# Patient Record
Sex: FEMALE | Race: WHITE | NOT HISPANIC OR LATINO | Employment: OTHER
[De-identification: names, ages, dates, MRNs, and addresses within clinical notes are randomized per-mention and may not be internally consistent; named-entity substitution may affect disease eponyms.]

---

## 2017-03-17 ENCOUNTER — RECORDS - HEALTHEAST (OUTPATIENT)
Dept: ADMINISTRATIVE | Facility: OTHER | Age: 62
End: 2017-03-17

## 2017-03-18 ENCOUNTER — COMMUNICATION - HEALTHEAST (OUTPATIENT)
Dept: FAMILY MEDICINE | Facility: CLINIC | Age: 62
End: 2017-03-18

## 2017-03-24 ENCOUNTER — COMMUNICATION - HEALTHEAST (OUTPATIENT)
Dept: FAMILY MEDICINE | Facility: CLINIC | Age: 62
End: 2017-03-24

## 2017-03-24 ENCOUNTER — OFFICE VISIT - HEALTHEAST (OUTPATIENT)
Dept: FAMILY MEDICINE | Facility: CLINIC | Age: 62
End: 2017-03-24

## 2017-03-24 DIAGNOSIS — Z02.89 PAIN MANAGEMENT CONTRACT SIGNED: ICD-10-CM

## 2017-03-24 DIAGNOSIS — Z12.31 ENCOUNTER FOR SCREENING MAMMOGRAM FOR MALIGNANT NEOPLASM OF BREAST: ICD-10-CM

## 2017-03-24 DIAGNOSIS — J45.30 MILD PERSISTENT ASTHMA: ICD-10-CM

## 2017-03-24 DIAGNOSIS — E78.5 HYPERLIPIDEMIA: ICD-10-CM

## 2017-03-24 DIAGNOSIS — E66.811 OBESITY (BMI 30.0-34.9): ICD-10-CM

## 2017-03-24 DIAGNOSIS — E11.9 TYPE 2 DIABETES MELLITUS (H): ICD-10-CM

## 2017-03-24 DIAGNOSIS — G47.33 OSA ON CPAP: ICD-10-CM

## 2017-03-24 DIAGNOSIS — I10 ESSENTIAL HYPERTENSION: ICD-10-CM

## 2017-03-24 DIAGNOSIS — G89.4 CHRONIC PAIN SYNDROME: ICD-10-CM

## 2017-03-24 LAB
CHOLEST SERPL-MCNC: 188 MG/DL
FASTING STATUS PATIENT QL REPORTED: YES
HBA1C MFR BLD: 6.3 % (ref 3.5–6)
HDLC SERPL-MCNC: 63 MG/DL
LDLC SERPL CALC-MCNC: 92 MG/DL
TRIGL SERPL-MCNC: 164 MG/DL

## 2017-04-19 ENCOUNTER — HOSPITAL ENCOUNTER (OUTPATIENT)
Dept: MAMMOGRAPHY | Facility: CLINIC | Age: 62
Discharge: HOME | End: 2017-04-19
Attending: FAMILY MEDICINE

## 2017-04-19 DIAGNOSIS — Z12.31 ENCOUNTER FOR SCREENING MAMMOGRAM FOR MALIGNANT NEOPLASM OF BREAST: ICD-10-CM

## 2017-04-29 ENCOUNTER — COMMUNICATION - HEALTHEAST (OUTPATIENT)
Dept: FAMILY MEDICINE | Facility: CLINIC | Age: 62
End: 2017-04-29

## 2017-04-29 DIAGNOSIS — M54.9 BACK PAIN: ICD-10-CM

## 2017-08-23 ENCOUNTER — COMMUNICATION - HEALTHEAST (OUTPATIENT)
Dept: SCHEDULING | Facility: CLINIC | Age: 62
End: 2017-08-23

## 2017-08-23 ENCOUNTER — COMMUNICATION - HEALTHEAST (OUTPATIENT)
Dept: FAMILY MEDICINE | Facility: CLINIC | Age: 62
End: 2017-08-23

## 2017-09-10 ENCOUNTER — COMMUNICATION - HEALTHEAST (OUTPATIENT)
Dept: FAMILY MEDICINE | Facility: CLINIC | Age: 62
End: 2017-09-10

## 2017-09-10 DIAGNOSIS — M79.7 FIBROMYALGIA: ICD-10-CM

## 2017-09-10 DIAGNOSIS — G89.4 CHRONIC PAIN SYNDROME: ICD-10-CM

## 2017-09-10 DIAGNOSIS — M54.9 BACK PAIN: ICD-10-CM

## 2017-09-13 ENCOUNTER — RECORDS - HEALTHEAST (OUTPATIENT)
Dept: ADMINISTRATIVE | Facility: OTHER | Age: 62
End: 2017-09-13

## 2017-09-29 ENCOUNTER — OFFICE VISIT - HEALTHEAST (OUTPATIENT)
Dept: FAMILY MEDICINE | Facility: CLINIC | Age: 62
End: 2017-09-29

## 2017-09-29 DIAGNOSIS — J45.30 MILD PERSISTENT ASTHMA WITHOUT COMPLICATION: ICD-10-CM

## 2017-09-29 DIAGNOSIS — F33.1 MODERATE EPISODE OF RECURRENT MAJOR DEPRESSIVE DISORDER (H): ICD-10-CM

## 2017-09-29 DIAGNOSIS — E78.5 HYPERLIPIDEMIA, UNSPECIFIED HYPERLIPIDEMIA TYPE: ICD-10-CM

## 2017-09-29 DIAGNOSIS — E11.9 TYPE 2 DIABETES MELLITUS (H): ICD-10-CM

## 2017-09-29 DIAGNOSIS — I10 ESSENTIAL HYPERTENSION WITH GOAL BLOOD PRESSURE LESS THAN 140/90: ICD-10-CM

## 2017-09-29 DIAGNOSIS — G89.4 CHRONIC PAIN SYNDROME: ICD-10-CM

## 2017-09-29 DIAGNOSIS — S32.039A: ICD-10-CM

## 2017-09-29 LAB
CHOLEST SERPL-MCNC: 249 MG/DL
FASTING STATUS PATIENT QL REPORTED: NO
HBA1C MFR BLD: 6 % (ref 3.5–6)
HDLC SERPL-MCNC: 69 MG/DL
LDLC SERPL CALC-MCNC: 146 MG/DL
TRIGL SERPL-MCNC: 171 MG/DL

## 2017-10-16 ENCOUNTER — COMMUNICATION - HEALTHEAST (OUTPATIENT)
Dept: FAMILY MEDICINE | Facility: CLINIC | Age: 62
End: 2017-10-16

## 2017-10-21 ENCOUNTER — AMBULATORY - HEALTHEAST (OUTPATIENT)
Dept: FAMILY MEDICINE | Facility: CLINIC | Age: 62
End: 2017-10-21

## 2017-10-26 ENCOUNTER — COMMUNICATION - HEALTHEAST (OUTPATIENT)
Dept: FAMILY MEDICINE | Facility: CLINIC | Age: 62
End: 2017-10-26

## 2017-11-10 ENCOUNTER — COMMUNICATION - HEALTHEAST (OUTPATIENT)
Dept: FAMILY MEDICINE | Facility: CLINIC | Age: 62
End: 2017-11-10

## 2017-11-10 ENCOUNTER — RECORDS - HEALTHEAST (OUTPATIENT)
Dept: ADMINISTRATIVE | Facility: OTHER | Age: 62
End: 2017-11-10

## 2017-11-10 DIAGNOSIS — G89.29 OTHER CHRONIC PAIN: ICD-10-CM

## 2017-11-22 ENCOUNTER — COMMUNICATION - HEALTHEAST (OUTPATIENT)
Dept: FAMILY MEDICINE | Facility: CLINIC | Age: 62
End: 2017-11-22

## 2017-11-22 DIAGNOSIS — M54.9 BACK PAIN: ICD-10-CM

## 2018-01-29 ENCOUNTER — AMBULATORY - HEALTHEAST (OUTPATIENT)
Dept: FAMILY MEDICINE | Facility: CLINIC | Age: 63
End: 2018-01-29

## 2018-01-29 ENCOUNTER — OFFICE VISIT - HEALTHEAST (OUTPATIENT)
Dept: FAMILY MEDICINE | Facility: CLINIC | Age: 63
End: 2018-01-29

## 2018-01-29 DIAGNOSIS — G89.4 CHRONIC PAIN SYNDROME: ICD-10-CM

## 2018-01-29 DIAGNOSIS — I10 ESSENTIAL HYPERTENSION: ICD-10-CM

## 2018-01-29 DIAGNOSIS — E66.9 OBESITY (BMI 30-39.9): ICD-10-CM

## 2018-01-29 DIAGNOSIS — E11.9 TYPE 2 DIABETES MELLITUS (H): ICD-10-CM

## 2018-01-29 DIAGNOSIS — E78.5 HYPERLIPIDEMIA, UNSPECIFIED HYPERLIPIDEMIA TYPE: ICD-10-CM

## 2018-01-29 DIAGNOSIS — J45.30 MILD PERSISTENT ASTHMA WITHOUT COMPLICATION: ICD-10-CM

## 2018-01-29 LAB
ANION GAP SERPL CALCULATED.3IONS-SCNC: 9 MMOL/L (ref 5–18)
BUN SERPL-MCNC: 18 MG/DL (ref 8–22)
CALCIUM SERPL-MCNC: 9.7 MG/DL (ref 8.5–10.5)
CHLORIDE BLD-SCNC: 108 MMOL/L (ref 98–107)
CHOLEST SERPL-MCNC: 234 MG/DL
CO2 SERPL-SCNC: 25 MMOL/L (ref 22–31)
CREAT SERPL-MCNC: 0.96 MG/DL (ref 0.6–1.1)
CREAT UR-MCNC: 70.8 MG/DL
FASTING STATUS PATIENT QL REPORTED: YES
GFR SERPL CREATININE-BSD FRML MDRD: 59 ML/MIN/1.73M2
GLUCOSE BLD-MCNC: 143 MG/DL (ref 70–125)
HBA1C MFR BLD: 6.8 % (ref 3.5–6)
HDLC SERPL-MCNC: 64 MG/DL
LDLC SERPL CALC-MCNC: 137 MG/DL
MICROALBUMIN UR-MCNC: <0.5 MG/DL (ref 0–1.99)
MICROALBUMIN/CREAT UR: NORMAL MG/G
POTASSIUM BLD-SCNC: 4.3 MMOL/L (ref 3.5–5)
SODIUM SERPL-SCNC: 142 MMOL/L (ref 136–145)
TRIGL SERPL-MCNC: 163 MG/DL

## 2018-01-30 ENCOUNTER — COMMUNICATION - HEALTHEAST (OUTPATIENT)
Dept: FAMILY MEDICINE | Facility: CLINIC | Age: 63
End: 2018-01-30

## 2018-01-30 DIAGNOSIS — I10 ESSENTIAL HYPERTENSION: ICD-10-CM

## 2018-02-05 ENCOUNTER — COMMUNICATION - HEALTHEAST (OUTPATIENT)
Dept: FAMILY MEDICINE | Facility: CLINIC | Age: 63
End: 2018-02-05

## 2018-04-06 ENCOUNTER — RECORDS - HEALTHEAST (OUTPATIENT)
Dept: ADMINISTRATIVE | Facility: OTHER | Age: 63
End: 2018-04-06

## 2018-04-12 ENCOUNTER — RECORDS - HEALTHEAST (OUTPATIENT)
Dept: ADMINISTRATIVE | Facility: OTHER | Age: 63
End: 2018-04-12

## 2018-06-04 ENCOUNTER — COMMUNICATION - HEALTHEAST (OUTPATIENT)
Dept: FAMILY MEDICINE | Facility: CLINIC | Age: 63
End: 2018-06-04

## 2018-06-04 DIAGNOSIS — E11.9 TYPE 2 DIABETES MELLITUS (H): ICD-10-CM

## 2018-06-11 ENCOUNTER — OFFICE VISIT - HEALTHEAST (OUTPATIENT)
Dept: FAMILY MEDICINE | Facility: CLINIC | Age: 63
End: 2018-06-11

## 2018-06-11 DIAGNOSIS — G89.4 CHRONIC PAIN SYNDROME: ICD-10-CM

## 2018-06-11 DIAGNOSIS — Z02.89 PAIN MANAGEMENT CONTRACT SIGNED: ICD-10-CM

## 2018-06-11 DIAGNOSIS — I10 ESSENTIAL HYPERTENSION: ICD-10-CM

## 2018-06-11 DIAGNOSIS — M51.369 DEGENERATIVE DISC DISEASE, LUMBAR: ICD-10-CM

## 2018-06-11 DIAGNOSIS — H60.90 OTITIS EXTERNA: ICD-10-CM

## 2018-06-11 DIAGNOSIS — E78.5 HYPERLIPIDEMIA, UNSPECIFIED HYPERLIPIDEMIA TYPE: ICD-10-CM

## 2018-06-11 DIAGNOSIS — E55.9 VITAMIN D DEFICIENCY: ICD-10-CM

## 2018-06-11 DIAGNOSIS — E11.9 TYPE 2 DIABETES MELLITUS (H): ICD-10-CM

## 2018-06-11 DIAGNOSIS — R26.89 POOR BALANCE: ICD-10-CM

## 2018-06-11 DIAGNOSIS — R29.6 FREQUENT FALLS: ICD-10-CM

## 2018-06-11 DIAGNOSIS — M50.30 DEGENERATIVE DISC DISEASE, CERVICAL: ICD-10-CM

## 2018-06-11 LAB
ANION GAP SERPL CALCULATED.3IONS-SCNC: 14 MMOL/L (ref 5–18)
BUN SERPL-MCNC: 23 MG/DL (ref 8–22)
CALCIUM SERPL-MCNC: 10.3 MG/DL (ref 8.5–10.5)
CHLORIDE BLD-SCNC: 107 MMOL/L (ref 98–107)
CHOLEST SERPL-MCNC: 193 MG/DL
CO2 SERPL-SCNC: 22 MMOL/L (ref 22–31)
CREAT SERPL-MCNC: 0.96 MG/DL (ref 0.6–1.1)
FASTING STATUS PATIENT QL REPORTED: YES
GFR SERPL CREATININE-BSD FRML MDRD: 59 ML/MIN/1.73M2
GLUCOSE BLD-MCNC: 119 MG/DL (ref 70–125)
HBA1C MFR BLD: 6.7 % (ref 3.5–6)
HDLC SERPL-MCNC: 57 MG/DL
LDLC SERPL CALC-MCNC: 103 MG/DL
POTASSIUM BLD-SCNC: 4.6 MMOL/L (ref 3.5–5)
SODIUM SERPL-SCNC: 143 MMOL/L (ref 136–145)
TRIGL SERPL-MCNC: 164 MG/DL

## 2018-06-11 RX ORDER — TRIAMCINOLONE ACETONIDE 0.25 MG/G
OINTMENT TOPICAL
Qty: 15 G | Refills: 0 | Status: SHIPPED | OUTPATIENT
Start: 2018-06-11 | End: 2024-06-19

## 2018-06-12 LAB
25(OH)D3 SERPL-MCNC: 18.5 NG/ML (ref 30–80)
25(OH)D3 SERPL-MCNC: 18.5 NG/ML (ref 30–80)

## 2018-06-15 ENCOUNTER — RECORDS - HEALTHEAST (OUTPATIENT)
Dept: ADMINISTRATIVE | Facility: OTHER | Age: 63
End: 2018-06-15

## 2018-06-19 ENCOUNTER — HOSPITAL ENCOUNTER (OUTPATIENT)
Dept: PHYSICAL MEDICINE AND REHAB | Facility: CLINIC | Age: 63
Discharge: HOME | End: 2018-06-19
Attending: FAMILY MEDICINE

## 2018-06-19 DIAGNOSIS — M47.816 LUMBAR FACET ARTHROPATHY: ICD-10-CM

## 2018-06-19 DIAGNOSIS — M54.50 LUMBAR SPINE PAIN: ICD-10-CM

## 2018-06-19 DIAGNOSIS — Z98.1 HISTORY OF LUMBAR FUSION: ICD-10-CM

## 2018-06-19 DIAGNOSIS — M79.7 FIBROMYALGIA: ICD-10-CM

## 2018-06-19 DIAGNOSIS — M12.88 OTHER SPECIFIC ARTHROPATHIES, NOT ELSEWHERE CLASSIFIED, OTHER SPECIFIED SITE: ICD-10-CM

## 2018-06-19 DIAGNOSIS — M47.812 CERVICAL FACET SYNDROME: ICD-10-CM

## 2018-06-19 DIAGNOSIS — M54.2 CERVICAL SPINE PAIN: ICD-10-CM

## 2018-06-19 DIAGNOSIS — R29.6 FALLS FREQUENTLY: ICD-10-CM

## 2018-06-19 ASSESSMENT — MIFFLIN-ST. JEOR: SCORE: 1469.94

## 2018-06-27 ENCOUNTER — COMMUNICATION - HEALTHEAST (OUTPATIENT)
Dept: PHYSICAL MEDICINE AND REHAB | Facility: CLINIC | Age: 63
End: 2018-06-27

## 2018-06-27 DIAGNOSIS — M47.816 LUMBAR FACET ARTHROPATHY: ICD-10-CM

## 2018-06-27 DIAGNOSIS — Z98.1 HISTORY OF LUMBAR FUSION: ICD-10-CM

## 2018-06-27 DIAGNOSIS — M48.061 LUMBAR SPINAL STENOSIS: ICD-10-CM

## 2018-06-27 DIAGNOSIS — M47.812 FACET ARTHROPATHY, CERVICAL: ICD-10-CM

## 2018-06-29 ENCOUNTER — RECORDS - HEALTHEAST (OUTPATIENT)
Dept: ADMINISTRATIVE | Facility: OTHER | Age: 63
End: 2018-06-29

## 2018-07-26 ENCOUNTER — OFFICE VISIT - HEALTHEAST (OUTPATIENT)
Dept: FAMILY MEDICINE | Facility: CLINIC | Age: 63
End: 2018-07-26

## 2018-07-26 DIAGNOSIS — L02.612 ABSCESS, TOE, LEFT: ICD-10-CM

## 2018-07-26 DIAGNOSIS — L03.039 CELLULITIS, TOE: ICD-10-CM
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
## 2018-07-26 DIAGNOSIS — E66.01 MORBID OBESITY (H): ICD-10-CM

## 2018-07-27 ENCOUNTER — COMMUNICATION - HEALTHEAST (OUTPATIENT)
Dept: FAMILY MEDICINE | Facility: CLINIC | Age: 63
End: 2018-07-27

## 2018-07-27 DIAGNOSIS — M54.9 BACK PAIN: ICD-10-CM

## 2018-08-10 ENCOUNTER — COMMUNICATION - HEALTHEAST (OUTPATIENT)
Dept: FAMILY MEDICINE | Facility: CLINIC | Age: 63
End: 2018-08-10

## 2018-08-10 DIAGNOSIS — E11.9 TYPE 2 DIABETES MELLITUS (H): ICD-10-CM

## 2018-08-11 ENCOUNTER — AMBULATORY - HEALTHEAST (OUTPATIENT)
Dept: FAMILY MEDICINE | Facility: CLINIC | Age: 63
End: 2018-08-11

## 2018-08-11 DIAGNOSIS — E11.9 TYPE 2 DIABETES MELLITUS (H): ICD-10-CM

## 2018-08-17 ENCOUNTER — RECORDS - HEALTHEAST (OUTPATIENT)
Dept: ADMINISTRATIVE | Facility: OTHER | Age: 63
End: 2018-08-17

## 2018-10-03 ENCOUNTER — COMMUNICATION - HEALTHEAST (OUTPATIENT)
Dept: FAMILY MEDICINE | Facility: CLINIC | Age: 63
End: 2018-10-03

## 2018-10-17 ENCOUNTER — COMMUNICATION - HEALTHEAST (OUTPATIENT)
Dept: FAMILY MEDICINE | Facility: CLINIC | Age: 63
End: 2018-10-17

## 2018-10-22 ENCOUNTER — OFFICE VISIT - HEALTHEAST (OUTPATIENT)
Dept: FAMILY MEDICINE | Facility: CLINIC | Age: 63
End: 2018-10-22

## 2018-10-22 DIAGNOSIS — E78.5 HYPERLIPIDEMIA, UNSPECIFIED HYPERLIPIDEMIA TYPE: ICD-10-CM

## 2018-10-22 DIAGNOSIS — Z23 IMMUNIZATION DUE: ICD-10-CM

## 2018-10-22 DIAGNOSIS — I10 ESSENTIAL HYPERTENSION: ICD-10-CM

## 2018-10-22 DIAGNOSIS — N32.81 OAB (OVERACTIVE BLADDER): ICD-10-CM

## 2018-10-22 DIAGNOSIS — E11.9 TYPE 2 DIABETES MELLITUS (H): ICD-10-CM

## 2018-10-22 DIAGNOSIS — E55.9 VITAMIN D DEFICIENCY: ICD-10-CM

## 2018-10-22 DIAGNOSIS — G89.4 CHRONIC PAIN SYNDROME: ICD-10-CM

## 2018-10-22 DIAGNOSIS — Z12.11 SCREEN FOR COLON CANCER: ICD-10-CM

## 2018-10-22 DIAGNOSIS — J45.30 MILD PERSISTENT ASTHMA WITHOUT COMPLICATION: ICD-10-CM

## 2018-10-22 LAB
ALBUMIN SERPL-MCNC: 4.1 G/DL (ref 3.5–5)
ALP SERPL-CCNC: 109 U/L (ref 45–120)
ALT SERPL W P-5'-P-CCNC: 24 U/L (ref 0–45)
ANION GAP SERPL CALCULATED.3IONS-SCNC: 11 MMOL/L (ref 5–18)
AST SERPL W P-5'-P-CCNC: 22 U/L (ref 0–40)
BILIRUB SERPL-MCNC: 0.7 MG/DL (ref 0–1)
BUN SERPL-MCNC: 23 MG/DL (ref 8–22)
CALCIUM SERPL-MCNC: 9.9 MG/DL (ref 8.5–10.5)
CHLORIDE BLD-SCNC: 107 MMOL/L (ref 98–107)
CHOLEST SERPL-MCNC: 199 MG/DL
CO2 SERPL-SCNC: 23 MMOL/L (ref 22–31)
CREAT SERPL-MCNC: 0.97 MG/DL (ref 0.6–1.1)
FASTING STATUS PATIENT QL REPORTED: ABNORMAL
GFR SERPL CREATININE-BSD FRML MDRD: 58 ML/MIN/1.73M2
GLUCOSE BLD-MCNC: 146 MG/DL (ref 70–125)
HBA1C MFR BLD: 7 % (ref 3.5–6)
HDLC SERPL-MCNC: 55 MG/DL
LDLC SERPL CALC-MCNC: 107 MG/DL
POTASSIUM BLD-SCNC: 4.5 MMOL/L (ref 3.5–5)
PROT SERPL-MCNC: 6.8 G/DL (ref 6–8)
SODIUM SERPL-SCNC: 141 MMOL/L (ref 136–145)
TRIGL SERPL-MCNC: 183 MG/DL

## 2018-10-23 LAB
25(OH)D3 SERPL-MCNC: 22.2 NG/ML (ref 30–80)
25(OH)D3 SERPL-MCNC: 22.2 NG/ML (ref 30–80)

## 2018-10-25 ENCOUNTER — COMMUNICATION - HEALTHEAST (OUTPATIENT)
Dept: SCHEDULING | Facility: CLINIC | Age: 63
End: 2018-10-25

## 2018-10-25 ENCOUNTER — COMMUNICATION - HEALTHEAST (OUTPATIENT)
Dept: FAMILY MEDICINE | Facility: CLINIC | Age: 63
End: 2018-10-25

## 2018-10-25 ENCOUNTER — RECORDS - HEALTHEAST (OUTPATIENT)
Dept: ADMINISTRATIVE | Facility: OTHER | Age: 63
End: 2018-10-25

## 2018-11-06 ENCOUNTER — RECORDS - HEALTHEAST (OUTPATIENT)
Dept: ADMINISTRATIVE | Facility: OTHER | Age: 63
End: 2018-11-06

## 2018-11-06 LAB — RETINOPATHY: NEGATIVE

## 2018-11-19 ENCOUNTER — COMMUNICATION - HEALTHEAST (OUTPATIENT)
Dept: FAMILY MEDICINE | Facility: CLINIC | Age: 63
End: 2018-11-19

## 2018-11-19 DIAGNOSIS — G89.29 OTHER CHRONIC PAIN: ICD-10-CM

## 2018-11-24 ENCOUNTER — COMMUNICATION - HEALTHEAST (OUTPATIENT)
Dept: FAMILY MEDICINE | Facility: CLINIC | Age: 63
End: 2018-11-24

## 2018-11-24 DIAGNOSIS — G89.4 CHRONIC PAIN SYNDROME: ICD-10-CM

## 2018-12-03 ENCOUNTER — OFFICE VISIT - HEALTHEAST (OUTPATIENT)
Dept: FAMILY MEDICINE | Facility: CLINIC | Age: 63
End: 2018-12-03

## 2018-12-03 DIAGNOSIS — E78.5 HYPERLIPIDEMIA, UNSPECIFIED HYPERLIPIDEMIA TYPE: ICD-10-CM

## 2018-12-03 DIAGNOSIS — G89.4 CHRONIC PAIN SYNDROME: ICD-10-CM

## 2018-12-03 DIAGNOSIS — J45.30 MILD PERSISTENT ASTHMA WITHOUT COMPLICATION: ICD-10-CM

## 2018-12-03 DIAGNOSIS — Z01.818 PREOP GENERAL PHYSICAL EXAM: ICD-10-CM

## 2018-12-03 DIAGNOSIS — I10 ESSENTIAL HYPERTENSION: ICD-10-CM

## 2018-12-03 DIAGNOSIS — E11.9 TYPE 2 DIABETES MELLITUS WITHOUT COMPLICATION, WITHOUT LONG-TERM CURRENT USE OF INSULIN (H): ICD-10-CM

## 2018-12-03 DIAGNOSIS — H25.9 SENILE CATARACT OF RIGHT EYE, UNSPECIFIED AGE-RELATED CATARACT TYPE: ICD-10-CM

## 2018-12-03 ASSESSMENT — MIFFLIN-ST. JEOR: SCORE: 1493.87

## 2018-12-12 ENCOUNTER — COMMUNICATION - HEALTHEAST (OUTPATIENT)
Dept: FAMILY MEDICINE | Facility: CLINIC | Age: 63
End: 2018-12-12

## 2018-12-28 ENCOUNTER — COMMUNICATION - HEALTHEAST (OUTPATIENT)
Dept: FAMILY MEDICINE | Facility: CLINIC | Age: 63
End: 2018-12-28

## 2018-12-28 DIAGNOSIS — E11.9 TYPE 2 DIABETES MELLITUS (H): ICD-10-CM

## 2019-01-21 ENCOUNTER — COMMUNICATION - HEALTHEAST (OUTPATIENT)
Dept: FAMILY MEDICINE | Facility: CLINIC | Age: 64
End: 2019-01-21

## 2019-01-21 RX ORDER — DICLOFENAC EPOLAMINE 0.01 G/1
1 SYSTEM TOPICAL EVERY 12 HOURS PRN
Qty: 60 PATCH | Refills: 5 | Status: SHIPPED | OUTPATIENT
Start: 2019-01-21 | End: 2023-07-06

## 2019-01-25 ENCOUNTER — COMMUNICATION - HEALTHEAST (OUTPATIENT)
Dept: FAMILY MEDICINE | Facility: CLINIC | Age: 64
End: 2019-01-25

## 2019-01-29 ENCOUNTER — COMMUNICATION - HEALTHEAST (OUTPATIENT)
Dept: FAMILY MEDICINE | Facility: CLINIC | Age: 64
End: 2019-01-29

## 2019-01-29 DIAGNOSIS — E11.9 TYPE 2 DIABETES MELLITUS (H): ICD-10-CM

## 2019-01-29 DIAGNOSIS — G89.4 CHRONIC PAIN SYNDROME: ICD-10-CM

## 2019-01-30 ENCOUNTER — COMMUNICATION - HEALTHEAST (OUTPATIENT)
Dept: FAMILY MEDICINE | Facility: CLINIC | Age: 64
End: 2019-01-30

## 2019-01-30 DIAGNOSIS — G89.4 CHRONIC PAIN SYNDROME: ICD-10-CM

## 2019-03-07 ENCOUNTER — RECORDS - HEALTHEAST (OUTPATIENT)
Dept: ADMINISTRATIVE | Facility: OTHER | Age: 64
End: 2019-03-07

## 2019-03-07 ENCOUNTER — COMMUNICATION - HEALTHEAST (OUTPATIENT)
Dept: FAMILY MEDICINE | Facility: CLINIC | Age: 64
End: 2019-03-07

## 2019-03-20 ENCOUNTER — COMMUNICATION - HEALTHEAST (OUTPATIENT)
Dept: FAMILY MEDICINE | Facility: CLINIC | Age: 64
End: 2019-03-20

## 2019-03-20 ENCOUNTER — RECORDS - HEALTHEAST (OUTPATIENT)
Dept: FAMILY MEDICINE | Facility: CLINIC | Age: 64
End: 2019-03-20

## 2019-03-29 ENCOUNTER — OFFICE VISIT - HEALTHEAST (OUTPATIENT)
Dept: FAMILY MEDICINE | Facility: CLINIC | Age: 64
End: 2019-03-29

## 2019-03-29 DIAGNOSIS — E78.5 HYPERLIPIDEMIA, UNSPECIFIED HYPERLIPIDEMIA TYPE: ICD-10-CM

## 2019-03-29 DIAGNOSIS — G89.4 CHRONIC PAIN SYNDROME: ICD-10-CM

## 2019-03-29 DIAGNOSIS — E11.9 TYPE 2 DIABETES MELLITUS WITHOUT COMPLICATION, WITHOUT LONG-TERM CURRENT USE OF INSULIN (H): ICD-10-CM

## 2019-03-29 DIAGNOSIS — E66.01 MORBID OBESITY (H): ICD-10-CM

## 2019-03-29 DIAGNOSIS — Z12.31 VISIT FOR SCREENING MAMMOGRAM: ICD-10-CM

## 2019-03-29 DIAGNOSIS — I10 ESSENTIAL HYPERTENSION: ICD-10-CM

## 2019-03-29 DIAGNOSIS — F33.1 MODERATE EPISODE OF RECURRENT MAJOR DEPRESSIVE DISORDER (H): ICD-10-CM

## 2019-03-29 LAB
ANION GAP SERPL CALCULATED.3IONS-SCNC: 12 MMOL/L (ref 5–18)
BUN SERPL-MCNC: 23 MG/DL (ref 8–22)
CALCIUM SERPL-MCNC: 10.6 MG/DL (ref 8.5–10.5)
CHLORIDE BLD-SCNC: 106 MMOL/L (ref 98–107)
CHOLEST SERPL-MCNC: 215 MG/DL
CO2 SERPL-SCNC: 24 MMOL/L (ref 22–31)
CREAT SERPL-MCNC: 1.11 MG/DL (ref 0.6–1.1)
CREAT UR-MCNC: 93.7 MG/DL
FASTING STATUS PATIENT QL REPORTED: ABNORMAL
GFR SERPL CREATININE-BSD FRML MDRD: 49 ML/MIN/1.73M2
GLUCOSE BLD-MCNC: 157 MG/DL (ref 70–125)
HBA1C MFR BLD: 7.1 % (ref 3.5–6)
HDLC SERPL-MCNC: 60 MG/DL
LDLC SERPL CALC-MCNC: 116 MG/DL
MICROALBUMIN UR-MCNC: <0.5 MG/DL (ref 0–1.99)
MICROALBUMIN/CREAT UR: NORMAL MG/G
POTASSIUM BLD-SCNC: 5.2 MMOL/L (ref 3.5–5)
SODIUM SERPL-SCNC: 142 MMOL/L (ref 136–145)
TRIGL SERPL-MCNC: 197 MG/DL

## 2019-04-05 ENCOUNTER — COMMUNICATION - HEALTHEAST (OUTPATIENT)
Dept: FAMILY MEDICINE | Facility: CLINIC | Age: 64
End: 2019-04-05

## 2019-04-05 DIAGNOSIS — H65.90 FLUID LEVEL BEHIND TYMPANIC MEMBRANE, UNSPECIFIED LATERALITY: ICD-10-CM

## 2019-04-10 ENCOUNTER — COMMUNICATION - HEALTHEAST (OUTPATIENT)
Dept: FAMILY MEDICINE | Facility: CLINIC | Age: 64
End: 2019-04-10

## 2019-04-10 DIAGNOSIS — N32.81 OAB (OVERACTIVE BLADDER): ICD-10-CM

## 2019-04-10 DIAGNOSIS — G89.4 CHRONIC PAIN SYNDROME: ICD-10-CM

## 2019-04-10 DIAGNOSIS — E66.811 OBESITY (BMI 30.0-34.9): ICD-10-CM

## 2019-04-18 ENCOUNTER — COMMUNICATION - HEALTHEAST (OUTPATIENT)
Dept: FAMILY MEDICINE | Facility: CLINIC | Age: 64
End: 2019-04-18

## 2019-04-19 ENCOUNTER — COMMUNICATION - HEALTHEAST (OUTPATIENT)
Dept: FAMILY MEDICINE | Facility: CLINIC | Age: 64
End: 2019-04-19

## 2019-04-24 ENCOUNTER — COMMUNICATION - HEALTHEAST (OUTPATIENT)
Dept: FAMILY MEDICINE | Facility: CLINIC | Age: 64
End: 2019-04-24

## 2019-05-07 ENCOUNTER — COMMUNICATION - HEALTHEAST (OUTPATIENT)
Dept: SCHEDULING | Facility: CLINIC | Age: 64
End: 2019-05-07

## 2019-05-07 ENCOUNTER — HOSPITAL ENCOUNTER (OUTPATIENT)
Dept: MAMMOGRAPHY | Facility: CLINIC | Age: 64
Discharge: HOME | End: 2019-05-07
Attending: FAMILY MEDICINE

## 2019-05-07 ENCOUNTER — COMMUNICATION - HEALTHEAST (OUTPATIENT)
Dept: FAMILY MEDICINE | Facility: CLINIC | Age: 64
End: 2019-05-07

## 2019-05-07 ENCOUNTER — OFFICE VISIT - HEALTHEAST (OUTPATIENT)
Dept: FAMILY MEDICINE | Facility: CLINIC | Age: 64
End: 2019-05-07

## 2019-05-07 DIAGNOSIS — H65.93 OME (OTITIS MEDIA WITH EFFUSION), BILATERAL: ICD-10-CM

## 2019-05-07 DIAGNOSIS — H91.93 DECREASED HEARING OF BOTH EARS: ICD-10-CM

## 2019-05-07 DIAGNOSIS — Z12.31 VISIT FOR SCREENING MAMMOGRAM: ICD-10-CM

## 2019-05-17 ENCOUNTER — RECORDS - HEALTHEAST (OUTPATIENT)
Dept: ADMINISTRATIVE | Facility: OTHER | Age: 64
End: 2019-05-17

## 2019-05-28 ENCOUNTER — RECORDS - HEALTHEAST (OUTPATIENT)
Dept: HEALTH INFORMATION MANAGEMENT | Facility: CLINIC | Age: 64
End: 2019-05-28

## 2019-06-03 ENCOUNTER — COMMUNICATION - HEALTHEAST (OUTPATIENT)
Dept: FAMILY MEDICINE | Facility: CLINIC | Age: 64
End: 2019-06-03

## 2019-06-04 ENCOUNTER — OFFICE VISIT - HEALTHEAST (OUTPATIENT)
Dept: AUDIOLOGY | Facility: CLINIC | Age: 64
End: 2019-06-04

## 2019-06-04 ENCOUNTER — OFFICE VISIT - HEALTHEAST (OUTPATIENT)
Dept: OTOLARYNGOLOGY | Facility: CLINIC | Age: 64
End: 2019-06-04

## 2019-06-04 DIAGNOSIS — H93.8X3 SENSATION OF FULLNESS IN BOTH EARS: ICD-10-CM

## 2019-06-04 DIAGNOSIS — H61.21 IMPACTED CERUMEN OF RIGHT EAR: ICD-10-CM

## 2019-06-04 DIAGNOSIS — H92.01 OTALGIA, RIGHT: ICD-10-CM

## 2019-06-04 DIAGNOSIS — H92.03 OTALGIA OF BOTH EARS: ICD-10-CM

## 2019-06-04 DIAGNOSIS — H90.3 SENSORINEURAL HEARING LOSS, BILATERAL: ICD-10-CM

## 2019-06-04 DIAGNOSIS — H90.3 SENSORINEURAL HEARING LOSS (SNHL) OF BOTH EARS: ICD-10-CM

## 2019-06-25 ENCOUNTER — RECORDS - HEALTHEAST (OUTPATIENT)
Dept: ADMINISTRATIVE | Facility: OTHER | Age: 64
End: 2019-06-25

## 2019-06-25 LAB — COLOGUARD-ABSTRACT: POSITIVE

## 2019-07-08 ENCOUNTER — COMMUNICATION - HEALTHEAST (OUTPATIENT)
Dept: ADMINISTRATIVE | Facility: CLINIC | Age: 64
End: 2019-07-08

## 2019-07-08 DIAGNOSIS — Z12.11 SCREEN FOR COLON CANCER: ICD-10-CM

## 2019-07-12 ENCOUNTER — RECORDS - HEALTHEAST (OUTPATIENT)
Dept: HEALTH INFORMATION MANAGEMENT | Facility: CLINIC | Age: 64
End: 2019-07-12

## 2019-07-17 ENCOUNTER — COMMUNICATION - HEALTHEAST (OUTPATIENT)
Dept: FAMILY MEDICINE | Facility: CLINIC | Age: 64
End: 2019-07-17

## 2019-07-22 ENCOUNTER — RECORDS - HEALTHEAST (OUTPATIENT)
Dept: ADMINISTRATIVE | Facility: OTHER | Age: 64
End: 2019-07-22

## 2019-07-25 ENCOUNTER — COMMUNICATION - HEALTHEAST (OUTPATIENT)
Dept: FAMILY MEDICINE | Facility: CLINIC | Age: 64
End: 2019-07-25

## 2019-08-07 ENCOUNTER — COMMUNICATION - HEALTHEAST (OUTPATIENT)
Dept: FAMILY MEDICINE | Facility: CLINIC | Age: 64
End: 2019-08-07

## 2019-08-07 DIAGNOSIS — E66.811 OBESITY (BMI 30.0-34.9): ICD-10-CM

## 2019-08-07 DIAGNOSIS — M54.9 BACK PAIN: ICD-10-CM

## 2019-08-07 DIAGNOSIS — I10 ESSENTIAL HYPERTENSION: ICD-10-CM

## 2019-09-05 ENCOUNTER — COMMUNICATION - HEALTHEAST (OUTPATIENT)
Dept: FAMILY MEDICINE | Facility: CLINIC | Age: 64
End: 2019-09-05

## 2019-09-05 DIAGNOSIS — M79.7 FIBROMYALGIA: ICD-10-CM

## 2019-09-09 ENCOUNTER — OFFICE VISIT - HEALTHEAST (OUTPATIENT)
Dept: FAMILY MEDICINE | Facility: CLINIC | Age: 64
End: 2019-09-09

## 2019-09-09 DIAGNOSIS — F33.1 MODERATE EPISODE OF RECURRENT MAJOR DEPRESSIVE DISORDER (H): ICD-10-CM

## 2019-09-09 DIAGNOSIS — I10 ESSENTIAL HYPERTENSION: ICD-10-CM

## 2019-09-09 DIAGNOSIS — J45.30 MILD PERSISTENT ASTHMA WITHOUT COMPLICATION: ICD-10-CM

## 2019-09-09 DIAGNOSIS — G89.4 CHRONIC PAIN SYNDROME: ICD-10-CM

## 2019-09-09 DIAGNOSIS — E11.9 TYPE 2 DIABETES MELLITUS WITHOUT COMPLICATION, WITHOUT LONG-TERM CURRENT USE OF INSULIN (H): ICD-10-CM

## 2019-09-09 DIAGNOSIS — E78.5 HYPERLIPIDEMIA, UNSPECIFIED HYPERLIPIDEMIA TYPE: ICD-10-CM

## 2019-09-09 DIAGNOSIS — M79.7 FIBROMYALGIA: ICD-10-CM

## 2019-09-09 DIAGNOSIS — E66.01 MORBID OBESITY (H): ICD-10-CM

## 2019-09-09 DIAGNOSIS — E55.9 VITAMIN D DEFICIENCY: ICD-10-CM

## 2019-09-09 LAB
ALBUMIN SERPL-MCNC: 4 G/DL (ref 3.5–5)
ALP SERPL-CCNC: 124 U/L (ref 45–120)
ALT SERPL W P-5'-P-CCNC: 20 U/L (ref 0–45)
ANION GAP SERPL CALCULATED.3IONS-SCNC: 12 MMOL/L (ref 5–18)
AST SERPL W P-5'-P-CCNC: 21 U/L (ref 0–40)
BILIRUB SERPL-MCNC: 0.3 MG/DL (ref 0–1)
BUN SERPL-MCNC: 17 MG/DL (ref 8–22)
CALCIUM SERPL-MCNC: 9.9 MG/DL (ref 8.5–10.5)
CHLORIDE BLD-SCNC: 107 MMOL/L (ref 98–107)
CHOLEST SERPL-MCNC: 226 MG/DL
CO2 SERPL-SCNC: 23 MMOL/L (ref 22–31)
CREAT SERPL-MCNC: 0.89 MG/DL (ref 0.6–1.1)
FASTING STATUS PATIENT QL REPORTED: NO
GFR SERPL CREATININE-BSD FRML MDRD: >60 ML/MIN/1.73M2
GLUCOSE BLD-MCNC: 129 MG/DL (ref 70–125)
HBA1C MFR BLD: 7.4 % (ref 3.5–6)
HDLC SERPL-MCNC: 53 MG/DL
LDLC SERPL CALC-MCNC: 107 MG/DL
POTASSIUM BLD-SCNC: 4.5 MMOL/L (ref 3.5–5)
PROT SERPL-MCNC: 6.7 G/DL (ref 6–8)
SODIUM SERPL-SCNC: 142 MMOL/L (ref 136–145)
TRIGL SERPL-MCNC: 331 MG/DL

## 2019-09-09 RX ORDER — CYCLOBENZAPRINE HCL 10 MG
TABLET ORAL
Qty: 60 TABLET | Refills: 0 | Status: SHIPPED | OUTPATIENT
Start: 2019-09-09 | End: 2022-06-28

## 2019-09-09 ASSESSMENT — ANXIETY QUESTIONNAIRES
7. FEELING AFRAID AS IF SOMETHING AWFUL MIGHT HAPPEN: NOT AT ALL
6. BECOMING EASILY ANNOYED OR IRRITABLE: NOT AT ALL
2. NOT BEING ABLE TO STOP OR CONTROL WORRYING: NOT AT ALL
1. FEELING NERVOUS, ANXIOUS, OR ON EDGE: NOT AT ALL
4. TROUBLE RELAXING: MORE THAN HALF THE DAYS
IF YOU CHECKED OFF ANY PROBLEMS ON THIS QUESTIONNAIRE, HOW DIFFICULT HAVE THESE PROBLEMS MADE IT FOR YOU TO DO YOUR WORK, TAKE CARE OF THINGS AT HOME, OR GET ALONG WITH OTHER PEOPLE: NOT DIFFICULT AT ALL
5. BEING SO RESTLESS THAT IT IS HARD TO SIT STILL: NOT AT ALL
GAD7 TOTAL SCORE: 2
3. WORRYING TOO MUCH ABOUT DIFFERENT THINGS: NOT AT ALL

## 2019-09-09 ASSESSMENT — PATIENT HEALTH QUESTIONNAIRE - PHQ9: SUM OF ALL RESPONSES TO PHQ QUESTIONS 1-9: 5

## 2019-09-09 ASSESSMENT — MIFFLIN-ST. JEOR: SCORE: 1454.41

## 2019-09-10 LAB
25(OH)D3 SERPL-MCNC: 26.1 NG/ML (ref 30–80)
25(OH)D3 SERPL-MCNC: 26.1 NG/ML (ref 30–80)

## 2019-09-11 ENCOUNTER — COMMUNICATION - HEALTHEAST (OUTPATIENT)
Dept: FAMILY MEDICINE | Facility: CLINIC | Age: 64
End: 2019-09-11

## 2019-09-11 DIAGNOSIS — G89.29 CHRONIC LOW BACK PAIN, UNSPECIFIED BACK PAIN LATERALITY, WITH SCIATICA PRESENCE UNSPECIFIED: ICD-10-CM

## 2019-09-11 DIAGNOSIS — M25.519 ARTHRALGIA OF SHOULDER, UNSPECIFIED LATERALITY: ICD-10-CM

## 2019-09-11 DIAGNOSIS — M54.5 CHRONIC LOW BACK PAIN, UNSPECIFIED BACK PAIN LATERALITY, WITH SCIATICA PRESENCE UNSPECIFIED: ICD-10-CM

## 2019-09-12 ENCOUNTER — COMMUNICATION - HEALTHEAST (OUTPATIENT)
Dept: FAMILY MEDICINE | Facility: CLINIC | Age: 64
End: 2019-09-12

## 2019-09-12 DIAGNOSIS — E11.9 TYPE 2 DIABETES MELLITUS (H): ICD-10-CM

## 2019-09-12 DIAGNOSIS — N32.81 OAB (OVERACTIVE BLADDER): ICD-10-CM

## 2019-09-16 ENCOUNTER — RECORDS - HEALTHEAST (OUTPATIENT)
Dept: FAMILY MEDICINE | Facility: CLINIC | Age: 64
End: 2019-09-16

## 2019-09-18 ENCOUNTER — COMMUNICATION - HEALTHEAST (OUTPATIENT)
Dept: FAMILY MEDICINE | Facility: CLINIC | Age: 64
End: 2019-09-18

## 2019-09-20 ENCOUNTER — COMMUNICATION - HEALTHEAST (OUTPATIENT)
Dept: FAMILY MEDICINE | Facility: CLINIC | Age: 64
End: 2019-09-20

## 2019-09-20 ENCOUNTER — COMMUNICATION - HEALTHEAST (OUTPATIENT)
Dept: GERIATRIC MEDICINE | Facility: CLINIC | Age: 64
End: 2019-09-20

## 2019-09-23 ENCOUNTER — COMMUNICATION - HEALTHEAST (OUTPATIENT)
Dept: FAMILY MEDICINE | Facility: CLINIC | Age: 64
End: 2019-09-23

## 2019-09-23 DIAGNOSIS — M54.5 CHRONIC LOW BACK PAIN, UNSPECIFIED BACK PAIN LATERALITY, WITH SCIATICA PRESENCE UNSPECIFIED: ICD-10-CM

## 2019-09-23 DIAGNOSIS — G89.29 CHRONIC LOW BACK PAIN, UNSPECIFIED BACK PAIN LATERALITY, WITH SCIATICA PRESENCE UNSPECIFIED: ICD-10-CM

## 2019-09-26 RX ORDER — LIDOCAINE 50 MG/G
OINTMENT TOPICAL 3 TIMES DAILY
Qty: 120 G | Refills: 0 | Status: SHIPPED | OUTPATIENT
Start: 2019-09-26 | End: 2023-07-05

## 2019-11-01 ENCOUNTER — COMMUNICATION - HEALTHEAST (OUTPATIENT)
Dept: FAMILY MEDICINE | Facility: CLINIC | Age: 64
End: 2019-11-01

## 2019-11-01 DIAGNOSIS — E55.9 VITAMIN D DEFICIENCY: ICD-10-CM

## 2019-11-27 ENCOUNTER — COMMUNICATION - HEALTHEAST (OUTPATIENT)
Dept: FAMILY MEDICINE | Facility: CLINIC | Age: 64
End: 2019-11-27

## 2019-11-27 DIAGNOSIS — E11.9 TYPE 2 DIABETES MELLITUS (H): ICD-10-CM

## 2019-12-01 ENCOUNTER — COMMUNICATION - HEALTHEAST (OUTPATIENT)
Dept: FAMILY MEDICINE | Facility: CLINIC | Age: 64
End: 2019-12-01

## 2019-12-01 DIAGNOSIS — E11.9 TYPE 2 DIABETES MELLITUS (H): ICD-10-CM

## 2019-12-05 ENCOUNTER — COMMUNICATION - HEALTHEAST (OUTPATIENT)
Dept: FAMILY MEDICINE | Facility: CLINIC | Age: 64
End: 2019-12-05

## 2019-12-06 ENCOUNTER — COMMUNICATION - HEALTHEAST (OUTPATIENT)
Dept: FAMILY MEDICINE | Facility: CLINIC | Age: 64
End: 2019-12-06

## 2020-01-11 ENCOUNTER — COMMUNICATION - HEALTHEAST (OUTPATIENT)
Dept: FAMILY MEDICINE | Facility: CLINIC | Age: 65
End: 2020-01-11

## 2020-01-11 DIAGNOSIS — I10 ESSENTIAL HYPERTENSION: ICD-10-CM

## 2020-01-14 RX ORDER — SPIRONOLACTONE 100 MG/1
TABLET, FILM COATED ORAL
Qty: 90 TABLET | Refills: 2 | Status: SHIPPED | OUTPATIENT
Start: 2020-01-14 | End: 2022-07-12 | Stop reason: ALTCHOICE

## 2020-01-23 ENCOUNTER — COMMUNICATION - HEALTHEAST (OUTPATIENT)
Dept: FAMILY MEDICINE | Facility: CLINIC | Age: 65
End: 2020-01-23

## 2020-02-03 ENCOUNTER — COMMUNICATION - HEALTHEAST (OUTPATIENT)
Dept: FAMILY MEDICINE | Facility: CLINIC | Age: 65
End: 2020-02-03

## 2020-02-03 DIAGNOSIS — I10 ESSENTIAL HYPERTENSION: ICD-10-CM

## 2020-02-09 ENCOUNTER — COMMUNICATION - HEALTHEAST (OUTPATIENT)
Dept: FAMILY MEDICINE | Facility: CLINIC | Age: 65
End: 2020-02-09

## 2020-02-09 DIAGNOSIS — E66.811 OBESITY (BMI 30.0-34.9): ICD-10-CM

## 2020-03-02 ENCOUNTER — COMMUNICATION - HEALTHEAST (OUTPATIENT)
Dept: FAMILY MEDICINE | Facility: CLINIC | Age: 65
End: 2020-03-02

## 2020-03-02 ENCOUNTER — RECORDS - HEALTHEAST (OUTPATIENT)
Dept: FAMILY MEDICINE | Facility: CLINIC | Age: 65
End: 2020-03-02

## 2020-03-02 DIAGNOSIS — G89.4 CHRONIC PAIN SYNDROME: ICD-10-CM

## 2020-03-02 DIAGNOSIS — E55.9 VITAMIN D DEFICIENCY: ICD-10-CM

## 2020-03-02 DIAGNOSIS — I10 ESSENTIAL HYPERTENSION: ICD-10-CM

## 2020-03-02 RX ORDER — ACETAMINOPHEN 500 MG/1
1000 CAPSULE ORAL EVERY 6 HOURS PRN
Qty: 180 CAPSULE | Refills: 1 | Status: SHIPPED | OUTPATIENT
Start: 2020-03-02 | End: 2021-10-13

## 2020-03-13 ENCOUNTER — OFFICE VISIT - HEALTHEAST (OUTPATIENT)
Dept: FAMILY MEDICINE | Facility: CLINIC | Age: 65
End: 2020-03-13

## 2020-03-13 DIAGNOSIS — G89.4 CHRONIC PAIN SYNDROME: ICD-10-CM

## 2020-03-13 DIAGNOSIS — E66.01 MORBID OBESITY (H): ICD-10-CM

## 2020-03-13 DIAGNOSIS — E78.5 HYPERLIPIDEMIA, UNSPECIFIED HYPERLIPIDEMIA TYPE: ICD-10-CM

## 2020-03-13 DIAGNOSIS — Z11.59 ENCOUNTER FOR HEPATITIS C SCREENING TEST FOR LOW RISK PATIENT: ICD-10-CM

## 2020-03-13 DIAGNOSIS — J45.20 MILD INTERMITTENT ASTHMA WITHOUT COMPLICATION: ICD-10-CM

## 2020-03-13 DIAGNOSIS — Z79.899 CONTROLLED SUBSTANCE AGREEMENT SIGNED: ICD-10-CM

## 2020-03-13 DIAGNOSIS — E11.9 TYPE 2 DIABETES MELLITUS WITHOUT COMPLICATION, WITHOUT LONG-TERM CURRENT USE OF INSULIN (H): ICD-10-CM

## 2020-03-13 DIAGNOSIS — I10 ESSENTIAL HYPERTENSION: ICD-10-CM

## 2020-03-13 LAB
ANION GAP SERPL CALCULATED.3IONS-SCNC: 8 MMOL/L (ref 5–18)
BUN SERPL-MCNC: 21 MG/DL (ref 8–22)
CALCIUM SERPL-MCNC: 9.3 MG/DL (ref 8.5–10.5)
CHLORIDE BLD-SCNC: 102 MMOL/L (ref 98–107)
CHOLEST SERPL-MCNC: 161 MG/DL
CO2 SERPL-SCNC: 27 MMOL/L (ref 22–31)
CREAT SERPL-MCNC: 0.95 MG/DL (ref 0.6–1.1)
FASTING STATUS PATIENT QL REPORTED: YES
GFR SERPL CREATININE-BSD FRML MDRD: 59 ML/MIN/1.73M2
GLUCOSE BLD-MCNC: 148 MG/DL (ref 70–125)
HBA1C MFR BLD: 7 %
HDLC SERPL-MCNC: 55 MG/DL
LDLC SERPL CALC-MCNC: 70 MG/DL
POTASSIUM BLD-SCNC: 4.4 MMOL/L (ref 3.5–5)
SODIUM SERPL-SCNC: 137 MMOL/L (ref 136–145)
TRIGL SERPL-MCNC: 182 MG/DL

## 2020-03-13 RX ORDER — ALBUTEROL SULFATE 90 UG/1
2 AEROSOL, METERED RESPIRATORY (INHALATION) EVERY 4 HOURS PRN
Qty: 1 INHALER | Refills: 1 | Status: SHIPPED | OUTPATIENT
Start: 2020-03-13 | End: 2024-06-19

## 2020-03-13 ASSESSMENT — MIFFLIN-ST. JEOR: SCORE: 1405.36

## 2020-03-16 LAB — HCV AB SERPL QL IA: NEGATIVE

## 2020-03-23 ENCOUNTER — COMMUNICATION - HEALTHEAST (OUTPATIENT)
Dept: FAMILY MEDICINE | Facility: CLINIC | Age: 65
End: 2020-03-23

## 2020-03-23 DIAGNOSIS — E66.811 OBESITY (BMI 30.0-34.9): ICD-10-CM

## 2020-03-31 ENCOUNTER — COMMUNICATION - HEALTHEAST (OUTPATIENT)
Dept: FAMILY MEDICINE | Facility: CLINIC | Age: 65
End: 2020-03-31

## 2020-03-31 DIAGNOSIS — E11.9 TYPE 2 DIABETES MELLITUS (H): ICD-10-CM

## 2020-04-01 ENCOUNTER — AMBULATORY - HEALTHEAST (OUTPATIENT)
Dept: FAMILY MEDICINE | Facility: CLINIC | Age: 65
End: 2020-04-01

## 2020-04-01 ENCOUNTER — COMMUNICATION - HEALTHEAST (OUTPATIENT)
Dept: FAMILY MEDICINE | Facility: CLINIC | Age: 65
End: 2020-04-01

## 2020-04-01 DIAGNOSIS — E11.9 TYPE 2 DIABETES MELLITUS (H): ICD-10-CM

## 2020-04-03 ENCOUNTER — COMMUNICATION - HEALTHEAST (OUTPATIENT)
Dept: FAMILY MEDICINE | Facility: CLINIC | Age: 65
End: 2020-04-03

## 2020-04-03 DIAGNOSIS — I10 ESSENTIAL HYPERTENSION: ICD-10-CM

## 2020-04-06 ENCOUNTER — COMMUNICATION - HEALTHEAST (OUTPATIENT)
Dept: FAMILY MEDICINE | Facility: CLINIC | Age: 65
End: 2020-04-06

## 2020-05-28 ENCOUNTER — COMMUNICATION - HEALTHEAST (OUTPATIENT)
Dept: FAMILY MEDICINE | Facility: CLINIC | Age: 65
End: 2020-05-28

## 2020-05-28 DIAGNOSIS — K59.01 SLOW TRANSIT CONSTIPATION: ICD-10-CM

## 2020-06-01 RX ORDER — SENNOSIDES 8.6 MG/1
1 TABLET ORAL 2 TIMES DAILY
Qty: 180 TABLET | Refills: 3 | Status: SHIPPED | OUTPATIENT
Start: 2020-06-01 | End: 2022-10-08

## 2020-06-16 ENCOUNTER — COMMUNICATION - HEALTHEAST (OUTPATIENT)
Dept: FAMILY MEDICINE | Facility: CLINIC | Age: 65
End: 2020-06-16

## 2020-06-16 DIAGNOSIS — G89.4 CHRONIC PAIN SYNDROME: ICD-10-CM

## 2020-06-20 ENCOUNTER — COMMUNICATION - HEALTHEAST (OUTPATIENT)
Dept: FAMILY MEDICINE | Facility: CLINIC | Age: 65
End: 2020-06-20

## 2020-06-20 DIAGNOSIS — M54.50 CHRONIC LOW BACK PAIN, UNSPECIFIED BACK PAIN LATERALITY, UNSPECIFIED WHETHER SCIATICA PRESENT: ICD-10-CM

## 2020-06-20 DIAGNOSIS — G89.29 CHRONIC LOW BACK PAIN, UNSPECIFIED BACK PAIN LATERALITY, UNSPECIFIED WHETHER SCIATICA PRESENT: ICD-10-CM

## 2020-06-20 DIAGNOSIS — G89.4 CHRONIC PAIN SYNDROME: ICD-10-CM

## 2020-06-24 ENCOUNTER — HOSPITAL ENCOUNTER (OUTPATIENT)
Dept: PHYSICAL MEDICINE AND REHAB | Facility: CLINIC | Age: 65
Discharge: HOME | End: 2020-06-24
Attending: PHYSICIAN ASSISTANT

## 2020-06-24 DIAGNOSIS — Z98.1 HISTORY OF LUMBAR FUSION: ICD-10-CM

## 2020-06-24 DIAGNOSIS — R20.0 ARM NUMBNESS: ICD-10-CM

## 2020-06-24 DIAGNOSIS — M48.061 SPINAL STENOSIS OF LUMBAR REGION, UNSPECIFIED WHETHER NEUROGENIC CLAUDICATION PRESENT: ICD-10-CM

## 2020-06-24 DIAGNOSIS — M54.2 CERVICALGIA: ICD-10-CM

## 2020-06-24 DIAGNOSIS — M54.50 LUMBAR SPINE PAIN: ICD-10-CM

## 2020-06-25 ENCOUNTER — COMMUNICATION - HEALTHEAST (OUTPATIENT)
Dept: FAMILY MEDICINE | Facility: CLINIC | Age: 65
End: 2020-06-25

## 2020-06-25 DIAGNOSIS — M54.9 BACK PAIN: ICD-10-CM

## 2020-07-03 ENCOUNTER — HOSPITAL ENCOUNTER (OUTPATIENT)
Dept: MRI IMAGING | Facility: CLINIC | Age: 65
Discharge: HOME | End: 2020-07-03
Attending: PHYSICIAN ASSISTANT

## 2020-07-03 DIAGNOSIS — Z98.1 HISTORY OF LUMBAR FUSION: ICD-10-CM

## 2020-07-03 DIAGNOSIS — M48.061 SPINAL STENOSIS OF LUMBAR REGION, UNSPECIFIED WHETHER NEUROGENIC CLAUDICATION PRESENT: ICD-10-CM

## 2020-07-03 DIAGNOSIS — M54.50 LUMBAR SPINE PAIN: ICD-10-CM

## 2020-07-03 DIAGNOSIS — R20.0 ARM NUMBNESS: ICD-10-CM

## 2020-07-03 DIAGNOSIS — M54.2 CERVICALGIA: ICD-10-CM

## 2020-07-06 ENCOUNTER — COMMUNICATION - HEALTHEAST (OUTPATIENT)
Dept: PHYSICAL MEDICINE AND REHAB | Facility: CLINIC | Age: 65
End: 2020-07-06

## 2020-07-08 ENCOUNTER — HOSPITAL ENCOUNTER (OUTPATIENT)
Dept: PHYSICAL MEDICINE AND REHAB | Facility: CLINIC | Age: 65
Discharge: HOME | End: 2020-07-08
Attending: PHYSICIAN ASSISTANT

## 2020-07-08 DIAGNOSIS — Z98.1 HISTORY OF LUMBAR FUSION: ICD-10-CM

## 2020-07-08 DIAGNOSIS — M48.062 SPINAL STENOSIS OF LUMBAR REGION WITH NEUROGENIC CLAUDICATION: ICD-10-CM

## 2020-07-08 DIAGNOSIS — M47.812 ARTHROPATHY OF CERVICAL FACET JOINT: ICD-10-CM

## 2020-07-08 DIAGNOSIS — M79.18 MYOFASCIAL PAIN: ICD-10-CM

## 2020-07-08 ASSESSMENT — MIFFLIN-ST. JEOR: SCORE: 1404.51

## 2020-07-13 ENCOUNTER — COMMUNICATION - HEALTHEAST (OUTPATIENT)
Dept: FAMILY MEDICINE | Facility: CLINIC | Age: 65
End: 2020-07-13

## 2020-07-13 DIAGNOSIS — I10 ESSENTIAL HYPERTENSION: ICD-10-CM

## 2020-07-13 RX ORDER — LISINOPRIL 2.5 MG/1
TABLET ORAL
Qty: 90 TABLET | Refills: 0 | Status: SHIPPED | OUTPATIENT
Start: 2020-07-13 | End: 2022-01-03

## 2020-07-14 ENCOUNTER — AMBULATORY - HEALTHEAST (OUTPATIENT)
Dept: PHYSICAL MEDICINE AND REHAB | Facility: CLINIC | Age: 65
End: 2020-07-14

## 2020-07-14 ENCOUNTER — HOSPITAL ENCOUNTER (OUTPATIENT)
Dept: PHYSICAL MEDICINE AND REHAB | Facility: CLINIC | Age: 65
Discharge: HOME | End: 2020-07-14
Attending: PHYSICIAN ASSISTANT

## 2020-07-14 DIAGNOSIS — E11.9 DIABETES MELLITUS, TYPE 2 (H): ICD-10-CM

## 2020-07-14 DIAGNOSIS — M48.062 SPINAL STENOSIS OF LUMBAR REGION WITH NEUROGENIC CLAUDICATION: ICD-10-CM

## 2020-07-14 LAB — GLUCOSE BLDC GLUCOMTR-MCNC: 126 MG/DL (ref 70–125)

## 2020-07-27 ENCOUNTER — RECORDS - HEALTHEAST (OUTPATIENT)
Dept: ADMINISTRATIVE | Facility: OTHER | Age: 65
End: 2020-07-27

## 2020-07-27 LAB — RETINOPATHY: NEGATIVE

## 2020-07-28 ENCOUNTER — HOSPITAL ENCOUNTER (OUTPATIENT)
Dept: PHYSICAL MEDICINE AND REHAB | Facility: CLINIC | Age: 65
Discharge: HOME | End: 2020-07-28
Attending: PHYSICIAN ASSISTANT

## 2020-07-28 DIAGNOSIS — Z98.1 HISTORY OF LUMBAR FUSION: ICD-10-CM

## 2020-07-28 DIAGNOSIS — M47.812 ARTHROPATHY OF CERVICAL FACET JOINT: ICD-10-CM

## 2020-07-28 DIAGNOSIS — M48.062 SPINAL STENOSIS OF LUMBAR REGION WITH NEUROGENIC CLAUDICATION: ICD-10-CM

## 2020-07-29 ENCOUNTER — RECORDS - HEALTHEAST (OUTPATIENT)
Dept: HEALTH INFORMATION MANAGEMENT | Facility: CLINIC | Age: 65
End: 2020-07-29

## 2020-08-10 ENCOUNTER — RECORDS - HEALTHEAST (OUTPATIENT)
Dept: ADMINISTRATIVE | Facility: OTHER | Age: 65
End: 2020-08-10

## 2020-08-11 ENCOUNTER — COMMUNICATION - HEALTHEAST (OUTPATIENT)
Dept: FAMILY MEDICINE | Facility: CLINIC | Age: 65
End: 2020-08-11

## 2020-08-11 DIAGNOSIS — I10 ESSENTIAL HYPERTENSION: ICD-10-CM

## 2020-08-12 ENCOUNTER — COMMUNICATION - HEALTHEAST (OUTPATIENT)
Dept: FAMILY MEDICINE | Facility: CLINIC | Age: 65
End: 2020-08-12

## 2020-08-12 DIAGNOSIS — G89.4 CHRONIC PAIN SYNDROME: ICD-10-CM

## 2020-08-12 DIAGNOSIS — G89.29 OTHER CHRONIC PAIN: ICD-10-CM

## 2020-08-12 DIAGNOSIS — E66.811 OBESITY (BMI 30.0-34.9): ICD-10-CM

## 2020-09-08 ENCOUNTER — HOSPITAL ENCOUNTER (OUTPATIENT)
Dept: PHYSICAL MEDICINE AND REHAB | Facility: CLINIC | Age: 65
Discharge: HOME | End: 2020-09-08
Attending: PHYSICIAN ASSISTANT

## 2020-09-08 DIAGNOSIS — M47.812 ARTHROPATHY OF CERVICAL FACET JOINT: ICD-10-CM

## 2020-09-08 DIAGNOSIS — M48.062 SPINAL STENOSIS OF LUMBAR REGION WITH NEUROGENIC CLAUDICATION: ICD-10-CM

## 2020-09-08 DIAGNOSIS — Z98.1 HISTORY OF LUMBAR FUSION: ICD-10-CM

## 2020-09-08 ASSESSMENT — MIFFLIN-ST. JEOR: SCORE: 1404.51

## 2020-09-10 ENCOUNTER — COMMUNICATION - HEALTHEAST (OUTPATIENT)
Dept: FAMILY MEDICINE | Facility: CLINIC | Age: 65
End: 2020-09-10

## 2020-09-22 ENCOUNTER — RECORDS - HEALTHEAST (OUTPATIENT)
Dept: ADMINISTRATIVE | Facility: OTHER | Age: 65
End: 2020-09-22

## 2020-09-23 ENCOUNTER — HOSPITAL ENCOUNTER (OUTPATIENT)
Dept: PHYSICAL MEDICINE AND REHAB | Facility: CLINIC | Age: 65
Discharge: HOME | End: 2020-09-23
Attending: PHYSICIAN ASSISTANT

## 2020-09-23 DIAGNOSIS — M47.812 ARTHROPATHY OF CERVICAL FACET JOINT: ICD-10-CM

## 2020-10-01 ENCOUNTER — AMBULATORY - HEALTHEAST (OUTPATIENT)
Dept: PALLIATIVE MEDICINE | Facility: OTHER | Age: 65
End: 2020-10-01

## 2020-10-01 ENCOUNTER — COMMUNICATION - HEALTHEAST (OUTPATIENT)
Dept: PALLIATIVE MEDICINE | Facility: OTHER | Age: 65
End: 2020-10-01

## 2020-10-01 DIAGNOSIS — M47.812 CERVICAL SPONDYLOSIS WITHOUT MYELOPATHY: ICD-10-CM

## 2020-10-08 ENCOUNTER — OFFICE VISIT - HEALTHEAST (OUTPATIENT)
Dept: FAMILY MEDICINE | Facility: CLINIC | Age: 65
End: 2020-10-08

## 2020-10-08 DIAGNOSIS — I10 ESSENTIAL HYPERTENSION: ICD-10-CM

## 2020-10-08 DIAGNOSIS — J45.20 MILD INTERMITTENT ASTHMA WITHOUT COMPLICATION: ICD-10-CM

## 2020-10-08 DIAGNOSIS — K21.9 GASTROESOPHAGEAL REFLUX DISEASE WITHOUT ESOPHAGITIS: ICD-10-CM

## 2020-10-08 DIAGNOSIS — E78.5 HYPERLIPIDEMIA, UNSPECIFIED HYPERLIPIDEMIA TYPE: ICD-10-CM

## 2020-10-08 DIAGNOSIS — E55.9 VITAMIN D DEFICIENCY: ICD-10-CM

## 2020-10-08 DIAGNOSIS — Z79.899 CONTROLLED SUBSTANCE AGREEMENT SIGNED: ICD-10-CM

## 2020-10-08 DIAGNOSIS — Z78.0 POST-MENOPAUSE: ICD-10-CM

## 2020-10-08 DIAGNOSIS — G89.4 CHRONIC PAIN SYNDROME: ICD-10-CM

## 2020-10-08 DIAGNOSIS — F33.0 MILD EPISODE OF RECURRENT MAJOR DEPRESSIVE DISORDER (H): ICD-10-CM

## 2020-10-08 DIAGNOSIS — E11.9 TYPE 2 DIABETES MELLITUS WITHOUT COMPLICATION, WITHOUT LONG-TERM CURRENT USE OF INSULIN (H): ICD-10-CM

## 2020-10-08 DIAGNOSIS — Z23 NEED FOR VACCINATION: ICD-10-CM

## 2020-10-08 LAB
ALBUMIN SERPL-MCNC: 4 G/DL (ref 3.5–5)
ALP SERPL-CCNC: 105 U/L (ref 45–120)
ALT SERPL W P-5'-P-CCNC: 20 U/L (ref 0–45)
ANION GAP SERPL CALCULATED.3IONS-SCNC: 12 MMOL/L (ref 5–18)
AST SERPL W P-5'-P-CCNC: 20 U/L (ref 0–40)
BILIRUB SERPL-MCNC: 0.6 MG/DL (ref 0–1)
BUN SERPL-MCNC: 20 MG/DL (ref 8–22)
CALCIUM SERPL-MCNC: 9.3 MG/DL (ref 8.5–10.5)
CHLORIDE BLD-SCNC: 105 MMOL/L (ref 98–107)
CO2 SERPL-SCNC: 23 MMOL/L (ref 22–31)
CREAT SERPL-MCNC: 0.93 MG/DL (ref 0.6–1.1)
CREAT UR-MCNC: 64.6 MG/DL
GFR SERPL CREATININE-BSD FRML MDRD: 60 ML/MIN/1.73M2
GLUCOSE BLD-MCNC: 130 MG/DL (ref 70–125)
HBA1C MFR BLD: 7.1 %
MICROALBUMIN UR-MCNC: <0.5 MG/DL (ref 0–1.99)
MICROALBUMIN/CREAT UR: NORMAL MG/G{CREAT}
POTASSIUM BLD-SCNC: 4.2 MMOL/L (ref 3.5–5)
PROT SERPL-MCNC: 6.8 G/DL (ref 6–8)
SODIUM SERPL-SCNC: 140 MMOL/L (ref 136–145)

## 2020-10-08 ASSESSMENT — ANXIETY QUESTIONNAIRES
2. NOT BEING ABLE TO STOP OR CONTROL WORRYING: NOT AT ALL
IF YOU CHECKED OFF ANY PROBLEMS ON THIS QUESTIONNAIRE, HOW DIFFICULT HAVE THESE PROBLEMS MADE IT FOR YOU TO DO YOUR WORK, TAKE CARE OF THINGS AT HOME, OR GET ALONG WITH OTHER PEOPLE: NOT DIFFICULT AT ALL
3. WORRYING TOO MUCH ABOUT DIFFERENT THINGS: NOT AT ALL
GAD7 TOTAL SCORE: 1
4. TROUBLE RELAXING: SEVERAL DAYS
6. BECOMING EASILY ANNOYED OR IRRITABLE: NOT AT ALL
5. BEING SO RESTLESS THAT IT IS HARD TO SIT STILL: NOT AT ALL
7. FEELING AFRAID AS IF SOMETHING AWFUL MIGHT HAPPEN: NOT AT ALL
1. FEELING NERVOUS, ANXIOUS, OR ON EDGE: NOT AT ALL

## 2020-10-08 ASSESSMENT — PATIENT HEALTH QUESTIONNAIRE - PHQ9: SUM OF ALL RESPONSES TO PHQ QUESTIONS 1-9: 7

## 2020-10-09 ENCOUNTER — HOSPITAL ENCOUNTER (OUTPATIENT)
Dept: PHYSICAL MEDICINE AND REHAB | Facility: CLINIC | Age: 65
Discharge: HOME | End: 2020-10-09
Attending: PHYSICIAN ASSISTANT

## 2020-10-09 ENCOUNTER — COMMUNICATION - HEALTHEAST (OUTPATIENT)
Dept: FAMILY MEDICINE | Facility: CLINIC | Age: 65
End: 2020-10-09

## 2020-10-09 DIAGNOSIS — M47.812 CERVICAL SPONDYLOSIS WITHOUT MYELOPATHY: ICD-10-CM

## 2020-10-09 LAB
25(OH)D3 SERPL-MCNC: 25.9 NG/ML (ref 30–80)
25(OH)D3 SERPL-MCNC: 25.9 NG/ML (ref 30–80)

## 2020-10-09 RX ORDER — ERGOCALCIFEROL 1.25 MG/1
1 CAPSULE ORAL WEEKLY
Qty: 12 CAPSULE | Refills: 1 | Status: SHIPPED | OUTPATIENT
Start: 2020-10-09 | End: 2024-02-22

## 2020-10-13 ENCOUNTER — COMMUNICATION - HEALTHEAST (OUTPATIENT)
Dept: PHYSICAL MEDICINE AND REHAB | Facility: CLINIC | Age: 65
End: 2020-10-13

## 2020-10-13 ENCOUNTER — AMBULATORY - HEALTHEAST (OUTPATIENT)
Dept: PHYSICAL MEDICINE AND REHAB | Facility: CLINIC | Age: 65
End: 2020-10-13

## 2020-10-13 DIAGNOSIS — M47.812 CERVICAL SPONDYLOSIS WITHOUT MYELOPATHY: ICD-10-CM

## 2020-10-14 ENCOUNTER — COMMUNICATION - HEALTHEAST (OUTPATIENT)
Dept: PHYSICAL MEDICINE AND REHAB | Facility: CLINIC | Age: 65
End: 2020-10-14

## 2020-10-28 ENCOUNTER — HOSPITAL ENCOUNTER (OUTPATIENT)
Dept: PHYSICAL MEDICINE AND REHAB | Facility: CLINIC | Age: 65
Discharge: HOME | End: 2020-10-28
Attending: PHYSICIAN ASSISTANT

## 2020-10-28 DIAGNOSIS — E11.9 TYPE 2 DIABETES MELLITUS WITHOUT COMPLICATION, WITHOUT LONG-TERM CURRENT USE OF INSULIN (H): ICD-10-CM

## 2020-10-28 DIAGNOSIS — M47.812 CERVICAL SPONDYLOSIS WITHOUT MYELOPATHY: ICD-10-CM

## 2020-10-28 LAB — GLUCOSE BLDC GLUCOMTR-MCNC: 112 MG/DL (ref 70–125)

## 2020-11-30 ENCOUNTER — COMMUNICATION - HEALTHEAST (OUTPATIENT)
Dept: FAMILY MEDICINE | Facility: CLINIC | Age: 65
End: 2020-11-30

## 2020-11-30 DIAGNOSIS — G89.4 CHRONIC PAIN SYNDROME: ICD-10-CM

## 2020-11-30 DIAGNOSIS — E11.9 TYPE 2 DIABETES MELLITUS (H): ICD-10-CM

## 2020-11-30 DIAGNOSIS — N32.81 OAB (OVERACTIVE BLADDER): ICD-10-CM

## 2020-11-30 DIAGNOSIS — M54.9 BACK PAIN: ICD-10-CM

## 2020-12-01 RX ORDER — IBUPROFEN 800 MG/1
TABLET, FILM COATED ORAL
Qty: 100 TABLET | Refills: 3 | Status: SHIPPED | OUTPATIENT
Start: 2020-12-01 | End: 2022-01-03

## 2020-12-01 RX ORDER — SOLIFENACIN SUCCINATE 5 MG/1
TABLET, FILM COATED ORAL
Qty: 90 TABLET | Refills: 3 | Status: SHIPPED | OUTPATIENT
Start: 2020-12-01 | End: 2021-11-10

## 2020-12-01 RX ORDER — TOPIRAMATE 100 MG/1
TABLET, FILM COATED ORAL
Qty: 180 TABLET | Refills: 3 | Status: SHIPPED | OUTPATIENT
Start: 2020-12-01 | End: 2023-03-09

## 2020-12-01 RX ORDER — GLIPIZIDE 5 MG/1
TABLET, FILM COATED, EXTENDED RELEASE ORAL
Qty: 90 TABLET | Refills: 3 | Status: SHIPPED | OUTPATIENT
Start: 2020-12-01 | End: 2021-12-17

## 2020-12-04 ENCOUNTER — COMMUNICATION - HEALTHEAST (OUTPATIENT)
Dept: FAMILY MEDICINE | Facility: CLINIC | Age: 65
End: 2020-12-04

## 2020-12-04 DIAGNOSIS — E11.9 TYPE 2 DIABETES MELLITUS (H): ICD-10-CM

## 2020-12-06 RX ORDER — DAPAGLIFLOZIN 10 MG/1
10 TABLET, FILM COATED ORAL DAILY
Qty: 90 TABLET | Refills: 1 | Status: SHIPPED | OUTPATIENT
Start: 2020-12-06 | End: 2021-08-11

## 2020-12-11 ENCOUNTER — COMMUNICATION - HEALTHEAST (OUTPATIENT)
Dept: FAMILY MEDICINE | Facility: CLINIC | Age: 65
End: 2020-12-11

## 2020-12-11 DIAGNOSIS — E11.9 TYPE 2 DIABETES MELLITUS (H): ICD-10-CM

## 2020-12-13 RX ORDER — METFORMIN HCL 500 MG
1000 TABLET, EXTENDED RELEASE 24 HR ORAL 2 TIMES DAILY WITH MEALS
Qty: 360 TABLET | Refills: 2 | Status: SHIPPED | OUTPATIENT
Start: 2020-12-13 | End: 2022-02-16

## 2020-12-14 ENCOUNTER — COMMUNICATION - HEALTHEAST (OUTPATIENT)
Dept: FAMILY MEDICINE | Facility: CLINIC | Age: 65
End: 2020-12-14

## 2020-12-14 DIAGNOSIS — G89.4 CHRONIC PAIN SYNDROME: ICD-10-CM

## 2020-12-15 RX ORDER — DULOXETIN HYDROCHLORIDE 60 MG/1
CAPSULE, DELAYED RELEASE ORAL
Qty: 90 CAPSULE | Refills: 3 | Status: SHIPPED | OUTPATIENT
Start: 2020-12-15 | End: 2022-02-28

## 2020-12-28 ENCOUNTER — HOSPITAL ENCOUNTER (OUTPATIENT)
Dept: MAMMOGRAPHY | Facility: CLINIC | Age: 65
Discharge: HOME | End: 2020-12-28
Attending: FAMILY MEDICINE

## 2020-12-28 ENCOUNTER — RECORDS - HEALTHEAST (OUTPATIENT)
Dept: BONE DENSITY | Facility: CLINIC | Age: 65
End: 2020-12-28

## 2020-12-28 DIAGNOSIS — Z12.31 SCREENING MAMMOGRAM, ENCOUNTER FOR: ICD-10-CM

## 2020-12-28 DIAGNOSIS — Z78.0 ASYMPTOMATIC MENOPAUSAL STATE: ICD-10-CM

## 2020-12-29 ENCOUNTER — RECORDS - HEALTHEAST (OUTPATIENT)
Dept: ADMINISTRATIVE | Facility: OTHER | Age: 65
End: 2020-12-29

## 2021-01-18 ENCOUNTER — COMMUNICATION - HEALTHEAST (OUTPATIENT)
Dept: SCHEDULING | Facility: CLINIC | Age: 66
End: 2021-01-18

## 2021-01-18 ENCOUNTER — OFFICE VISIT - HEALTHEAST (OUTPATIENT)
Dept: FAMILY MEDICINE | Facility: CLINIC | Age: 66
End: 2021-01-18

## 2021-01-18 DIAGNOSIS — N30.00 ACUTE CYSTITIS WITHOUT HEMATURIA: ICD-10-CM

## 2021-01-18 DIAGNOSIS — R05.9 COUGH: ICD-10-CM

## 2021-01-18 DIAGNOSIS — R42 DIZZINESS: ICD-10-CM

## 2021-01-18 DIAGNOSIS — R53.83 FATIGUE, UNSPECIFIED TYPE: ICD-10-CM

## 2021-01-18 LAB
ALBUMIN SERPL-MCNC: 4 G/DL (ref 3.5–5)
ALBUMIN UR-MCNC: NEGATIVE MG/DL
ALP SERPL-CCNC: 97 U/L (ref 45–120)
ALT SERPL W P-5'-P-CCNC: 18 U/L (ref 0–45)
ANION GAP SERPL CALCULATED.3IONS-SCNC: 13 MMOL/L (ref 5–18)
APPEARANCE UR: ABNORMAL
AST SERPL W P-5'-P-CCNC: 19 U/L (ref 0–40)
BACTERIA #/AREA URNS HPF: ABNORMAL HPF
BILIRUB SERPL-MCNC: 0.4 MG/DL (ref 0–1)
BILIRUB UR QL STRIP: NEGATIVE
BUN SERPL-MCNC: 20 MG/DL (ref 8–22)
CALCIUM SERPL-MCNC: 9.4 MG/DL (ref 8.5–10.5)
CHLORIDE BLD-SCNC: 101 MMOL/L (ref 98–107)
CO2 SERPL-SCNC: 24 MMOL/L (ref 22–31)
COLOR UR AUTO: YELLOW
CREAT SERPL-MCNC: 0.95 MG/DL (ref 0.6–1.1)
ERYTHROCYTE [DISTWIDTH] IN BLOOD BY AUTOMATED COUNT: 11.3 % (ref 11–14.5)
GFR SERPL CREATININE-BSD FRML MDRD: 59 ML/MIN/1.73M2
GLUCOSE BLD-MCNC: 192 MG/DL (ref 70–125)
GLUCOSE UR STRIP-MCNC: ABNORMAL MG/DL
HCT VFR BLD AUTO: 50.1 % (ref 35–47)
HGB BLD-MCNC: 16.4 G/DL (ref 12–16)
HGB UR QL STRIP: NEGATIVE
KETONES UR STRIP-MCNC: NEGATIVE MG/DL
LEUKOCYTE ESTERASE UR QL STRIP: NEGATIVE
MCH RBC QN AUTO: 31.4 PG (ref 27–34)
MCHC RBC AUTO-ENTMCNC: 32.7 G/DL (ref 32–36)
MCV RBC AUTO: 96 FL (ref 80–100)
NITRATE UR QL: POSITIVE
PH UR STRIP: 7.5 [PH] (ref 5–8)
PLATELET # BLD AUTO: 325 THOU/UL (ref 140–440)
PMV BLD AUTO: 7.6 FL (ref 7–10)
POTASSIUM BLD-SCNC: 4.1 MMOL/L (ref 3.5–5)
PROT SERPL-MCNC: 7 G/DL (ref 6–8)
RBC # BLD AUTO: 5.22 MILL/UL (ref 3.8–5.4)
RBC #/AREA URNS AUTO: ABNORMAL HPF
SODIUM SERPL-SCNC: 138 MMOL/L (ref 136–145)
SP GR UR STRIP: 1.02 (ref 1–1.03)
SQUAMOUS #/AREA URNS AUTO: ABNORMAL LPF
TSH SERPL DL<=0.005 MIU/L-ACNC: 1.78 UIU/ML (ref 0.3–5)
UROBILINOGEN UR STRIP-ACNC: ABNORMAL
WBC #/AREA URNS AUTO: ABNORMAL HPF
WBC: 9.1 THOU/UL (ref 4–11)
YEAST #/AREA URNS HPF: ABNORMAL HPF

## 2021-01-19 ENCOUNTER — COMMUNICATION - HEALTHEAST (OUTPATIENT)
Dept: SCHEDULING | Facility: CLINIC | Age: 66
End: 2021-01-19

## 2021-01-19 LAB
ATRIAL RATE - MUSE: 68 BPM
DIASTOLIC BLOOD PRESSURE - MUSE: NORMAL
INTERPRETATION ECG - MUSE: NORMAL
P AXIS - MUSE: 31 DEGREES
PR INTERVAL - MUSE: 166 MS
QRS DURATION - MUSE: 54 MS
QT - MUSE: 390 MS
QTC - MUSE: 414 MS
R AXIS - MUSE: -40 DEGREES
SYSTOLIC BLOOD PRESSURE - MUSE: NORMAL
T AXIS - MUSE: 20 DEGREES
VENTRICULAR RATE- MUSE: 68 BPM

## 2021-01-20 LAB — BACTERIA SPEC CULT: ABNORMAL

## 2021-02-03 ENCOUNTER — COMMUNICATION - HEALTHEAST (OUTPATIENT)
Dept: FAMILY MEDICINE | Facility: CLINIC | Age: 66
End: 2021-02-03

## 2021-02-03 DIAGNOSIS — N30.00 ACUTE CYSTITIS WITHOUT HEMATURIA: ICD-10-CM

## 2021-03-04 ENCOUNTER — COMMUNICATION - HEALTHEAST (OUTPATIENT)
Dept: FAMILY MEDICINE | Facility: CLINIC | Age: 66
End: 2021-03-04

## 2021-03-04 DIAGNOSIS — M54.50 CHRONIC LOW BACK PAIN: ICD-10-CM

## 2021-03-04 DIAGNOSIS — E66.811 OBESITY (BMI 30.0-34.9): ICD-10-CM

## 2021-03-04 DIAGNOSIS — M25.519 ARTHRALGIA OF SHOULDER, UNSPECIFIED LATERALITY: ICD-10-CM

## 2021-03-04 DIAGNOSIS — G89.29 CHRONIC LOW BACK PAIN: ICD-10-CM

## 2021-03-04 RX ORDER — PHENTERMINE HYDROCHLORIDE 37.5 MG/1
TABLET ORAL
Qty: 135 TABLET | Refills: 0 | Status: SHIPPED | OUTPATIENT
Start: 2021-03-04 | End: 2022-06-28

## 2021-03-07 ENCOUNTER — COMMUNICATION - HEALTHEAST (OUTPATIENT)
Dept: FAMILY MEDICINE | Facility: CLINIC | Age: 66
End: 2021-03-07

## 2021-03-07 DIAGNOSIS — G89.29 OTHER CHRONIC PAIN: ICD-10-CM

## 2021-03-07 RX ORDER — GABAPENTIN 300 MG/1
CAPSULE ORAL
Qty: 240 CAPSULE | Refills: 3 | Status: SHIPPED | OUTPATIENT
Start: 2021-03-07 | End: 2022-03-25

## 2021-05-07 ENCOUNTER — TRANSFERRED RECORDS (OUTPATIENT)
Dept: HEALTH INFORMATION MANAGEMENT | Facility: CLINIC | Age: 66
End: 2021-05-07

## 2021-05-26 ENCOUNTER — RECORDS - HEALTHEAST (OUTPATIENT)
Dept: ADMINISTRATIVE | Facility: CLINIC | Age: 66
End: 2021-05-26

## 2021-05-26 ASSESSMENT — PATIENT HEALTH QUESTIONNAIRE - PHQ9: SUM OF ALL RESPONSES TO PHQ QUESTIONS 1-9: 5

## 2021-05-27 ENCOUNTER — RECORDS - HEALTHEAST (OUTPATIENT)
Dept: ADMINISTRATIVE | Facility: CLINIC | Age: 66
End: 2021-05-27

## 2021-05-27 ASSESSMENT — PATIENT HEALTH QUESTIONNAIRE - PHQ9: SUM OF ALL RESPONSES TO PHQ QUESTIONS 1-9: 7

## 2021-05-27 NOTE — TELEPHONE ENCOUNTER
Refill Approved    Rx renewed per Medication Renewal Policy. Medication was last renewed on 6/11/18.    Sandhya Mckinnon, [facility] Triage/Med Refill 4/11/2019     Requested Prescriptions   Pending Prescriptions Disp Refills     DULoxetine (CYMBALTA) 60 MG capsule [Pharmacy Med Name: DULoxetine 60MG     CAP] 90 capsule 1     Sig: TAKE 1 CAPSULE BY MOUTH ONCE DAILY       Tricyclics/Misc Antidepressant/Antianxiety Meds Refill Protocol Passed - 4/10/2019 10:01 AM        Passed - PCP or prescribing provider visit in last year     Last office visit with prescriber/PCP: 3/29/2019 Trish Eagle MD OR same dept: 3/29/2019 Trish Eagle MD OR same specialty: 3/29/2019 Trish Eagle MD  Last physical: 12/3/2018 Last MTM visit: Visit date not found   Next visit within 3 mo: Visit date not found  Next physical within 3 mo: Visit date not found  Prescriber OR PCP: Loraine) SHALOM Eagle MD  Last diagnosis associated with med order: 1. Chronic pain syndrome  - DULoxetine (CYMBALTA) 60 MG capsule [Pharmacy Med Name: DULoxetine 60MG     CAP]; TAKE 1 CAPSULE BY MOUTH ONCE DAILY  Dispense: 90 capsule; Refill: 1    If protocol passes may refill for 12 months if within 3 months of last provider visit (or a total of 15 months).

## 2021-05-27 NOTE — TELEPHONE ENCOUNTER
Question following Office Visit  When did you see your provider: 3/29/19  What is your question: She has been using an over the counter antihistamine since last week with no resolution of plugged ears.  She continues to have mild dizziness as well.   No sinus congestion.    What is next step for patient?  Okay to leave a detailed message: Yes

## 2021-05-27 NOTE — TELEPHONE ENCOUNTER
Controlled Substance Refill Request  Medication Name:   Requested Prescriptions     Pending Prescriptions Disp Refills     phentermine (ADIPEX-P) 37.5 mg tablet 90 tablet 0     Sig: Take 1 tablet (37.5 mg total) by mouth daily before breakfast.     solifenacin (VESICARE) 5 MG tablet 90 tablet 1     Sig: Take 1 tablet (5 mg total) by mouth daily.     Date Last Fill:   3/29/2019  Pharmacy:  [facility]     Submit electronically to pharmacy  Controlled Substance Agreement Date Scanned:   Encounter-Level CSA Scan Date - 03/24/2017:    Scan on 3/28/2017 11:54 AM (below)         Last office visit with prescriber/PCP: 3/29/2019 Trish Eagle MD OR same dept: 3/29/2019 Trish Eagle MD OR same specialty: 3/29/2019 Trish Eagle MD  Last physical: 12/3/2018 Last MTM visit: Visit date not found

## 2021-05-27 NOTE — TELEPHONE ENCOUNTER
[facility]ax [facility] from [facility], they have started the Prior Authorization Process via Cover My Meds    CoverMyMeds Key: [id number]    Medication Name: Gabapentin 300mg capsule    Insurance Plan: Medicare Part D  PBM: SERGIO  Patient ID: not provided on fax    Please complete the PA process

## 2021-05-27 NOTE — PROGRESS NOTES
SUBJECTIVE: Anita Duque is a 63 y.o. White or  female who presents today for a med check/diabetes check.  She also needs her controlled substance agreements updated.  She has chronic pain especially in the back and has had a CSA with me for a long time for 3 Percocet daily.  She actually does not use this much but wants to be able to have this much should she get a flare in her lumbar radiculopathy.  We fill out a CSA for Percocet 5/325 3 times daily for 90 tablets in 30 days.  She also is obese and has had phentermine on a daily basis.  She tells me that Dr. Shai Cardenas gave her 1-1/2 tablets/day on average and she would use at least one and sometimes a second tablet.  I am okay with doing this and so we write a new CSA for phentermine 37.5 mg 1.5 tabs daily for a total of 135 tablets in 90 days.  These are in 90-day increments so she can get them through mail order.  She has diabetes which is well controlled.  Her last A1c was 7.0%.  She needs her foot exam today.  She has hypertension which is well controlled hyperlipidemia with an LDL mildly elevated at 107.  She admits she is not checking blood sugars very often but when she does she says her morning sugars are between 99 and 120.  She has some major depression and scored 10 on a PHQ 9 today.  She scored 0 on her anxiety screening.  She tells me she takes 2 medications for her mood but the only one I see is Cymbalta.    OBJECTIVE: /72 (Patient Site: Right Arm, Patient Position: Sitting, Cuff Size: Adult Large)   Pulse 80   Temp 98.3  F (36.8  C) (Oral)   Wt 207 lb 4.8 oz (94 kg)   SpO2 96%   Breastfeeding? No   BMI 35.58 kg/m    General: Obese older female in no acute distress  Heart: Regular rate and rhythm without murmur  Lungs: Clear bilaterally  Abdomen: Soft, nontender  Extremities: Warm, dry with trace edema  Foot exam shows second toe left foot mild hammertoe.  No skin lesions, very mild toenail fungus limited to the  great toes bilaterally, no neuropathy on filament testing.    ASSESSMENT & PLAN:    1. Chronic pain syndrome     2. Morbid obesity (H)     3. Type 2 diabetes mellitus without complication, without long-term current use of insulin (H)  Microalbumin, Random Urine    Glycosylated Hemoglobin A1c   4. Essential hypertension  Basic Metabolic Panel   5. Hyperlipidemia, unspecified hyperlipidemia type  Lipid Cascade   6. Moderate episode of recurrent major depressive disorder (H)     7. Visit for screening mammogram  Mammo Screening Bilateral     I will check the above-mentioned lab work and get back to her on those results by my chart.  We discussed that she is due for a colonoscopy and has a Estefany guard kit at home she has not even opened.  We discussed doing so.  We also discussed that she is due for a mammogram mid next month.  I will put the new CSA is on her chart for reference.  She should follow up in 4 months time.  40 minutes spent together with the patient today, more than 50% spent in counseling, discussing the above topics.        Patient Active Problem List   Diagnosis     Essential hypertension     Joint Pain, Localized In The Shoulder     Fibromyalgia     Type 2 diabetes mellitus (H)     Hyperlipidemia     Major Depression, Recurrent     Chronic Constipation     Cellulitis     Sciatica     Abdominal Pain     Chronic pain syndrome     Lower Back Pain     Vaginal Wall Prolapse With Midline Cystocele     Female Stress Incontinence     Lump Or Mass In Breast     Osteoarthrosis, unspecified whether generalized or localized, lower leg     JANINE on CPAP     Mild persistent asthma     Obesity (BMI 30.0-34.9)     Degenerative disc disease, cervical     Degenerative disc disease, lumbar     Morbid obesity (H)     Preop general physical exam       Current Outpatient Medications on File Prior to Visit   Medication Sig Dispense Refill     cholecalciferol, vitamin D3, (VITAMIN D3) 5,000 unit Tab Take by mouth daily.        cyanocobalamin, vitamin B-12, (VITAMIN B-12 ORAL) Take 1 tablet by mouth as needed.       dapagliflozin (FARXIGA) 10 mg Tab Take 1 tablet by mouth daily. 90 tablet 1     diclofenac epolamine (FLECTOR) 1.3 % PT12 Place 1 patch on the skin every 12 (twelve) hours as needed. 60 patch 5     DULoxetine (CYMBALTA) 60 MG capsule Take 1 capsule (60 mg total) by mouth daily. 90 capsule 1     erythromycin ophthalmic ointment Administer 1 application to both eyes daily as needed.              gabapentin (NEURONTIN) 300 MG capsule TAKE TWO CAPSULES BY MOUTH 4 TIMES DAILY 240 capsule 9     glipiZIDE (GLUCOTROL XL) 5 MG 24 hr tablet Take 1 tablet (5 mg total) by mouth daily. 90 tablet 2     lidocaine (LIDODERM) 5 % Remove & Discard patch within 12 hours or as directed by MD 90 patch 0     lisinopril (PRINIVIL,ZESTRIL) 2.5 MG tablet TAKE ONE TABLET BY MOUTH ONCE DAILY 90 tablet 0     lovastatin (MEVACOR) 40 MG tablet Take 1 tablet (40 mg total) by mouth at bedtime. 90 tablet 1     metFORMIN (GLUCOPHAGE-XR) 500 MG 24 hr tablet Take 2 tablets (1,000 mg total) by mouth 2 (two) times a day with meals. 360 tablet 1     omeprazole (PRILOSEC) 40 MG capsule Take 40 mg by mouth daily .             oxyCODONE-acetaminophen (PERCOCET/ENDOCET) 5-325 mg per tablet Take 1 tablet by mouth every 6 (six) hours as needed for pain. 90 tablet 0     phentermine (ADIPEX-P) 37.5 mg tablet Take 1 tablet (37.5 mg total) by mouth daily before breakfast. 90 tablet 0     solifenacin (VESICARE) 5 MG tablet Take 1 tablet (5 mg total) by mouth daily. 90 tablet 1     spironolactone (ALDACTONE) 100 MG tablet TAKE 1 TABLET BY MOUTH DAILY 90 tablet 0     topiramate (TOPAMAX) 100 MG tablet Take 1 tablet (100 mg total) by mouth 2 (two) times a day. 180 tablet 3     triamcinolone (KENALOG) 0.025 % ointment Apply sparingly in ear canal 1-2 times daily as needed 15 g 0     [DISCONTINUED] lidocaine (LIDODERM) 5 % Place 1 patch on the skin daily Remove & Discard patch  within 12 hours or as directed by MD .       [DISCONTINUED] dapagliflozin (FARXIGA) 10 mg Tab Take 1 tablet by mouth daily. 90 tablet 1     [DISCONTINUED] ergocalciferol (ERGOCALCIFEROL) 50,000 unit capsule Take 1 capsule (50,000 Units total) by mouth once a week. 12 capsule 3     No current facility-administered medications on file prior to visit.

## 2021-05-27 NOTE — TELEPHONE ENCOUNTER
Central PA team  [phone]  Pool: FUNMILAYO PA MED ([id number])          PA has been initiated.       PA form completed and faxed insurance via Cover My Meds     Key:  [id number]     Medication:  Gabapentin 300MG capsules      Insurance: [employer]         Response will be received via fax and may take up to 5-10 business days depending on plan

## 2021-05-27 NOTE — TELEPHONE ENCOUNTER
Call patient please.  Continue with daily Zyrtec or what ever she is taking and I will send her a Medrol Dosepak to see if she can get any improvement.  Please explained to her that it takes weeks for this to resolve and she is only starting the allergy season.

## 2021-05-28 ENCOUNTER — RECORDS - HEALTHEAST (OUTPATIENT)
Dept: ADMINISTRATIVE | Facility: CLINIC | Age: 66
End: 2021-05-28

## 2021-05-28 ASSESSMENT — ANXIETY QUESTIONNAIRES
GAD7 TOTAL SCORE: 1
GAD7 TOTAL SCORE: 2

## 2021-05-28 ASSESSMENT — ASTHMA QUESTIONNAIRES: ACT_TOTALSCORE: 20

## 2021-05-28 NOTE — TELEPHONE ENCOUNTER
Central PA team  [phone]  Pool: HE PA MED ([id number])          PA has been initiated.       PA form completed and faxed insurance via Cover My Meds     Key:  [id number]     Medication:  DrugDiclofenac Epolamine 1.3% patches    Insurance: Pierre Tim         Response will be received via fax and may take up to 5-10 business days depending on plan

## 2021-05-28 NOTE — PROGRESS NOTES
SUBJECTIVE: Anita Duque is a 63 y.o. White or  female who presents today with a continued complaint of bilateral ear pain.  I saw her late in March and at that time she was complaining about some decreased hearing and full ears.  I thought perhaps it was allergies.  She denies ever having allergies and says she was tested in the past.  At that time I thought she could at least try over-the-counter Zyrtec to see if there was any improvement.  She later called back complaining that that did not help and so I gave her a Medrol dose pack to see if that would help.  She thinks perhaps it helped for a little bit but then she started having more stuffy nose and sore throat and ear pain and decreased hearing.  Her significant other was sick of screaming everything he said to her and sent her to see me again.  She reports that she had trouble as a 1-year-old with her ears.  She had a most complete loss of hearing and apparently had some sort of surgery.  She is uncertain of any details.  She says she actually found out much later in life about this.  Also she notes that when she was giving birth to her child and they went to give her oxygen with the mask she freaked out and had an episode where she had a flashback with a room full of doctors and nurses.  She noted that she saw a male doctor and her family doc was female who was delivering her child.  She goes on to tell me that her dad told her that when she was 2 months old he found her blue in a SIDS episode and he shook her and she started breathing again but this all made her have issues with school.  She was always a poor reader and had problems with memory.  She wonders about how this might be related to her ears.    OBJECTIVE: /76 (Patient Site: Right Arm, Patient Position: Sitting, Cuff Size: Adult Large)   Pulse 82   Temp 98.2  F (36.8  C) (Oral)   Wt 202 lb 8 oz (91.9 kg)   SpO2 99%   BMI 34.76 kg/m    General: Obese older female in no  acute distress  HEENT: Eyes clear, nose with clear rhinorrhea, throat clear, left ear shows fluid behind the TM, right ear has cerumen and some membrane obscuring my vision of the TM  Heart: Regular rate and rhythm  Lungs: Clear bilaterally    ASSESSMENT & PLAN:    1. OME (otitis media with effusion), bilateral  azithromycin (ZITHROMAX Z-ANIRUDH) 250 MG tablet    Ambulatory referral to ENT   2. Decreased hearing of both ears  Ambulatory referral to ENT     The patient is convinced that she has something seriously wrong although I think she likely has allergies.  I am going to give her a Z-Anirudh to see if there is any improvement in her symptoms.  I am also going to give her a referral to ears nose and throat which she is to schedule at least 2 weeks out.  If she is still having the decreased hearing and ear pain even after being treated with a Z-Anirudh, and she has an appointment set up already to be evaluated.  She can see me back at her usual interval which is her diabetes check.    Patient Active Problem List   Diagnosis     Essential hypertension     Joint Pain, Localized In The Shoulder     Fibromyalgia     Type 2 diabetes mellitus (H)     Hyperlipidemia     Major Depression, Recurrent     Chronic Constipation     Cellulitis     Sciatica     Abdominal Pain     Chronic pain syndrome     Lower Back Pain     Vaginal Wall Prolapse With Midline Cystocele     Female Stress Incontinence     Lump Or Mass In Breast     Osteoarthrosis, unspecified whether generalized or localized, lower leg     JANINE on CPAP     Mild persistent asthma     Obesity (BMI 30.0-34.9)     Degenerative disc disease, cervical     Degenerative disc disease, lumbar     Morbid obesity (H)     Preop general physical exam       Current Outpatient Medications on File Prior to Visit   Medication Sig Dispense Refill     cholecalciferol, vitamin D3, (VITAMIN D3) 5,000 unit Tab Take 5,000 Units by mouth daily.              cyanocobalamin, vitamin B-12, (VITAMIN B-12  ORAL) Take 1 tablet by mouth as needed.       dapagliflozin (FARXIGA) 10 mg Tab Take 1 tablet by mouth daily. 90 tablet 1     diclofenac epolamine (FLECTOR) 1.3 % PT12 Place 1 patch on the skin every 12 (twelve) hours as needed. 60 patch 5     DULoxetine (CYMBALTA) 60 MG capsule TAKE 1 CAPSULE BY MOUTH ONCE DAILY 90 capsule 3     erythromycin ophthalmic ointment Administer 1 application to both eyes daily as needed.              gabapentin (NEURONTIN) 300 MG capsule TAKE TWO CAPSULES BY MOUTH 4 TIMES DAILY 240 capsule 9     glipiZIDE (GLUCOTROL XL) 5 MG 24 hr tablet Take 1 tablet (5 mg total) by mouth daily. 90 tablet 2     lidocaine (LIDODERM) 5 % Remove & Discard patch within 12 hours or as directed by MD 90 patch 0     lisinopril (PRINIVIL,ZESTRIL) 2.5 MG tablet TAKE ONE TABLET BY MOUTH ONCE DAILY 90 tablet 0     lovastatin (MEVACOR) 40 MG tablet Take 1 tablet (40 mg total) by mouth at bedtime. 90 tablet 1     MAPAP, ACETAMINOPHEN, 500 mg coapsule Take 1,000 mg by mouth every 6 (six) hours as needed.         11     metFORMIN (GLUCOPHAGE-XR) 500 MG 24 hr tablet Take 2 tablets (1,000 mg total) by mouth 2 (two) times a day with meals. 360 tablet 1     omeprazole (PRILOSEC) 40 MG capsule Take 40 mg by mouth daily .             oxyCODONE-acetaminophen (PERCOCET/ENDOCET) 5-325 mg per tablet Take 1 tablet by mouth every 6 (six) hours as needed for pain. 90 tablet 0     phentermine (ADIPEX-P) 37.5 mg tablet Take 1-1/2 tabs per day in a.m. 135 tablet 0     SENNA LAXATIVE 8.6 mg tablet Take 1 tablet by mouth 2 (two) times a day.         3     solifenacin (VESICARE) 5 MG tablet Take 1 tablet (5 mg total) by mouth daily. 90 tablet 1     spironolactone (ALDACTONE) 100 MG tablet TAKE 1 TABLET BY MOUTH DAILY 90 tablet 0     topiramate (TOPAMAX) 100 MG tablet Take 1 tablet (100 mg total) by mouth 2 (two) times a day. 180 tablet 3     triamcinolone (KENALOG) 0.025 % ointment Apply sparingly in ear canal 1-2 times daily as needed  15 g 0     No current facility-administered medications on file prior to visit.

## 2021-05-28 NOTE — TELEPHONE ENCOUNTER
PRIOR AUTHORIZATION DENIED    Denial Rational: WEIGHT LOSS MEDICATIONS ARE EXCLUDED FROM ALL MEDICARE PART D PLANS.        Appeal Information: PHONE: [phone], FAX: [phone]    UNABLE TO COPY/PASTE INTO Epic.

## 2021-05-28 NOTE — TELEPHONE ENCOUNTER
Fax received from [facility], they have started the Prior Authorization Process via Cover My Meds    CoverMyMeds Key: [id number]    Medication Name: Phentermine 37.5mg tablets    Insurance Plan: A[employer] Part D  PBM:   Patient ID: [id number]    Please complete the PA process

## 2021-05-28 NOTE — TELEPHONE ENCOUNTER
Please call and talk to the patient about this.  Have her call her insurance to see what they will cover for alternatives.  If she is willing to take 1 of those medications I will certainly send a new prescription.  Otherwise she needs to tell me why it has to be this medication so I can write a letter.

## 2021-05-28 NOTE — TELEPHONE ENCOUNTER
Central PA team  [phone]  Pool: HE PA MED ([id number])          PA has been initiated.       PA form completed and faxed insurance via Cover My Meds     Key:  [id number]     Medication:  PHENTERMINE 37.5MG    Insurance:  AETNA        Response will be received via fax and may take up to 5-10 business days depending on plan

## 2021-05-28 NOTE — TELEPHONE ENCOUNTER
Seen 3 weeks ago for ears and still both hurting her.    Had head congestion since then.    No fever, dizziness or shortness of breath     Would like to be seen.    Sera Botello, RN, [facility] Nurse Triage/Med Refills RN         Reason for Disposition    Earache lasts > 1 hour    Protocols used: EAR - CONGESTION-A-OH

## 2021-05-29 ENCOUNTER — RECORDS - HEALTHEAST (OUTPATIENT)
Dept: ADMINISTRATIVE | Facility: CLINIC | Age: 66
End: 2021-05-29

## 2021-05-29 NOTE — TELEPHONE ENCOUNTER
Received fax from [facility] home oxygen on patient      Placed in Dr. Pineda's inbox       06/03/19

## 2021-05-29 NOTE — PROGRESS NOTES
Anita Duque is a 63 y.o. female seen in consultation at the request of Dr. Eagle for hearing loss in both ears for the past month and right sided ear pain.  Patient notes she had surgery in an ear at age 1. She has had non-pulsatile tinnitus in both ears for 3-4 years.  She reports no vertigo.      ALLERGY:    Allergies   Allergen Reactions     Bupropion Hcl        MEDICATIONS:     Current Outpatient Medications on File Prior to Visit   Medication Sig Dispense Refill     azithromycin (ZITHROMAX Z-ANIRUDH) 250 MG tablet Take 2 tabs day 1, then 1 tab for next 4 days 6 tablet 0     cholecalciferol, vitamin D3, (VITAMIN D3) 5,000 unit Tab Take 5,000 Units by mouth daily.              cyanocobalamin, vitamin B-12, (VITAMIN B-12 ORAL) Take 1 tablet by mouth as needed.       dapagliflozin (FARXIGA) 10 mg Tab Take 1 tablet by mouth daily. 90 tablet 1     diclofenac epolamine (FLECTOR) 1.3 % PT12 Place 1 patch on the skin every 12 (twelve) hours as needed. 60 patch 5     DULoxetine (CYMBALTA) 60 MG capsule TAKE 1 CAPSULE BY MOUTH ONCE DAILY 90 capsule 3     erythromycin ophthalmic ointment Administer 1 application to both eyes daily as needed.              gabapentin (NEURONTIN) 300 MG capsule TAKE TWO CAPSULES BY MOUTH 4 TIMES DAILY 240 capsule 9     glipiZIDE (GLUCOTROL XL) 5 MG 24 hr tablet Take 1 tablet (5 mg total) by mouth daily. 90 tablet 2     lidocaine (LIDODERM) 5 % Remove & Discard patch within 12 hours or as directed by MD 90 patch 0     lisinopril (PRINIVIL,ZESTRIL) 2.5 MG tablet TAKE ONE TABLET BY MOUTH ONCE DAILY 90 tablet 0     lovastatin (MEVACOR) 40 MG tablet Take 1 tablet (40 mg total) by mouth at bedtime. 90 tablet 1     MAPAP, ACETAMINOPHEN, 500 mg coapsule Take 1,000 mg by mouth every 6 (six) hours as needed.         11     metFORMIN (GLUCOPHAGE-XR) 500 MG 24 hr tablet Take 2 tablets (1,000 mg total) by mouth 2 (two) times a day with meals. 360 tablet 1     omeprazole (PRILOSEC) 40 MG capsule Take  40 mg by mouth daily .             oxyCODONE-acetaminophen (PERCOCET/ENDOCET) 5-325 mg per tablet Take 1 tablet by mouth every 6 (six) hours as needed for pain. 90 tablet 0     phentermine (ADIPEX-P) 37.5 mg tablet Take 1-1/2 tabs per day in a.m. 135 tablet 0     SENNA LAXATIVE 8.6 mg tablet Take 1 tablet by mouth 2 (two) times a day.         3     solifenacin (VESICARE) 5 MG tablet Take 1 tablet (5 mg total) by mouth daily. 90 tablet 1     spironolactone (ALDACTONE) 100 MG tablet TAKE 1 TABLET BY MOUTH DAILY 90 tablet 0     topiramate (TOPAMAX) 100 MG tablet Take 1 tablet (100 mg total) by mouth 2 (two) times a day. 180 tablet 3     triamcinolone (KENALOG) 0.025 % ointment Apply sparingly in ear canal 1-2 times daily as needed 15 g 0     No current facility-administered medications on file prior to visit.        Past Medical/Surgical History, Family History and Social History reviewed in detail and documented separately in the medical record.    Complete Review of Systems:  A 10-point review was performed.  Pertinent positives are noted in the HPI and on a separate scanned document in the chart.    EXAM:  There were no vitals filed for this visit.    Nurse documentation reviewed  and documented separately.    General Appearance: Pleasant, alert, appropriate appearance for age. No acute distress    Head Exam: Normal. Normocephalic, atraumatic.    Eye Exam: Normal external eye, conjunctiva, lids, cornea. Extra-ocular movements are intact.    Left external ear: normal  Left otoscopic exam: Normal EAC. Normal TM     Right external ear: normal  Right otoscopic exam: cerumen impaction    PROCEDURE  Cerumen removal  The right ear is examined under the microscope and a cerumen impaction is removed with microdissection technique.  EAC and TM are normal.     Nose Exam: Normal external nose. Septum midline. Nasal mucosa normal.  Inferior turbinates normal.    OroPharynx Exam: Dental hygiene adequate. Normal tongue. Normal  buccal mucosa. Normal palate.  Normal pharynx. Normal tonsils.    Neck Exam: Supple, no masses or nodes. Trachea and larynx midline.    Thyroid Exam: No tenderness, nodules or enlargement.    Salivary Glands: nontender without masses    Neuro: Alert and oriented times 3, CN 2-12 grossly intact, no nystagmus, PERRL, EOMI, normal speech and gait    Chest/Respiratory Exam: Normal chest wall motion and respiratory effort. No audible stridor or wheezing.    Cardiovascular Exam: Regular rate and rhythm.  No cyanosis, clubbing or edema.    Pulses: carotid pulses normal    Mood:  Calm, appropriate    Skin:  No conspicuous rash or lesion in the head and neck    ASSESSMENT:  1. Otalgia, right    2. Sensorineural hearing loss (SNHL) of both ears    3. Impacted cerumen of right ear        PLAN: Findings, assessment, and management options were discussed. Ears look healthy and if she is having continued pain, consider oral surgery evaluation. She notes that she has not seen a dentist for 5 years.  Recommend annual audiogram.

## 2021-05-30 ENCOUNTER — RECORDS - HEALTHEAST (OUTPATIENT)
Dept: ADMINISTRATIVE | Facility: CLINIC | Age: 66
End: 2021-05-30

## 2021-05-30 VITALS — WEIGHT: 181.3 LBS | BODY MASS INDEX: 31.36 KG/M2

## 2021-05-30 NOTE — TELEPHONE ENCOUNTER
Who is calling:  PatientAnita  Reason for Call:  Patient received a note from Gastrology that results of colonoscopy were sent to her primary. Patient is asking if results are available , would like the results please. Patient is anxious for the results.  Date of last appointment with primary care: 6/4/19  Okay to leave a detailed message: Yes

## 2021-05-30 NOTE — TELEPHONE ENCOUNTER
Fax received from [facility], they have started the Prior Authorization Process via Cover My Meds    CoverMyMeds Key: [id number]    Medication Name:   solifenacin (VESICARE) 5 MG tablet 90 tablet 1 4/12/2019     Sig - Route: Take 1 tablet (5 mg total) by mouth daily. - Oral    Sent to pharmacy as: solifenacin (VESICARE) 5 MG tablet    E-Prescribing Status: Receipt confirmed by pharmacy (4/12/2019  6:08 PM CDT)          Insurance Plan: Medicare Part D  BRIONNAM: SERGIO  Patient ID: [id number]    Please complete the PA process

## 2021-05-30 NOTE — TELEPHONE ENCOUNTER
Please call the patient and let her know her Cologuard testing was positive.  This means she needs to have a follow-up colonoscopy.  Is patient willing?  I will write the referral.

## 2021-05-30 NOTE — TELEPHONE ENCOUNTER
Pt is willing to get a colonoscopy. I told her you will write a referral and that they should be calling.

## 2021-05-30 NOTE — TELEPHONE ENCOUNTER
Central PA team  [phone]  Pool: [facility] PA MED ([id number])          PA has been initiated.       PA form completed and faxed insurance via Cover My Meds     Key:  [id number]     Medication: Solifenacin Succinate 5MG tablets    Insurance:  [employer] Medicare Part D        Response will be received via fax and may take up to 5-10 business days depending on plan

## 2021-05-30 NOTE — TELEPHONE ENCOUNTER
I have not received this information yet.  When it comes through I will call her and let her know the result.  If she does not hear from me by August 1, she should call me again.

## 2021-05-31 VITALS — BODY MASS INDEX: 36.87 KG/M2 | WEIGHT: 213.1 LBS

## 2021-05-31 VITALS — WEIGHT: 197.7 LBS | BODY MASS INDEX: 34.2 KG/M2

## 2021-05-31 NOTE — TELEPHONE ENCOUNTER
RN cannot approve Refill Request    RN can NOT refill this medication med is not covered by policy/route to provider. Last office visit: 5/7/2019 Trish Eagle MD Last Physical: 12/3/2018 Last MTM visit: Visit date not found Last visit same specialty: 5/7/2019 Trish Eagle MD.  Next visit within 3 mo: Visit date not found  Next physical within 3 mo: Visit date not found      Sera Botello, [facility] Triage/Med Refill 8/7/2019    Requested Prescriptions   Pending Prescriptions Disp Refills     phentermine (ADIPEX-P) 37.5 mg tablet [Pharmacy Med Name: PHENTERMINE 37.5MG  TAB] 135 tablet 0     Sig: TAKE 1 & 1/2 (ONE & ONE-HALF) TABLETS BY MOUTH ONCE DAILY IN THE MORNING       There is no refill protocol information for this order        ibuprofen (ADVIL,MOTRIN) 800 MG tablet [Pharmacy Med Name: IBUPROFEN 800MG     TAB] 100 tablet 2     Sig: TAKE 1 TABLET BY MOUTH THREE TIMES DAILY AS NEEDED       There is no refill protocol information for this order      Signed Prescriptions Disp Refills    lovastatin (MEVACOR) 40 MG tablet 30 tablet 0     Sig: TAKE ONE TABLET BY MOUTH AT BEDTIME       Statins Refill Protocol (Hmg CoA Reductase Inhibitors) Passed - 8/7/2019 10:27 AM        Passed - PCP or prescribing provider visit in past 12 months      Last office visit with prescriber/PCP: 5/7/2019 Trish Eagle MD OR same dept: 5/7/2019 Trish Eagle MD OR same specialty: 5/7/2019 Trish Eagle MD  Last physical: 12/3/2018 Last MTM visit: Visit date not found   Next visit within 3 mo: Visit date not found  Next physical within 3 mo: Visit date not found  Prescriber OR PCP: Trish Eagle MD ([place])  Last diagnosis associated with med order: 1. Essential hypertension  - lovastatin (MEVACOR) 40 MG tablet; TAKE ONE TABLET BY MOUTH AT BEDTIME  Dispense: 30 tablet; Refill: 0  - lisinopril (PRINIVIL,ZESTRIL) 2.5 MG tablet; TAKE ONE TABLET BY MOUTH ONCE DAILY  Dispense: 30  tablet; Refill: 0  - spironolactone (ALDACTONE) 100 MG tablet; Take 1 tablet (100 mg total) by mouth daily.  Dispense: 30 tablet; Refill: 0    2. Obesity (BMI 30.0-34.9)  - phentermine (ADIPEX-P) 37.5 mg tablet [Pharmacy Med Name: PHENTERMINE 37.5MG  TAB]; TAKE 1 & 1/2 (ONE & ONE-HALF) TABLETS BY MOUTH ONCE DAILY IN THE MORNING  Dispense: 135 tablet; Refill: 0    3. Back pain  - ibuprofen (ADVIL,MOTRIN) 800 MG tablet [Pharmacy Med Name: IBUPROFEN 800MG     TAB]; TAKE 1 TABLET BY MOUTH THREE TIMES DAILY AS NEEDED  Dispense: 100 tablet; Refill: 2    If protocol passes may refill for 12 months if within 3 months of last provider visit (or a total of 15 months).            lisinopril (PRINIVIL,ZESTRIL) 2.5 MG tablet 30 tablet 0     Sig: TAKE ONE TABLET BY MOUTH ONCE DAILY       Ace Inhibitors Refill Protocol Passed - 8/7/2019 10:27 AM        Passed - PCP or prescribing provider visit in past 12 months       Last office visit with prescriber/PCP: 5/7/2019 Trish Eagle MD OR same dept: 5/7/2019 Trish Eagle MD OR same specialty: 5/7/2019 Trish Eagle MD  Last physical: 12/3/2018 Last MTM visit: Visit date not found   Next visit within 3 mo: Visit date not found  Next physical within 3 mo: Visit date not found  Prescriber OR PCP: Trish Eagle MD ([place])  Last diagnosis associated with med order: 1. Essential hypertension  - lovastatin (MEVACOR) 40 MG tablet; TAKE ONE TABLET BY MOUTH AT BEDTIME  Dispense: 30 tablet; Refill: 0  - lisinopril (PRINIVIL,ZESTRIL) 2.5 MG tablet; TAKE ONE TABLET BY MOUTH ONCE DAILY  Dispense: 30 tablet; Refill: 0  - spironolactone (ALDACTONE) 100 MG tablet; Take 1 tablet (100 mg total) by mouth daily.  Dispense: 30 tablet; Refill: 0    2. Obesity (BMI 30.0-34.9)  - phentermine (ADIPEX-P) 37.5 mg tablet [Pharmacy Med Name: PHENTERMINE 37.5MG  TAB]; TAKE 1 & 1/2 (ONE & ONE-HALF) TABLETS BY MOUTH ONCE DAILY IN THE MORNING  Dispense: 135 tablet; Refill: 0    3.  Back pain  - ibuprofen (ADVIL,MOTRIN) 800 MG tablet [Pharmacy Med Name: IBUPROFEN 800MG     TAB]; TAKE 1 TABLET BY MOUTH THREE TIMES DAILY AS NEEDED  Dispense: 100 tablet; Refill: 2    If protocol passes may refill for 12 months if within 3 months of last provider visit (or a total of 15 months).             Passed - Serum Potassium in past 12 months     Lab Results   Component Value Date    Potassium 5.2 (H) 03/29/2019             Passed - Blood pressure filed in past 12 months     BP Readings from Last 1 Encounters:   05/07/19 110/76             Passed - Serum Creatinine in past 12 months     Creatinine   Date Value Ref Range Status   03/29/2019 1.11 (H) 0.60 - 1.10 mg/dL Final            spironolactone (ALDACTONE) 100 MG tablet 30 tablet 0     Sig: Take 1 tablet (100 mg total) by mouth daily.       Diuretics/Combination Diuretics Refill Protocol  Passed - 8/7/2019 10:27 AM        Passed - Visit with PCP or prescribing provider visit in past 12 months     Last office visit with prescriber/PCP: 5/7/2019 Trish Eagle MD OR same dept: 5/7/2019 Trish Eagle MD OR same specialty: 5/7/2019 Trish Eagle MD  Last physical: 12/3/2018 Last MTM visit: Visit date not found   Next visit within 3 mo: Visit date not found  Next physical within 3 mo: Visit date not found  Prescriber OR PCP: Trish Eagle MD ([place])  Last diagnosis associated with med order: 1. Essential hypertension  - lovastatin (MEVACOR) 40 MG tablet; TAKE ONE TABLET BY MOUTH AT BEDTIME  Dispense: 30 tablet; Refill: 0  - lisinopril (PRINIVIL,ZESTRIL) 2.5 MG tablet; TAKE ONE TABLET BY MOUTH ONCE DAILY  Dispense: 30 tablet; Refill: 0  - spironolactone (ALDACTONE) 100 MG tablet; Take 1 tablet (100 mg total) by mouth daily.  Dispense: 30 tablet; Refill: 0    2. Obesity (BMI 30.0-34.9)  - phentermine (ADIPEX-P) 37.5 mg tablet [Pharmacy Med Name: PHENTERMINE 37.5MG  TAB]; TAKE 1 & 1/2 (ONE & ONE-HALF) TABLETS BY MOUTH ONCE DAILY  IN THE MORNING  Dispense: 135 tablet; Refill: 0    3. Back pain  - ibuprofen (ADVIL,MOTRIN) 800 MG tablet [Pharmacy Med Name: IBUPROFEN 800MG     TAB]; TAKE 1 TABLET BY MOUTH THREE TIMES DAILY AS NEEDED  Dispense: 100 tablet; Refill: 2    If protocol passes may refill for 12 months if within 3 months of last provider visit (or a total of 15 months).             Passed - Serum Potassium in past 12 months      Lab Results   Component Value Date    Potassium 5.2 (H) 03/29/2019             Passed - Serum Sodium in past 12 months      Lab Results   Component Value Date    Sodium 142 03/29/2019             Passed - Blood pressure on file in past 12 months     BP Readings from Last 1 Encounters:   05/07/19 110/76             Passed - Serum Creatinine in past 12 months      Creatinine   Date Value Ref Range Status   03/29/2019 1.11 (H) 0.60 - 1.10 mg/dL Final

## 2021-05-31 NOTE — TELEPHONE ENCOUNTER
Controlled Substance Refill Request  Medication Name:   Requested Prescriptions     Pending Prescriptions Disp Refills     phentermine (ADIPEX-P) 37.5 mg tablet [Pharmacy Med Name: PHENTERMINE 37.5MG  TAB] 135 tablet 0     Sig: TAKE 1 & 1/2 (ONE & ONE-HALF) TABLETS BY MOUTH ONCE DAILY IN THE MORNING     ibuprofen (ADVIL,MOTRIN) 800 MG tablet [Pharmacy Med Name: IBUPROFEN 800MG     TAB] 100 tablet 2     Sig: TAKE 1 TABLET BY MOUTH THREE TIMES DAILY AS NEEDED     Signed Prescriptions Disp Refills     lovastatin (MEVACOR) 40 MG tablet 30 tablet 0     Sig: TAKE ONE TABLET BY MOUTH AT BEDTIME     Authorizing Provider: TRISH EAGLE     Ordering User: RADHA CARROLL     lisinopril (PRINIVIL,ZESTRIL) 2.5 MG tablet 30 tablet 0     Sig: TAKE ONE TABLET BY MOUTH ONCE DAILY     Authorizing Provider: TRISH EAGLE     Ordering User: RADHA CARROLL     spironolactone (ALDACTONE) 100 MG tablet 30 tablet 0     Sig: Take 1 tablet (100 mg total) by mouth daily.     Authorizing Provider: TRISH EAGLE     Ordering User: RADHA CARROLL     Date Last Fill: 4/12/2019  Pharmacy: [facility]      Submit electronically to pharmacy  Controlled Substance Agreement Date Scanned:   Encounter-Level CSA Scan Date - 03/24/2017:    Scan on 3/28/2017 11:54 AM (below)         Last office visit with prescriber/PCP: 5/7/2019 Trish Eagle MD OR same dept: 5/7/2019 Trish Eagle MD OR same specialty: 5/7/2019 Trish Eagle MD  Last physical: 12/3/2018 Last MTM visit: Visit date not found

## 2021-05-31 NOTE — TELEPHONE ENCOUNTER
" Last OV 3/29/19 \"  Return in about 4 months (around 7/29/2019) for diabetes check. \"    Refill Given    Refill given per Policy, patient informed they are overdue for Office Visit    Sera Botello, [facility] Triage/Med Refill 8/7/2019    Requested Prescriptions   Pending Prescriptions Disp Refills     lovastatin (MEVACOR) 40 MG tablet [Pharmacy Med Name: LOVASTATIN 40MG     TAB] 90 tablet 1     Sig: TAKE ONE TABLET BY MOUTH AT BEDTIME       Statins Refill Protocol (Hmg CoA Reductase Inhibitors) Passed - 8/7/2019 10:27 AM        Passed - PCP or prescribing provider visit in past 12 months      Last office visit with prescriber/PCP: 5/7/2019 Trish Eagle MD OR same dept: 5/7/2019 Trish Eagle MD OR same specialty: 5/7/2019 Trish Eagle MD  Last physical: 12/3/2018 Last MTM visit: Visit date not found   Next visit within 3 mo: Visit date not found  Next physical within 3 mo: Visit date not found  Prescriber OR PCP: Trish Eagle MD ([place])  Last diagnosis associated with med order: 1. Essential hypertension  - lisinopril (PRINIVIL,ZESTRIL) 2.5 MG tablet [Pharmacy Med Name: LISINOPRIL 2.5MG    TAB]; TAKE ONE TABLET BY MOUTH ONCE DAILY  Dispense: 90 tablet; Refill: 0    2. Obesity (BMI 30.0-34.9)  - phentermine (ADIPEX-P) 37.5 mg tablet [Pharmacy Med Name: PHENTERMINE 37.5MG  TAB]; TAKE 1 & 1/2 (ONE & ONE-HALF) TABLETS BY MOUTH ONCE DAILY IN THE MORNING  Dispense: 135 tablet; Refill: 0    3. Back pain  - ibuprofen (ADVIL,MOTRIN) 800 MG tablet [Pharmacy Med Name: IBUPROFEN 800MG     TAB]; TAKE 1 TABLET BY MOUTH THREE TIMES DAILY AS NEEDED  Dispense: 100 tablet; Refill: 2    If protocol passes may refill for 12 months if within 3 months of last provider visit (or a total of 15 months).             lisinopril (PRINIVIL,ZESTRIL) 2.5 MG tablet [Pharmacy Med Name: LISINOPRIL 2.5MG    TAB] 90 tablet 0     Sig: TAKE ONE TABLET BY MOUTH ONCE DAILY       Ace Inhibitors Refill " Protocol Passed - 8/7/2019 10:27 AM        Passed - PCP or prescribing provider visit in past 12 months       Last office visit with prescriber/PCP: 5/7/2019 Trish Eagle MD OR same dept: 5/7/2019 Trish Eagle MD OR same specialty: 5/7/2019 Trish Eagle MD  Last physical: 12/3/2018 Last MTM visit: Visit date not found   Next visit within 3 mo: Visit date not found  Next physical within 3 mo: Visit date not found  Prescriber OR PCP: Trish Eagle MD ([place])  Last diagnosis associated with med order: 1. Essential hypertension  - lisinopril (PRINIVIL,ZESTRIL) 2.5 MG tablet [Pharmacy Med Name: LISINOPRIL 2.5MG    TAB]; TAKE ONE TABLET BY MOUTH ONCE DAILY  Dispense: 90 tablet; Refill: 0    2. Obesity (BMI 30.0-34.9)  - phentermine (ADIPEX-P) 37.5 mg tablet [Pharmacy Med Name: PHENTERMINE 37.5MG  TAB]; TAKE 1 & 1/2 (ONE & ONE-HALF) TABLETS BY MOUTH ONCE DAILY IN THE MORNING  Dispense: 135 tablet; Refill: 0    3. Back pain  - ibuprofen (ADVIL,MOTRIN) 800 MG tablet [Pharmacy Med Name: IBUPROFEN 800MG     TAB]; TAKE 1 TABLET BY MOUTH THREE TIMES DAILY AS NEEDED  Dispense: 100 tablet; Refill: 2    If protocol passes may refill for 12 months if within 3 months of last provider visit (or a total of 15 months).             Passed - Serum Potassium in past 12 months     Lab Results   Component Value Date    Potassium 5.2 (H) 03/29/2019             Passed - Blood pressure filed in past 12 months     BP Readings from Last 1 Encounters:   05/07/19 110/76             Passed - Serum Creatinine in past 12 months     Creatinine   Date Value Ref Range Status   03/29/2019 1.11 (H) 0.60 - 1.10 mg/dL Final             phentermine (ADIPEX-P) 37.5 mg tablet [Pharmacy Med Name: PHENTERMINE 37.5MG  TAB] 135 tablet 0     Sig: TAKE 1 & 1/2 (ONE & ONE-HALF) TABLETS BY MOUTH ONCE DAILY IN THE MORNING       There is no refill protocol information for this order        ibuprofen (ADVIL,MOTRIN) 800 MG tablet  [Pharmacy Med Name: IBUPROFEN 800MG     TAB] 100 tablet 2     Sig: TAKE 1 TABLET BY MOUTH THREE TIMES DAILY AS NEEDED       There is no refill protocol information for this order        spironolactone (ALDACTONE) 100 MG tablet 90 tablet 0     Sig: Take 1 tablet (100 mg total) by mouth daily.       Diuretics/Combination Diuretics Refill Protocol  Passed - 8/7/2019 10:27 AM        Passed - Visit with PCP or prescribing provider visit in past 12 months     Last office visit with prescriber/PCP: 5/7/2019 Trish Eagle MD OR same dept: 5/7/2019 Trish Eagle MD OR same specialty: 5/7/2019 Trish Eagle MD  Last physical: 12/3/2018 Last MTM visit: Visit date not found   Next visit within 3 mo: Visit date not found  Next physical within 3 mo: Visit date not found  Prescriber OR PCP: Trish Eagle MD ([place])  Last diagnosis associated with med order: 1. Essential hypertension  - lisinopril (PRINIVIL,ZESTRIL) 2.5 MG tablet [Pharmacy Med Name: LISINOPRIL 2.5MG    TAB]; TAKE ONE TABLET BY MOUTH ONCE DAILY  Dispense: 90 tablet; Refill: 0    2. Obesity (BMI 30.0-34.9)  - phentermine (ADIPEX-P) 37.5 mg tablet [Pharmacy Med Name: PHENTERMINE 37.5MG  TAB]; TAKE 1 & 1/2 (ONE & ONE-HALF) TABLETS BY MOUTH ONCE DAILY IN THE MORNING  Dispense: 135 tablet; Refill: 0    3. Back pain  - ibuprofen (ADVIL,MOTRIN) 800 MG tablet [Pharmacy Med Name: IBUPROFEN 800MG     TAB]; TAKE 1 TABLET BY MOUTH THREE TIMES DAILY AS NEEDED  Dispense: 100 tablet; Refill: 2    If protocol passes may refill for 12 months if within 3 months of last provider visit (or a total of 15 months).             Passed - Serum Potassium in past 12 months      Lab Results   Component Value Date    Potassium 5.2 (H) 03/29/2019             Passed - Serum Sodium in past 12 months      Lab Results   Component Value Date    Sodium 142 03/29/2019             Passed - Blood pressure on file in past 12 months     BP Readings from Last 1 Encounters:    05/07/19 110/76             Passed - Serum Creatinine in past 12 months      Creatinine   Date Value Ref Range Status   03/29/2019 1.11 (H) 0.60 - 1.10 mg/dL Final

## 2021-05-31 NOTE — TELEPHONE ENCOUNTER
Refill Request  Did you contact pharmacy: Yes  Medication name:   Requested Prescriptions     Pending Prescriptions Disp Refills     lovastatin (MEVACOR) 40 MG tablet [Pharmacy Med Name: LOVASTATIN 40MG     TAB] 90 tablet 1     Sig: TAKE ONE TABLET BY MOUTH AT BEDTIME     lisinopril (PRINIVIL,ZESTRIL) 2.5 MG tablet [Pharmacy Med Name: LISINOPRIL 2.5MG    TAB] 90 tablet 0     Sig: TAKE ONE TABLET BY MOUTH ONCE DAILY     phentermine (ADIPEX-P) 37.5 mg tablet [Pharmacy Med Name: PHENTERMINE 37.5MG  TAB] 135 tablet 0     Sig: TAKE 1 & 1/2 (ONE & ONE-HALF) TABLETS BY MOUTH ONCE DAILY IN THE MORNING     ibuprofen (ADVIL,MOTRIN) 800 MG tablet [Pharmacy Med Name: IBUPROFEN 800MG     TAB] 100 tablet 2     Sig: TAKE 1 TABLET BY MOUTH THREE TIMES DAILY AS NEEDED     spironolactone (ALDACTONE) 100 MG tablet 90 tablet 0     Sig: Take 1 tablet (100 mg total) by mouth daily.     Who prescribed the medication: PCP  [facility]  Is patient out of medication: No.  5 days left  Patient notified refills processed in 72 hours:  no  Okay to leave a detailed message: no

## 2021-06-01 VITALS — BODY MASS INDEX: 35.9 KG/M2 | WEIGHT: 210.3 LBS | HEIGHT: 64 IN

## 2021-06-01 VITALS — BODY MASS INDEX: 36.16 KG/M2 | WEIGHT: 209 LBS

## 2021-06-01 NOTE — TELEPHONE ENCOUNTER
Pt contacted. She would like to try the gel. She reports her pain is in her back, neck, knees and hands.

## 2021-06-01 NOTE — TELEPHONE ENCOUNTER
Central PA team  [phone]  Pool: HE PA MED ([id number])          PA has been initiated.       PA form completed and faxed insurance via Cover My Meds     Key:  [id number]     Medication:  Lidocaine 5% patches      Insurance:  [employer] Medicare Part D ePA Form          Response will be received via fax and may take up to 5-10 business days depending on plan

## 2021-06-01 NOTE — TELEPHONE ENCOUNTER
Prior Authorization Request  Who s requesting:  Pharmacy  Pharmacy Name and Location: [facility]  Medication Name:   lidocaine (LIDODERM) 5 % 90 patch 0 1/30/2019  --   Sig: Remove & Discard patch within 12 hours or as directed by MD       Insurance Plan: [employer]  Insurance Member ID Number:  [id number]  Informed patient that prior authorizations can take up to 10 business days for response:   No  Okay to leave a detailed message: Yes

## 2021-06-01 NOTE — TELEPHONE ENCOUNTER
Refill Approved    Rx renewed per Medication Renewal Policy. Medication was last renewed on 4/12/19 (Vesicare) 10/22/18 (metformin).    Karlene Rodriguez, [facility] Triage/Med Refill 9/12/2019     Requested Prescriptions   Pending Prescriptions Disp Refills     metFORMIN (GLUCOPHAGE-XR) 500 MG 24 hr tablet [Pharmacy Med Name: MetFORMIN ER 500MG  TAB] 360 tablet 1     Sig: TAKE 2 TABLETS BY MOUTH TWICE DAILY WITH MEALS       Metformin Refill Protocol Passed - 9/12/2019  4:57 PM        Passed - Blood pressure in last 12 months     BP Readings from Last 1 Encounters:   09/09/19 110/70             Passed - LFT or AST or ALT in last 12 months     Albumin   Date Value Ref Range Status   09/09/2019 4.0 3.5 - 5.0 g/dL Final     Bilirubin, Total   Date Value Ref Range Status   09/09/2019 0.3 0.0 - 1.0 mg/dL Final     Bilirubin, Direct   Date Value Ref Range Status   08/26/2013 0.1 <0.6 mg/dL Final     Alkaline Phosphatase   Date Value Ref Range Status   09/09/2019 124 (H) 45 - 120 U/L Final     AST   Date Value Ref Range Status   09/09/2019 21 0 - 40 U/L Final     ALT   Date Value Ref Range Status   09/09/2019 20 0 - 45 U/L Final     Protein, Total   Date Value Ref Range Status   09/09/2019 6.7 6.0 - 8.0 g/dL Final                Passed - GFR or Serum Creatinine in last 6 months     GFR MDRD Non Af Amer   Date Value Ref Range Status   09/09/2019 >60 >60 mL/min/1.73m2 Final     GFR MDRD Af Amer   Date Value Ref Range Status   09/09/2019 >60 >60 mL/min/1.73m2 Final             Passed - Visit with PCP or prescribing provider visit in last 6 months or next 3 months     Last office visit with prescriber/PCP: 9/9/2019 OR same dept: 9/9/2019 Trish Eagle MD OR same specialty: 9/9/2019 Trish Eagle MD Last physical: Visit date not found Last MTM visit: Visit date not found         Next appt within 3 mo: Visit date not found  Next physical within 3 mo: Visit date not found  Prescriber OR PCP: Chambers ([place])  SHALOM Eagle MD  Last diagnosis associated with med order: 1. Type 2 diabetes mellitus (H)  - metFORMIN (GLUCOPHAGE-XR) 500 MG 24 hr tablet [Pharmacy Med Name: MetFORMIN ER 500MG  TAB]; TAKE 2 TABLETS BY MOUTH TWICE DAILY WITH MEALS  Dispense: 360 tablet; Refill: 1    2. OAB (overactive bladder)  - VESICARE 5 mg tablet [Pharmacy Med Name: VESICARE 5MG TAB]; TAKE 1 TABLET BY MOUTH ONCE DAILY  Dispense: 90 tablet; Refill: 1     If protocol passes may refill for 12 months if within 3 months of last provider visit (or a total of 15 months).           Passed - A1C in last 6 months     Hemoglobin A1c   Date Value Ref Range Status   09/09/2019 7.4 (H) 3.5 - 6.0 % Final               Passed - Microalbumin in last year      Microalbumin, Random Urine   Date Value Ref Range Status   03/29/2019 <0.50 0.00 - 1.99 mg/dL Final                  VESICARE 5 mg tablet [Pharmacy Med Name: VESICARE 5MG TAB] 90 tablet 1     Sig: TAKE 1 TABLET BY MOUTH ONCE DAILY       Urinary Incontinence Medications Refill Protocol Passed - 9/12/2019  4:57 PM        Passed - PCP or prescribing provider visit in past 12 months       Last office visit with prescriber/PCP: 9/9/2019 Trish Eagle MD OR same dept: 9/9/2019 Trish Eagle MD OR same specialty: 9/9/2019 Trish Eagle MD  Last physical: 12/3/2018 Last MTM visit: Visit date not found   Next visit within 3 mo: Visit date not found  Next physical within 3 mo: Visit date not found  Prescriber OR PCP: Trish Eagle MD ([place])  Last diagnosis associated with med order: 1. Type 2 diabetes mellitus (H)  - metFORMIN (GLUCOPHAGE-XR) 500 MG 24 hr tablet [Pharmacy Med Name: MetFORMIN ER 500MG  TAB]; TAKE 2 TABLETS BY MOUTH TWICE DAILY WITH MEALS  Dispense: 360 tablet; Refill: 1    2. OAB (overactive bladder)  - VESICARE 5 mg tablet [Pharmacy Med Name: VESICARE 5MG TAB]; TAKE 1 TABLET BY MOUTH ONCE DAILY  Dispense: 90 tablet; Refill: 1    If protocol passes may refill for 12  months if within 3 months of last provider visit (or a total of 15 months).

## 2021-06-01 NOTE — TELEPHONE ENCOUNTER
Prior Authorization Request  Who s requesting:  Patient  Pharm[facility] and Location[facility]  Medication Name: diclofenac sodium  Insurance Plan: Medicare Health Insurance  Insurance Member ID Number:  [id number].  Informed patient that prior authorizations can take up to 10 business days for response:   No  Okay to leave a detailed message: No

## 2021-06-01 NOTE — TELEPHONE ENCOUNTER
50,000 IU tab of Vit D weekly, not 5000, is (I hope) what the pharmacist meant.  I want her to take 5000 IU daily. Notify her if that needs to be clarified with her.

## 2021-06-01 NOTE — TELEPHONE ENCOUNTER
RN cannot approve Refill Request    RN can NOT refill this medication med is not covered by policy/route to provider. Last office visit: 9/9/2019 Trish Eagle MD Last Physical: 12/3/2018 Last MTM visit: Visit date not found Last visit same specialty: 9/9/2019 Trish Eagle MD.  Next visit within 3 mo: Visit date not found  Next physical within 3 mo: Visit date not found      Robyn Lopez, [facility] Triage/Med Refill 9/11/2019    Requested Prescriptions   Pending Prescriptions Disp Refills     diclofenac sodium (VOLTAREN) 1 % Gel [Pharmacy Med Name: DICLOFENAC 1%       GEL]  3     Sig: APPLY TOPICALLY TWICE DAILY AS NEEDED FOR ARTHRITIS       There is no refill protocol information for this order

## 2021-06-01 NOTE — TELEPHONE ENCOUNTER
Prior Authorization Request  Who s requesting:  Patient  Pharmacy Name and Location: [facility]  Medication Name: Diclofenac Gel  Insurance Plan: Medicare  Insurance Member ID Number:  [id number]  Informed patient that prior authorizations can take up to 10 business days for response:   No  Okay to leave a detailed message: No

## 2021-06-01 NOTE — TELEPHONE ENCOUNTER
Called pt and relayed message below. Pt states that she will stick with her same medication of lovastatin and give it a try with 2 pills a day. And regarding vitamin d, pt is currently taking 5,000 IU's once a week per the recommendation of the pharmacist. She states that she used to take it daily but not anymore.     Trinh Gomes, CMA

## 2021-06-01 NOTE — TELEPHONE ENCOUNTER
RN cannot approve Refill Request    RN can NOT refill this medication med is not covered by policy/route to provider. Last office visit: 5/7/2019 Trish Eagle MD Last Physical: 12/3/2018 Last MTM visit: Visit date not found Last visit same specialty: 5/7/2019 Trish Eagle MD.  Next visit within 3 mo: Visit date not found  Next physical within 3 mo: Visit date not found      Robyn Lopez, [facility] Triage/Med Refill 9/5/2019    Requested Prescriptions   Pending Prescriptions Disp Refills     cyclobenzaprine (FLEXERIL) 5 MG tablet [Pharmacy Med Name: CYCLOBENZAPR 5MG    TAB] 30 tablet 0     Sig: TAKE ONE TABLET BY MOUTH THREE TIMES DAILY AS NEEDED       There is no refill protocol information for this order

## 2021-06-01 NOTE — TELEPHONE ENCOUNTER
Please make sure that the patient needs this medication before we have somebody start the prior authorization paperwork.  If she still wants the medication, faxed to the PA pool.

## 2021-06-01 NOTE — PROGRESS NOTES
"    SUBJECTIVE: Anita Duque is a 64 y.o. White or  female who presents today for a six-month med check and for a refill of her Flexeril.  She was last seen 3/29.  Since that time she has been seen for ear pain and decreased hearing and was sent to ears nose and throat.  She is a complicated patient having chronic pain, type 2 diabetes, obesity, hypertension, hyperlipidemia, asthma and depression.  We have a controlled substance agreement for phentermine and now she needs one for Percocet and Flexeril.  We agree on 90 tablets for 90 days.  She only uses the Percocet as needed.  I have agreed to 60 tablets for 30 days for Flexeril.  She was given a PHQ 9 and scored 5.  She scored 2 on her KINDRA 7.  She is very concerned about her weight.  Her significant other has a problem with it.  It affects their relationship.  She has spent money on different diets and treatments.  She is tearful when talking about this.  She feels she is not able to exercise because of her chronic pain.  She finds it very hard to diet.  We discussed getting a referral to the bariatric clinic to discuss both surgical and nonsurgical options.  She has relatively well-controlled type 2 diabetes.  She has hypertension which is well controlled.  LDL is higher than I would like to see at 116.  She also had a bump in her creatinine which is something new and it was 1.11.  We discussed rechecking that.  She has a history of vitamin D deficiency.    OBJECTIVE: /70 (Patient Site: Right Arm, Patient Position: Sitting, Cuff Size: Adult Large)   Pulse 86   Ht 5' 4\" (1.626 m)   Wt 206 lb (93.4 kg)   SpO2 98%   Breastfeeding? No   BMI 35.36 kg/m    General: Obese older female in no acute physical distress  Heart: Regular rate and rhythm without murmur  Lungs: Clear bilaterally  Abdomen: Soft, nontender  Extremities: Warm, dry and without edema  Psych: Tearful, frustrated, anxious especially when discussing her weight concerning her " relationship    ASSESSMENT & PLAN:    1. Type 2 diabetes mellitus without complication, without long-term current use of insulin (H)  Glycosylated Hemoglobin A1c   2. Chronic pain syndrome  oxyCODONE-acetaminophen (PERCOCET/ENDOCET) 5-325 mg per tablet   3. Fibromyalgia  cyclobenzaprine (FLEXERIL) 10 MG tablet   4. Essential hypertension  Comprehensive Metabolic Panel    lovastatin (MEVACOR) 40 MG tablet   5. Hyperlipidemia, unspecified hyperlipidemia type  Lipid Cascade   6. Mild persistent asthma without complication     7. Moderate episode of recurrent major depressive disorder (H)     8. Morbid obesity (H)  Ambulatory referral to Bariatric Care: Surgical and Non-Surgical   9. Vitamin D deficiency  Vitamin D, Total (25-Hydroxy)     We discussed the fact that something like a sleeve might be a good option for her.  I will refer her to the bariatric clinic to discuss options.  We filled out a controlled substance agreement for her Percocet and Flexeril.  I will put that on her chart.  I have refilled these 2 meds.  Will check the above-mentioned lab work and get back to her on those results by my chart and only call with grossly abnormal values.  I am refilling her lovastatin.  She declined to hepatitis C screening as well as immunizations.  She should see me back in 6 months time for a med check or annual wellness visit.40 minutes spent together with the patient today, more than 50% spent in counseling, discussing the above topics.        Patient Active Problem List   Diagnosis     Essential hypertension     Joint Pain, Localized In The Shoulder     Fibromyalgia     Type 2 diabetes mellitus (H)     Hyperlipidemia     Major Depression, Recurrent     Chronic Constipation     Cellulitis     Sciatica     Abdominal Pain     Chronic pain syndrome     Lower Back Pain     Vaginal Wall Prolapse With Midline Cystocele     Female Stress Incontinence     Lump Or Mass In Breast     Osteoarthrosis, unspecified whether  generalized or localized, lower leg     JANINE on CPAP     Mild persistent asthma     Obesity (BMI 30.0-34.9)     Degenerative disc disease, cervical     Degenerative disc disease, lumbar     Morbid obesity (H)     Preop general physical exam       Current Outpatient Medications on File Prior to Visit   Medication Sig Dispense Refill     cholecalciferol, vitamin D3, (VITAMIN D3) 5,000 unit Tab Take 5,000 Units by mouth daily.              cyanocobalamin, vitamin B-12, (VITAMIN B-12 ORAL) Take 1 tablet by mouth as needed.       dapagliflozin (FARXIGA) 10 mg Tab Take 1 tablet by mouth daily. 90 tablet 1     diclofenac epolamine (FLECTOR) 1.3 % PT12 Place 1 patch on the skin every 12 (twelve) hours as needed. 60 patch 5     DULoxetine (CYMBALTA) 60 MG capsule TAKE 1 CAPSULE BY MOUTH ONCE DAILY 90 capsule 3     erythromycin ophthalmic ointment Administer 1 application to both eyes daily as needed.              gabapentin (NEURONTIN) 300 MG capsule TAKE TWO CAPSULES BY MOUTH 4 TIMES DAILY 240 capsule 9     glipiZIDE (GLUCOTROL XL) 5 MG 24 hr tablet Take 1 tablet (5 mg total) by mouth daily. 90 tablet 2     ibuprofen (ADVIL,MOTRIN) 800 MG tablet TAKE 1 TABLET BY MOUTH THREE TIMES DAILY AS NEEDED 100 tablet 2     lidocaine (LIDODERM) 5 % Remove & Discard patch within 12 hours or as directed by MD 90 patch 0     lisinopril (PRINIVIL,ZESTRIL) 2.5 MG tablet TAKE ONE TABLET BY MOUTH ONCE DAILY 30 tablet 0     MAPAP, ACETAMINOPHEN, 500 mg coapsule Take 1,000 mg by mouth every 6 (six) hours as needed.         11     omeprazole (PRILOSEC) 40 MG capsule Take 40 mg by mouth daily .             phentermine (ADIPEX-P) 37.5 mg tablet TAKE 1 & 1/2 (ONE & ONE-HALF) TABLETS BY MOUTH ONCE DAILY IN THE MORNING 135 tablet 0     SENNA LAXATIVE 8.6 mg tablet Take 1 tablet by mouth 2 (two) times a day.         3     spironolactone (ALDACTONE) 100 MG tablet Take 1 tablet (100 mg total) by mouth daily. 30 tablet 0     topiramate (TOPAMAX) 100 MG  tablet Take 1 tablet (100 mg total) by mouth 2 (two) times a day. 180 tablet 3     triamcinolone (KENALOG) 0.025 % ointment Apply sparingly in ear canal 1-2 times daily as needed 15 g 0     azithromycin (ZITHROMAX Z-ANIRUDH) 250 MG tablet Take 2 tabs day 1, then 1 tab for next 4 days 6 tablet 0     No current facility-administered medications on file prior to visit.

## 2021-06-01 NOTE — TELEPHONE ENCOUNTER
You can call the patient and see if she is willing to use some other type of cream or patch that is possibly covered by Medicare.  I believe there is a gel that is covered.

## 2021-06-01 NOTE — PROGRESS NOTES
[facility] Care Coordination Contact   CHW, spoke with member  to remind to schedule Annual exam. Member noted she had never had this type of exam. CHW provided overview of exam and member noted she would follow up with her PCP.      CATHIE Moreno  [facility]  [phone]

## 2021-06-01 NOTE — TELEPHONE ENCOUNTER
Is there is some kind of lidocaine topical product that is covered by Medicare for arthritic pain?  This patient does have diabetes, not sure that the pain she has is from neuropathy.  Can you help?

## 2021-06-01 NOTE — TELEPHONE ENCOUNTER
----- Message from Trish Eagle MD sent at 9/12/2019  9:14 AM CDT -----  I have sent a refill of her cholesterol med --with a change--I am doubling the dose.  If she would prefer to switch to atorvastatin 40 mg instead of 80 mg of lovastatin, let me know. Also discuss the Vit D with pt.  Get back to me.

## 2021-06-01 NOTE — TELEPHONE ENCOUNTER
Central PA team  [phone]  Pool: FUNMILAYO PA MED ([id number])          PA has been initiated.       PA form completed and faxed insurance via Cover My Meds     Key:  [id number]      Medication:  Diclofenac Sodium 1% gel      Insurance:  [employer] Medicare Part D        Response will be received via fax and may take up to 5-10 business days depending on plan

## 2021-06-02 ENCOUNTER — RECORDS - HEALTHEAST (OUTPATIENT)
Dept: ADMINISTRATIVE | Facility: CLINIC | Age: 66
End: 2021-06-02

## 2021-06-02 VITALS — BODY MASS INDEX: 35.58 KG/M2 | WEIGHT: 207.3 LBS

## 2021-06-02 VITALS — WEIGHT: 214 LBS | BODY MASS INDEX: 37.02 KG/M2

## 2021-06-02 VITALS — WEIGHT: 214.7 LBS | HEIGHT: 64 IN | BODY MASS INDEX: 36.66 KG/M2

## 2021-06-02 NOTE — TELEPHONE ENCOUNTER
RN cannot approve Refill Request    RN can NOT refill this medication med is not covered by policy/route to provider. Last office visit: 9/9/2019 Trish Eagle MD Last Physical: 12/3/2018 Last MTM visit: Visit date not found Last visit same specialty: 9/9/2019 Trish Eagle MD.  Next visit within 3 mo: Visit date not found  Next physical within 3 mo: Visit date not found      Shanthi Fishman, [facility] Triage/Med Refill 11/1/2019    Requested Prescriptions   Pending Prescriptions Disp Refills     VITAMIN D2 50,000 unit capsule [Pharmacy Med Name: VITAMIN D2 (ERGO)50,000 IU CAP] 4 capsule 11     Sig: TAKE 1 CAPSULE BY MOUTH ONCE A WEEK       There is no refill protocol information for this order

## 2021-06-03 VITALS
DIASTOLIC BLOOD PRESSURE: 70 MMHG | OXYGEN SATURATION: 98 % | WEIGHT: 206 LBS | BODY MASS INDEX: 35.17 KG/M2 | HEIGHT: 64 IN | SYSTOLIC BLOOD PRESSURE: 110 MMHG | HEART RATE: 86 BPM

## 2021-06-03 VITALS — WEIGHT: 202.5 LBS | BODY MASS INDEX: 34.76 KG/M2

## 2021-06-03 NOTE — TELEPHONE ENCOUNTER
RN cannot approve Refill Request    RN can NOT refill this medication med is not covered by policy/route to provider. Last office visit: 9/9/2019 Trish Eagle MD Last Physical: 12/3/2018 Last MTM visit: Visit date not found Last visit same specialty: 9/9/2019 Trsih Eagle MD.  Next visit within 3 mo: Visit date not found  Next physical within 3 mo: Visit date not found      Mckayla Malhotra, [facility] Connection Triage/Med Refill 11/28/2019    Requested Prescriptions   Pending Prescriptions Disp Refills     glipiZIDE (GLUCOTROL XL) 5 MG 24 hr tablet [Pharmacy Med Name: GLIPIZIDE ER 5MG    TAB] 90 tablet 2     Sig: TAKE 1 TABLET BY MOUTH ONCE DAILY       Oral Hypoglycemics Refill Protocol Passed - 11/27/2019  8:51 AM        Passed - Visit with PCP or prescribing provider visit in last 6 months       Last office visit with prescriber/PCP: 9/9/2019 OR same dept: 9/9/2019 Trish Eagle MD OR same specialty: 9/9/2019 Trish Eagle MD Last physical: Visit date not found Last MTM visit: Visit date not found         Next appt within 3 mo: Visit date not found  Next physical within 3 mo: Visit date not found  Prescriber OR PCP: Trish Eagle MD (Betsy)  Last diagnosis associated with med order: 1. Type 2 diabetes mellitus (H)  - glipiZIDE (GLUCOTROL XL) 5 MG 24 hr tablet [Pharmacy Med Name: GLIPIZIDE ER 5MG    TAB]; TAKE 1 TABLET BY MOUTH ONCE DAILY  Dispense: 90 tablet; Refill: 2  - FARXIGA 10 mg Tab [Pharmacy Med Name: FARXIGA 10MG TAB]; TAKE 1 TABLET BY MOUTH ONCE DAILY  Dispense: 90 tablet; Refill: 1     If protocol passes may refill for 12 months if within 3 months of last provider visit (or a total of 15 months).           Passed - A1C in last 6 months     Hemoglobin A1c   Date Value Ref Range Status   09/09/2019 7.4 (H) 3.5 - 6.0 % Final               Passed - Microalbumin in last year      Microalbumin, Random Urine   Date Value Ref Range Status   03/29/2019 <0.50 0.00 - 1.99  mg/dL Final                  Passed - Blood pressure in last year     BP Readings from Last 1 Encounters:   09/09/19 110/70             Passed - Serum creatinine in last year     Creatinine   Date Value Ref Range Status   09/09/2019 0.89 0.60 - 1.10 mg/dL Final             FARXIGA 10 mg Tab [Pharmacy Med Name: FARXIGA 10MG TAB] 90 tablet 1     Sig: TAKE 1 TABLET BY MOUTH ONCE DAILY       There is no refill protocol information for this order

## 2021-06-03 NOTE — TELEPHONE ENCOUNTER
Refill Approved    Rx renewed per Medication Renewal Policy. Medication was last renewed on .  glipiZIDE (GLUCOTROL XL) 5 MG 24 hr tablet 90 tablet 2 1/30/2019     Mckayla Malhotra, [facility] Triage/Med Refill 11/28/2019     Requested Prescriptions   Pending Prescriptions Disp Refills     glipiZIDE (GLUCOTROL XL) 5 MG 24 hr tablet [Pharmacy Med Name: GLIPIZIDE ER 5MG    TAB] 90 tablet 2     Sig: TAKE 1 TABLET BY MOUTH ONCE DAILY       Oral Hypoglycemics Refill Protocol Passed - 11/27/2019  8:51 AM        Passed - Visit with PCP or prescribing provider visit in last 6 months       Last office visit with prescriber/PCP: 9/9/2019 OR same dept: 9/9/2019 Trish Eagle MD OR same specialty: 9/9/2019 Trihs Eagle MD Last physical: Visit date not found Last MTM visit: Visit date not found         Next appt within 3 mo: Visit date not found  Next physical within 3 mo: Visit date not found  Prescriber OR PCP: Loraine) SHALOM Eagle MD  Last diagnosis associated with med order: 1. Type 2 diabetes mellitus (H)  - glipiZIDE (GLUCOTROL XL) 5 MG 24 hr tablet [Pharmacy Med Name: GLIPIZIDE ER 5MG    TAB]; TAKE 1 TABLET BY MOUTH ONCE DAILY  Dispense: 90 tablet; Refill: 2  - FARXIGA 10 mg Tab [Pharmacy Med Name: FARXIGA 10MG TAB]; TAKE 1 TABLET BY MOUTH ONCE DAILY  Dispense: 90 tablet; Refill: 1     If protocol passes may refill for 12 months if within 3 months of last provider visit (or a total of 15 months).           Passed - A1C in last 6 months     Hemoglobin A1c   Date Value Ref Range Status   09/09/2019 7.4 (H) 3.5 - 6.0 % Final               Passed - Microalbumin in last year      Microalbumin, Random Urine   Date Value Ref Range Status   03/29/2019 <0.50 0.00 - 1.99 mg/dL Final                  Passed - Blood pressure in last year     BP Readings from Last 1 Encounters:   09/09/19 110/70             Passed - Serum creatinine in last year     Creatinine   Date Value Ref Range Status   09/09/2019  0.89 0.60 - 1.10 mg/dL Final             FARXIGA 10 mg Tab [Pharmacy Med Name: FARXIGA 10MG TAB] 90 tablet 1     Sig: TAKE 1 TABLET BY MOUTH ONCE DAILY       There is no refill protocol information for this order

## 2021-06-03 NOTE — TELEPHONE ENCOUNTER
RN cannot approve Refill Request    RN can NOT refill this medication med is not covered by policy/route to provider     . Last office visit: 9/9/2019 Trish Eagle MD Last Physical: 12/3/2018 Last MTM visit: Visit date not found Last visit same specialty: 9/9/2019 Trish Eagle MD.  Next visit within 3 mo: Visit date not found  Next physical within 3 mo: Visit date not found      Sandhya Mckinnon, [facility] Triage/Med Refill 12/1/2019    Requested Prescriptions   Pending Prescriptions Disp Refills     FARXIGA 10 mg Tab [Pharmacy Med Name: FARXIGA 10MG TAB] 90 tablet 1     Sig: TAKE 1 TABLET BY MOUTH ONCE DAILY       There is no refill protocol information for this order

## 2021-06-04 VITALS
RESPIRATION RATE: 18 BRPM | HEART RATE: 82 BPM | HEIGHT: 64 IN | TEMPERATURE: 97.9 F | DIASTOLIC BLOOD PRESSURE: 72 MMHG | BODY MASS INDEX: 33.32 KG/M2 | OXYGEN SATURATION: 98 % | SYSTOLIC BLOOD PRESSURE: 112 MMHG | WEIGHT: 195.19 LBS

## 2021-06-04 VITALS — WEIGHT: 195 LBS | HEIGHT: 64 IN | BODY MASS INDEX: 33.29 KG/M2

## 2021-06-04 NOTE — TELEPHONE ENCOUNTER
Prior Authorization Request  Who s requesting:  Pharmacy  Pharmacy Name and Location: [id number]  Medication Name: Farxiga 10 mg tablets  Insurance Plan: [facility]  Insurance Member ID Number:  [id number]  Informed patient that prior authorizations can take up to 10 business days for response:   No  Okay to leave a detailed message: No    Key [id number]

## 2021-06-04 NOTE — TELEPHONE ENCOUNTER
Central PA team  [phone]  Pool: FUNMILAYO PA MED ([id number])          PA has been initiated.       PA form completed and faxed insurance via Cover My Meds     Key:   [id number] - PA Case ID: [id number]     Medication:  Farxiga 10MG tablets    Insurance:  [employer] Medicare Part D        Response will be received via fax and may take up to 5-10 business days depending on plan

## 2021-06-05 VITALS
HEART RATE: 76 BPM | DIASTOLIC BLOOD PRESSURE: 60 MMHG | BODY MASS INDEX: 33.3 KG/M2 | WEIGHT: 194 LBS | SYSTOLIC BLOOD PRESSURE: 98 MMHG | OXYGEN SATURATION: 100 %

## 2021-06-05 VITALS
HEART RATE: 83 BPM | BODY MASS INDEX: 33.47 KG/M2 | SYSTOLIC BLOOD PRESSURE: 96 MMHG | WEIGHT: 195 LBS | TEMPERATURE: 98.4 F | DIASTOLIC BLOOD PRESSURE: 67 MMHG | OXYGEN SATURATION: 98 % | RESPIRATION RATE: 14 BRPM

## 2021-06-05 NOTE — TELEPHONE ENCOUNTER
Central PA team  [phone]  Pool: HE PA MED ([id number])          PA has been initiated.       PA form completed and faxed insurance via Cover My Meds     Key:  MARCOS COLLADO (Key: [id number])        Medication:  VESIcare 5MG tablets      Insurance:  [employer] PART D        Response will be received via fax and may take up to 5-10 business days depending on plan

## 2021-06-05 NOTE — TELEPHONE ENCOUNTER
Prior Authorization Request  Who s requesting:  Pharmacy  Pharmacy Name and Location: [facility]  Medication Name: Vesicare 5 mg  Insurance Plan: [employer]  Insurance Member ID Number:  [id number]  CoverMyMeds Key: N/A  Informed patient that prior authorizations can take up to 10 business days for response:   NA  Okay to leave a detailed message: Yes

## 2021-06-05 NOTE — TELEPHONE ENCOUNTER
Refill Approved    Rx renewed per Medication Renewal Policy. Medication was last renewed on 8/7/2019.  Last OV 9/9/2019.    Ninfa Blackmon, [facility] Connection Triage/Med Refill 1/14/2020     Requested Prescriptions   Pending Prescriptions Disp Refills     spironolactone (ALDACTONE) 100 MG tablet [Pharmacy Med Name: Spironolactone 100 MG Oral Tablet] 30 tablet 0     Sig: TAKE 1 TABLET BY MOUTH ONCE DAILY. APPOINTMENT REQUIRED FOR FUTURE REFILLS       Diuretics/Combination Diuretics Refill Protocol  Passed - 1/11/2020  9:27 AM        Passed - Visit with PCP or prescribing provider visit in past 12 months     Last office visit with prescriber/PCP: 9/9/2019 Trish Eagle MD OR same dept: 9/9/2019 Trish Eagle MD OR same specialty: 9/9/2019 Trish Eagle MD  Last physical: 12/3/2018 Last MTM visit: Visit date not found   Next visit within 3 mo: Visit date not found  Next physical within 3 mo: Visit date not found  Prescriber OR PCP: Trish Eagle MD (Betsy)  Last diagnosis associated with med order: 1. Essential hypertension  - spironolactone (ALDACTONE) 100 MG tablet [Pharmacy Med Name: Spironolactone 100 MG Oral Tablet]; TAKE 1 TABLET BY MOUTH ONCE DAILY. APPOINTMENT REQUIRED FOR FUTURE REFILLS  Dispense: 30 tablet; Refill: 0    If protocol passes may refill for 12 months if within 3 months of last provider visit (or a total of 15 months).             Passed - Serum Potassium in past 12 months      Lab Results   Component Value Date    Potassium 4.5 09/09/2019             Passed - Serum Sodium in past 12 months      Lab Results   Component Value Date    Sodium 142 09/09/2019             Passed - Blood pressure on file in past 12 months     BP Readings from Last 1 Encounters:   09/09/19 110/70             Passed - Serum Creatinine in past 12 months      Creatinine   Date Value Ref Range Status   09/09/2019 0.89 0.60 - 1.10 mg/dL Final

## 2021-06-05 NOTE — TELEPHONE ENCOUNTER
Refill Approved    Rx renewed per Medication Renewal Policy. Medication was last renewed on 9/12/19.    Sandhya Mckinnon, [facility] Triage/Med Refill 2/3/2020     Requested Prescriptions   Pending Prescriptions Disp Refills     lovastatin (MEVACOR) 40 MG tablet [Pharmacy Med Name: Lovastatin 40 MG Oral Tablet] 180 tablet 0     Sig: TAKE 2 TABLETS BY MOUTH AT BEDTIME       Statins Refill Protocol (Hmg CoA Reductase Inhibitors) Passed - 2/3/2020 12:36 PM        Passed - PCP or prescribing provider visit in past 12 months      Last office visit with prescriber/PCP: 9/9/2019 Trish Eagle MD OR same dept: 9/9/2019 Trish Eagle MD OR same specialty: 9/9/2019 Trish Eagle MD  Last physical: 12/3/2018 Last MTM visit: Visit date not found   Next visit within 3 mo: Visit date not found  Next physical within 3 mo: Visit date not found  Prescriber OR PCP: Loraine) SHALOM Eagle MD  Last diagnosis associated with med order: 1. Essential hypertension  - lovastatin (MEVACOR) 40 MG tablet [Pharmacy Med Name: Lovastatin 40 MG Oral Tablet]; TAKE 2 TABLETS BY MOUTH AT BEDTIME  Dispense: 180 tablet; Refill: 0    If protocol passes may refill for 12 months if within 3 months of last provider visit (or a total of 15 months).

## 2021-06-06 NOTE — TELEPHONE ENCOUNTER
Medication Question or Clarification  Who is calling: Patient   What medication are you calling about (include dose and sig)?:  MAPAP, ACETAMINOPHEN, 500 mg coapsule  1,000 mg, Oral, Every 6 hours PRN         Summary: Take 1,000 mg by mouth every 6 (six) hours as needed.         Who prescribed the medication?: Trish Eagle MD  What is your question/concern?: The patient is making a office visit as she was transferred to [facility].  The issue is she is out of medication.  Will Dr Gonzales bridge medication until seen?  Please advise.   Requested Pharmacy: [facility]  Okay to leave a detailed message?: No [phone]

## 2021-06-06 NOTE — TELEPHONE ENCOUNTER
Controlled Substance Refill Request  Medication Name:   Requested Prescriptions     Pending Prescriptions Disp Refills     phentermine (ADIPEX-P) 37.5 mg tablet [Pharmacy Med Name: Phentermine HCl 37.5 MG Oral Tablet] 135 tablet 0     Sig: TAKE 1 & 1/2 (ONE & ONE-HALF) TABLETS BY MOUTH ONCE DAILY IN THE MORNING     Date Last Fill: 8/8/19  Requested Pharmacy: [facility]  Submit electronically to pharmacy  Controlled Substance Agreement on file:   Encounter-Level CSA Scan Date - 06/11/2018:    Scan on 6/14/2018 11:32 AM: PERCOSET/PHENTERMINE           Encounter-Level CSA Scan Date - 03/24/2017:    Scan on 3/28/2017 11:54 AM        Last office visit:  9/9/19        Serina Bauer RN BSBA [facility] Triage/Med Refill 2/13/2020 7:16 AM

## 2021-06-06 NOTE — TELEPHONE ENCOUNTER
Last Med Check: 9/9/19     Next med check due on: 3/9/20    CSA on File: last csa for this med on 4/9/19    Future Appointment Scheduled ? No, but left msg for pt to sched med in in March    Edie Lynne, [facility]

## 2021-06-06 NOTE — TELEPHONE ENCOUNTER
Pt is scheduled 3/13/2020.    She is requesting Vitamin D 50,000 units  Tylenol 500mg capsule  Percocet.    CSA 9/23/2019  Future appt 3/13/20  Last med check visit 9/9/2019      Controlled Substance Refill Request  Medication Name:   Requested Prescriptions     Pending Prescriptions Disp Refills     oxyCODONE-acetaminophen (PERCOCET/ENDOCET) 5-325 mg per tablet 90 tablet 0     Sig: Take 1 tablet by mouth every 6 (six) hours as needed for pain.     Signed Prescriptions Disp Refills     lisinopriL (PRINIVIL,ZESTRIL) 2.5 MG tablet 30 tablet 0     Sig: Take 1 tablet (2.5 mg total) by mouth daily.     Authorizing Provider: VENKAT CALDWELL     Date Last Fill: 9/9/2019  Requested Pharmacy: [facility]  Submit electronically to pharmacy  Controlled Substance Agreement on file:

## 2021-06-06 NOTE — PROGRESS NOTES
"    SUBJECTIVE: Anita Duque is a 64 y.o. White or  female who presents today patient is here for her 6-month med check.  She was last here 9/9/2019.  She has type 2 diabetes and her A1c at that time was 7.4% which was higher than usual.  She also has mild intermittent asthma and would like a refill of her albuterol.  She rarely uses it but needs 1 for her car.  Together we filled out a asthma action plan.  She has chronic pain syndrome and uses Percocet for which we have an opioid controlled substance contract for which was done at our last visit.  We also have a CSA for phentermine 37.5 mg.  She takes 1-1/2 tablets a day.  That is 135 tablets first 90 days.  Together we signed that updated controlled substance agreement today.  She has hypertension which is well controlled.  She takes lovastatin for hyperlipidemia and needs that checked.  She is willing to have a hepatitis C screening check.  She has had low vitamin D but this is chronic.  She is getting high-dose vitamin D weekly.    OBJECTIVE: /72   Pulse 82   Temp 97.9  F (36.6  C)   Resp 18   Ht 5' 4\" (1.626 m)   Wt 195 lb 3 oz (88.5 kg)   SpO2 98%   Breastfeeding No   BMI 33.50 kg/m    General: Obese older female in no acute distress  Heart: Regular rate and rhythm without murmur  Lungs: Clear bilaterally  Abdomen: Soft  Extremities: Warm, dry and without edema    ASSESSMENT & PLAN:     1. Type 2 diabetes mellitus without complication, without long-term current use of insulin (H)  Generally doing pretty well controlled.  Will monitor.   - Glycosylated Hemoglobin A1c    2. Mild intermittent asthma without complication   Asthma action plan has been filled out today.  Patient rarely uses albuterol but needs refill as she has not had any refill recently.  - albuterol (PROAIR HFA;PROVENTIL HFA;VENTOLIN HFA) 90 mcg/actuation inhaler; Inhale 2 puffs every 4 (four) hours as needed for wheezing.  Dispense: 1 Inhaler; Refill: 1    3. " Essential hypertension  Well-controlled, will monitor lab.  - Basic Metabolic Panel    4. Hyperlipidemia, unspecified hyperlipidemia type  Will monitor lab.  - Lipid [place] FASTING    5. Chronic pain syndrome  Has opioid controlled substance agreement with me.  Last done 9/2019.    6. Morbid obesity (H)  Uses phentermine for which we have a controlled substance agreement.    7. Controlled substance agreement signed  Signed contract for phentermine 37.5 mg, 135 tablets in 90 days.    8. Encounter for hepatitis C screening test for low risk patient  - Hepatitis C Antibody (Anti-HCV)    I will get back to her on her lab results by my chart and only call with grossly abnormal values.  New non-opioid controlled substance agreement will be scanned in.  Asthma action plan updated.  If her labs look good, she can see me back in 6 months time otherwise she will need to come back in sooner.    Patient Active Problem List   Diagnosis     Essential hypertension     Joint Pain, Localized In The Shoulder     Fibromyalgia     Type 2 diabetes mellitus (H)     Hyperlipidemia     Major Depression, Recurrent     Chronic Constipation     Sciatica     Chronic pain syndrome     Lower Back Pain     Vaginal Wall Prolapse With Midline Cystocele     Female Stress Incontinence     Lump Or Mass In Breast     Osteoarthrosis, unspecified whether generalized or localized, lower leg     JANINE on CPAP     Obesity (BMI 30.0-34.9)     Degenerative disc disease, cervical     Degenerative disc disease, lumbar     Morbid obesity (H)     Mild intermittent asthma     Controlled substance agreement signed       Current Outpatient Medications on File Prior to Visit   Medication Sig Dispense Refill     cyanocobalamin, vitamin B-12, (VITAMIN B-12 ORAL) Take 1 tablet by mouth as needed.       cyclobenzaprine (FLEXERIL) 10 MG tablet one tablet twice daily 60 tablet 0     dapagliflozin (FARXIGA) 10 mg Tab Take 10 mg by mouth daily. 90 tablet 0     diclofenac  epolamine (FLECTOR) 1.3 % PT12 Place 1 patch on the skin every 12 (twelve) hours as needed. 60 patch 5     diclofenac sodium (VOLTAREN) 1 % Gel APPLY TOPICALLY TWICE DAILY AS NEEDED FOR ARTHRITIS 300 g 1     DULoxetine (CYMBALTA) 60 MG capsule TAKE 1 CAPSULE BY MOUTH ONCE DAILY 90 capsule 3     ergocalciferol (VITAMIN D2) 1,250 mcg (50,000 unit) capsule Take 1 capsule (50,000 Units total) by mouth once a week. 12 capsule 1     gabapentin (NEURONTIN) 300 MG capsule TAKE TWO CAPSULES BY MOUTH 4 TIMES DAILY 240 capsule 9     glipiZIDE (GLUCOTROL XL) 5 MG 24 hr tablet Take 1 tablet (5 mg total) by mouth daily. 90 tablet 3     ibuprofen (ADVIL,MOTRIN) 800 MG tablet TAKE 1 TABLET BY MOUTH THREE TIMES DAILY AS NEEDED 100 tablet 2     lidocaine (XYLOCAINE) 5 % ointment Apply topically 3 (three) times a day. 120 g 0     lisinopriL (PRINIVIL,ZESTRIL) 2.5 MG tablet Take 1 tablet (2.5 mg total) by mouth daily. 30 tablet 0     lovastatin (MEVACOR) 40 MG tablet TAKE 2 TABLETS BY MOUTH AT BEDTIME 180 tablet 1     MAPAP, ACETAMINOPHEN, 500 mg coapsule Take 1,000 mg by mouth every 6 (six) hours as needed. 180 capsule 1     metFORMIN (GLUCOPHAGE-XR) 500 MG 24 hr tablet Take 2 tablets (1,000 mg total) by mouth 2 (two) times a day with meals. 360 tablet 3     omeprazole (PRILOSEC) 40 MG capsule Take 40 mg by mouth daily .             oxyCODONE-acetaminophen (PERCOCET/ENDOCET) 5-325 mg per tablet Take 1 tablet by mouth every 6 (six) hours as needed for pain. 90 tablet 0     phentermine (ADIPEX-P) 37.5 mg tablet TAKE 1 & 1/2 (ONE & ONE-HALF) TABLETS BY MOUTH ONCE DAILY IN THE MORNING 45 tablet 0     SENNA LAXATIVE 8.6 mg tablet Take 1 tablet by mouth 2 (two) times a day.         3     spironolactone (ALDACTONE) 100 MG tablet TAKE 1 TABLET BY MOUTH ONCE DAILY. APPOINTMENT REQUIRED FOR FUTURE REFILLS 90 tablet 2     topiramate (TOPAMAX) 100 MG tablet Take 1 tablet (100 mg total) by mouth 2 (two) times a day. 180 tablet 3     triamcinolone  (KENALOG) 0.025 % ointment Apply sparingly in ear canal 1-2 times daily as needed 15 g 0     VESICARE 5 mg tablet Take 1 tablet (5 mg total) by mouth daily. 90 tablet 3     erythromycin ophthalmic ointment Administer 1 application to both eyes daily as needed.              [DISCONTINUED] albuterol (PROVENTIL HFA;VENTOLIN HFA) 90 mcg/actuation inhaler Inhale 2 puffs every 4 (four) hours as needed for wheezing. 3 Inhaler 3     No current facility-administered medications on file prior to visit.

## 2021-06-06 NOTE — TELEPHONE ENCOUNTER
Patient is due for her 6-month med check.  Please get her scheduled.  What is with the Tylenol?  Does she need that filled as well?

## 2021-06-06 NOTE — TELEPHONE ENCOUNTER
Medication Request  Medication name: Vitamin D 5,000 and Mpap Extra Strength 500 mg  Requested Pharmacy: [facility]  Reason for request: The patient is making a office visit as she was transferred to [facility].  The issue is she is out of medication.  Will Dr Gonzales bridge medication until seen?  Please advise.   When did you use medication last?:  Out today  Patient offered appointment:  yes  Okay to leave a detailed message: no  [phone]

## 2021-06-07 NOTE — TELEPHONE ENCOUNTER
RX for dapagliflozin called into [facility] pharmacy per dr. Vargas request. rx is not configured to send in by e script.

## 2021-06-07 NOTE — TELEPHONE ENCOUNTER
Last Med Check: 3/13/20    Next med check due on:9/13/2020    CSA on File:3/20/20    Future Appointment Scheduled ?no    Last Med Refill? 2/13/20    [facility]

## 2021-06-08 NOTE — TELEPHONE ENCOUNTER
Patient insistent that Dr Eagle has filled this for her in the past.   Pharmacist contacted. Dr Eagle has never filled this but rather her previous provider, Dr Cardenas. - pt notified.    Ok for Monday to wait for DR Swain return to clinic per patient. Declined sooner virtual appt with partner. She has been out x 2wks. She would like to wait for her PCP.     Spoke with and inform patient Ketoconazole cream refill but Oxycodone refill request was denied due to negative urine drug screen. Srinivasan mentioned that he had stopped taking the Oxycodone tabs for about a week and a half because it was making him too drowsy when driving. Patient asked to be seen in office for an appointment but I was unable to find an open available slot. Suggested patient calling clinic for same day slot since her would rather be seen sooner than later. Provided patient with clinic phone number.

## 2021-06-08 NOTE — TELEPHONE ENCOUNTER
Controlled Substance Refill Request  Medication Name:   Requested Prescriptions     Pending Prescriptions Disp Refills     oxyCODONE-acetaminophen (PERCOCET/ENDOCET) 5-325 mg per tablet 90 tablet 0     Sig: Take 1 tablet by mouth every 6 (six) hours as needed for pain.     Date Last Fill: 3/2/2020  Requested Pharmacy: [facility]  Submit electronically to pharmacy  Controlled Substance Agreement on file:   Encounter-Level CSA Scan Date - 06/11/2018:    Scan on 6/14/2018 11:32 AM: PERCOSET/PHENTERMINE           Encounter-Level CSA Scan Date - 03/24/2017:    Scan on 3/28/2017 11:54 AM        Last office visit:  3/13/2020

## 2021-06-08 NOTE — TELEPHONE ENCOUNTER
I do not see records of Dr. Eagle ever sending this medicine as a prescription.  I do note that it has been used over-the-counter in the past.  Is there a reason she wants a prescription versus just yet picking it up over-the-counter?  Have to make sure since Dr. Eagle may or may not want to continue prescribing this when she gets back from her PTO.    Maninder Lira, CNP

## 2021-06-08 NOTE — TELEPHONE ENCOUNTER
Orders being requested: New TENS Unit  Reason service is needed/diagnosis: back pain  When are orders needed by: soon as ordered.  Where to send Orders: [facility] patient states she does not know were to get this as she was given one when she was in PT.  Okay to leave detailed message?  Yes

## 2021-06-08 NOTE — TELEPHONE ENCOUNTER
Left a detailed voicemail on the patient's voicemail requesting her to call back to schedule a face to face visit with Dr. Eagle or a covering provider if unable to wait until Dr. Eagle is back in clinic doing virtual visits.

## 2021-06-08 NOTE — TELEPHONE ENCOUNTER
Medication Request  Medication name: Hyper Patch  Requested Pharmacy: [facility]  Reason for request: States she saw this on Face Book and it is a patch you put on your stomach. It is suppose to change your metabolism and gives you energy which helps you loose weight.    Okay to leave a detailed message: yes

## 2021-06-08 NOTE — TELEPHONE ENCOUNTER
Advised pt that this is an OTC medication and purchased at a retailer. Insurance would not cover, this would be an out of pocket expense.

## 2021-06-08 NOTE — TELEPHONE ENCOUNTER
Medication Request  Medication name:    Disp  Refills  Start  End     SENNA LAXATIVE 8.6 mg tablet   3  4/12/2019      Sig - Route: Take 1 tablet by mouth 2 (two) times a day.        - Oral     Class: Historical Med         Requested Pharmacy: [facility]  Reason for request: caller is out and stated that she had put refills in last week already. Caller stated that she would really like for Trish Eagle MD to send it over as soon as possible   When did you use medication last?:    Patient offered appointment:  patient declined  Okay to leave a detailed message: yes

## 2021-06-08 NOTE — TELEPHONE ENCOUNTER
Tried to order this but it is requiring a face-to-face visit.  It needs special documentation.  She will need to make an appointment to see me.

## 2021-06-09 ENCOUNTER — COMMUNICATION - HEALTHEAST (OUTPATIENT)
Dept: FAMILY MEDICINE | Facility: CLINIC | Age: 66
End: 2021-06-09

## 2021-06-09 DIAGNOSIS — I10 ESSENTIAL HYPERTENSION: ICD-10-CM

## 2021-06-09 NOTE — TELEPHONE ENCOUNTER
Call placed to pt with provider's results and recommendations. Pt stated understanding. Transferred pt to scheduling to make appt.

## 2021-06-09 NOTE — PATIENT INSTRUCTIONS - HE
.   DISCHARGE INSTRUCTIONS    During office hours (8:00 a.m.- 4:00 p.m.) questions or concerns may be answered  by calling [facility] Navigation Nurses at  [phone].  Messages received after hours will be returned the following business day.      In the case of an emergency, please dial 911 or seek assistance at the nearest Emergency Room/Urgent Care facility.     All Patients:    ? You may experience an increase in your symptoms for the first 2 days (It may take anywhere between 2 days- 2 weeks for the steroid to have maximum effect).    ? You may use ice on the injection site, as frequently as 20 minutes each hour if needed.    ? You may take your pain medicine.    ? You may continue taking your regular medication after your injection. If you have had a Medial Branch Block you may resume pain medication once your pain diary is completed.    ? You may shower. No swimming, tub bath or hot tub for 48 hours.  You may remove your bandaid/bandage as soon as you are home.    ? You may resume light activities, as tolerated.    ? Resume your usual diet as tolerated.    ? It is strongly advised that you do not drive for 1-3 hours post injection.    ? If you have had oral sedation:  Do not drive for 8 hours post injection.      ? If you have had IV sedation:  Do not drive for 24 hours post injection.  Do not operate hazardous machinery or make important personal/business decisions for 24 hours.      POSSIBLE STEROID SIDE EFFECTS (If steroid/cortisone was used for your procedure)    -If you experience these symptoms, it should only last for a short period      Swelling of the legs                Skin redness (flushing)       Mouth (oral) irritation     Blood sugar (glucose) levels              Sweats                      Mood changes    Headache    Sleeplessness         POSSIBLE PROCEDURE SIDE EFFECTS  -Call the Spine Center if you are concerned    Increased Pain             Increased numbness/tingling         Nausea/Vomiting            Bruising/bleeding at site        Redness or swelling                                                Difficulty walking        Weakness             Fever greater than 100.5    *In the event of a severe headache after an epidural steroid injection that is relieved by lying down, please call the [facility] to speak with a clinical staff member*

## 2021-06-09 NOTE — PROGRESS NOTES
SUBJECTIVE: Anita Duque is a 61 y.o. caucasion female who presents today following up on her diabetes and chronic pain.  Her last diabetic office visit was 16 and her A1c at that time was 6.5.  She continues to take her medication faithfully.  Anita sees providers at [facility] for her diabetes and her most recent visit was about 4 months ago, but I do not have those records to review.  She was getting her percocet and phentermine from the providers at [facility], but since she transitioned to Medicare, she can no longer be seen there.  She requests refills of these medications to now come from me.    Anita reports getting percocet from [facility] totaling about 90 tablets which lasts her anywhere from 2-3 months.  She also uses volataren gel and a TENS unit.  Her lidocaine patches which used to be beneficial are no longer covered by insurance.  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  Her phentermine use is inconsistent over the past 5 years and used prn.    Her mood is better since her mother  6 months ago.  She initially had significant depressive symptoms, but now is better after visiting her son and his family in [facility] and with the support of her significant other, tremayne. She lost 20 pounds during this episode due to poor appetite.      Anita is trying to use her CPAP machine more faithfully.  She reports issues with nasal congestion, in particular when the humidifier is used.  She initially tried and failed therapy but is more motivated this time to make it work. She recently had an appointment at [facility] which showed excellent compliance. They recommended adjusting humidification and a trial of Flonase for her congestion symptoms.    PHQ9 score was 5  GAD7 score was 0  Asthma control test score was 24      OBJECTIVE: BP 90/60  Temp (!) 72  F (22.2  C)  Wt 181 lb 4.8 oz (82.2 kg)  BMI 31.36 kg/m2  General: Healthy  appearing obese female  Heart: S1, S2  Lungs: Clear to auscultation bilaterally  Abdomen: Soft. Non-tender  Extremities: No lower extremity edema.  Diabetic foot exam: Normal sensation with monofilament probe.  Pedal and post tibial pulses palpable.    ASSESSMENT & PLAN:    1. Type 2 diabetes mellitus  Glycosylated Hemoglobin A1c    Microalbumin, Random Urine   2. Chronic pain syndrome  oxyCODONE-acetaminophen (PERCOCET) 5-325 mg per tablet   3. Mild persistent asthma     4. Essential hypertension  Basic Metabolic Panel   5. Hyperlipidemia  Lipid [place] FASTING   6. JANINE on CPAP     7. Obesity (BMI 30.0-34.9)     8. Pain management contract signed     9. Encounter for screening mammogram for malignant neoplasm of breast   Mammo Screening Bilateral     Continue diabetic medications as previously prescribed.  Patient was reminded to do eye exam and mammogram.  Recheck above-mentioned labs.  I will get back to her on these results by mail and only call with grossly abnormal values.   run which shows percocet filled 120 count approximately every 3 months.  Pain contract signed and prescription filled.  Follow up in 3-4 months.    Patient Active Problem List   Diagnosis     Essential Hypertension     Joint Pain, Localized In The Shoulder     Fibromyalgia     Type 2 diabetes mellitus     Hyperlipidemia     Major Depression, Recurrent     Chronic Constipation     Cellulitis     Sciatica     Abdominal Pain     Chronic Pain Syndrome     Lower Back Pain     Vaginal Wall Prolapse With Midline Cystocele     Female Stress Incontinence     Lump Or Mass In Breast     Osteoarthrosis, unspecified whether generalized or localized, lower leg     JANINE on CPAP     Mild persistent asthma     Obesity (BMI 30.0-34.9)     Pain management contract signed       Current Outpatient Prescriptions on File Prior to Visit   Medication Sig Dispense Refill     cyclobenzaprine (FLEXERIL) 5 MG tablet TAKE ONE TABLET BY MOUTH THREE TIMES DAILY AS  NEEDED 30 tablet 0     diclofenac sodium (VOLTAREN) 1 % Gel Apply topically twice daily as needed for arthritis 3 Tube 3     DULoxetine (CYMBALTA) 60 MG capsule        fluticasone (FLONASE) 50 mcg/actuation nasal spray 1 spray each nostril twice daily 1 g 1     gabapentin (NEURONTIN) 300 MG capsule 600 mg 4 (four) times a day.        glipiZIDE (GLUCOTROL) 5 MG 24 hr tablet 10 mg daily.        ibuprofen (ADVIL,MOTRIN) 800 MG tablet TAKE ONE TABLET BY MOUTH THREE TIMES DAILY AS NEEDED 90 tablet 0     INVOKANA 300 mg Tab        ketoconazole (NIZORAL) 2 % shampoo        lisinopril (PRINIVIL,ZESTRIL) 2.5 MG tablet TAKE ONE TABLET BY MOUTH EVERY DAY 90 tablet 3     lovastatin (MEVACOR) 40 MG tablet        metFORMIN (GLUCOPHAGE-XR) 500 MG 24 hr tablet 500 mg 4 (four) times a day.        mometasone-formoterol (DULERA) 200-5 mcg/actuation HFAA inhaler Inhale 2 puffs 2 (two) times a day. 30 Inhaler 1     nystatin-triamcinolone (MYCOLOG II) cream Apply small amount to affected 2-3 times daily as needed for the itching. 30 g 0     omeprazole (PRILOSEC) 40 MG capsule 40 mg.        polyethylene glycol (GLYCOLAX) 17 gram/dose powder        pramipexole (MIRAPEX) 0.75 MG tablet 0.75 mg.        spironolactone (ALDACTONE) 100 MG tablet        topiramate (TOPAMAX) 50 MG tablet        traZODone (DESYREL) 100 MG tablet        VESICARE 5 mg tablet        No current facility-administered medications on file prior to visit.                Maninder Lira, Nurse Practitioner

## 2021-06-09 NOTE — TELEPHONE ENCOUNTER
----- Message from Pema De PA-C sent at 7/5/2020  1:24 PM CDT -----  Please call the patient and let her know that the MRI scans of the cervical and lumbar spine show slight progression of the degenerative changes.  I would like the patient to  Follow up in the clinic to determine next steps.  Thanks!

## 2021-06-09 NOTE — TELEPHONE ENCOUNTER
RN cannot approve Refill Request    RN can NOT refill this medication med is not covered by policy/route to provider     . Last office visit: 3/13/2020 Trish Eagle MD Last Physical: 12/3/2018 Last MTM visit: Visit date not found Last visit same specialty: 3/13/2020 Trish Eagle MD.  Next visit within 3 mo: Visit date not found  Next physical within 3 mo: Visit date not found      Sandhya Mckinnon, [facility] Triage/Med Refill 6/25/2020    Requested Prescriptions   Pending Prescriptions Disp Refills     ibuprofen (ADVIL,MOTRIN) 800 MG tablet [Pharmacy Med Name: Ibuprofen 800 MG Oral Tablet] 100 tablet 0     Sig: Take 1 tablet by mouth three times daily as needed       There is no refill protocol information for this order

## 2021-06-09 NOTE — TELEPHONE ENCOUNTER
Who is calling:  patient  Reason for Call:  Calling to check on below request. Relayed message that a referral was placed to Spine. Transferred patient to Spine and also provided their phone number.  Date of last appointment with primary care: 3/13/2020  Okay to leave a detailed message: Yes

## 2021-06-09 NOTE — TELEPHONE ENCOUNTER
Who is calling:  Pt   Reason for Call:  Pt would like PCP to contact her about getting an appointment or an order for an appointment for an injection in her back for herniated disc pain at [facility] sometime next week if possible.     Date of last appointment with primary care: 03/13/20    Okay to leave a detailed message: Yes

## 2021-06-09 NOTE — PROGRESS NOTES
Assessment:   Anita Duque is a 65 y.o. y.o. female with past medical history significant for hypertension, type 2 diabetes mellitus, hyperlipidemia, depression, chronic pain syndrome, obstructive sleep apnea, asthma, controlled substance agreement signed, fibromyalgia, obesity a who presents today for follow-up regarding 2 areas of pain.  1.  Acute on chronic midline low back pain with radiation into the buttocks and hips and subjective bilateral lower extremity weakness.  Patient has a history of an L4-5 fusion.  MRI lumbar spine shows moderate spinal stenosis L3-4.  There is also moderate to severe foraminal stenosis at this level.  2.  Chronic neck pain radiating into the bilateral shoulders.  Patient feels as though the pain radiates from the neck all the way down to the low back.  MRI cervical spine shows mild bilateral foraminal stenosis C4-5 and minimal bilateral foraminal stenosis C5-6.  I suspect she is symptomatic from cervical facet arthropathy with secondary myofascial pain.  Patient had a radiofrequency ablation in 2016 with [facility] which provided 100% relief of her neck pain for about 1 year.        Plan:      A shared decision making plan was used.  The patient's values and choices were respected.  The following represents what was discussed and decided upon by the physician assistant and the patient.       1.  DIAGNOSTIC TESTS:  I reviewed the MRI lumbar spine and MRI cervical spine.  No further diagnostic tests were ordered.     2.  PHYSICAL THERAPY: Patient attended physical therapy at [facility] in 2018.    She has been doing some of her home exercises but states that the exercises that require her to get down on the floor are too painful.  I entered a new referral for the patient to return to pool therapy at [facility].     3.  MEDICATIONS: No changes are made to the patient's medications.  -Uses Percocet 1 tab four times daily.  -Using gabapentin 600 mg 4 times  daily  -Using cyclobenzaprine 10 mg 1-2 daily.  -Using ibuprofen and Tylenol as needed.  -She is also using topical pain relieving patches.  -I did provide a prescription for a new TENS unit.  Patient has been using a TENS unit for years for her chronic neck and low back pain and is very helpful.  Her unit is not functioning properly.    4.  INTERVENTIONS: **  -In regards to the low back pain, I recommended a bilateral L3-4 transforaminal epidural steroid injection.  I chose this injection initially because she describes claudicating back pain with a positive shopping cart sign.  She reports that she has had excellent relief with epidural steroid injections in the past. The injection will use cortisone.  Cortisone weakens/suppresses the immune system so it does not function as well as it normally does.  We do not know if this could make it more likely that you could get the COVID-19 virus or if you do have the virus, if you are going to be sicker than you would have been if you hadn't had the cortisone injection.  Patient was willing to accept this risk and the order for the injection was placed.  -If this does not help, I would recommend medial branch blocks to determine if there is facet mediated pain.  -In regards to the neck pain, patient is interested in repeating the radiofrequency ablation.  Patient will trial physical therapy first.  If it fails to improve, we could consider medial branch blocks as a work-up toward a repeat radiofrequency ablation.      5.  PATIENT EDUCATION: Patient is in agreement the above plan.  All questions were answered.     6.  FOLLOW-UP: I will see the patient back in clinic for a 2-week post procedure follow-up visit after her bilateral L3-4 transforaminal epidural steroid injection.  If she has questions or concerns in the meantime, she should not hesitate to call.     Subjective:      Anita Duque is a 65 y.o. female who presents today for follow-up regarding acute on  chronic low back pain as well as an exacerbation of chronic neck pain.  I last had a video visit with the patient June 24, 2020.  At that time I ordered MRI scans of the cervical and lumbar spine which will be described below.  She returns today to review those results and discuss treatment options.     Patient complains of  bilateral low back pain.  Pain is worse on the left than the right.  Pain radiates into the buttocks and lateral hips.  She denies any pain radiating further down the legs.  She states that her pain is aggravated with walking.  She states that she can only walk from her house to the car and back.  She states that if she goes to the grocery store she has to lean on a cart to make it around the store.  A sending stairs also aggravates the pain.  Pain improves with sitting, but she states that it can take 20 to 30 minutes for the pain to ease up.  Also improves with applying heat and pain relieving patches.    Patient also complains of bilateral neck pain.  Pain is equally severe on both sides.  Pain extends down the neck and into the upper trapezius muscles.  She denies any pain radiating further down the arms.  She does have some pain and numbness in her hands.  She has known carpal tunnel syndrome bilaterally.  She had carpal tunnel release years ago on the left but she continued to have symptoms so she did not pursue carpal tunnel release on the right.    Overall, patient rates her pain today as a 6 out of 10.  At its worst it is a 10-10.  At its best it is a 2 out of 10.        Patient attended physical therapy at [facility] in 2018.   She has been doing some of her home exercises but states that the exercises that require her to get down on the floor are too painful. She is using Percocet twice daily, gabapentin 600 mg 4 times daily, Flexeril 10 mg daily, ibuprofen 800 mg twice daily, Voltaren gel, and pain relieving patches.     Review of Systems:  Positive for weakness, loss of bladder  control (chronic, stable), headache, difficulty swallowing, trip/stumble/falls.  Negative for numbness/tingling, loss of bowel control, inability to urinate, pain much worse at night, difficulty with hand skills, fevers, unintentional weight loss.     Objective:   CONSTITUTIONAL:  Vital signs as above.  Patient is in no acute distress.  The patient is well nourished and well groomed.  Patient is obese.  PSYCHIATRIC:  The patient is awake, alert, oriented to person, place and time.  The patient is answering questions appropriately with clear speech.  Normal affect.  HEENT: Normocephalic, atraumatic.  Sclera clear.    SKIN: Exposed skin is clean, dry, intact without rashes.  VASCULAR: No lower extremity edema.  MUSCULOSKELETAL:  Gait is mildly antalgic.  The patient is able to rise onto toes and heels bilaterally with support..  Lumbar flexion moderately restricted.  Lumbar extension severely restricted with reproduction of low back pain.  Patient has tenderness to palpation bilateral lumbar paraspinous muscles from approximately L3 through the lumbosacral junction.  No significant tenderness palpation bilateral sacroiliac joints.  Tender to palpation bilateral cervical paraspinous muscles and upper trapezius muscles with some hypertonicity.  Patient demonstrates breakaway weakness bilateral shoulder abductors (patient reports reproduction of shoulder pain due to rotator cuff tear), 5/5 bilateral elbow flexors/extensors, finger flexors/abductors, wrist extensors.  5/5 strength bilateral hip flexors, knee flexors/extensors, ankle dorsi/plantar flexors, EHL.  NEUROLOGICAL: Reflexes are 2+ bilateral biceps, triceps, brachioradialis, patellar, and Achilles.  Sensation to light touch is intact over the bilateral upper and lower extremities throughout.     RESULTS: I reviewed the MRI lumbar spine from [facility] dated Jaqui 3, 2020.  This shows slight progression of the degenerative changes in the lumbar spine at L3-4 and  L4-5.  At L3-4 there is a generalized disc bulge with superimposed left paracentral disc extrusion leading to moderate to severe left greater than right foraminal stenosis as well as moderate spinal canal stenosis.  At L4-5 there is what appears to be artifact and/are a disc extrusion resulting in severe right foraminal stenosis.  There is also mild left foraminal stenosis.  At L5-S1 there is a generalized disc bulge asymmetric to the right with mild right foraminal stenosis.  Please see report for further details.     I reviewed the MRI cervical spine from [facility] dated July 3, 2020.  At C4-5 there is a generalized disc bulge/osteophyte complex resulting in mild left wrist and right foraminal stenosis.  At C5-6 there is a generalized disc bulge and mild facet arthropathy with minimal bilateral foraminal stenosis.  There is no spinal canal stenosis at any level.  Please see report for further details.

## 2021-06-09 NOTE — PROGRESS NOTES
"Anita Duque is a 65 y.o. female who is being evaluated via a billable video visit.      The patient has been notified of following:     \"This video visit will be conducted via a call between you and your physician/provider. We have found that certain health care needs can be provided without the need for an in-person physical exam.  This service lets us provide the care you need with a video conversation.  If a prescription is necessary we can send it directly to your pharmacy.  If lab work is needed we can place an order for that and you can then stop by our lab to have the test done at a later time.    Video visits are billed at different rates depending on your insurance coverage. Please reach out to your insurance provider with any questions.    If during the course of the call the physician/provider feels a video visit is not appropriate, you will not be charged for this service.\"    Patient has given verbal consent to a Video visit? Yes    Will anyone else be joining your video visit? No        Video Start Time: 11:21 AM    Additional provider notes:   Assessment:   Anita Duque is a 65 y.o. y.o. female with past medical history significant for hypertension, type 2 diabetes mellitus, hyperlipidemia, depression, chronic pain syndrome, obstructive sleep apnea, asthma, controlled substance agreement signed, fibromyalgia, obesity a who presents today for follow-up regarding acute on chronic midline low back pain with radiation into the buttocks and bilateral lower extremity weakness.  Patient has a history of an L4-5 fusion.  MRI lumbar spine from 2018 shows a diffuse annular bulge at L3-4 which results in moderate to severe central canal stenosis.  I am concerned that the severity of the stenosis may have progressed or she may have a new disc protrusion, particularly at L5-S1 based on the location of her pain.  On exam, she was unable to walk on heels or toes (difficult to tell if this was pain limited " or true weakness).  -Patient also complains of an exacerbation of chronic neck pain radiating into the bilateral shoulders.  Patient feels as though the pain radiates from the neck all the way down to the low back.  MRI cervical spine in 2018 showed mild bilateral foraminal stenosis C4-5 and C5-6 but no central canal stenosis.       Plan:     A shared decision making plan was used.  The patient's values and choices were respected.  The following represents what was discussed and decided upon by the physician assistant and the patient.      1.  DIAGNOSTIC TESTS:  I reviewed the MRI lumbar spine and MRI cervical spine from June 2018.  I did order updated MRI cervical and lumbar spines for further evaluation.    2.  PHYSICAL THERAPY: Patient attended physical therapy at [facility] in 2018.  No further physical therapy was ordered.  I do not think the patient would be able to tolerate physical therapy based on the severity of her pain currently.  Hopefully once her pain is under better control she would be able to begin a physical therapy program again.    3.  MEDICATIONS: No changes are made to the patient's medications.  -Uses Percocet twice daily.  -Using gabapentin 600 mg 4 times daily  -Using cyclobenzaprine 10 mg daily  -Using ibuprofen 800 mg twice daily  -She is also using topical pain relieving patches and Voltaren gel.    4.  INTERVENTIONS: Patient is interested in having an injection.  Patient states that she has had significant improvement in her pain with lumbar epidural steroid injections in the past, although I do not see any record of this.  We will await the results of her MRI scans.    5.  PATIENT EDUCATION: Patient is in agreement the above plan.  All questions were answered.    6.  FOLLOW-UP: Patient will follow-up with me in person in the clinic to review the MRI scans of the cervical and lumbar spine and determine next steps in treatment.  If she has questions or concerns in the meantime, she  should not hesitate to call.    Subjective:     Anita Duque is a 65 y.o. female who presents today for follow-up regarding acute on chronic low back pain as well as an exacerbation of chronic neck pain..  I have not seen this patient since June 19, 2018.  Patient states that she is continued to deal with neck and back pain since I saw her and it became more severe about 3 weeks ago.  She denies any injury or event to cause the pain to increase 3 weeks ago.  She has not had any falls.  Patient states that for the past 3 weeks she has been in almost constant 10 out of 10 pain.    Patient complains of a stabbing pain in the midline of the lower back.  Patient points to the lumbosacral junction when asked to point to her pain.  She states that occasionally she feels the pain radiate into the buttocks and hips bilaterally.  This tends to be worse on the left than the right.  She denies any pain further down the legs.  However, she feels that both legs are weak.  She has great difficulty walking because of the weakness and pain in her legs.  She denies loss of bowel control.  She does have chronic urinary incontinence.  She has had bladder surgery for this and has to wear a pad.  She does not think it is any more severe since her pain worsened.    Patient also complains of worsening neck pain.  Patient complains of bilateral neck pain that radiates up to the shoulders.  She denies any pain radiating further down the arms.  Patient states that it feels like the neck pain radiates down the spine to her low back where the pain is most severe.  She denies any weakness down the arms.  She does have numbness and tingling in both arms when she sleeps at night.  She does have a history of carpal tunnel release on 1 side.  She wears wrist splints.  She has headache associated with the neck pain.    Overall, patient rates her pain today as a 10 out of 10.  At its worst it is a 10 out of 10.  At its best it is a 8 out of 10.   Pain is aggravated with walking.  She states that she has been using a TENS unit, heat, ice, and pain relieving patches with minimal improvement (reaching pain level 8 at its best).      Patient attended physical therapy at [facility] in 2018.  She is using Percocet twice daily, gabapentin 600 mg 4 times daily, Flexeril 10 mg daily, ibuprofen 800 mg twice daily, Voltaren gel, and pain relieving patches.    Review of Systems:  Positive for numbness/tingling (both arms at night), weakness (both legs with walking), loss of bladder control (chronic, stable), headache, difficulty swallowing.  Negative for loss of bowel control, inability to urinate, pain much worse at night, trip/stumble/falls, difficulty with hand skills, fevers, unintentional weight loss.     Objective:   CONSTITUTIONAL:  Vital signs as above.  Patient appears to be in a great deal of pain during the physical exam portion of the visit.  The patient is well nourished and well groomed.  Patient is obese.  PSYCHIATRIC:  The patient is awake, alert, oriented to person, place and time.  The patient is answering questions appropriately with clear speech.  Normal affect.  HEENT: Normocephalic, atraumatic.  Sclera clear.    SKIN: Exposed skin is clean, dry, intact without rashes.  MUSCULOSKELETAL:  Gait is severely antalgic.  The patient is unable to heel and toe walk bilaterally.  Lumbar flexion severely restricted.  Lumbar extension severely restricted.     RESULTS: I reviewed the MRI lumbar spine from [facility] dated June 26, 2018.  This shows postoperative changes of the laminectomy and posterior spinal fusion at L4-5.  At L2-3 there is minimal central canal stenosis with mild bilateral foraminal stenosis and mild bilateral lateral recess stenosis.  At L3-4 there is a diffuse disc bulge with moderate to severe central canal stenosis with left greater than right lateral recess stenosis and moderate left greater than right foraminal stenosis.  At  L4-5 there is no evidence of canal compromise but there is bilateral lateral recess stenosis and moderate bilateral record left foraminal stenosis.  At L5-S1 there is mild right lateral recess stenosis and mild right foraminal stenosis.  Please see report for further details.    I reviewed the MRI cervical spine from [facility] dated June 26, 2018.  This shows stable mild bilateral foraminal stenosis C4-5 and C5-6.  There is no evidence of central canal stenosis throughout the cervical region.  Please see report for further details.          Video-Visit Details    Type of service:  Video Visit    Video End Time (time video stopped): 11:42 AM  Originating Location (pt. Location): Home    Distant Location (provider location):  [facility]     Platform used for Video Visit: Kaitlynn De PA-C

## 2021-06-09 NOTE — TELEPHONE ENCOUNTER
Patient Returning Call  Reason for call:  Patient returning call to check on the status of this request.  Information relayed to patient:  Pending providers review and approval.  Patient has additional questions:  yes  If YES, what are your questions/concerns:  I need a call back Today I need to be seen ASAP for my back pain and injection.  Okay to leave a detailed message?: Yes

## 2021-06-10 RX ORDER — LOVASTATIN 40 MG
80 TABLET ORAL AT BEDTIME
Qty: 180 TABLET | Refills: 1 | Status: SHIPPED | OUTPATIENT
Start: 2021-06-10 | End: 2022-03-29 | Stop reason: ALTCHOICE

## 2021-06-10 NOTE — TELEPHONE ENCOUNTER
Refill Approved    Rx renewed per Medication Renewal Policy. Medication was last renewed on 2/3/20, last OV 3/13/20.    Kenia Mccarthy, [facility] Triage/Med Refill 8/12/2020     Requested Prescriptions   Pending Prescriptions Disp Refills     lovastatin (MEVACOR) 40 MG tablet [Pharmacy Med Name: Lovastatin 40 MG Oral Tablet] 180 tablet 0     Sig: TAKE 2 TABLETS BY MOUTH AT BEDTIME       Statins Refill Protocol (Hmg CoA Reductase Inhibitors) Passed - 8/11/2020  9:20 AM        Passed - PCP or prescribing provider visit in past 12 months      Last office visit with prescriber/PCP: 3/13/2020 Trish Eagle MD OR same dept: 3/13/2020 Trish Eagle MD OR same specialty: 3/13/2020 Trish Eagle MD  Last physical: 12/3/2018 Last MTM visit: Visit date not found   Next visit within 3 mo: Visit date not found  Next physical within 3 mo: Visit date not found  Prescriber OR PCP: [place]) SHALOM Eagle MD  Last diagnosis associated with med order: 1. Essential hypertension  - lovastatin (MEVACOR) 40 MG tablet [Pharmacy Med Name: Lovastatin 40 MG Oral Tablet]; TAKE 2 TABLETS BY MOUTH AT BEDTIME  Dispense: 180 tablet; Refill: 0    If protocol passes may refill for 12 months if within 3 months of last provider visit (or a total of 15 months).

## 2021-06-10 NOTE — PROGRESS NOTES
Assessment:   Anita Duque is a 65 y.o. y.o. female with past medical history significant for hypertension, type 2 diabetes mellitus, hyperlipidemia, depression, chronic pain syndrome, obstructive sleep apnea, asthma, controlled substance agreement signed, fibromyalgia, obesity a who presents today for follow-up regarding 2 areas of pain.  1.  Acute on chronic midline low back pain with radiation into the buttocks and hips and subjective bilateral lower extremity weakness.  Patient has a history of an L4-5 fusion.  MRI lumbar spine shows moderate spinal stenosis L3-4.  There is also moderate to severe foraminal stenosis at this level. Patient is status post a bilateral L3-4 transforaminal epidural steroid injection July 14, 2020 which provided 95% relief of her pain.  2.  Chronic neck pain radiating into the bilateral shoulders.  Patient feels as though the pain radiates from the neck all the way down to the low back.  MRI cervical spine shows mild bilateral foraminal stenosis C4-5 and minimal bilateral foraminal stenosis C5-6.  I suspect she is symptomatic from cervical facet arthropathy with secondary myofascial pain.  Patient had a right C4-5 radiofrequency ablation in October 6, 2016 with [facility] which provided 100% relief of her neck pain for about 1 year.  Neck pain is currently under adequate control with medication regimen.  However, if it were to worsen, we could consider repeating the radiofrequency ablation.        Plan:      A shared decision making plan was used.  The patient's values and choices were respected.  The following represents what was discussed and decided upon by the physician assistant and the patient.       1.  DIAGNOSTIC TESTS:  I reviewed the MRI lumbar spine and MRI cervical spine.  No further diagnostic tests were ordered.     2.  PHYSICAL THERAPY: I had entered a referral for pool therapy at [facility] on July 8, 2020.  Patient reports that she is scheduled to  begin pool therapy August 3, when the pool opens.     3.  MEDICATIONS: No changes are made to the patient's medications.  -Uses Percocet 1 tab twice daily.  -Using gabapentin 600 mg 4 times daily  -Using cyclobenzaprine 10 mg as needed.  -Using ibuprofen and Tylenol as needed.  -She is also using topical pain relieving patches and ointments which are helpful.  Specifically, salon pas patches are helpful for her neck pain..    4.  INTERVENTIONS:   -If low back pain were to return, recommend repeating the bilateral L3-4 transforaminal epidural steroid injection.  I explained to the patient that she can have up to 4 injections/year so ideally we would wait 3 months before we repeat the injection.  -If the neck pain becomes more severe, we could repeat the radiofrequency ablation.  Patient will trial physical therapy first.  If it fails to improve, we could consider medial branch blocks as a work-up toward a repeat radiofrequency ablation.      5.  PATIENT EDUCATION: Patient is in agreement the above plan.  All questions were answered.     6.  FOLLOW-UP: I will see the patient back in the clinic in about 6 weeks to monitor her progress with physical therapy.  If she has questions or concerns in the meantime, she should not hesitate to call.     Subjective:      Anita Duque is a 65 y.o. female who presents today for follow-up regarding acute on chronic low back pain as well as an exacerbation of chronic neck pain.  Patient status post a bilateral L3-4 transforaminal epidural steroid injection July 14, 2020.  Patient reports 95% improvement in her back pain.     Patient complains of  only mild residual low back pain.  She feels the pain primarily in the midline of the lumbar spine, but as the pain worsens the end of the day, it spreads across the low back.  She denies any pain radiating to the buttocks or down the legs.  She does continue to feel a sensation of weakness in her legs, like they are tired with walking.   She denies any numbness or tingling down the legs.    Patient also complains of ongoing neck pain, although it is not as severe as it was previously.  She feels the pain in the midline but sometimes it radiates out to the sides.  Both sides are equally affected.  She denies any pain radiating down the arms.  Denies any numbness, tingling, weakness down the arms.  Neck pain is aggravated with turning her neck, such as when she is driving.  Neck pain is also aggravated with prolonged cervical flexion, such as reading.  Pain is alleviated with applying salon pas patches.    Overall, patient rates her pain as a 2 out of 10.  At its worst it is a 10 out of 10.  At its best it is a 1-10.    Patient had been referred to physical therapy at [facility] for pool therapy.  Patient reports that the pool opens August 3 and she is scheduled to begin pool therapy then.  She has had pool therapy in the past and it was therapeutic. She is using Percocet twice daily, gabapentin 600 mg 4 times daily, Flexeril 10 mg as needed, ibuprofen 800 mg as needed, Tylenol as needed, Voltaren gel, lidocaine ointment, and pain relieving patches.  All of the medications are somewhat helpful.  Patient also applies a back brace occasionally for pain.     Review of Systems:  Positive for weakness, loss of bladder control (chronic, stable), headache, difficulty swallowing, pain much worse at night.  Negative for numbness/tingling, loss of bowel control, inability to urinate, trip/stumble/falls, difficulty with hand skills, fevers, unintentional weight loss.     Objective:   CONSTITUTIONAL:  Vital signs as above.  Patient is in no acute distress.  The patient is well nourished and well groomed.  Patient is obese.  PSYCHIATRIC:  The patient is awake, alert, oriented to person, place and time.  The patient is answering questions appropriately with clear speech.  Normal affect.  HEENT: Normocephalic, atraumatic.  Sclera clear.    SKIN: Exposed skin is  clean, dry, intact without rashes.  VASCULAR: No lower extremity edema.  MUSCULOSKELETAL:  Gait is nonantalgic.   Patient has tenderness to palpation bilateral lumbar paraspinous muscles from approximately L3 through the lumbosacral junction.   Tender to palpation bilateral cervical paraspinous muscles and upper trapezius muscles with some hypertonicity.  Cervical range of motion is intact with flexion, mildly restricted with extension, mildly restricted with lateral rotation bilaterally.  Positive Kemps maneuver bilaterally.  Patient demonstrates  5/5 strength bilateral shoulder abductors, bilateral elbow flexors/extensors, finger flexors/abductors, wrist extensors.  5/5 strength bilateral hip flexors, knee flexors/extensors, ankle dorsi/plantar flexors, EHL.  NEUROLOGICAL: Reflexes are 2+ bilateral biceps, triceps, brachioradialis, patellar, and Achilles.  Sensation to light touch is intact over the bilateral upper and lower extremities throughout.     RESULTS: I reviewed the MRI lumbar spine from [facility] dated Jaqui 3, 2020.  This shows slight progression of the degenerative changes in the lumbar spine at L3-4 and L4-5.  At L3-4 there is a generalized disc bulge with superimposed left paracentral disc extrusion leading to moderate to severe left greater than right foraminal stenosis as well as moderate spinal canal stenosis.  At L4-5 there is what appears to be artifact and/are a disc extrusion resulting in severe right foraminal stenosis.  There is also mild left foraminal stenosis.  At L5-S1 there is a generalized disc bulge asymmetric to the right with mild right foraminal stenosis.  Please see report for further details.     I reviewed the MRI cervical spine from [facility] dated July 3, 2020.  At C4-5 there is a generalized disc bulge/osteophyte complex resulting in mild left wrist and right foraminal stenosis.  At C5-6 there is a generalized disc bulge and mild facet arthropathy with minimal bilateral  foraminal stenosis.  There is no spinal canal stenosis at any level.  Please see report for further details.

## 2021-06-10 NOTE — TELEPHONE ENCOUNTER
Controlled Substance Refill Request  Medication Name:   Requested Prescriptions     Pending Prescriptions Disp Refills     gabapentin (NEURONTIN) 300 MG capsule [Pharmacy Med Name: Gabapentin 300 MG Oral Capsule] 240 capsule 0     Sig: TAKE 2 CAPSULES BY MOUTH 4 TIMES DAILY     phentermine (ADIPEX-P) 37.5 mg tablet [Pharmacy Med Name: Phentermine HCl 37.5 MG Oral Tablet] 135 tablet 0     Sig: TAKE 1 & 1/2 (ONE & ONE-HALF) TABLETS BY MOUTH ONCE DAILY IN THE MORNING     DULoxetine (CYMBALTA) 60 MG capsule [Pharmacy Med Name: DULoxetine HCl 60 MG Oral Capsule Delayed Release Particles] 90 capsule 0     Sig: Take 1 capsule by mouth once daily     Date Last Fill: 3/23/20  Requested Pharmacy: [facility]  Submit electronically to pharmacy  Controlled Substance Agreement on file:   Encounter-Level CSA Scan Date - 06/11/2018:    Scan on 6/14/2018 11:32 AM: PERCOSET/PHENTERMINE           Encounter-Level CSA Scan Date - 03/24/2017:    Scan on 3/28/2017 11:54 AM        Last office visit:  3/13/20    .    Rx renewed per Medication Renewal policy  Duloxetine    Provider Refill Request  Medication:  gabapentin  RN can NOT refill this medication per RN refill protocol because historical medication requested

## 2021-06-11 NOTE — TELEPHONE ENCOUNTER
Attempted to contact patient, but she was unreachable at this time.  A message was left informing her that based on her positive response to the cervical medial branch blocks that were done by Dr. Ponce on 9/23/20, Pema De was recommending that the patient proceed with confirmatory right C4, C5 MBBs.    The patient was encouraged to contact the [facility] Center if she had any questions regarding this recommendation or if she was interested in scheduling the procedure.    Procedure requirements were included as part of the detailed message that was left by this author.

## 2021-06-11 NOTE — PROGRESS NOTES
Assessment:   Anita Duque is a 65 y.o. y.o. female with past medical history significant for hypertension, type 2 diabetes mellitus, hyperlipidemia, depression, chronic pain syndrome, obstructive sleep apnea, asthma, controlled substance agreement signed, fibromyalgia, obesity a who presents today for follow-up regarding 2 areas of pain.  1.  Acute on chronic midline low back pain with radiation into the buttocks and hips and subjective bilateral lower extremity weakness.  Patient has a history of an L4-5 fusion.  MRI lumbar spine shows moderate spinal stenosis L3-4.  There is also moderate to severe foraminal stenosis at this level. Patient is status post a bilateral L3-4 transforaminal epidural steroid injection July 14, 2020 which provided 95% relief of her pain, but she feels that that relief is waning.  2.  Chronic neck pain radiating into the bilateral shoulders.   MRI cervical spine shows mild bilateral foraminal stenosis C4-5 and minimal bilateral foraminal stenosis C5-6.  I suspect she is symptomatic from cervical facet arthropathy with secondary myofascial pain.  Patient had a right C4-5 radiofrequency ablation in October 6, 2016 with [facility] which provided 100% relief of her neck pain for about 1 year.         Plan:      A shared decision making plan was used.  The patient's values and choices were respected.  The following represents what was discussed and decided upon by the physician assistant and the patient.       1.  DIAGNOSTIC TESTS:  I reviewed the MRI lumbar spine and MRI cervical spine.  No further diagnostic tests were ordered.     2.  PHYSICAL THERAPY: Patient is currently in physical therapy at [facility].  She is doing pool therapy she has had 4 sessions so far.  Encouraged her to continue with physical therapy and home exercises.     3.  MEDICATIONS: No changes are made to the patient's medications.  -Uses Percocet 1 tab twice daily.  -Using gabapentin 600 mg 4 times  daily  -Using cyclobenzaprine 10 mg as needed.  -Using ibuprofen and Tylenol as needed.  -She is also applying diclofenac gel and patches which help.    4.  INTERVENTIONS:   -I offer the patient right C4, C5 medial branch blocks as a work-up toward radiofrequency ablation.  Patient had 100% relief of her neck pain for about 1 year when this procedure was performed several years ago.  Patient indicated she would like to proceed and an order was placed.      5.  REFERRALS: Patient is interested in finding out if there may be a surgical solution for her back pain.  She is disappointed that the relief she had from her injection is already waning.  Patient request to see Dr. Frieda Beyer.  We will refer her to his clinic.  He did have prior surgery.     6.  FOLLOW-UP: Patient will return to the clinic for her right C4, C5 medial branch blocks.  If she has questions or concerns in the meantime, she should not hesitate to call.     Subjective:      Anita Duque is a 65 y.o. female who presents today for follow-up regarding acute on chronic low back pain as well as an exacerbation of chronic neck pain.  I last saw the patient July 28, 2020.  At that time she reported 95% improvement in her low back pain after her bilateral L3-4 transforaminal epidural steroid injection performed on July 14, 2020.  She complained of chronic neck pain.  I recommended physical therapy.  Patient has had 4 sessions of physical therapy at [facility].  She reports ongoing pain.     Patient complains of  bilateral low back pain.  Patient feels that the relief that she had from her epidural steroid injection is waning.  Pain extends into the buttocks and lateral hips.  She denies any pain further down the leg.    Patient also complains of neck pain.  Patient states that the neck pain is worse than it was 6 weeks ago.  She states the pain is in the midline and extends out into the upper trapezius muscles toward the shoulders.  Pain also  radiates up into the head causing a headache.    Overall, patient rates her pain today as a 3 or 4 out of 10.  She states that on bad days her pain reaches a 7 out of 10.  Pain is aggravated with walking.  She denies alleviating factors to the pain.  She denies any new symptoms since she was last seen.  She denies any numbness, tingling, or weakness.    As mentioned above, patient is attending physical therapy at [facility].  She is doing pool therapy.  She has had 4 sessions of far.  She does her home exercises.  She is using Percocet twice daily, gabapentin 600 mg 4 times daily, Flexeril 10 mg as needed, ibuprofen 800 mg as needed, Tylenol as needed, Voltaren gel, lidocaine ointment, and pain relieving patches.  All of the medications are somewhat helpful.     Review of Systems:  Positive for loss of bladder control (chronic, stable), headache, difficulty swallowing, pain much worse at night.  Negative for numbness/tingling, weakness, loss of bowel control, inability to urinate, trip/stumble/falls, difficulty with hand skills, fevers, unintentional weight loss.     Objective:   CONSTITUTIONAL:  Vital signs as above.  Patient is in no acute distress.  The patient is well nourished and well groomed.  Patient is obese.  PSYCHIATRIC:  The patient is awake, alert, oriented to person, place and time.  The patient is answering questions appropriately with clear speech.  Normal affect.  HEENT: Normocephalic, atraumatic.  Sclera clear.    SKIN: Exposed skin is clean, dry, intact without rashes.  VASCULAR: No lower extremity edema.  MUSCULOSKELETAL:  Gait is nonantalgic.   Patient has tenderness to palpation bilateral lumbar paraspinous muscles from approximately L3 through the lumbosacral junction.   Tender to palpation bilateral cervical paraspinous muscles and upper trapezius muscles with some hypertonicity.  Cervical range of motion is intact with flexion, mildly restricted with extension, mildly restricted with  lateral rotation bilaterally.  Positive Kemps maneuver bilaterally.  Patient demonstrates  5/5 strength bilateral shoulder abductors, bilateral elbow flexors/extensors, finger flexors/abductors, wrist extensors.  5/5 strength bilateral hip flexors, knee flexors/extensors, ankle dorsi/plantar flexors, EHL.  NEUROLOGICAL: Reflexes are 2+ bilateral biceps, triceps, brachioradialis, patellar, and Achilles.  Sensation to light touch is intact over the bilateral upper and lower extremities throughout.     RESULTS: I reviewed the MRI lumbar spine from [facility] dated Jaqui 3, 2020.  This shows slight progression of the degenerative changes in the lumbar spine at L3-4 and L4-5.  At L3-4 there is a generalized disc bulge with superimposed left paracentral disc extrusion leading to moderate to severe left greater than right foraminal stenosis as well as moderate spinal canal stenosis.  At L4-5 there is what appears to be artifact and/are a disc extrusion resulting in severe right foraminal stenosis.  There is also mild left foraminal stenosis.  At L5-S1 there is a generalized disc bulge asymmetric to the right with mild right foraminal stenosis.  Please see report for further details.     I reviewed the MRI cervical spine from [facility] dated July 3, 2020.  At C4-5 there is a generalized disc bulge/osteophyte complex resulting in mild left wrist and right foraminal stenosis.  At C5-6 there is a generalized disc bulge and mild facet arthropathy with minimal bilateral foraminal stenosis.  There is no spinal canal stenosis at any level.  Please see report for further details.

## 2021-06-11 NOTE — PATIENT INSTRUCTIONS - HE
Please complete your pain diary and return the diary to the Spine Center at your earliest convenience.  The Spine Center will contact you to schedule your next appointment after your pain diary is reviewed by your doctor.  Thank you.    DISCHARGE INSTRUCTIONS    During office hours (8:00 a.m.- 4:00 p.m.) questions or concerns may be answered  by calling Spine Center Navigation Nurses at  [phone].  Messages received after hours will be returned the following business day.      In the case of an emergency, please dial 911 or seek assistance at the nearest Emergency Room/Urgent Care facility.     All Patients:  ? You may experience an increase in your symptoms for the first 2 days, once the numbing medication wears off.    ? You may resume your regular medication, no pain medication until you have completed your diary    ? You may shower. No swimming, tub bath or hot tub for 24 hours; remove bandage after 4 hours    ? Continue your activities that can cause you pain to test the blocks.                         ? You should not drive for the next 3 to 5 hours (have someone drive you)           POSSIBLE PROCEDURE SIDE EFFECTS  -Call Spine Center if concerned-    Increased Pain  Increased numbness/tingling     Nausea/Vomiting  Bruising/bleeding at site (hematoma)             Swelling at site (edema) Headache  Difficulty walking  Infection        Fever greater than 100.5

## 2021-06-12 NOTE — PATIENT INSTRUCTIONS - HE
Please complete your pain diary and return the diary to the Spine Center at your earliest convenience.  The Sp[facility] Center will contact you to schedule your next appointment after your pain diary is reviewed by your doctor.  Thank you.    You can follow up with Pema on her next available appointment to discuss treatment options for your back.    DISCHARGE INSTRUCTIONS    During office hours (8:00 a.m.- 4:00 p.m.) questions or concerns may be answered  by calling Sp[facility] Center Navigation Nurses at  [phone].  Messages received after hours will be returned the following business day.      In the case of an emergency, please dial 911 or seek assistance at the nearest Emergency Room/Urgent Care facility.     All Patients:  ? You may experience an increase in your symptoms for the first 2 days, once the numbing medication wears off.    ? You may resume your regular medication, no pain medication until you have completed your diary    ? You may shower. No swimming, tub bath or hot tub for 24 hours; remove bandage after 4 hours    ? Continue your activities that can cause you pain to test the blocks.                         ? You should not drive for the next 3 to 5 hours (have someone drive you)           POSSIBLE PROCEDURE SIDE EFFECTS  -Call Spine Center if concerned-    Increased Pain  Increased numbness/tingling     Nausea/Vomiting  Bruising/bleeding at site (hematoma)             Swelling at site (edema) Headache  Difficulty walking  Infection        Fever greater than 100.5

## 2021-06-12 NOTE — TELEPHONE ENCOUNTER
Patient contacted to inform her that KADEN Price was recommending that she move forward with the right C4, C5 medial branch radiofrequency ablation procedure with Dr. Ponce based on the positive response to the confirmatory medial branch blocks she recently had completed.    The patient was in agreement with the recommendation and was interested in proceeding with the RF.    A prior authorization request was sent to the PA Team and the patient was informed that she would be contacted to schedule the procedure once the PA had been received.    The patient was encouraged to contact the [facility] Center if she had any questions or concerns prior to her next appointment.

## 2021-06-12 NOTE — TELEPHONE ENCOUNTER
Patient contacted to inform her that the prior authorization had been received from the PA Team and she could move forward with scheduling her procedure with Dr. Ponce.    Procedure requirements were reviewed with the patient to her verbalized understanding before being transferred to a member of the scheduling team to secure the appointment.    The patient was encouraged to contact the Sp[facility] Center if she had any questions or concerns prior to her next appointment.

## 2021-06-12 NOTE — PROGRESS NOTES
SUBJECTIVE: Anita Duque is a 65 y.o. White or  female who presents today for her 6-month med check.  Last time I saw her was mid March.  She is a very complicated patient.  She is diabetic and used to be seen at [facility] [place].  She was trying to lose weight and had been on phentermine there but now is getting it from me.  She uses it intermittently actually.  She is on Farxiga, glipizide XL and metformin.  She is currently not watching her blood sugars.  Her A1c in March was 7.0%.  Her blood pressure is well controlled and her LDL was 70.  She needs a foot exam today.  She also has asthma.  We did our action plan in March and today her ACT score is 20.  She has chronic pain and depression.  She used to be seen at a pain clinic but now I am giving her Percocet 5/3 2 5 mg 90 tablets for 90 days.  We have a controlled substance agreement.  She takes Cymbalta and Neurontin as well as Advil.  Her chronic pain affects her mental health.  PHQ 9 score today is 7.  KINDRA 7 score is 1.  She is due for a DEXA scan and has a history of being vitamin D deficient on a regular basis.  She had her mammogram in May 2019.  She complains of having a fall not long ago and landing on her knees and hands and having that affect her pain which was doing so well prior to it.  She also complains of sore throat and we discussed her omeprazole which she is only taking every other day at best.  We discussed the need to take it more regularly.    OBJECTIVE: BP 98/60   Pulse 76   Wt 194 lb (88 kg)   SpO2 100%   Breastfeeding No   BMI 33.30 kg/m    General: Obese older female in no acute distress  Heart: Regular rate and rhythm without murmur  Lungs: Clear bilaterally  Abdomen: Soft, nontender  Extremities: Warm, and dry with trace nonpitting edema  Foot exam shows no deformities, no skin lesions but mild fungal infection of her first digit toenails.  No neuropathy on filament testing.  Psych: Mood is pretty good today.    ASSESSMENT &  PLAN:     1. Type 2 diabetes mellitus without complication, without long-term current use of insulin (H)  Historically well controlled.  Foot exam done today.  Will monitor labs.  Will refill meds as needed.  - Glycosylated Hemoglobin A1c  - Microalbumin, Random Urine    2. Chronic pain syndrome  Exacerbation of her knee and back pain after a fall.  Needs refill of her controlled substance.  - oxyCODONE-acetaminophen (PERCOCET/ENDOCET) 5-325 mg per tablet; Take 1 tablet by mouth every 6 (six) hours as needed for pain.  Dispense: 90 tablet; Refill: 0    3. Controlled substance agreement signed  CSA was signed on 3/20/2020    4. Mild intermittent asthma without complication  Decently controlled.  ACT score is 20 today.    5. Essential hypertension  Well-controlled.  Will monitor lab work.  - Comprehensive Metabolic Panel    6. Hyperlipidemia, unspecified hyperlipidemia type  Well-controlled as of 6 months ago.    7. Mild episode of recurrent major depressive disorder (H)  PHQ 9 score of 7 which is not bad for her.  Affected by her chronic pain.    8. Gastroesophageal reflux disease without esophagitis  Patient is having sore throat which I think is secondary to not using her omeprazole on a regular basis.  She is to reinitiate its use on a daily basis.    9. Vitamin D deficiency  Chronically vitamin D deficient.  Will monitor lab and treat accordingly.  - Vitamin D, Total (25-Hydroxy)  - ergocalciferol (VITAMIN D2) 1,250 mcg (50,000 unit) capsule; Take 1 capsule (50,000 Units total) by mouth once a week.  Dispense: 12 capsule; Refill: 1    10. Need for vaccination  - Influenza,Quad,High Dose,PF 65 YR+    11. Post-menopause  Due for bone density.  - DXA Bone Density Scan; Future    I will get back to her on her lab results by my chart and only call with grossly abnormal values.  I will refill meds as needed.  I reminded her that she needs a tetanus update and that that should be done at the pharmacy.  If her labs are  all good, she can return to clinic in 6 months time.    40 minutes spent together with the patient today, more than 50% spent in counseling, discussing the above topics.        Patient Active Problem List   Diagnosis     Essential hypertension     Joint Pain, Localized In The Shoulder     Fibromyalgia     Type 2 diabetes mellitus (H)     Hyperlipidemia     Major Depression, Recurrent     Chronic Constipation     Sciatica     Chronic pain syndrome     Lower Back Pain     Vaginal Wall Prolapse With Midline Cystocele     Female Stress Incontinence     Lump Or Mass In Breast     Osteoarthrosis, unspecified whether generalized or localized, lower leg     JANINE on CPAP     Obesity (BMI 30.0-34.9)     Degenerative disc disease, cervical     Degenerative disc disease, lumbar     Morbid obesity (H)     Mild intermittent asthma     Controlled substance agreement signed     Gastroesophageal reflux disease without esophagitis       Current Outpatient Medications on File Prior to Visit   Medication Sig Dispense Refill     albuterol (PROAIR HFA;PROVENTIL HFA;VENTOLIN HFA) 90 mcg/actuation inhaler Inhale 2 puffs every 4 (four) hours as needed for wheezing. 1 Inhaler 1     cyanocobalamin, vitamin B-12, (VITAMIN B-12 ORAL) Take 1 tablet by mouth as needed.       cyclobenzaprine (FLEXERIL) 10 MG tablet one tablet twice daily 60 tablet 0     dapagliflozin (FARXIGA) 10 mg Tab Take 10 mg by mouth daily. 90 tablet 1     diclofenac epolamine (FLECTOR) 1.3 % PT12 Place 1 patch on the skin every 12 (twelve) hours as needed. 60 patch 5     diclofenac sodium (VOLTAREN) 1 % Gel APPLY TOPICALLY TWICE DAILY AS NEEDED FOR ARTHRITIS 300 g 1     DULoxetine (CYMBALTA) 60 MG capsule Take 1 capsule by mouth once daily 90 capsule 0     erythromycin ophthalmic ointment Administer 1 application to both eyes daily as needed.              gabapentin (NEURONTIN) 300 MG capsule TAKE 2 CAPSULES BY MOUTH 4 TIMES DAILY 240 capsule 0     glipiZIDE (GLUCOTROL XL) 5  MG 24 hr tablet Take 1 tablet (5 mg total) by mouth daily. 90 tablet 3     ibuprofen (ADVIL,MOTRIN) 800 MG tablet Take 1 tablet by mouth three times daily as needed 100 tablet 0     lidocaine (XYLOCAINE) 5 % ointment Apply topically 3 (three) times a day. 120 g 0     lisinopriL (PRINIVIL,ZESTRIL) 2.5 MG tablet Take 1 tablet by mouth once daily 90 tablet 0     lovastatin (MEVACOR) 40 MG tablet Take 2 tablets (80 mg total) by mouth at bedtime. 180 tablet 1     MAPAP, ACETAMINOPHEN, 500 mg coapsule Take 1,000 mg by mouth every 6 (six) hours as needed. 180 capsule 1     metFORMIN (GLUCOPHAGE-XR) 500 MG 24 hr tablet Take 2 tablets (1,000 mg total) by mouth 2 (two) times a day with meals. 360 tablet 3     omeprazole (PRILOSEC) 40 MG capsule Take 40 mg by mouth daily .             phentermine (ADIPEX-P) 37.5 mg tablet TAKE 1 & 1/2 (ONE & ONE-HALF) TABLETS BY MOUTH ONCE DAILY IN THE MORNING 135 tablet 0     SENNA LAXATIVE 8.6 mg tablet Take 1 tablet by mouth 2 (two) times a day. 180 tablet 3     spironolactone (ALDACTONE) 100 MG tablet TAKE 1 TABLET BY MOUTH ONCE DAILY. APPOINTMENT REQUIRED FOR FUTURE REFILLS 90 tablet 2     topiramate (TOPAMAX) 100 MG tablet Take 1 tablet (100 mg total) by mouth 2 (two) times a day. 180 tablet 3     triamcinolone (KENALOG) 0.025 % ointment Apply sparingly in ear canal 1-2 times daily as needed 15 g 0     VESICARE 5 mg tablet Take 1 tablet (5 mg total) by mouth daily. 90 tablet 3     [DISCONTINUED] ergocalciferol (VITAMIN D2) 1,250 mcg (50,000 unit) capsule Take 1 capsule (50,000 Units total) by mouth once a week. 12 capsule 1     No current facility-administered medications on file prior to visit.

## 2021-06-12 NOTE — PATIENT INSTRUCTIONS - HE
Follow-up visit with Pema De in 4 weeks to discuss outcome and determine care plan going forward.  Please be advised that your blood glucose levels may be increased for the next 5-7 days as a result of the steroid you received with your injection today.  It is recommended that you monitor your blood glucose levels closely over the next week and direct any additional questions regarding your blood glucose management to your primary doctor.      DISCHARGE INSTRUCTIONS    During office hours (8:00 a.m.- 4:00 p.m.) questions or concerns may be answered  by calling [facility] Nurses at  [phone].  Messages received after hours will be returned the following business day.      In the case of an emergency, please dial 911 or seek assistance at the nearest Emergency Room/Urgent Care facility.     All Patients:  ? You may experience an increase in your symptoms for the first 2 days (It may take anywhere between 4-6 weeks for the procedure to get to its maximum effect).    ? You may use ice on the injection site, as frequently as 20 minutes each hour if needed.    ? You may take your pain medicine.    ? You may continue taking your regular medication.    ? You may shower. No swimming, tub bath or hot tub for 48 hours.  You may remove your bandaid/bandage as soon as you are home.    ? You may resume light activities, as tolerated.    ? Resume your usual diet as tolerated.    ? It is strongly advised that you do not drive for 1-3 hours post injection.    ? If you have had oral sedation:  Do not drive for 8 hours post injection.      ? If you have had IV sedation:  Do not drive for 24 hours post injection.  Do not operate hazardous machinery or make important personal/business decisions for 24 hours.    POSSIBLE STEROID SIDE EFFECTS (If steroid/cortisone was used for your procedure)    -If you experience these symptoms, it should only last for a short period      Swelling of the legs                Skin  redness (flushing)       Mouth (oral) irritation     Blood sugar (glucose) levels              Sweats                     Mood changes    Headache         POSSIBLE PROCEDURE SIDE EFFECTS    -Call the [facility] if you are concerned      Increased Pain             Increased numbness/tingling        Nausea/Vomiting            Bruising/bleeding at site        Redness or swelling                                                Difficulty walking        Weakness            Fever greater than 100.5    *In the event of a severe headache after an epidural steroid injection that is relieved by lying down, please call the [facility] to speak with a clinical staff member*

## 2021-06-13 NOTE — TELEPHONE ENCOUNTER
Refill Approved    Rx renewed per Medication Renewal Policy. Medication was last renewed on 9/12/19, last Ov 10/8/20.    Kenia Mccarthy, [facility] Triage/Med Refill 12/13/2020     Requested Prescriptions   Pending Prescriptions Disp Refills     metFORMIN (GLUCOPHAGE-XR) 500 MG 24 hr tablet [Pharmacy Med Name: metFORMIN HCl  MG Oral Tablet Extended Release 24 Hour] 360 tablet 0     Sig: TAKE 2 TABLETS BY MOUTH TWICE DAILY WITH MEALS       Metformin Refill Protocol Passed - 12/11/2020 12:32 PM        Passed - Blood pressure in last 12 months     BP Readings from Last 1 Encounters:   10/28/20 124/74             Passed - LFT or AST or ALT in last 12 months     Albumin   Date Value Ref Range Status   10/08/2020 4.0 3.5 - 5.0 g/dL Final     Bilirubin, Total   Date Value Ref Range Status   10/08/2020 0.6 0.0 - 1.0 mg/dL Final     Bilirubin, Direct   Date Value Ref Range Status   08/26/2013 0.1 <0.6 mg/dL Final     Alkaline Phosphatase   Date Value Ref Range Status   10/08/2020 105 45 - 120 U/L Final     AST   Date Value Ref Range Status   10/08/2020 20 0 - 40 U/L Final     ALT   Date Value Ref Range Status   10/08/2020 20 0 - 45 U/L Final     Protein, Total   Date Value Ref Range Status   10/08/2020 6.8 6.0 - 8.0 g/dL Final                Passed - GFR or Serum Creatinine in last 6 months     GFR MDRD Non Af Amer   Date Value Ref Range Status   10/08/2020 60 (L) >60 mL/min/1.73m2 Final     GFR MDRD Af Amer   Date Value Ref Range Status   10/08/2020 >60 >60 mL/min/1.73m2 Final             Passed - Visit with PCP or prescribing provider visit in last 6 months or next 3 months     Last office visit with prescriber/PCP: 10/8/2020 OR same dept: 10/8/2020 Trish Eagle MD OR same specialty: 10/8/2020 Trish Eagle MD Last physical: Visit date not found Last MTM visit: Visit date not found         Next appt within 3 mo: Visit date not found  Next physical within 3 mo: Visit date not  found  Prescriber OR PCP: Trish Eagle MD ([facility])  Last diagnosis associated with med order: 1. Type 2 diabetes mellitus (H)  - metFORMIN (GLUCOPHAGE-XR) 500 MG 24 hr tablet [Pharmacy Med Name: metFORMIN HCl  MG Oral Tablet Extended Release 24 Hour]; TAKE 2 TABLETS BY MOUTH TWICE DAILY WITH MEALS  Dispense: 360 tablet; Refill: 0     If protocol passes may refill for 12 months if within 3 months of last provider visit (or a total of 15 months).           Passed - A1C in last 6 months     Hemoglobin A1c   Date Value Ref Range Status   10/08/2020 7.1 (H) <=5.6 % Final     Comment:     Normal <5.7% Prediabete 5.7-6.4% Diabletes 6.5% or higher - adopted from ADA consensus guidelines               Passed - Microalbumin in last year      Microalbumin, Random Urine   Date Value Ref Range Status   10/08/2020 <0.50 0.00 - 1.99 mg/dL Final

## 2021-06-13 NOTE — TELEPHONE ENCOUNTER
RN cannot approve Refill Request    RN can NOT refill this medication med is not covered by policy/route to provider. Last office visit: 10/8/2020 Trish Eagle MD Last Physical: 12/3/2018 Last MTM visit: Visit date not found Last visit same specialty: 10/8/2020 Trish Eagle MD.  Next visit within 3 mo: Visit date not found  Next physical within 3 mo: Visit date not found      Sandhya Mckinnon, [facility] Triage/Med Refill 12/1/2020    Requested Prescriptions   Pending Prescriptions Disp Refills     ibuprofen (ADVIL,MOTRIN) 800 MG tablet [Pharmacy Med Name: Ibuprofen 800 MG Oral Tablet] 100 tablet 0     Sig: Take 1 tablet by mouth three times daily as needed       There is no refill protocol information for this order      Signed Prescriptions Disp Refills    topiramate (TOPAMAX) 100 MG tablet 180 tablet 3     Sig: Take 1 tablet by mouth twice daily       Topiramate Refill Protocol  Passed - 11/30/2020 11:18 AM        Passed - Bicarbonate/Electrolyte panel in last 12 months     Sodium   Date Value Ref Range Status   10/08/2020 140 136 - 145 mmol/L Final     Potassium   Date Value Ref Range Status   10/08/2020 4.2 3.5 - 5.0 mmol/L Final     Chloride   Date Value Ref Range Status   10/08/2020 105 98 - 107 mmol/L Final     CO2   Date Value Ref Range Status   10/08/2020 23 22 - 31 mmol/L Final                Passed - PCP or prescribing provider visit in last 12 months or next 3 months     Last office visit with prescriber/PCP: 10/8/2020 Trish Eagle MD OR same dept: 10/8/2020 Trish Eagle MD OR same specialty: 10/8/2020 Trish Eagle MD  Last physical: 12/3/2018 Last MTM visit: Visit date not found   Next visit within 3 mo: Visit date not found  Next physical within 3 mo: Visit date not found  Prescriber OR PCP: Trish Eagle MD ([place])  Last diagnosis associated with med order: 1. Chronic pain syndrome  - topiramate (TOPAMAX) 100 MG tablet; Take 1 tablet by mouth  twice daily  Dispense: 180 tablet; Refill: 3    2. OAB (overactive bladder)  - VESICARE 5 mg tablet; Take 1 tablet by mouth once daily  Dispense: 90 tablet; Refill: 3    3. Type 2 diabetes mellitus (H)  - glipiZIDE (GLUCOTROL XL) 5 MG 24 hr tablet; Take 1 tablet by mouth once daily  Dispense: 90 tablet; Refill: 3    4. Back pain  - ibuprofen (ADVIL,MOTRIN) 800 MG tablet [Pharmacy Med Name: Ibuprofen 800 MG Oral Tablet]; Take 1 tablet by mouth three times daily as needed  Dispense: 100 tablet; Refill: 0    If protocol passes may refill for 12 months if within 3 months of last provider visit (or a total of 15 months).             Passed - Serum creatinine in last 12 months     Creatinine   Date Value Ref Range Status   10/08/2020 0.93 0.60 - 1.10 mg/dL Final               VESICARE 5 mg tablet 90 tablet 3     Sig: Take 1 tablet by mouth once daily       Urinary Incontinence Medications Refill Protocol Passed - 11/30/2020 11:18 AM        Passed - PCP or prescribing provider visit in past 12 months       Last office visit with prescriber/PCP: 10/8/2020 Trish Eagle MD OR same dept: 10/8/2020 Trish Eagle MD OR same specialty: 10/8/2020 Trish Eagle MD  Last physical: 12/3/2018 Last MTM visit: Visit date not found   Next visit within 3 mo: Visit date not found  Next physical within 3 mo: Visit date not found  Prescriber OR PCP: Trish Eagle MD ([place])  Last diagnosis associated with med order: 1. Chronic pain syndrome  - topiramate (TOPAMAX) 100 MG tablet; Take 1 tablet by mouth twice daily  Dispense: 180 tablet; Refill: 3    2. OAB (overactive bladder)  - VESICARE 5 mg tablet; Take 1 tablet by mouth once daily  Dispense: 90 tablet; Refill: 3    3. Type 2 diabetes mellitus (H)  - glipiZIDE (GLUCOTROL XL) 5 MG 24 hr tablet; Take 1 tablet by mouth once daily  Dispense: 90 tablet; Refill: 3    4. Back pain  - ibuprofen (ADVIL,MOTRIN) 800 MG tablet [Pharmacy Med Name: Ibuprofen 800 MG  Oral Tablet]; Take 1 tablet by mouth three times daily as needed  Dispense: 100 tablet; Refill: 0    If protocol passes may refill for 12 months if within 3 months of last provider visit (or a total of 15 months).               glipiZIDE (GLUCOTROL XL) 5 MG 24 hr tablet 90 tablet 3     Sig: Take 1 tablet by mouth once daily       Oral Hypoglycemics Refill Protocol Passed - 11/30/2020 11:18 AM        Passed - Visit with PCP or prescribing provider visit in last 6 months       Last office visit with prescriber/PCP: 10/8/2020 OR same dept: 10/8/2020 Trish Eagle MD OR same specialty: 10/8/2020 Trish Eagle MD Last physical: Visit date not found Last MTM visit: Visit date not found         Next appt within 3 mo: Visit date not found  Next physical within 3 mo: Visit date not found  Prescriber OR PCP: Trish Eagle MD ([place])  Last diagnosis associated with med order: 1. Chronic pain syndrome  - topiramate (TOPAMAX) 100 MG tablet; Take 1 tablet by mouth twice daily  Dispense: 180 tablet; Refill: 3    2. OAB (overactive bladder)  - VESICARE 5 mg tablet; Take 1 tablet by mouth once daily  Dispense: 90 tablet; Refill: 3    3. Type 2 diabetes mellitus (H)  - glipiZIDE (GLUCOTROL XL) 5 MG 24 hr tablet; Take 1 tablet by mouth once daily  Dispense: 90 tablet; Refill: 3    4. Back pain  - ibuprofen (ADVIL,MOTRIN) 800 MG tablet [Pharmacy Med Name: Ibuprofen 800 MG Oral Tablet]; Take 1 tablet by mouth three times daily as needed  Dispense: 100 tablet; Refill: 0     If protocol passes may refill for 12 months if within 3 months of last provider visit (or a total of 15 months).           Passed - A1C in last 6 months     Hemoglobin A1c   Date Value Ref Range Status   10/08/2020 7.1 (H) <=5.6 % Final     Comment:     Normal <5.7% Prediabete 5.7-6.4% Diabletes 6.5% or higher - adopted from ADA consensus guidelines               Passed - Microalbumin in last year      Microalbumin, Random Urine   Date Value  Ref Range Status   10/08/2020 <0.50 0.00 - 1.99 mg/dL Final                  Passed - Blood pressure in last year     BP Readings from Last 1 Encounters:   10/28/20 124/74             Passed - Serum creatinine in last year     Creatinine   Date Value Ref Range Status   10/08/2020 0.93 0.60 - 1.10 mg/dL Final

## 2021-06-13 NOTE — TELEPHONE ENCOUNTER
Refill Approved    Rx renewed per Medication Renewal Policy. Medication was last renewed on 11/2819.1/30/19.9/77793    Sandhya Mckinnon, [facility] Triage/Med Refill 12/1/2020     Requested Prescriptions   Pending Prescriptions Disp Refills     topiramate (TOPAMAX) 100 MG tablet [Pharmacy Med Name: Topiramate 100 MG Oral Tablet] 180 tablet 0     Sig: Take 1 tablet by mouth twice daily       Topiramate Refill Protocol  Passed - 11/30/2020 11:18 AM        Passed - Bicarbonate/Electrolyte panel in last 12 months     Sodium   Date Value Ref Range Status   10/08/2020 140 136 - 145 mmol/L Final     Potassium   Date Value Ref Range Status   10/08/2020 4.2 3.5 - 5.0 mmol/L Final     Chloride   Date Value Ref Range Status   10/08/2020 105 98 - 107 mmol/L Final     CO2   Date Value Ref Range Status   10/08/2020 23 22 - 31 mmol/L Final                Passed - PCP or prescribing provider visit in last 12 months or next 3 months     Last office visit with prescriber/PCP: 10/8/2020 Trish Eagle MD OR same dept: 10/8/2020 Trish Eagle MD OR same specialty: 10/8/2020 Trish Eagle MD  Last physical: 12/3/2018 Last MTM visit: Visit date not found   Next visit within 3 mo: Visit date not found  Next physical within 3 mo: Visit date not found  Prescriber OR PCP: Trish Eagle MD ([place])  Last diagnosis associated with med order: 1. Chronic pain syndrome  - topiramate (TOPAMAX) 100 MG tablet [Pharmacy Med Name: Topiramate 100 MG Oral Tablet]; Take 1 tablet by mouth twice daily  Dispense: 180 tablet; Refill: 0    2. OAB (overactive bladder)  - VESICARE 5 mg tablet [Pharmacy Med Name: VESIcare 5 MG Oral Tablet]; Take 1 tablet by mouth once daily  Dispense: 90 tablet; Refill: 0    3. Type 2 diabetes mellitus (H)  - glipiZIDE (GLUCOTROL XL) 5 MG 24 hr tablet [Pharmacy Med Name: glipiZIDE ER 5 MG Oral Tablet Extended Release 24 Hour]; Take 1 tablet by mouth once daily  Dispense: 90 tablet; Refill:  0    4. Back pain  - ibuprofen (ADVIL,MOTRIN) 800 MG tablet [Pharmacy Med Name: Ibuprofen 800 MG Oral Tablet]; Take 1 tablet by mouth three times daily as needed  Dispense: 100 tablet; Refill: 0    If protocol passes may refill for 12 months if within 3 months of last provider visit (or a total of 15 months).             Passed - Serum creatinine in last 12 months     Creatinine   Date Value Ref Range Status   10/08/2020 0.93 0.60 - 1.10 mg/dL Final                VESICARE 5 mg tablet [Pharmacy Med Name: VESIcare 5 MG Oral Tablet] 90 tablet 0     Sig: Take 1 tablet by mouth once daily       Urinary Incontinence Medications Refill Protocol Passed - 11/30/2020 11:18 AM        Passed - PCP or prescribing provider visit in past 12 months       Last office visit with prescriber/PCP: 10/8/2020 Trish Eagle MD OR same dept: 10/8/2020 Trish Eagle MD OR same specialty: 10/8/2020 Trish Eagle MD  Last physical: 12/3/2018 Last MTM visit: Visit date not found   Next visit within 3 mo: Visit date not found  Next physical within 3 mo: Visit date not found  Prescriber OR PCP: Trish Eagle MD ([place])  Last diagnosis associated with med order: 1. Chronic pain syndrome  - topiramate (TOPAMAX) 100 MG tablet [Pharmacy Med Name: Topiramate 100 MG Oral Tablet]; Take 1 tablet by mouth twice daily  Dispense: 180 tablet; Refill: 0    2. OAB (overactive bladder)  - VESICARE 5 mg tablet [Pharmacy Med Name: VESIcare 5 MG Oral Tablet]; Take 1 tablet by mouth once daily  Dispense: 90 tablet; Refill: 0    3. Type 2 diabetes mellitus (H)  - glipiZIDE (GLUCOTROL XL) 5 MG 24 hr tablet [Pharmacy Med Name: glipiZIDE ER 5 MG Oral Tablet Extended Release 24 Hour]; Take 1 tablet by mouth once daily  Dispense: 90 tablet; Refill: 0    4. Back pain  - ibuprofen (ADVIL,MOTRIN) 800 MG tablet [Pharmacy Med Name: Ibuprofen 800 MG Oral Tablet]; Take 1 tablet by mouth three times daily as needed  Dispense: 100 tablet;  Refill: 0    If protocol passes may refill for 12 months if within 3 months of last provider visit (or a total of 15 months).                glipiZIDE (GLUCOTROL XL) 5 MG 24 hr tablet [Pharmacy Med Name: glipiZIDE ER 5 MG Oral Tablet Extended Release 24 Hour] 90 tablet 0     Sig: Take 1 tablet by mouth once daily       Oral Hypoglycemics Refill Protocol Passed - 11/30/2020 11:18 AM        Passed - Visit with PCP or prescribing provider visit in last 6 months       Last office visit with prescriber/PCP: 10/8/2020 OR same dept: 10/8/2020 Trish Eagle MD OR same specialty: 10/8/2020 Trish Eagle MD Last physical: Visit date not found Last MTM visit: Visit date not found         Next appt within 3 mo: Visit date not found  Next physical within 3 mo: Visit date not found  Prescriber OR PCP: Loraine) SHALOM Eagle MD  Last diagnosis associated with med order: 1. Chronic pain syndrome  - topiramate (TOPAMAX) 100 MG tablet [Pharmacy Med Name: Topiramate 100 MG Oral Tablet]; Take 1 tablet by mouth twice daily  Dispense: 180 tablet; Refill: 0    2. OAB (overactive bladder)  - VESICARE 5 mg tablet [Pharmacy Med Name: VESIcare 5 MG Oral Tablet]; Take 1 tablet by mouth once daily  Dispense: 90 tablet; Refill: 0    3. Type 2 diabetes mellitus (H)  - glipiZIDE (GLUCOTROL XL) 5 MG 24 hr tablet [Pharmacy Med Name: glipiZIDE ER 5 MG Oral Tablet Extended Release 24 Hour]; Take 1 tablet by mouth once daily  Dispense: 90 tablet; Refill: 0    4. Back pain  - ibuprofen (ADVIL,MOTRIN) 800 MG tablet [Pharmacy Med Name: Ibuprofen 800 MG Oral Tablet]; Take 1 tablet by mouth three times daily as needed  Dispense: 100 tablet; Refill: 0     If protocol passes may refill for 12 months if within 3 months of last provider visit (or a total of 15 months).           Passed - A1C in last 6 months     Hemoglobin A1c   Date Value Ref Range Status   10/08/2020 7.1 (H) <=5.6 % Final     Comment:     Normal <5.7% Prediabete  5.7-6.4% Diabletes 6.5% or higher - adopted from ADA consensus guidelines               Passed - Microalbumin in last year      Microalbumin, Random Urine   Date Value Ref Range Status   10/08/2020 <0.50 0.00 - 1.99 mg/dL Final                  Passed - Blood pressure in last year     BP Readings from Last 1 Encounters:   10/28/20 124/74             Passed - Serum creatinine in last year     Creatinine   Date Value Ref Range Status   10/08/2020 0.93 0.60 - 1.10 mg/dL Final                ibuprofen (ADVIL,MOTRIN) 800 MG tablet [Pharmacy Med Name: Ibuprofen 800 MG Oral Tablet] 100 tablet 0     Sig: Take 1 tablet by mouth three times daily as needed       There is no refill protocol information for this order

## 2021-06-13 NOTE — PROGRESS NOTES
SUBJECTIVE: Anita Duque is a 62 y.o.  female who presents today for follow-up of an ER visit on 8/23 for a fall off of her ladder in the garage while trying to get something off the top of a refrigerator.  She fell backward onto her left wrist and then onto her back.  She has been getting slowly better but has needed to increase her use of narcotics.  She had a left wrist injury that was painful at first but has improved a lot.  However, after reviewing a number of different x-rays she had that evening, it was noted she had a acute minimally displaced anterior fracture of the L3 vertebrae above the area of pedicle screws.  This has been bothering her and continues to bother her.  We have a pain contract for 40 tablets of Percocet per 30 days.  She has been eating 4 Percocet per day.  She tells me she will only use it as long as absolutely needed.  Right now she is finding it hard to sit, sleep, and do things around the house.  She also needs a diabetes check today as she was last seen in March when she had an A1c of 6.3%.  Her last LDL was 92.  Her blood pressure is controlled today.  She has depression and anxiety and sees a therapist.  She is pretty well controlled.  Her PHQ 9 score was 4 today.  Her KINDRA 7 score was 4.  She is also using ibuprofen and Tylenol and asks for a refill of Tylenol at a high dose because she believes it will be covered by her insurance then.  She expresses frustration with her new insurance not covering over-the-counter medication.  She has mild persistent asthma and she was given an ACT and scored 24.  She is in need of a tetanus and flu shot today.    OBJECTIVE: /60  Pulse 78  Wt 197 lb 11.2 oz (89.7 kg)  BMI 34.2 kg/m2  General: Mildly obese older female with a bit of increased pain with movement around compared to usual  Heart: Regular rate and rhythm without murmur  Lungs: Clear bilaterally  Abdomen: Soft, nontender  Extremities: Warm, dry and without  edema    ASSESSMENT & PLAN:    1. Chronic pain syndrome  acetaminophen (TYLENOL) 650 MG CR tablet   2. Fracture of L3 vertebra     3. Type 2 diabetes mellitus  Glycosylated Hemoglobin A1c   4. Essential hypertension with goal blood pressure less than 140/90  Comprehensive Metabolic Panel   5. Hyperlipidemia, unspecified hyperlipidemia type  Lipid Cascade RANDOM   6. Mild persistent asthma without complication     7. Moderate episode of recurrent major depressive disorder       I have agreed to increase her Percocet quantity on a short-term basis.  I have given her a new prescription for higher dose Tylenol to see if she can indeed get them to pay for it.  She is to follow-up with her orthopedic surgeon Dr. Cloud on October 4.  I will get back to her on the above-mentioned lab work by my chart and only call with grossly abnormal values.  I would like to see her back in 4 months time. 40 minutes spent together with the patient today, more than 50% spent in counseling, discussing the above topics.        Patient Active Problem List   Diagnosis     Essential hypertension     Joint Pain, Localized In The Shoulder     Fibromyalgia     Type 2 diabetes mellitus     Hyperlipidemia     Major Depression, Recurrent     Chronic Constipation     Cellulitis     Sciatica     Abdominal Pain     Chronic pain syndrome     Lower Back Pain     Vaginal Wall Prolapse With Midline Cystocele     Female Stress Incontinence     Lump Or Mass In Breast     Osteoarthrosis, unspecified whether generalized or localized, lower leg     JANINE on CPAP     Mild persistent asthma     Obesity (BMI 30.0-34.9)     Pain management contract signed       Current Outpatient Prescriptions on File Prior to Visit   Medication Sig Dispense Refill     cyclobenzaprine (FLEXERIL) 10 MG tablet Take 1 tablet (10 mg total) by mouth 2 (two) times a day as needed for muscle spasms. 20 tablet 0     diclofenac sodium (VOLTAREN) 1 % Gel Apply topically twice daily as needed  for arthritis 3 Tube 3     DULoxetine (CYMBALTA) 60 MG capsule        fluticasone (FLONASE) 50 mcg/actuation nasal spray 1 spray each nostril twice daily 1 g 1     gabapentin (NEURONTIN) 300 MG capsule 600 mg 4 (four) times a day.        glipiZIDE (GLUCOTROL) 5 MG 24 hr tablet 10 mg daily.        ibuprofen (ADVIL,MOTRIN) 800 MG tablet TAKE ONE TABLET BY MOUTH THREE TIMES DAILY AS NEEDED 100 tablet 0     INVOKANA 300 mg Tab        ketoconazole (NIZORAL) 2 % shampoo        lisinopril (PRINIVIL,ZESTRIL) 2.5 MG tablet TAKE ONE TABLET BY MOUTH EVERY DAY 90 tablet 3     lovastatin (MEVACOR) 40 MG tablet        metFORMIN (GLUCOPHAGE-XR) 500 MG 24 hr tablet 500 mg 4 (four) times a day.        mometasone-formoterol (DULERA) 200-5 mcg/actuation HFAA inhaler Inhale 2 puffs 2 (two) times a day. 30 Inhaler 1     nystatin-triamcinolone (MYCOLOG II) cream Apply small amount to affected 2-3 times daily as needed for the itching. 30 g 0     omeprazole (PRILOSEC) 40 MG capsule 40 mg.        oxyCODONE-acetaminophen (PERCOCET) 5-325 mg per tablet Take 1 tablet by mouth every 6 (six) hours as needed for pain. 90 tablet 0     polyethylene glycol (GLYCOLAX) 17 gram/dose powder        pramipexole (MIRAPEX) 0.75 MG tablet 0.75 mg.        spironolactone (ALDACTONE) 100 MG tablet        topiramate (TOPAMAX) 50 MG tablet        traZODone (DESYREL) 100 MG tablet        VESICARE 5 mg tablet        No current facility-administered medications on file prior to visit.

## 2021-06-13 NOTE — TELEPHONE ENCOUNTER
Refill Approved    Rx renewed per Medication Renewal Policy. Medication was last renewed on 8/13/20.    Sandhya Mckinnon, [facility] Triage/Med Refill 12/15/2020     Requested Prescriptions   Pending Prescriptions Disp Refills     DULoxetine (CYMBALTA) 60 MG capsule [Pharmacy Med Name: DULoxetine HCl 60 MG Oral Capsule Delayed Release Particles] 90 capsule 0     Sig: Take 1 capsule by mouth once daily       Tricyclics/Misc Antidepressant/Antianxiety Meds Refill Protocol Passed - 12/14/2020 10:14 AM        Passed - PCP or prescribing provider visit in last year     Last office visit with prescriber/PCP: 10/8/2020 Trish Eagle MD OR same dept: 10/8/2020 Trish Eagle MD OR same specialty: 10/8/2020 Trish Eagle MD  Last physical: 12/3/2018 Last MTM visit: Visit date not found   Next visit within 3 mo: Visit date not found  Next physical within 3 mo: Visit date not found  Prescriber OR PCP: Trish Eagle MD ([place])  Last diagnosis associated with med order: 1. Chronic pain syndrome  - DULoxetine (CYMBALTA) 60 MG capsule [Pharmacy Med Name: DULoxetine HCl 60 MG Oral Capsule Delayed Release Particles]; Take 1 capsule by mouth once daily  Dispense: 90 capsule; Refill: 0    If protocol passes may refill for 12 months if within 3 months of last provider visit (or a total of 15 months).

## 2021-06-14 ENCOUNTER — COMMUNICATION - HEALTHEAST (OUTPATIENT)
Dept: FAMILY MEDICINE | Facility: CLINIC | Age: 66
End: 2021-06-14

## 2021-06-14 NOTE — PROGRESS NOTES
URGENT CARE VISIT:    SUBJECTIVE:   Anita Duque is a 65 y.o. female with a history of diabetes who presents for lightheadedness for 4 days. Lightheadedness is constant. It is worse with standing up. Nothing makes it better. No recent changes in medications. No risk factors for PE. Denies shortness of breath, urinary frequency, palpitations, vision changes, weakness, or n/t in arms/legs. Treatments tried include none with no relief of symptoms. This is first episode.     Has a cough which has been present for months. It occurs a few times a day. Her cough has not changed in character. She denies shortness of breath, nasal congestion, sore throat, or chest pain. Had a virtual visit and they suggested a COVID test.     PMH:   Past Medical History:   Diagnosis Date     Asthma      Chronic pain syndrome      Depression      Diabetes mellitus, type II (H)      Fibromyalgia      Hyperlipidemia      Hypertension      Insomnia      Allergies: Bupropion hcl  Medications:   Current Outpatient Medications   Medication Sig Dispense Refill     cyanocobalamin, vitamin B-12, (VITAMIN B-12 ORAL) Take 1 tablet by mouth as needed.       cyclobenzaprine (FLEXERIL) 10 MG tablet one tablet twice daily 60 tablet 0     diclofenac epolamine (FLECTOR) 1.3 % PT12 Place 1 patch on the skin every 12 (twelve) hours as needed. 60 patch 5     diclofenac sodium (VOLTAREN) 1 % Gel APPLY TOPICALLY TWICE DAILY AS NEEDED FOR ARTHRITIS 300 g 1     DULoxetine (CYMBALTA) 60 MG capsule Take 1 capsule by mouth once daily 90 capsule 3     ergocalciferol (VITAMIN D2) 1,250 mcg (50,000 unit) capsule Take 1 capsule (50,000 Units total) by mouth once a week. 12 capsule 1     gabapentin (NEURONTIN) 300 MG capsule TAKE 2 CAPSULES BY MOUTH 4 TIMES DAILY 240 capsule 0     glipiZIDE (GLUCOTROL XL) 5 MG 24 hr tablet Take 1 tablet by mouth once daily 90 tablet 3     ibuprofen (ADVIL,MOTRIN) 800 MG tablet Take 1 tablet by mouth three times daily as needed 100  tablet 3     lidocaine (XYLOCAINE) 5 % ointment Apply topically 3 (three) times a day. 120 g 0     lisinopriL (PRINIVIL,ZESTRIL) 2.5 MG tablet Take 1 tablet by mouth once daily 90 tablet 0     lovastatin (MEVACOR) 40 MG tablet Take 2 tablets (80 mg total) by mouth at bedtime. 180 tablet 1     MAPAP, ACETAMINOPHEN, 500 mg coapsule Take 1,000 mg by mouth every 6 (six) hours as needed. 180 capsule 1     metFORMIN (GLUCOPHAGE-XR) 500 MG 24 hr tablet Take 2 tablets (1,000 mg total) by mouth 2 (two) times a day with meals. 360 tablet 2     omeprazole (PRILOSEC) 40 MG capsule Take 40 mg by mouth daily .             oxyCODONE-acetaminophen (PERCOCET/ENDOCET) 5-325 mg per tablet Take 1 tablet by mouth every 6 (six) hours as needed for pain. 90 tablet 0     phentermine (ADIPEX-P) 37.5 mg tablet TAKE 1 & 1/2 (ONE & ONE-HALF) TABLETS BY MOUTH ONCE DAILY IN THE MORNING 135 tablet 0     SENNA LAXATIVE 8.6 mg tablet Take 1 tablet by mouth 2 (two) times a day. 180 tablet 3     spironolactone (ALDACTONE) 100 MG tablet TAKE 1 TABLET BY MOUTH ONCE DAILY. APPOINTMENT REQUIRED FOR FUTURE REFILLS 90 tablet 2     topiramate (TOPAMAX) 100 MG tablet Take 1 tablet by mouth twice daily 180 tablet 3     triamcinolone (KENALOG) 0.025 % ointment Apply sparingly in ear canal 1-2 times daily as needed 15 g 0     VESICARE 5 mg tablet Take 1 tablet by mouth once daily 90 tablet 3     albuterol (PROAIR HFA;PROVENTIL HFA;VENTOLIN HFA) 90 mcg/actuation inhaler Inhale 2 puffs every 4 (four) hours as needed for wheezing. 1 Inhaler 1     dapagliflozin (FARXIGA) 10 mg Tab Take 10 mg by mouth daily. 90 tablet 1     sulfamethoxazole-trimethoprim (BACTRIM DS) 800-160 mg per tablet Take 1 tablet by mouth 2 (two) times a day for 7 days. 14 tablet 0     No current facility-administered medications for this visit.      Social History:   Social History     Tobacco Use     Smoking status: Never Smoker     Smokeless tobacco: Never Used   Substance Use Topics      Alcohol use: Not on file       ROS: ROS otherwise found to be negative except as noted above.     OBJECTIVE:  BP 96/67 (Patient Site: Left Arm, Patient Position: Standing, Cuff Size: Adult Regular)   Pulse 83   Temp 98.4  F (36.9  C) (Oral)   Resp 14   Wt 195 lb (88.5 kg)   SpO2 98%   BMI 33.47 kg/m    General: WDWN in NAD  Eyes: EOMI,  PERRL, conjunctiva clear  HENT: Ear canals and TM's normal.  Nose and mouth without ulcers, erythema or lesions  Neck: Supple, non-tender  Cardiac: RRR without murmurs, rubs, or gallops.  Respiratory: LCTAB without adventitious sounds. Non-labored breathing.  Abdominal: Active bowel sounds in all four quadrants. Soft, non-tender without guarding or rebound.   Extremities: No peripheral edema or tenderness, peripheral pulses normal  Musculoskeletal: No gross deformities noted, FROM noted in all extremities  Neuro: Normal strength and tone, sensory exam grossly normal,  normal speech and mentation. CN 2 to 12 intact. Normal finger to nose and shin to heel test. Negative romberg. Ambulates well. A & O x 3.   Integumentary: No suspicious rashes or lesions.   Psych: normal mood and affect    Labs:  Results for orders placed or performed in visit on 01/18/21   HM2(CBC w/o Differential)   Result Value Ref Range    WBC 9.1 4.0 - 11.0 thou/uL    RBC 5.22 3.80 - 5.40 mill/uL    Hemoglobin 16.4 (H) 12.0 - 16.0 g/dL    Hematocrit 50.1 (H) 35.0 - 47.0 %    MCV 96 80 - 100 fL    MCH 31.4 27.0 - 34.0 pg    MCHC 32.7 32.0 - 36.0 g/dL    RDW 11.3 11.0 - 14.5 %    Platelets 325 140 - 440 thou/uL    MPV 7.6 7.0 - 10.0 fL   Comprehensive Metabolic Panel   Result Value Ref Range    Sodium 138 136 - 145 mmol/L    Potassium 4.1 3.5 - 5.0 mmol/L    Chloride 101 98 - 107 mmol/L    CO2 24 22 - 31 mmol/L    Anion Gap, Calculation 13 5 - 18 mmol/L    Glucose 192 (H) 70 - 125 mg/dL    BUN 20 8 - 22 mg/dL    Creatinine 0.95 0.60 - 1.10 mg/dL    GFR MDRD Af Amer >60 >60 mL/min/1.73m2    GFR MDRD Non Af Amer  59 (L) >60 mL/min/1.73m2    Bilirubin, Total 0.4 0.0 - 1.0 mg/dL    Calcium 9.4 8.5 - 10.5 mg/dL    Protein, Total 7.0 6.0 - 8.0 g/dL    Albumin 4.0 3.5 - 5.0 g/dL    Alkaline Phosphatase 97 45 - 120 U/L    AST 19 0 - 40 U/L    ALT 18 0 - 45 U/L   Urinalysis-UC if Indicated   Result Value Ref Range    Color, UA Yellow Colorless, Yellow, Straw, Light Yellow    Clarity, UA Cloudy (!) Clear    Glucose, UA >=1000 mg/dL (!) Negative    Bilirubin, UA Negative Negative    Ketones, UA Negative Negative    Specific Gravity, UA 1.020 1.005 - 1.030    Blood, UA Negative Negative    pH, UA 7.5 5.0 - 8.0    Protein, UA Negative Negative mg/dL    Urobilinogen, UA 0.2 E.U./dL 0.2 E.U./dL, 1.0 E.U./dL    Nitrite, UA Positive (!) Negative    Leukocytes, UA Negative Negative    Bacteria, UA Many (!) None Seen hpf    RBC, UA None Seen None Seen, 0-2 hpf    WBC, UA None Seen None Seen, 0-5 hpf    Squam Epithel, UA 5-10 (!) None Seen, 0-5 lpf    Yeast, UA Few (!) None Seen hpf   Electrocardiogram Perform and Read   Result Value Ref Range    SYSTOLIC BLOOD PRESSURE      DIASTOLIC BLOOD PRESSURE      VENTRICULAR RATE 68 BPM    ATRIAL RATE 68 BPM    P-R INTERVAL 166 ms    QRS DURATION 54 ms    Q-T INTERVAL 390 ms    QTC CALCULATION (BEZET) 414 ms    P Axis 31 degrees    R AXIS -40 degrees    T AXIS 20 degrees    MUSE DIAGNOSIS       Normal sinus rhythm  Left axis deviation  Low voltage QRS  Inferior infarct , age undetermined  Cannot rule out Anteroseptal infarct , age undetermined  Abnormal ECG  When compared with ECG of 11-MAR-2011 10:22,  Questionable change in QRS duration  Minimal criteria for Anteroseptal infarct are now Present  Inferior infarct is now Present         ASSESSMENT:   1. Acute cystitis without hematuria  sulfamethoxazole-trimethoprim (BACTRIM DS) 800-160 mg per tablet   2. Cough  Symptomatic COVID-19 Virus (CORONAVIRUS) PCR   3. Dizziness  HM2(CBC w/o Differential)    Comprehensive Metabolic Panel    Electrocardiogram  Perform and Read    Orthostatic blood pressure    Urinalysis-UC if Indicated    Culture, Urine   4. Fatigue, unspecified type  Thyroid Stimulating Hormone (TSH)        PLAN:  Patient Instructions   Patient was educated on the natural course of condition.  Broad differential was considered including central causes of dizziness, infection, UTI, cardiac, electrolyte abnormalities, dehydration, anemia, thyroid, among others. CBC, CMP and EKG are unremarkable. UA is indicative of a UTI which might be the culprit of her symptoms although other causes cannot be ruled out at this time until labs are completed. Take medication as directed. Side effects discussed. Thyroid level is in progress. Will notify if any abnormal results. COVID PCR testing may take up to 3 days. Conservative measures discussed including drinking fluids (water), wiping from front to back, and avoiding holding urine when there is urge to urinate. See your primary care provider if symptoms do not improve in 3 days. Seek emergency care if you develop severe abdominal/flank pain, or fever.     Patient verbalized understanding and is agreeable to plan. The patient was discharged ambulatory and in stable condition.    Maki Martinez ....................  1/18/2021   4:47 PM

## 2021-06-14 NOTE — TELEPHONE ENCOUNTER
"Light headed and dizzy for 3 days.  All day dizziness . As soon as she stands up she gets dizzy.    Neck pain on right side.Headaches.  Taking ibuprofen 800mg, Ibuprofen., and using lidocaine patches.    Coughing  And a sore throat . Feeling fatigue, and sleeping a lot.  Has been using inhaler more often in the past 3 days.  Patient advised to make a telephone appointment with PCP or other to possibly R/O Covid..    She was sent to [facility] for appointment     Ritika Chester RN  [facility] Triage/refill nurse  COVID 19 Nurse Triage Plan/Patient Instructions    Please be aware that novel coronavirus (COVID-19) may be circulating in the community. If you develop symptoms such as fever, cough, or SOB or if you have concerns about the presence of another infection including coronavirus (COVID-19), please contact your health care provider or visit [email or web address].     Disposition/Instructions    Home care recommended. Follow home care protocol based instructions., Virtual Visit with provider recommended. Reference Visit Selection Guide. and Additional COVID19 information to add for patients.   How can I protect others?  If you have symptoms (fever, cough, body aches or trouble breathing): Stay home and away from others (self-isolate) until:    At least 10 days have passed since your symptoms started, And     You ve had no fever--and no medicine that reduces fever--for 1 full day (24 hours), And      Your other symptoms have resolved (gotten better).     If you don t have symptoms, but a test showed that you have COVID-19 (you tested positive):    Stay home and away from others (self-isolate). Follow the tips under \"How do I self-isolate?\" below for 10 days (20 days if you have a weak immune system).    You don't need to be retested for COVID-19 before going back to school or work. As long as you're fever-free and feeling better, you can go back to school, work and other activities after waiting the 10 or 20 days.     How " do I self-isolate?    Stay in your own room, even for meals. Use your own bathroom if you can.     Stay away from others in your home. No hugging, kissing or shaking hands. No visitors.    Don t go to work, school or anywhere else.     Clean  high touch  surfaces often (doorknobs, counters, handles, etc.). Use a household cleaning spray or wipes. You ll find a full list on the EPA website:  www.epa.gov/pesticide-registration/list-n-disinfectants-use-against-sars-cov-2.    Cover your mouth and nose with a mask, tissue or washcloth to avoid spreading germs.    Wash your hands and face often. Use soap and water.    Caregivers in these groups are at risk for severe illness due to COVID-19:  o People 65 years and older  o People who live in a nursing home or long-term care facility  o People with chronic disease (lung, heart, cancer, diabetes, kidney, liver, immunologic)  o People who have a weakened immune system, including those who:  - Are in cancer treatment  - Take medicine that weakens the immune system, such as corticosteroids  - Had a bone marrow or organ transplant  - Have an immune deficiency  - Have poorly controlled HIV or AIDS  - Are obese (body mass index of 40 or higher)  - Smoke regularly    Caregivers should wear gloves while washing dishes, handling laundry and cleaning bedrooms and bathrooms.    Use caution when washing and drying laundry: Don t shake dirty laundry, and use the warmest water setting that you can.    For more tips, go to www.cdc.gov/coronavirus/2019-ncov/downloads/10Things.pdf.    How can I take care of myself?  1. Get lots of rest. Drink extra fluids (unless a doctor has told you not to).     2. Take Tylenol (acetaminophen) for fever or pain. If you have liver or kidney problems, ask your family doctor if it s okay to take Tylenol.     Adults can take either:     650 mg (two 325 mg pills) every 4 to 6 hours, or     1,000 mg (two 500 mg pills) every 8 hours as needed.     Note: Don t  take more than 3,000 mg in one day.   Acetaminophen is found in many medicines (both prescribed and over-the-counter medicines). Read all labels to be sure you don t take too much.     For children, check the Tylenol bottle for the right dose. The dose is based on the child s age or weight.    3. If you have other health problems (like cancer, heart failure, an organ transplant or severe kidney disease): Call your specialty clinic if you don t feel better in the next 2 days.    4. Know when to call 911: Emergency warning signs include:    Trouble breathing or shortness of breath    Pain or pressure in the chest that doesn t go away    Feeling confused like you haven t felt before, or not being able to wake up    Bluish-colored lips or face    What are the symptoms of COVID-19?     The most common symptoms are cough, fever and trouble breathing.     Less common symptoms include body aches, chills, diarrhea (loose, watery poops), fatigue (feeling very tired), headache, runny nose, sore throat and loss of smell.    COVID-19 can cause severe coughing (bronchitis) and lung infection (pneumonia).    How does it spread?     The virus may spread when a person coughs or sneezes into the air. The virus can travel about 6 feet this way, and it can live on surfaces.      Common  (household disinfectants) will kill the virus.    Who is at risk?  Anyone can catch COVID-19 if they re around someone who has the virus.    How can others protect themselves?     Stay away from people who have COVID-19 (or symptoms of COVID-19).    Wash hands often with soap and water. Or, use hand  with at least 60% alcohol.    Avoid touching the eyes, nose or mouth.     Wear a face mask when you go out in public, when sick or when caring for a sick person.    Where can I get more information?     [facility]: About COVID-19: [email or web address]    CDC: What to Do If You re Sick:  www.cdc.gov/coronavirus/2019-ncov/about/steps-when-sick.html    CDC: Ending Home Isolation: www.cdc.gov/coronavirus/2019-ncov/hcp/disposition-in-home-patients.html     CDC: Caring for Someone: www.cdc.gov/coronavirus/2019-ncov/if-you-are-sick/care-for-someone.html     [facility]: Interim Guidance for Hospital Discharge to Home: www.[facility]/diseases/coronavirus/hcp/hospdischarge.pdf    [facility] clinical trials (COVID-19 research studies): clinicalaffairs.[facility]-clinical-trials     Below are the COVID-19 hotlines at the [place] Department of Health ([facility]). Interpreters are available.   o For health questions: Call 915-824-9812 or 1-535.432.3394 (7 a.m. to 7 p.m.)  o For questions about schools and childcare: Call 692-134-5375 or 1-972.224.2375 (7 a.m. to 7 p.m.)              Thank you for taking steps to prevent the spread of this virus.  o Limit your contact with others.  o Wear a simple mask to cover your cough.  o Wash your hands well and often.    [facility]: About COVID-19: [email or web address]    CDC: What to Do If You're Sick: www.cdc.gov/coronavirus/2019-ncov/about/steps-when-sick.html    CDC: Ending Home Isolation: www.cdc.gov/coronavirus/2019-ncov/hcp/disposition-in-home-patients.html     CDC: Caring for Someone: www.cdc.gov/coronavirus/2019-ncov/if-you-are-sick/care-for-someone.html     [facility]: Interim Guidance for Hospital Discharge to Home: www.[facility]/diseases/coronavirus/hcp/hospdischarge.pdf    [facility] clinical trials (COVID-19 research studies): clinicalaffairs.[facility]-clinical-trials     Below are the COVID-19 hotlines at the [place] Department [facility] ([facility]). Interpreters are available.   o For health questions: Call 366-335-4976 or 1-847.917.5771 (7 a.m. to 7 p.m.)  o For questions about schools and childcare: Call 404-892-2007 or 1-532.685.9420 (7 a.m. to 7 p.m.)       Reason for Disposition    SEVERE sore  throat pain    Protocols used: SORE THROAT-A-[place]

## 2021-06-14 NOTE — PATIENT INSTRUCTIONS - HE
Patient was educated on the natural course of condition.  Broad differential was considered including central causes of dizziness, infection, UTI, cardiac, electrolyte abnormalities, dehydration, anemia, thyroid, among others. CBC, CMP and EKG are unremarkable. UA is indicative of a UTI which might be the culprit of her symptoms although other causes cannot be ruled out at this time until labs are completed. Take medication as directed. Side effects discussed. Thyroid level is in progress. Will notify if any abnormal results. COVID PCR testing may take up to 3 days. Conservative measures discussed including drinking fluids (water), wiping from front to back, and avoiding holding urine when there is urge to urinate. See your primary care provider if symptoms do not improve in 3 days. Seek emergency care if you develop severe abdominal/flank pain, or fever.

## 2021-06-15 ENCOUNTER — OFFICE VISIT - HEALTHEAST (OUTPATIENT)
Dept: FAMILY MEDICINE | Facility: CLINIC | Age: 66
End: 2021-06-15

## 2021-06-15 DIAGNOSIS — Z79.899 CONTROLLED SUBSTANCE AGREEMENT SIGNED: ICD-10-CM

## 2021-06-15 DIAGNOSIS — E66.01 MORBID OBESITY (H): ICD-10-CM

## 2021-06-15 DIAGNOSIS — E11.9 TYPE 2 DIABETES MELLITUS WITHOUT COMPLICATION, WITHOUT LONG-TERM CURRENT USE OF INSULIN (H): ICD-10-CM

## 2021-06-15 DIAGNOSIS — G89.4 CHRONIC PAIN SYNDROME: ICD-10-CM

## 2021-06-15 DIAGNOSIS — G47.33 OSA ON CPAP: ICD-10-CM

## 2021-06-15 DIAGNOSIS — I10 ESSENTIAL HYPERTENSION: ICD-10-CM

## 2021-06-15 DIAGNOSIS — R94.31 ABNORMAL ELECTROCARDIOGRAM: ICD-10-CM

## 2021-06-15 DIAGNOSIS — M26.29 OVERBITE: ICD-10-CM

## 2021-06-15 DIAGNOSIS — J45.20 MILD INTERMITTENT ASTHMA WITHOUT COMPLICATION: ICD-10-CM

## 2021-06-15 DIAGNOSIS — K08.89 TOOTH PAIN: ICD-10-CM

## 2021-06-15 DIAGNOSIS — F33.0 MILD EPISODE OF RECURRENT MAJOR DEPRESSIVE DISORDER (H): ICD-10-CM

## 2021-06-15 DIAGNOSIS — E78.5 HYPERLIPIDEMIA, UNSPECIFIED HYPERLIPIDEMIA TYPE: ICD-10-CM

## 2021-06-15 PROBLEM — E66.811 OBESITY (BMI 30.0-34.9): Status: ACTIVE | Noted: 2017-03-24

## 2021-06-15 LAB
ANION GAP SERPL CALCULATED.3IONS-SCNC: 16 MMOL/L (ref 5–18)
BUN SERPL-MCNC: 21 MG/DL (ref 8–22)
CALCIUM SERPL-MCNC: 9.5 MG/DL (ref 8.5–10.5)
CHLORIDE BLD-SCNC: 102 MMOL/L (ref 98–107)
CHOLEST SERPL-MCNC: 197 MG/DL
CO2 SERPL-SCNC: 20 MMOL/L (ref 22–31)
CREAT SERPL-MCNC: 0.86 MG/DL (ref 0.6–1.1)
FASTING STATUS PATIENT QL REPORTED: YES
GFR SERPL CREATININE-BSD FRML MDRD: >60 ML/MIN/1.73M2
GLUCOSE BLD-MCNC: 143 MG/DL (ref 70–125)
HBA1C MFR BLD: 7.1 %
HDLC SERPL-MCNC: 52 MG/DL
LDLC SERPL CALC-MCNC: 103 MG/DL
POTASSIUM BLD-SCNC: 4.9 MMOL/L (ref 3.5–5)
SODIUM SERPL-SCNC: 138 MMOL/L (ref 136–145)
TRIGL SERPL-MCNC: 208 MG/DL

## 2021-06-15 RX ORDER — OXYCODONE AND ACETAMINOPHEN 5; 325 MG/1; MG/1
1 TABLET ORAL EVERY 6 HOURS PRN
Qty: 90 TABLET | Refills: 0 | Status: SHIPPED | OUTPATIENT
Start: 2021-06-15 | End: 2022-02-28

## 2021-06-15 RX ORDER — CLINDAMYCIN HCL 300 MG
300 CAPSULE ORAL 3 TIMES DAILY
Qty: 30 CAPSULE | Refills: 0 | Status: SHIPPED | OUTPATIENT
Start: 2021-06-15 | End: 2021-06-25

## 2021-06-15 ASSESSMENT — PATIENT HEALTH QUESTIONNAIRE - PHQ9: SUM OF ALL RESPONSES TO PHQ QUESTIONS 1-9: 6

## 2021-06-15 ASSESSMENT — ANXIETY QUESTIONNAIRES
2. NOT BEING ABLE TO STOP OR CONTROL WORRYING: NOT AT ALL
4. TROUBLE RELAXING: NOT AT ALL
3. WORRYING TOO MUCH ABOUT DIFFERENT THINGS: NOT AT ALL
6. BECOMING EASILY ANNOYED OR IRRITABLE: NOT AT ALL
5. BEING SO RESTLESS THAT IT IS HARD TO SIT STILL: NOT AT ALL
GAD7 TOTAL SCORE: 0
7. FEELING AFRAID AS IF SOMETHING AWFUL MIGHT HAPPEN: NOT AT ALL
1. FEELING NERVOUS, ANXIOUS, OR ON EDGE: NOT AT ALL

## 2021-06-15 NOTE — TELEPHONE ENCOUNTER
Refill Approved    Rx renewed per Medication Renewal Policy. Medication was last renewed on 8/13/20, last OV 10/8/20    Kenia Mccarthy, [facility] Triage/Med Refill 3/7/2021     Requested Prescriptions   Pending Prescriptions Disp Refills     gabapentin (NEURONTIN) 300 MG capsule [Pharmacy Med Name: Gabapentin 300 MG Oral Capsule] 240 capsule 0     Sig: TAKE 2 CAPSULES BY MOUTH 4 TIMES DAILY       Gabapentin/Levetiracetam/Tiagabine Refill Protocol  Passed - 3/7/2021 10:43 AM        Passed - PCP or prescribing provider visit in past 12 months or next 3 months     Last office visit with prescriber/PCP: 10/8/2020 Trish Eagle MD OR same dept: 10/8/2020 Trish Eagle MD OR same specialty: 10/8/2020 Trish Eagle MD  Last physical: 12/3/2018 Last MTM visit: Visit date not found   Next visit within 3 mo: Visit date not found  Next physical within 3 mo: Visit date not found  Prescriber OR PCP: [place]) SHALOM Eagle MD  Last diagnosis associated with med order: 1. Other chronic pain  - gabapentin (NEURONTIN) 300 MG capsule [Pharmacy Med Name: Gabapentin 300 MG Oral Capsule]; TAKE 2 CAPSULES BY MOUTH 4 TIMES DAILY  Dispense: 240 capsule; Refill: 0    If protocol passes may refill for 12 months if within 3 months of last provider visit (or a total of 15 months).

## 2021-06-15 NOTE — TELEPHONE ENCOUNTER
Pt called back, relayed message-    Pt is refusing to come in, states she tried to see Dr. Eagle in the first place and had to go to urgent care because she couldn't see her.    She states she always has to do 2 rounds of ABX no matter what the infection is-she has been like this her whole life and her records can prove that.    She is also on a spend down with her insurance and cannot afford to come in for something she has already been treat for an still has same symptoms because she didn't get 2 rounds of ABX.    She wants ABX and cannot and will not come in.

## 2021-06-15 NOTE — TELEPHONE ENCOUNTER
Controlled Substance Refill Request  Medication Name:   Requested Prescriptions     Pending Prescriptions Disp Refills     phentermine (ADIPEX-P) 37.5 mg tablet [Pharmacy Med Name: Phentermine HCl 37.5 MG Oral Tablet] 135 tablet 0     Sig: TAKE 1 & 1/2 (ONE & ONE-HALF) TABLETS BY MOUTH ONCE DAILY IN THE MORNING     diclofenac sodium (VOLTAREN) 1 % Gel [Pharmacy Med Name: Diclofenac Sodium 1 % Transdermal Gel] 300 g 0     Sig: APPLY TOPICALLY TWICE DAILY AS NEEDED FOR ARTHRITIS     Date Last Fill: 8/13/20  Requested Pharmacy: [facility]  Submit electronically to pharmacy  Controlled Substance Agreement on file:   Encounter-Level CSA Scan Date - 06/11/2018:    Scan on 6/14/2018 11:32 AM: PERCOSET/PHENTERMINE           Encounter-Level CSA Scan Date - 03/24/2017:    Scan on 3/28/2017 11:54 AM        Last office visit:  10/8/20

## 2021-06-15 NOTE — TELEPHONE ENCOUNTER
Reason for Call:  Medication or medication refill:    Do you use a [facility]?  Name of the pharmacy and phone number for the current request: [facility]    Name of the medication requested: BACTRIM    Other request:  PT WANTS ANOTHER RX OF BACTRIM TO FOLLOW THE ONE SHE RECEIVED FROM THE ER    Can we leave a detailed message on this number? Yes    Phone number patient can be reached at: Cell number on file:    Telephone Information:   Mobile [phone]       Best Time: ANYTIME    Call taken on 2/3/2021 at 4:33 PM by Catalino Kumari

## 2021-06-15 NOTE — TELEPHONE ENCOUNTER
Medication: Phentermine & diclofenac gel  Last Date Filled 8/13/2020; 9/12/19  Last appointment addressing medication use: 10/8/20      Taken as prescribed from physician notes? YES    CSA in last year: YES    Random Utox in last year: YES  (if any of the above answer NO - schedule with PCP)     Opioids + benzodiazepines? NO  (if the above answer YES - schedule with PCP every 6 months)       All responses suggest: Scheduling with PCP for further intervention

## 2021-06-15 NOTE — TELEPHONE ENCOUNTER
Not appropriate to refill an antibiotic. Needs an appt.  Do I have room for thurs--virtual or live?

## 2021-06-15 NOTE — TELEPHONE ENCOUNTER
Left message to call back for: patient  Information to relay to patient:  See below and help schedule.

## 2021-06-15 NOTE — TELEPHONE ENCOUNTER
RN cannot approve Refill Request    RN can NOT refill this medication med is not covered by policy/route to provider. Last office visit: 10/8/2020 Trish Eagle MD Last Physical: 12/3/2018 Last MTM visit: Visit date not found Last visit same specialty: 10/8/2020 Trish Eagle MD.  Next visit within 3 mo: Visit date not found  Next physical within 3 mo: Visit date not found      Robson Elizalde, [facility] Triage/Med Refill 3/4/2021    Requested Prescriptions   Pending Prescriptions Disp Refills     phentermine (ADIPEX-P) 37.5 mg tablet [Pharmacy Med Name: Phentermine HCl 37.5 MG Oral Tablet] 135 tablet 0     Sig: TAKE 1 & 1/2 (ONE & ONE-HALF) TABLETS BY MOUTH ONCE DAILY IN THE MORNING       There is no refill protocol information for this order        diclofenac sodium (VOLTAREN) 1 % Gel [Pharmacy Med Name: Diclofenac Sodium 1 % Transdermal Gel] 300 g 0     Sig: APPLY TOPICALLY TWICE DAILY AS NEEDED FOR ARTHRITIS       There is no refill protocol information for this order

## 2021-06-15 NOTE — TELEPHONE ENCOUNTER
Unable to pend up medication since not on current med list.     Pt requesting refill of antibiotic.     Medication: Bactrim DS  Last Date Filled: 1/18/21  Last appointment addressing medication: 1/18/21 walk in appointment.  Last B/P:  BP Readings from Last 3 Encounters:   01/19/21 138/72   01/18/21 96/67   10/28/20 124/74     Last labs pertaining to refill:1/18/21.      Pend medication and associate diagnosis before routing to Provider for review.       If patient has not been seen in over 1 year, pend 30 day supply and notify patient they are due for an appointment before any further refills.     Trinh Gomes, CMA

## 2021-06-15 NOTE — PROGRESS NOTES
SUBJECTIVE: Anita Duque is a 62 y.o. White or  female who presents today for her diabetes check.  She was last seen 9/29/17 and had in A1c of 6.0% at that time.  Her blood pressure is well controlled today.  She has hyperlipidemia and her last LDL was very elevated at 146.  It had previously been 92.  We need to recheck that today.  She has mild persistent asthma which she says is well controlled.  She has chronic pain mostly of her low back.  She has a history of vitamin D deficiency.  She is in need of both her tetanus and her flu shot but declines both at this time.  She tells me she has an eye appointment usually in April, I looking in her chart and I do not have anything since a year ago last summer.  She is also flaking for Pap and she tells me she had a total hysterectomy with Dr. Tang even though she did not expect it when she was scheduled for bladder reconstruction.  She needs a foot exam today.  She also would like a refill of her phentermine.  She previously got this from Dr. Cardenas but can no longer go to see him.  After her last surgery she gained about 20 pounds.    OBJECTIVE: /60  Pulse 72  Wt 213 lb 1.6 oz (96.7 kg)  SpO2 99%  BMI 36.87 kg/m2  General: Obese older female in no acute distress  Heart: Regular rate and rhythm without murmur  Lungs: Clear bilaterally  Abdomen: Soft, nontender  Extremities: Warm, dry and without edema  Foot exam shows onychomycosis of the great toe, but no deformity or skin lesions other than dryness.  There is no neuropathy on filament testing.    ASSESSMENT & PLAN:    1. Type 2 diabetes mellitus  Glycosylated Hemoglobin A1c    Microalbumin, Random Urine   2. Essential hypertension  Basic Metabolic Panel   3. Hyperlipidemia, unspecified hyperlipidemia type  Lipid Cascade FASTING   4. Chronic pain syndrome     5. Mild persistent asthma without complication     6. Obesity (BMI 30-39.9)  phentermine 37.5 MG capsule     I will do the  above-mentioned lab work and get back to her by my chart.  I have given her a refill of the phentermine and have sent it to [facility] as requested.  Her usual pharmacy is [facility].  She needs to follow-up in 4 months time.    Patient Active Problem List   Diagnosis     Essential hypertension     Joint Pain, Localized In The Shoulder     Fibromyalgia     Type 2 diabetes mellitus     Hyperlipidemia     Major Depression, Recurrent     Chronic Constipation     Cellulitis     Sciatica     Abdominal Pain     Chronic pain syndrome     Lower Back Pain     Vaginal Wall Prolapse With Midline Cystocele     Female Stress Incontinence     Lump Or Mass In Breast     Osteoarthrosis, unspecified whether generalized or localized, lower leg     JANINE on CPAP     Mild persistent asthma     Obesity (BMI 30.0-34.9)     Pain management contract signed       Current Outpatient Prescriptions on File Prior to Visit   Medication Sig Dispense Refill     acetaminophen (TYLENOL) 650 MG CR tablet Take 2 tablets (1,300 mg total) by mouth every 8 (eight) hours as needed for pain. 90 tablet 1     cyclobenzaprine (FLEXERIL) 10 MG tablet Take 1 tablet (10 mg total) by mouth 2 (two) times a day as needed for muscle spasms. 20 tablet 0     diclofenac sodium (VOLTAREN) 1 % Gel Apply topically twice daily as needed for arthritis 3 Tube 3     DULoxetine (CYMBALTA) 60 MG capsule        fluticasone (FLONASE) 50 mcg/actuation nasal spray 1 spray each nostril twice daily 1 g 1     gabapentin (NEURONTIN) 300 MG capsule Take 2 capsules (600 mg total) by mouth 4 (four) times a day. 240 capsule 5     glipiZIDE (GLUCOTROL) 5 MG 24 hr tablet 10 mg daily.        ibuprofen (ADVIL,MOTRIN) 800 MG tablet TAKE ONE TABLET BY MOUTH THREE TIMES DAILY AS NEEDED 100 tablet 2     INVOKANA 300 mg Tab        ketoconazole (NIZORAL) 2 % shampoo        lisinopril (PRINIVIL,ZESTRIL) 2.5 MG tablet TAKE ONE TABLET BY MOUTH EVERY DAY 90 tablet 3     lovastatin (MEVACOR) 40 MG tablet         metFORMIN (GLUCOPHAGE-XR) 500 MG 24 hr tablet 500 mg 4 (four) times a day.        mometasone-formoterol (DULERA) 200-5 mcg/actuation HFAA inhaler Inhale 2 puffs 2 (two) times a day. 30 Inhaler 1     nystatin-triamcinolone (MYCOLOG II) cream Apply small amount to affected 2-3 times daily as needed for the itching. 30 g 0     omeprazole (PRILOSEC) 40 MG capsule 40 mg.        oxyCODONE-acetaminophen (PERCOCET) 5-325 mg per tablet Take 1 tablet by mouth every 6 (six) hours as needed for pain. 90 tablet 0     polyethylene glycol (GLYCOLAX) 17 gram/dose powder        pramipexole (MIRAPEX) 0.75 MG tablet 0.75 mg.        spironolactone (ALDACTONE) 100 MG tablet        topiramate (TOPAMAX) 50 MG tablet        traZODone (DESYREL) 100 MG tablet        VESICARE 5 mg tablet        No current facility-administered medications on file prior to visit.

## 2021-06-16 PROBLEM — Z79.899 CONTROLLED SUBSTANCE AGREEMENT SIGNED: Chronic | Status: ACTIVE | Noted: 2020-03-13

## 2021-06-16 PROBLEM — K21.9 GASTROESOPHAGEAL REFLUX DISEASE WITHOUT ESOPHAGITIS: Status: ACTIVE | Noted: 2020-10-09

## 2021-06-16 PROBLEM — M50.30 DEGENERATIVE DISC DISEASE, CERVICAL: Status: ACTIVE | Noted: 2018-06-11

## 2021-06-16 PROBLEM — M51.369 DEGENERATIVE DISC DISEASE, LUMBAR: Status: ACTIVE | Noted: 2018-06-11

## 2021-06-16 PROBLEM — J45.20 MILD INTERMITTENT ASTHMA: Chronic | Status: ACTIVE | Noted: 2020-03-13

## 2021-06-16 PROBLEM — E66.01 MORBID OBESITY (H): Status: ACTIVE | Noted: 2018-07-26

## 2021-06-16 NOTE — TELEPHONE ENCOUNTER
Telephone Encounter by Yadi Mac at 4/19/2019  2:27 PM     Author: Yadi Mac Service: -- Author Type: --    Filed: 4/19/2019  2:27 PM Encounter Date: 4/18/2019 Status: Signed    : Yadi Mac APPROVED:    Approval start date: 1/1/2019  Approval end date:12/31/2019    Pharmacy has been notified of approval and will contact patient when medication is ready for pickup.

## 2021-06-16 NOTE — TELEPHONE ENCOUNTER
Telephone Encounter by Yadi Mac at 9/25/2019  4:30 PM     Author: Yadi Mac Service: -- Author Type: --    Filed: 9/25/2019  4:32 PM Encounter Date: 9/23/2019 Status: Addendum    : Yadi Mac    Related Notes: Original Note by Yadi Mac filed at 9/25/2019  4:31 PM       PRIOR AUTHORIZATION DENIED    Denial Rational: Lidoderm patches are only covered with the diagnosis of post-herpetic neuralgia, diabetic neuropathy, or cancer related pain. It will not be covered for the associated diagnosis.         This medication was denied. If physician would like to appeal because patient has contraindication or allergy to covered medication please write letter of medical necessity and route back to PA team to initiate.  If no further action is needed please close encounter thank you.

## 2021-06-16 NOTE — TELEPHONE ENCOUNTER
Telephone Encounter by Isa Arceo at 12/5/2019  1:49 PM     Author: Isa Arceo Service: -- Author Type: --    Filed: 12/5/2019  1:49 PM Encounter Date: 12/5/2019 Status: Signed    : Isa Arceo APPROVED:    Approval start date: 12/05/2019  Approval end date:  12/31/2020    Pharmacy has been notified of approval and will contact patient when medication is ready for pickup.

## 2021-06-16 NOTE — TELEPHONE ENCOUNTER
Telephone Encounter by Suzette Murry at 1/29/2019 11:01 AM     Author: Suzette Murry Service: -- Author Type: --    Filed: 1/29/2019 11:03 AM Encounter Date: 1/25/2019 Status: Signed    : Suzette Murry       PA INITIATION

## 2021-06-16 NOTE — TELEPHONE ENCOUNTER
Telephone Encounter by Yadi Mac at 7/19/2019 11:29 AM     Author: Yadi Mac Service: -- Author Type: --    Filed: 7/19/2019 11:30 AM Encounter Date: 7/17/2019 Status: Signed    : Yadi Mac APPROVED:    Approval start date: 12/30/2018  Approval end date: 12/31/2019    Pharmacy has been notified of approval and will contact patient when medication is ready for pickup.

## 2021-06-16 NOTE — TELEPHONE ENCOUNTER
Telephone Encounter by Cher Arguelles at 1/24/2020  3:06 PM     Author: Cher Arguelles Service: -- Author Type: --    Filed: 1/24/2020  3:07 PM Encounter Date: 1/23/2020 Status: Signed    : Cher Arguelles       Question set received, answered and faxed back. Will await determination.

## 2021-06-16 NOTE — TELEPHONE ENCOUNTER
Telephone Encounter by Yadi Mac at 4/22/2019  9:20 AM     Author: Yadi Mac Service: -- Author Type: --    Filed: 4/22/2019  9:21 AM Encounter Date: 4/19/2019 Status: Signed    : Yadi Mac       PRIOR AUTHORIZATION DENIED    Denial Rational: Not approved for off label use.  Diagnosis of Osteo-arthritis and Fibromyalgia are not approved indications for use.  This is FDA approved for minor sprains, strains, contusions        Appeal Information: This medication was denied. If physician would like to appeal because patient has contraindication or allergy to covered medication please write letter of medical necessity and route back to PA team to initiate.  If no further action is needed please close encounter thank you.

## 2021-06-16 NOTE — TELEPHONE ENCOUNTER
Telephone Encounter by Cher Arguelles at 1/27/2020 10:35 AM     Author: Cher Arguelles Service: -- Author Type: --    Filed: 1/27/2020 10:37 AM Encounter Date: 1/23/2020 Status: Signed    : Cher Arguelles APPROVED: VESIcare 5MG tablets    Approval start date: 01/24/2020  Approval end date:  12/31/2020    Pharmacy has been notified of approval and will contact patient when medication is ready for pickup.

## 2021-06-16 NOTE — TELEPHONE ENCOUNTER
Telephone Encounter by Yadi Mac at 9/19/2019  4:36 PM     Author: Yadi Mac Service: -- Author Type: --    Filed: 9/19/2019  4:36 PM Encounter Date: 9/12/2019 Status: Signed    : Yadi Mac APPROVED:    Approval start date:12/30/18  Approval end date:12/31/19    Pharmacy has been notified of approval and will contact patient when medication is ready for pickup.

## 2021-06-18 ENCOUNTER — COMMUNICATION - HEALTHEAST (OUTPATIENT)
Dept: FAMILY MEDICINE | Facility: CLINIC | Age: 66
End: 2021-06-18

## 2021-06-18 NOTE — PROGRESS NOTES
SUBJECTIVE: Anita Duque is a 62 y.o. White or  female who presents today for a diabetes check and med check.  I last saw her and of January and at that time her A1c was 6.8%.  Her blood pressures well controlled today.  Her last LDL was elevated at 137 and she is not overly compliant with her cholesterol med all the time.  She has some chronic pain issues with back and neck pain that is chronic.  She also has fibromyalgia.  She complains about left ear pain today.  She has had a couple recent falls.  She was in [place] for a son's [employer] as he is in the service.  They were touring the [employer] and she had a fall on the ship.  She also had a fall when she was trying to get off her pontoon.  She has knee osteoarthritis and that is flaring because she has fallen on her knees.  Her meniscus has begun to bother her again.  We discuss her balance and perhaps she needs to have that checked out.  Even her orthopedic doctor suggested it.  [facility]  We also discuss a referral to the spine care clinic.  She is interested in a chiropractor but also would like to be assessed for other treatments.  She has needed to use her Percocet but had not filled it in quite a number of months.  We discussed getting a controlled substance treatment agreement signed for 90 tablets in 30 days at maximum.  She also will take phentermine at times and wants this as well as part of her contract.  This of course has caused a lot of depression and she is on duloxetine.  She was given a PHQ 9 and scored 3 today.  She needs a refill of her medication.  She has had vitamin D deficiency and her last check was quite a while ago and was low at 17.7.    OBJECTIVE: /70  Pulse 81  Resp 14  Wt 209 lb (94.8 kg)  SpO2 98%  BMI 36.16 kg/m2  General: Obese older female in no acute distress  HEENT: Right ear is clear, left ear has irritation of the canal wall with redness and  flaky skin but no discharge  Heart: Regular rate and rhythm without murmur  Lungs: Clear bilaterally  Abdomen: Soft  Extremities: Warm, dry with trace edema    ASSESSMENT & PLAN:    1. Chronic pain syndrome  oxyCODONE-acetaminophen (PERCOCET) 5-325 mg per tablet    DULoxetine (CYMBALTA) 60 MG capsule   2. Pain management contract signed     3. Type 2 diabetes mellitus  Glycosylated Hemoglobin A1c   4. Essential hypertension  Basic Metabolic Panel   5. Hyperlipidemia, unspecified hyperlipidemia type  Lipid Cascade FASTING   6. Poor balance  Ambulatory referral to Neurology   7. Frequent falls  Ambulatory referral to Neurology   8. Degenerative disc disease, cervical  Ambulatory referral to Spine Care   9. Degenerative disc disease, lumbar  Ambulatory referral to Spine Care   10. Otitis externa  triamcinolone (KENALOG) 0.025 % ointment   11. Vitamin D deficiency  Vitamin D, Total (25-Hydroxy)     I am referring her to both the spine care clinic and the [facility].  I am refilling her Cymbalta and Percocet.  I am going to give her an ointment for her ear canal.  She is to use it sparingly twice a day.  I will check the above-mentioned lab work and get back to her by my chart.  I would like to see her back in 4 months time.    Patient Active Problem List   Diagnosis     Essential hypertension     Joint Pain, Localized In The Shoulder     Fibromyalgia     Type 2 diabetes mellitus     Hyperlipidemia     Major Depression, Recurrent     Chronic Constipation     Cellulitis     Sciatica     Abdominal Pain     Chronic pain syndrome     Lower Back Pain     Vaginal Wall Prolapse With Midline Cystocele     Female Stress Incontinence     Lump Or Mass In Breast     Osteoarthrosis, unspecified whether generalized or localized, lower leg     JANINE on CPAP     Mild persistent asthma     Obesity (BMI 30.0-34.9)     Pain management contract signed     Degenerative disc disease, cervical     Degenerative disc  disease, lumbar       Current Outpatient Prescriptions on File Prior to Visit   Medication Sig Dispense Refill     acetaminophen (TYLENOL) 650 MG CR tablet Take 2 tablets (1,300 mg total) by mouth every 8 (eight) hours as needed for pain. 90 tablet 1     cyclobenzaprine (FLEXERIL) 10 MG tablet Take 1 tablet (10 mg total) by mouth 2 (two) times a day as needed for muscle spasms. 20 tablet 0     dapagliflozin (FARXIGA) 10 mg Tab Take 1 tablet by mouth daily. 90 tablet 0     diclofenac sodium (VOLTAREN) 1 % Gel Apply topically twice daily as needed for arthritis 3 Tube 3     fluticasone (FLONASE) 50 mcg/actuation nasal spray 1 spray each nostril twice daily 1 g 1     gabapentin (NEURONTIN) 300 MG capsule Take 2 capsules (600 mg total) by mouth 4 (four) times a day. 240 capsule 5     glipiZIDE (GLUCOTROL) 5 MG 24 hr tablet 10 mg daily.        ibuprofen (ADVIL,MOTRIN) 800 MG tablet TAKE ONE TABLET BY MOUTH THREE TIMES DAILY AS NEEDED 100 tablet 2     INVOKANA 300 mg Tab        ketoconazole (NIZORAL) 2 % shampoo        lisinopril (PRINIVIL,ZESTRIL) 2.5 MG tablet TAKE ONE TABLET BY MOUTH ONCE DAILY 90 tablet 0     lovastatin (MEVACOR) 40 MG tablet Take 1 tablet (40 mg total) by mouth at bedtime. 90 tablet 1     metFORMIN (GLUCOPHAGE-XR) 500 MG 24 hr tablet 500 mg 4 (four) times a day.        mometasone-formoterol (DULERA) 200-5 mcg/actuation HFAA inhaler Inhale 2 puffs 2 (two) times a day. 30 Inhaler 1     nystatin-triamcinolone (MYCOLOG II) cream Apply small amount to affected 2-3 times daily as needed for the itching. 30 g 0     omeprazole (PRILOSEC) 40 MG capsule 40 mg.        phentermine (ADIPEX-P) 37.5 mg tablet Take 1 tablet (37.5 mg total) by mouth daily before breakfast. 90 tablet 0     polyethylene glycol (GLYCOLAX) 17 gram/dose powder        pramipexole (MIRAPEX) 0.75 MG tablet 0.75 mg.        spironolactone (ALDACTONE) 100 MG tablet        topiramate (TOPAMAX) 50 MG tablet        traZODone (DESYREL) 100 MG  tablet        VESICARE 5 mg tablet        [DISCONTINUED] DULoxetine (CYMBALTA) 60 MG capsule        [DISCONTINUED] oxyCODONE-acetaminophen (PERCOCET) 5-325 mg per tablet Take 1 tablet by mouth every 6 (six) hours as needed for pain. 90 tablet 0     No current facility-administered medications on file prior to visit.

## 2021-06-18 NOTE — PROGRESS NOTES
ASSESSMENT: Anita Duque is a 63 y.o. female with past medical history significant for hypertension, type 2 diabetes, hyperlipidemia, chronic pain syndrome, asthma, fibromyalgia, depression, female incontinence, JANINE, obesity who presents today for new patient evaluation of 2 areas of chronic pain.  1.  Chronic bilateral neck pain.  Patient has not had any recent advanced cervical spine.  Patient seem symptomatic from cervical facet syndrome.  Positive Kemps test bilaterally.  Patient's pain radiating to the shoulders.  Question if this is radicular in nature versus related to rotator cuff pathology.  Patient is status post what sounds like a bilateral radiofrequency ablation in 2016 with [facility] which provided 100% relief of her neck pain for about 1 year.  Patient has been having frequent falls and has difficulty with tandem gait.  I would like to rule out cervical spinal stenosis.  2.  Chronic bilateral low back pain.  Patient has history of an L4-5 fusion.  Current pain is most consistent with lumbar facet syndrome/disc degeneration, likely above her fusion.  An x-ray of the lumbar spine from August 2017 showed a mild compression fracture at L3.  Patient does not have any lower extremity radicular symptoms.  -Patient is neurologically intact on exam.  -Patient has fibromyalgia which contributes to her pain.    NDI: 52  RUTHANN: 48  Who 5: 10    PLAN:  A shared decision making model was used.  The patient's values and choices were respected.  The following represents what was discussed and decided upon by the physician assistant and the patient.      1.  DIAGNOSTIC TESTS: I reviewed the x-rays of the thoracic, lumbar spine from August 2017.  I placed orders for MRI scans of the cervical and lumbar spine for further evaluation.    2.  PHYSICAL THERAPY: I believe the patient would benefit from physical therapy.  I would like to see her MRI results first.  I will refer the patient to pool therapy at  [facility] if appropriate.  Patient had physical therapy for her neck and lower back 5 years ago.  She did pool therapy in the 1990s.  She thought it was helpful.    3.  MEDICATIONS: No changes are made to the patient's medications.  Her pain medications are managed by her primary care provider.  She uses Cymbalta 60 mg daily, Tylenol 1000 mg 4 times per day, ibuprofen 800 mg 4 times per day, cyclobenzaprine 10 mg as needed, Percocet up to 3 times per day, gabapentin 600 mg 4 times per day, as well as a topical pain relieving cream.    4.  INTERVENTIONS: No interventions were ordered.  We will await the results of her updated imaging studies.  She will likely benefit from interventional pain management if symptoms fail to improve with conservative treatment.  -In regards to neck pain, I think it would be reasonable to try cervical facet joint injections.  Patient did have 100% relief of her pain following her radiofrequency ablation, but facet joint injections were not trialed first.  I explained to the patient the facet joint injections can also provide relief of her pain which may last months.  -In regards to the lower back pain, I think she may also benefit from lumbar facet joint injections.  Patient has not had any interventional pain management for the lumbar spine.    5.  PATIENT EDUCATION:   -We could consider chiropractic referral in the future if needed.  Patient had chiropractic manipulation along with acupuncture about 5 years ago which was helpful.  -The patient is in agreement with the above plan.  All questions were answered.    6.  FOLLOW-UP:   A nurse will call the patient with results of the MRI scans of the cervical and lumbar spine.  At that time I will likely refer the patient to physical therapy and/or chiropractic manipulation.        SUBJECTIVE:  Aniat Duque  Is a 63 y.o. female who presents today in consultation at the request of Dr. Eagle for new patient evaluation of neck pain  and low back pain.    First concern today is chronic neck pain.  Patient states that she has had pain for 30 years.  She feels it is getting progressively worse.  She had a fall in March 2018 and another fall in May 2018.  Each of these falls seem to aggravate her neck pain.  Both falls occurred where she tripped and landed on her knees and then continued falling forward on her stomach.  Patient states that pain is in the midline of the cervical spine.  She is unable to say if one side of the neck is more painful than the other.  She describes the pain as aching and sharp.  She has pain which extends into the shoulders and upper arms bilaterally, left greater than right.  She is not sure if this is pain coming from the neck or if it is related to known rotator cuff tears.  Patient states that she has occasional numbness and tingling in both hands due to carpal tunnel syndrome.  She feels weakness in the arms.  Patient has daily headaches associated with the neck pain.  She states that these are mild.  Neck pain is aggravated with turning her neck.  It is alleviated with stretching, massage, heat, and applying her TENS unit.  Patient states that her balance is poor.  She has had frequent falls.  She is going to the [facility] for this.    Second concern is chronic low back pain.  This is also been present for at least 30 years.  Patient has a history of lumbar spine surgery ×2 or 3 including an L4-5 fusion.  Patient has had persistent pain despite her surgeries.  Again, the falls in March and May of this year seem to aggravate the pain.  Pain is in the lower back.  It spans across the lower back above her fusion, at about the L3 level.  Patient states that both sides of the lower back are equally painful.  She describes the pain as aching and sharp.  She denies any pain radiating into the buttocks or down the legs.  She denies any numbness or tingling down the legs.  She does feel that her legs  are weak.  Low back pain is aggravated bending, lifting, twisting.  It is alleviated with rest and applying heat.  Patient has history of chronic urinary incontinence related to a cystocele.  Denies bowel incontinence.    About 5 years ago, patient was working with a doctor in [place] for her pain.  Patient states that at that time she was going to a physical therapist, chiropractor, and acupuncturist which were helpful.  She also had Botox for chronic migraine at that time which was helpful.  Patient does not feel like current headaches are to warrant Botox injections.  Patient had a radiofrequency ablation at [facility] in 2016.  It sounds like this was perhaps bilateral C4, C5, C6.  Patient reports that provided 100% relief of her neck pain for about 1 year and then pain returned.  Patient has not had any cervical spine surgery.  As mentioned above, she has had multiple low back surgeries including fusion.  Patient uses Cymbalta 60 mg daily, Tylenol 1000 mg 4 times daily, ibuprofen 800 mg 4 times daily, cycle benzathine 10 mg as needed, Percocet up to 3 per day, gabapentin 60 mg 4 times daily, and she applies a topical pain relieving cream as needed.  She applies a TENS unit.  She also has a back brace occasionally for pain.    Current Outpatient Prescriptions on File Prior to Encounter   Medication Sig Dispense Refill     acetaminophen (TYLENOL) 650 MG CR tablet Take 2 tablets (1,300 mg total) by mouth every 8 (eight) hours as needed for pain. 90 tablet 1     cyclobenzaprine (FLEXERIL) 10 MG tablet Take 1 tablet (10 mg total) by mouth 2 (two) times a day as needed for muscle spasms. 20 tablet 0     DULoxetine (CYMBALTA) 60 MG capsule Take 1 capsule (60 mg total) by mouth daily. 90 capsule 1     ergocalciferol (ERGOCALCIFEROL) 50,000 unit capsule Take 1 capsule (50,000 Units total) by mouth once a week. 12 capsule 3     gabapentin (NEURONTIN) 300 MG capsule Take 2 capsules (600 mg total) by mouth  4 (four) times a day. 240 capsule 5     glipiZIDE (GLUCOTROL) 5 MG 24 hr tablet 10 mg daily.        ibuprofen (ADVIL,MOTRIN) 800 MG tablet TAKE ONE TABLET BY MOUTH THREE TIMES DAILY AS NEEDED 100 tablet 2     ketoconazole (NIZORAL) 2 % shampoo        lisinopril (PRINIVIL,ZESTRIL) 2.5 MG tablet TAKE ONE TABLET BY MOUTH ONCE DAILY 90 tablet 0     lovastatin (MEVACOR) 40 MG tablet Take 1 tablet (40 mg total) by mouth at bedtime. 90 tablet 1     metFORMIN (GLUCOPHAGE-XR) 500 MG 24 hr tablet 500 mg 4 (four) times a day.        omeprazole (PRILOSEC) 40 MG capsule 40 mg.        oxyCODONE-acetaminophen (PERCOCET) 5-325 mg per tablet Take 1 tablet by mouth every 6 (six) hours as needed for pain. 90 tablet 0     phentermine (ADIPEX-P) 37.5 mg tablet Take 1 tablet (37.5 mg total) by mouth daily before breakfast. 90 tablet 0     pramipexole (MIRAPEX) 0.75 MG tablet 0.75 mg.        spironolactone (ALDACTONE) 100 MG tablet        topiramate (TOPAMAX) 50 MG tablet        traZODone (DESYREL) 100 MG tablet        dapagliflozin (FARXIGA) 10 mg Tab Take 1 tablet by mouth daily. 90 tablet 0     diclofenac sodium (VOLTAREN) 1 % Gel Apply topically twice daily as needed for arthritis 3 Tube 3     fluticasone (FLONASE) 50 mcg/actuation nasal spray 1 spray each nostril twice daily 1 g 1     INVOKANA 300 mg Tab        mometasone-formoterol (DULERA) 200-5 mcg/actuation HFAA inhaler Inhale 2 puffs 2 (two) times a day. 30 Inhaler 1     nystatin-triamcinolone (MYCOLOG II) cream Apply small amount to affected 2-3 times daily as needed for the itching. 30 g 0     polyethylene glycol (GLYCOLAX) 17 gram/dose powder        triamcinolone (KENALOG) 0.025 % ointment Apply sparingly in ear canal 1-2 times daily as needed 15 g 0     VESICARE 5 mg tablet            Allergies   Allergen Reactions     Bupropion Hcl        Past Medical History:   Diagnosis Date     Asthma      Chronic pain syndrome      Depression      Diabetes mellitus, type II (H)       Fibromyalgia      Hyperlipidemia      Hypertension      Insomnia         Patient Active Problem List   Diagnosis     Essential hypertension     Joint Pain, Localized In The Shoulder     Fibromyalgia     Type 2 diabetes mellitus (H)     Hyperlipidemia     Major Depression, Recurrent     Chronic Constipation     Cellulitis     Sciatica     Abdominal Pain     Chronic pain syndrome     Lower Back Pain     Vaginal Wall Prolapse With Midline Cystocele     Female Stress Incontinence     Lump Or Mass In Breast     Osteoarthrosis, unspecified whether generalized or localized, lower leg     JANINE on CPAP     Mild persistent asthma     Obesity (BMI 30.0-34.9)     Pain management contract signed     Degenerative disc disease, cervical     Degenerative disc disease, lumbar       Past Surgical History:   Procedure Laterality Date     HYSTERECTOMY  2011     AL [place] W/O FACETEC FORAMOT/[facility]KC 1/2 VRT SEG, CERVICAL      Description: Laminectomy Lumbar;  Recorded: 10/05/2012;     AL REVISE MEDIAN N/CARPAL TUNNEL SURG      Description: Neuroplasty Decompression Median Nerve At Carpal Tunnel;  Recorded: 07/16/2012;     AL SUPRACERV ABD HYSTERECTOMY      Description: Supracervical Hysterectomy;  Recorded: 04/05/2011;     TOTAL ABDOMINAL HYSTERECTOMY         Family History   Problem Relation Age of Onset     No Medical Problems Mother      No Medical Problems Father      No Medical Problems Sister      No Medical Problems Daughter      No Medical Problems Maternal Grandmother      No Medical Problems Maternal Grandfather      No Medical Problems Paternal Grandmother      No Medical Problems Paternal Grandfather      No Medical Problems Maternal Aunt      No Medical Problems Paternal Aunt      BRCA 1/2 Neg Hx      Breast cancer Neg Hx      Cancer Neg Hx      Colon cancer Neg Hx      Endometrial cancer Neg Hx      Ovarian cancer Neg Hx      Social history: The patient is .  She lives with her fiancé.  She works part-time at a [employer]  the[employer] taking to.  She drinks alcohol occasionally.  She denies tobacco use.  She denies illicit drug use.    ROS: Positive for hoarseness, cough, swelling of feet, constipation bruising, poor sleep quality, back pain, joint pain, leg pain, headache, depression/worry, difficulty swallowing.  Specifically negative for dysphagia, imbalance, fine motor skill difficulties, bowel/bladder dysfunction, fevers,chills, appetite changes, unexplained weight loss.   Otherwise 13 systems reviewed are negative.  Please see the patient's intake questionnaire from today for details.      OBJECTIVE:  PHYSICAL EXAMINATION:  CONSTITUTIONAL:  Vital signs as above.  No acute distress.  The patient is well nourished and well groomed.  PSYCHIATRIC:  The patient is awake, alert, oriented to person, place, time and answering questions appropriately with clear speech.    HEENT:  Normocephalic, atraumatic.  Sclera clear.    SKIN:  Skin over the face, bilateral upper extremities, and neck is clean, dry, intact without rashes.  LYMPH NODES:  No palpable or tender anterior/posterior cervical, submandibular, or supraclavicular lymph nodes.    MUSCLE STRENGTH:  5/5 strength for the bilateral shoulder abductors, elbow flexors/extensors, wrist extensors, finger flexors/abductors, wrist, knee flexors/extensors, ankle dorsi/plantar flexors, EHL.  NEURO: Ambulates with a slightly antalgic gait.  Patient has difficulty with tandem gait.  Able to rise onto toes and heels.  CN III-XII are grossly intact.  2/4 symmetric biceps, brachioradialis, triceps, patellar, and Achilles reflexes bilaterally.  Sensation to light touch is intact over bilateral upper and lower extremities throughout.  Negative Babinski's bilaterally.  No ankle clonus.  Negative Alfaro's bilaterally.    VASCULAR:  2/4 radial pulses bilaterally.  Warm upper limbs bilaterally.  Capillary refill in the upper extremities is less than 1 second.  MUSCULOSKELETAL: Tenderness to palpation  bilateral cervical paraspinous muscles.  Cervical range of motion is moderately restricted with flexion, extension, and lateral rotation bilaterally.  Positive Kemps test bilaterally.  Tender palpation of the lumbar paraspinous muscles, most severe at L3 but extending down through the lumbosacral junction.  Lumbar flexion extension mildly restricted.  Lumbar facet loading maneuvers reproduce low back pain bilaterally.    RESULTS:    XR LUMBAR SPINE 2 OR 3 VWS  8/23/2017 12:09 PM     INDICATION: Fall.      COMPARISON: 10/10/2012     FINDINGS: There are bilateral pedicle screws at the lower spine. Mild degenerative osseous changes are present. There is an acute minimally displaced anterior fracture of the superior endplate of a lower lumbar vertebral body at the level just above the   pedicle screw. To maintain consistency with previous report numbering, the pedicle screws are designated at the L4-L5 level. Therefore, the fracture would be at L3. The fracture results in approximately 15% anterior vertebral body height loss. Otherwise   no acute fractures identified.    XR THORACIC SPINE 3 VWS   8/23/2017 12:10 PM     INDICATION: Thoracic spine pain. Trauma.  COMPARISON: None.     FINDINGS: Degenerative thoracic paraspinal osteophytes. No fracture seen.

## 2021-06-19 NOTE — LETTER
Letter by Trish Eagle MD at      Author: Trish Eagle MD Service: -- Author Type: --    Filed:  Encounter Date: 3/29/2019 Status: (Other)         [facility] FAMILY MEDICINE/OB  03/29/19    Patient: Anita Duque  YOB: 1955  Medical Record Number: [id number]  CSN: [id number]                                                                              Non-opioid Controlled Substance Agreement    I understand that my care provider has prescribed a controlled substance to help manage my condition(s). I am taking this medicine to help me function or work. I know this is strong medicine, and that it can cause serious side effects. Controlled substances can be sedating, addicting and may cause a dependency on the drug. They can affect my ability to drive or think, and cause depression. They need to be taken exactly as prescribed. Combining controlled substances with certain medicines or chemicals (such as cocaine, sedatives and tranquilizers, sleeping pills, meth) can be dangerous or even fatal. Also, if I stop controlled substances suddenly, I may have severe withdrawal symptoms.  If not helpful, I may be asked to stop them.    The risks, benefits, and side effects of these medicine(s) were explained to me. I agree that:    1. I will take part in other treatments as advised by my care team. This may be psychiatry or counseling, physical therapy, behavioral therapy, group treatment or a referral to a pain clinic. I will reduce or stop my medicine when my care team tells me to do so.  2. I will take my medicines as prescribed. I will not change the dose or schedule unless my care team tells me to. There will be no refills if I run out early.  I may be contactedwithout warning and asked to complete a urine drug test or pill count at any time.   3. I will keep all my appointments, and understand this is part of the monitoring of controlled substances. My care team may require an  office visit for EVERY controlled substance refill. If I miss appointments or dont follow instructions, my care team may stop my medicine.  4. I will not ask other providers to prescribe controlled substances, and I will not accept controlled substances from other people. If I need another prescribed controlled substance for a new reason, I will tell my care team within 1 business day.  5. I will use one pharmacy to fill all of my controlled substance prescriptions, and it is up to me to make sure that I do not run out of my medicines on weekends or holidays. If my care team is willing to refill my controlled substance prescription without a visit, I must request refills only during office hours, refills may take up to 3 days to process, and it may take up to 5 to 7 days for my medicine to be mailed and ready at my pharmacy. Prescriptions will not be mailed anywhere except my pharmacy.    6. I am responsible for my prescriptions. If the medicine/prescription is lost or stolen, it will not be replaced. I also agree not to share controlled substance medicines with anyone.          [facility] FAMILY MEDICINE/OB  03/29/19  Patient:  Anita Duque  YOB: 1955  Medical Record Number: [id number]  CSN: [id number]    7. I agree to not use ANY illegal or recreational drugs. This includes marijuana, cocaine, bath salts or other drugs. I agree not to use alcohol unless my care team says I may. I agree to give urine samples whenever asked. If I dont give a urine sample, the care team may stop my medicine.    8. If I enroll in the [place] Medical Marijuana program, I will tell my care team. I will also sign an agreement to share my medical records with my care team.    9. I will bring in my list of medicines (or my medicine bottles) each time I come to the clinic.   10. I will tell my care team right away if I become pregnant or have a new medical problem treated outside of my regular clinic.  11. I  understand that this medicine can affect my thinking and judgment. It may be unsafe for me to drive, use machinery and do dangerous tasks. I will not do any of these things until I know how the medicine affects me. If my dose changes, I will wait to see how it affects me. I will contact my care team if I have concerns about medicine side effects.    I understand that if I do not follow any of the conditions above, my prescriptions or treatment may be stopped.      I agree that my provider, clinic care team, and pharmacy may work with any city, state or federal law enforcement agency that investigates the misuse, sale, or other diversion of my controlled medicine. I will allow my provider to discuss my care with or share a copy of this agreement with any other treating provider, pharmacy or emergency room where I receive care. I agree to give up (waive) any right of privacy or confidentiality with respect to these consents.   I have read this agreement and have asked questions about anything I did not understand.    ___________________________________________________________________________  Patient signature - Date/Time  -Anita SINGH Sealy                                      ___________________________________________________________________________  Witness signature                                                                    ___________________________________________________________________________  Provider signature- Trish Eagle MD (Betsy)

## 2021-06-19 NOTE — LETTER
Letter by Trish Eagle MD at      Author: Trish Eagle MD Service: -- Author Type: --    Filed:  Encounter Date: 9/9/2019 Status: (Other)         [facility] FAMILY MEDICINE/OB  09/09/19    Patient: Anita Duque  YOB: 1955  Medical Record Number: [id number]                                                                  Opioid / Opioid Plus Controlled Substance Agreement    I understand that my care provider has prescribed an opioid (narcotic) controlled substance to help manage my condition(s). I am taking this medicine to help me function or work. I know this is strong medicine, and that it can cause serious side effects. Opioid medicine can be sedating, addicting and may cause a dependency on the drug. They can affect my ability to drive or think, and cause depression. They need to be taken exactly as prescribed. Combining opioids with certain medicines or chemicals (such as cocaine, sedatives and tranquilizers, sleeping pills, meth) can be dangerous or even fatal. Also, if I stop opioids suddenly, I may have severe withdrawal symptoms. Last, I understand that opioids do not work for all types of pain nor for all patients. If not helpful, I may be asked to stop them.        The risks, benefits, and side effects of these medicine(s) were explained to me. I agree that:    1. I will take part in other treatments as advised by my care team. This may be psychiatry or counseling, physical therapy, behavioral therapy, group treatment or a referral to a pain clinic. I will reduce or stop my medicine when my care team tells me to do so.  2. I will take my medicines as prescribed. I will not change the dose or schedule unless my care team tells me to. There will be no refills if I run out early.  I may be contactedwithout warning and asked to complete a urine drug test or pill count at any time.   3. I will keep all my appointments, and understand this is part of the monitoring  of opioids. My care team may require an office visit for EVERY opioid/controlled substance refill. If I miss appointments or dont follow instructions, my care team may stop my medicine.  4. I will not ask other providers to prescribe controlled substances, and I will not accept controlled substances from other people. If I need another prescribed controlled substance for a new reason, I will tell my care team within 1 business day.  5. I will use one pharmacy to fill all of my controlled substance prescriptions, and it is up to me to make sure that I do not run out of my medicines on weekends or holidays. If my care team is willing to refill my opioid prescription without a visit, I must request refills only during office hours, refills may take up to 3 days to process, and it may take up to 5 to 7 days for my medicine to be mailed and ready at my pharmacy. Prescriptions will not be mailed anywhere except my pharmacy.        [id number]  Rev 12/18         Registration to scan to EHR                             Page 1 of 2               Controlled Substance Agreement Opioid        [facility] FAMILY MEDICINE/OB  09/09/19  Patient: Anita Duque  YOB: 1955  Medical Record Number: [id number]                                                                  6. I am responsible for my prescriptions. If the medicine/prescription is lost or stolen, it will not be replaced. I also agree not to share controlled substance medicines with anyone.  7. I agree to not use ANY illegal or recreational drugs. This includes marijuana, cocaine, bath salts or other drugs. I agree not to use alcohol unless my care team says I may.          I agree to give urine samples whenever asked. If I dont give a urine sample, the care team may stop my medicine.    8. If I enroll in the [place] Medical Marijuana program, I will tell my care team. I will also sign an agreement to share my medical records with my care team.   9. I  will bring in my list of medicines (or my medicine bottles) each time I come to the clinic.   10. I will tell my care team right away if I become pregnant or have a new medical problem treated outside of my regular clinic.  11. I understand that this medicine can affect my thinking and judgment. It may be unsafe for me to drive, use machinery and do dangerous tasks. I will not do any of these things until I know how the medicine affects me. If my dose changes, I will wait to see how it affects me. I will contact my care team if I have concerns about medicine side effects.    I understand that if I do not follow any of the conditions above, my prescriptions or treatment may be stopped.      I agree that my provider, clinic care team, and pharmacy may work with any city, state or federal law enforcement agency that investigates the misuse, sale, or other diversion of my controlled medicine. I will allow my provider to discuss my care with or share a copy of this agreement with any other treating provider, pharmacy or emergency room where I receive care. I agree to give up (waive) any right of privacy or confidentiality with respect to these consents.     I have read this agreement and have asked questions about anything I did not understand.      ________________________________________________________________________  Patient signature - Date/Time -  Anita SINGH Melissa                                      ________________________________________________________________________  Witness signature                                                            ________________________________________________________________________  Provider signature - Trish Eagle MD (Betsy)      [id number]  Rev 12/18         Registration to scan to EHR                         Page 2 of 2                   Controlled Substance Agreement Opioid           Page 1 of 2  Opioid Pain Medicines (also known as Narcotics)  What You Need to  Know    What are opioids?   Opioids are pain medicines that must be prescribed by a doctor.  They are also known as narcotics.    Examples are:     morphine (MS Contin, Shahnaz)    oxycodone (Oxycontin)    oxycodone and acetaminophen (Percocet)    hydrocodone and acetaminophen (Vicodin, Norco)     fentanyl patch (Duragesic)     hydromorphone (Dilaudid)     methadone     What do opioids do well?   Opioids are best for short-term pain after a surgery or injury. They also work well for cancer pain. Unlike other pain medicines, they do not cause liver or kidney failure or ulcers. They may help some people with long-lasting (chronic) pain.     What do opioids NOT do well?   Opioids never get rid of pain entirely, and they do not work well for most patients with chronic pain. Opioids do not reduce swelling, one of the causes of pain. They also dont work well for nerve pain.                           For informational purposes only.  Not to replace the advice of your care provider.  Copyright 201 [facility]. All right reserved. OMG 655540-Bbi 02/18.      Page 2 of 2    Risks and side effects   Talk to your doctor before you start or decide to keep taking one of these medicines. Side effects include:    Lowering your breathing rate enough to cause death    Overdose, including death, especially if taking higher than prescribed doses    Long-term opioid use    Worse depression symptoms; less pleasure in things you usually enjoy    Feeling tired or sluggish    Slower thoughts or cloudy thinking    Being more sensitive to pain over time; pain is harder to control    Trouble sleeping or restless sleep    Changes in hormone levels (for example, less testosterone)    Changes in sex drive or ability to have sex    Constipation    Unsafe driving    Itching and sweating    Feeling dizzy    Nausea, vomiting and dry mouth    What else should I know about opioids?  When someone takes opioids for too long or too  often, they become dependent. This means that if you stop or reduce the medicine too quickly, you will have withdrawal symptoms.    Dependence is not the same as addiction. Addiction is when people keep using a substance that harms their body, their mind or their relations with others. If you have a history of drug or alcohol abuse, taking opioids can cause a relapse.    Over time, opioids dont work as well. Most people will need higher and higher doses. The higher the dose, the more serious the side effects. We dont know the long-term effects of opioids.      Prescribed opioids aren't the best way to manage chronic pain    Other ways to manage pain include:      Ibuprofen or acetaminophen.  You should always try this first.      Treat health problems that may be causing pain.      acupuncture or massage, deep breathing, meditation, visual imagery, aromatherapy.      Use heat or ice at the pain site      Physical therapy and exercise      Stop smoking      See a counselor or therapist                                                  People who have used opioids for a long time may have a lower quality of life, worse depression, higher levels of pain and more visits to doctors.    Never share your opioids with others. Be sure to store opioids in a secure place, locked if possible.Young children can easily swallow them and overdose.     You can overdose on opioids.  Signs of overdose include decrease or loss of consciousness, slowed breathing, trouble waking and blue lips.  If someone is worried about overdose, they should call 911.    If you are at risk for overdose, you may get naloxone (Narcan, a medicine that reverses the effects of opioids.  If you overdose, a friend or family member can give you Narcan while waiting for the ambulance.  They need to know the signs of overdose and how to give Narcan.    While you're taking opioids:    Don't use alcohol or street drugs. Taking them together can cause  death.    Don't take any of these medicines unless your doctor says its okay.  Taking these with opioids can cause death.    Benzodiazepines (such as lorazepam         or diazepam)    Muscle relaxers (such as cyclobenzaprine)    sleeping pills    other opioids    Safe disposal of opioids  Find your area drug take-back program, your pharmacy mail-back program, buy a special disposal bag (such as Deterra) from your pharmacy or flush them down the toilet.  Use the guidelines at:  www.fda.gov/drugs/resourcesforyou

## 2021-06-19 NOTE — LETTER
Letter by Trish Eagle MD at      Author: Trish Eagle MD Service: -- Author Type: --    Filed:  Encounter Date: 12/6/2019 Status: Signed       Anita Duque  [address] [place] [address]    December 6, 2019    Dear Anita    In reviewing your records, we have determined a gap in your preventive services. Based on your age and health history, we recommend the follow service.       ? Diabetic Eye Exam      Please schedule an appointment with your eye doctor and ask them to fax the report to [facility] at [phone].    Thank you for choosing us as your health care provider.    Sincerely,   [facility] Family Medicine/OB  [address] [facility], [address] [place]  [facility] [address]  Phone Number:  [phone]

## 2021-06-19 NOTE — LETTER
Letter by Trish Eagle MD at      Author: Trish Eagle MD Service: -- Author Type: --    Filed:  Encounter Date: 3/29/2019 Status: (Other)         [facility] FAMILY MEDICINE/OB  03/29/19    Patient: Anita Duque  YOB: 1955  Medical Record Number: [id number]                                                                  Opioid / Opioid Plus Controlled Substance Agreement    I understand that my care provider has prescribed an opioid (narcotic) controlled substance to help manage my condition(s). I am taking this medicine to help me function or work. I know this is strong medicine, and that it can cause serious side effects. Opioid medicine can be sedating, addicting and may cause a dependency on the drug. They can affect my ability to drive or think, and cause depression. They need to be taken exactly as prescribed. Combining opioids with certain medicines or chemicals (such as cocaine, sedatives and tranquilizers, sleeping pills, meth) can be dangerous or even fatal. Also, if I stop opioids suddenly, I may have severe withdrawal symptoms. Last, I understand that opioids do not work for all types of pain nor for all patients. If not helpful, I may be asked to stop them.        The risks, benefits, and side effects of these medicine(s) were explained to me. I agree that:    1. I will take part in other treatments as advised by my care team. This may be psychiatry or counseling, physical therapy, behavioral therapy, group treatment or a referral to a pain clinic. I will reduce or stop my medicine when my care team tells me to do so.  2. I will take my medicines as prescribed. I will not change the dose or schedule unless my care team tells me to. There will be no refills if I run out early.  I may be contactedwithout warning and asked to complete a urine drug test or pill count at any time.   3. I will keep all my appointments, and understand this is part of the  monitoring of opioids. My care team may require an office visit for EVERY opioid/controlled substance refill. If I miss appointments or dont follow instructions, my care team may stop my medicine.  4. I will not ask other providers to prescribe controlled substances, and I will not accept controlled substances from other people. If I need another prescribed controlled substance for a new reason, I will tell my care team within 1 business day.  5. I will use one pharmacy to fill all of my controlled substance prescriptions, and it is up to me to make sure that I do not run out of my medicines on weekends or holidays. If my care team is willing to refill my opioid prescription without a visit, I must request refills only during office hours, refills may take up to 3 days to process, and it may take up to 5 to 7 days for my medicine to be mailed and ready at my pharmacy. Prescriptions will not be mailed anywhere except my pharmacy.        [id number]  Rev 12/18         Registration to scan to EHR                             Page 1 of 2               Controlled Substance Agreement Opioid        [facility] FAMILY MEDICINE/OB  03/29/19  Patient: Anita Duque  YOB: 1955  Medical Record Number: [id number]                                                                  6. I am responsible for my prescriptions. If the medicine/prescription is lost or stolen, it will not be replaced. I also agree not to share controlled substance medicines with anyone.  7. I agree to not use ANY illegal or recreational drugs. This includes marijuana, cocaine, bath salts or other drugs. I agree not to use alcohol unless my care team says I may.          I agree to give urine samples whenever asked. If I dont give a urine sample, the care team may stop my medicine.    8. If I enroll in the [place] Medical Marijuana program, I will tell my care team. I will also sign an agreement to share my medical records with my care  team.   9. I will bring in my list of medicines (or my medicine bottles) each time I come to the clinic.   10. I will tell my care team right away if I become pregnant or have a new medical problem treated outside of my regular clinic.  11. I understand that this medicine can affect my thinking and judgment. It may be unsafe for me to drive, use machinery and do dangerous tasks. I will not do any of these things until I know how the medicine affects me. If my dose changes, I will wait to see how it affects me. I will contact my care team if I have concerns about medicine side effects.    I understand that if I do not follow any of the conditions above, my prescriptions or treatment may be stopped.      I agree that my provider, clinic care team, and pharmacy may work with any city, state or federal law enforcement agency that investigates the misuse, sale, or other diversion of my controlled medicine. I will allow my provider to discuss my care with or share a copy of this agreement with any other treating provider, pharmacy or emergency room where I receive care. I agree to give up (waive) any right of privacy or confidentiality with respect to these consents.     I have read this agreement and have asked questions about anything I did not understand.      ________________________________________________________________________  Patient signature - Date/Time -  Anita Duque                                      ________________________________________________________________________  Witness signature                                                            ________________________________________________________________________  Provider signature - Trish Eagle MD (Betsy)      [id number]  Rev 12/18         Registration to scan to EHR                         Page 2 of 2                   Controlled Substance Agreement Opioid           Page 1 of 2  Opioid Pain Medicines (also known as Narcotics)  What You  Need to Know    What are opioids?   Opioids are pain medicines that must be prescribed by a doctor.  They are also known as narcotics.    Examples are:     morphine (MS Contin, Shahnaz)    oxycodone (Oxycontin)    oxycodone and acetaminophen (Percocet)    hydrocodone and acetaminophen (Vicodin, Norco)     fentanyl patch (Duragesic)     hydromorphone (Dilaudid)     methadone     What do opioids do well?   Opioids are best for short-term pain after a surgery or injury. They also work well for cancer pain. Unlike other pain medicines, they do not cause liver or kidney failure or ulcers. They may help some people with long-lasting (chronic) pain.     What do opioids NOT do well?   Opioids never get rid of pain entirely, and they do not work well for most patients with chronic pain. Opioids do not reduce swelling, one of the causes of pain. They also dont work well for nerve pain.                           For informational purposes only.  Not to replace the advice of your care provider.  Copyright [facility]. All right reserved. rPath [id number] 02/18.      Page 2 of 2    Risks and side effects   Talk to your doctor before you start or decide to keep taking one of these medicines. Side effects include:    Lowering your breathing rate enough to cause death    Overdose, including death, especially if taking higher than prescribed doses    Long-term opioid use    Worse depression symptoms; less pleasure in things you usually enjoy    Feeling tired or sluggish    Slower thoughts or cloudy thinking    Being more sensitive to pain over time; pain is harder to control    Trouble sleeping or restless sleep    Changes in hormone levels (for example, less testosterone)    Changes in sex drive or ability to have sex    Constipation    Unsafe driving    Itching and sweating    Feeling dizzy    Nausea, vomiting and dry mouth    What else should I know about opioids?  When someone takes opioids for too long or  too often, they become dependent. This means that if you stop or reduce the medicine too quickly, you will have withdrawal symptoms.    Dependence is not the same as addiction. Addiction is when people keep using a substance that harms their body, their mind or their relations with others. If you have a history of drug or alcohol abuse, taking opioids can cause a relapse.    Over time, opioids dont work as well. Most people will need higher and higher doses. The higher the dose, the more serious the side effects. We dont know the long-term effects of opioids.      Prescribed opioids aren't the best way to manage chronic pain    Other ways to manage pain include:      Ibuprofen or acetaminophen.  You should always try this first.      Treat health problems that may be causing pain.      acupuncture or massage, deep breathing, meditation, visual imagery, aromatherapy.      Use heat or ice at the pain site      Physical therapy and exercise      Stop smoking      See a counselor or therapist                                                  People who have used opioids for a long time may have a lower quality of life, worse depression, higher levels of pain and more visits to doctors.    Never share your opioids with others. Be sure to store opioids in a secure place, locked if possible.Young children can easily swallow them and overdose.     You can overdose on opioids.  Signs of overdose include decrease or loss of consciousness, slowed breathing, trouble waking and blue lips.  If someone is worried about overdose, they should call 911.    If you are at risk for overdose, you may get naloxone (Narcan, a medicine that reverses the effects of opioids.  If you overdose, a friend or family member can give you Narcan while waiting for the ambulance.  They need to know the signs of overdose and how to give Narcan.    While you're taking opioids:    Don't use alcohol or street drugs. Taking them together can cause  death.    Don't take any of these medicines unless your doctor says its okay.  Taking these with opioids can cause death.    Benzodiazepines (such as lorazepam         or diazepam)    Muscle relaxers (such as cyclobenzaprine)    sleeping pills    other opioids    Safe disposal of opioids  Find your area drug take-back program, your pharmacy mail-back program, buy a special disposal bag (such as Deterra) from your pharmacy or flush them down the toilet.  Use the guidelines at:  www.fda.gov/drugs/resourcesforyou

## 2021-06-19 NOTE — PROGRESS NOTES
ASSESSMENT/PLAN:       1. Cellulitis, toe    - cephalexin (KEFLEX) 500 MG capsule; Take 1 capsule (500 mg total) by mouth 4 (four) times a day for 10 days.  Dispense: 40 capsule; Refill: 0  -Discussed with the patient precautions and what would prompt the need to be seen again urgently.  If it is getting worse she may need to have an IM injection of antibiotics or possibly might even need IV antibiotics.  -I discussed wound care with her and suggested that she soak her foot in warm water and an antibacterial soap nightly.    2. Morbid obesity (H)  Follow-up with primary care provider    3. Tick bite, initial encounter  From the history this certainly did not sound like a deer tick rather a wood tick as it was about the size of a pea      Asher Currie MD      PROGRESS NOTE   7/26/2018    SUBJECTIVE:  Anita Duque is a 63 y.o. female  who presents for   Chief Complaint   Patient presents with     Tick Removal     Pt removed a tick on her toe on Sunday and states she thinks there may still be the head of the tick embedded in her toe.      #1 tick bite left foot  5 days ago the patient noticed what looked like a swelling on the undersurface of her left foot second toe and she tried to remove it and it came off with some difficulty and as she removed it and it was on the floor and started crawling.  It was a fairly large tic and over the last few days she has noticed some redness swelling more pain in the toe and she is concerned that some of the parts of the tick might still be in her skin.  She has not had any fever.  She has diabetes and states that her blood sugars have been fairly well controlled.    Patient Active Problem List   Diagnosis     Essential hypertension     Joint Pain, Localized In The Shoulder     Fibromyalgia     Type 2 diabetes mellitus (H)     Hyperlipidemia     Major Depression, Recurrent     Chronic Constipation     Cellulitis     Sciatica     Abdominal Pain     Chronic pain syndrome      Lower Back Pain     Vaginal Wall Prolapse With Midline Cystocele     Female Stress Incontinence     Lump Or Mass In Breast     Osteoarthrosis, unspecified whether generalized or localized, lower leg     JANINE on CPAP     Mild persistent asthma     Obesity (BMI 30.0-34.9)     Pain management contract signed     Degenerative disc disease, cervical     Degenerative disc disease, lumbar     Morbid obesity (H)       Current Outpatient Prescriptions   Medication Sig Dispense Refill     acetaminophen (TYLENOL) 650 MG CR tablet Take 2 tablets (1,300 mg total) by mouth every 8 (eight) hours as needed for pain. 90 tablet 1     cyclobenzaprine (FLEXERIL) 10 MG tablet Take 1 tablet (10 mg total) by mouth 2 (two) times a day as needed for muscle spasms. 20 tablet 0     dapagliflozin (FARXIGA) 10 mg Tab Take 1 tablet by mouth daily. 90 tablet 0     diclofenac sodium (VOLTAREN) 1 % Gel Apply topically twice daily as needed for arthritis 3 Tube 3     docusate sodium (COLACE) 100 MG capsule Take by mouth.       DULoxetine (CYMBALTA) 60 MG capsule Take 1 capsule (60 mg total) by mouth daily. 90 capsule 1     ergocalciferol (ERGOCALCIFEROL) 50,000 unit capsule Take 1 capsule (50,000 Units total) by mouth once a week. 12 capsule 3     estrogens, conjugated,-methylTESTOSTERone (ESTRATEST) 1.25-2.5 mg per tablet Take 1 tablet by mouth daily.       fluticasone (FLONASE) 50 mcg/actuation nasal spray 1 spray each nostril twice daily 1 g 1     gabapentin (NEURONTIN) 300 MG capsule Take 2 capsules (600 mg total) by mouth 4 (four) times a day. 240 capsule 5     glipiZIDE (GLUCOTROL) 5 MG 24 hr tablet 10 mg daily.        ibuprofen (ADVIL,MOTRIN) 800 MG tablet TAKE ONE TABLET BY MOUTH THREE TIMES DAILY AS NEEDED 100 tablet 2     INVOKANA 300 mg Tab        ketoconazole (NIZORAL) 2 % shampoo        lisinopril (PRINIVIL,ZESTRIL) 2.5 MG tablet TAKE ONE TABLET BY MOUTH ONCE DAILY 90 tablet 0     lovastatin (MEVACOR) 40 MG tablet Take 1 tablet (40 mg  total) by mouth at bedtime. 90 tablet 1     metFORMIN (GLUCOPHAGE-XR) 500 MG 24 hr tablet 500 mg 4 (four) times a day.        mometasone-formoterol (DULERA) 200-5 mcg/actuation HFAA inhaler Inhale 2 puffs 2 (two) times a day. 30 Inhaler 1     nystatin-triamcinolone (MYCOLOG II) cream Apply small amount to affected 2-3 times daily as needed for the itching. 30 g 0     omeprazole (PRILOSEC) 40 MG capsule 40 mg.        orlistat (XENICAL) 120 MG capsule 1 capsule three times daily       oxyCODONE-acetaminophen (PERCOCET) 5-325 mg per tablet Take 1 tablet by mouth every 6 (six) hours as needed for pain. 90 tablet 0     phentermine (ADIPEX-P) 37.5 mg tablet Take 1 tablet (37.5 mg total) by mouth daily before breakfast. 90 tablet 0     polyethylene glycol (GLYCOLAX) 17 gram/dose powder        pramipexole (MIRAPEX) 0.75 MG tablet 0.75 mg.        spironolactone (ALDACTONE) 100 MG tablet        topiramate (TOPAMAX) 50 MG tablet        traZODone (DESYREL) 100 MG tablet        triamcinolone (KENALOG) 0.025 % ointment Apply sparingly in ear canal 1-2 times daily as needed 15 g 0     VESICARE 5 mg tablet        cephalexin (KEFLEX) 500 MG capsule Take 1 capsule (500 mg total) by mouth 4 (four) times a day for 10 days. 40 capsule 0     No current facility-administered medications for this visit.        History   Smoking Status     Never Smoker   Smokeless Tobacco     Never Used           OBJECTIVE:        No results found for this or any previous visit (from the past 240 hour(s)).    Vitals:    07/26/18 1003   BP: 102/62   Patient Site: Right Arm   Patient Position: Sitting   Cuff Size: Adult Regular   Pulse: 72   Temp: 97.8  F (36.6  C)     No Data Recorded        Physical Exam:  GENERAL APPEARANCE: Very pleasant 63-year-old female, NAD, well hydrated, well nourished  SKIN:  Normal skin turgor, inspection of the left foot second toe reveals some redness and swelling both on the plantar surface and the dorsal surface of the foot  with about a 3 cm area of lymphangitis.  EXTREMITY: Procedure note: On the plantar surface of the left foot second toe there is a small dark spot at the proximal crease of the skin and there looks like there may be a small collection of pus under the skin.  This area was cleansed and anesthetized with 1% Xylocaine and was incised with a 11 blade I unroofed the skin and there was a small foreign body present that was removed but very little pus drainage.  The area was irrigated and covered with a bandage.  NEURO: no focal findings

## 2021-06-20 NOTE — LETTER
"Letter by Trish Eagle MD at      Author: Trish Eagle MD Service: -- Author Type: --    Filed:  Encounter Date: 10/9/2020 Status: (Other)         Anita Duque  [address] [facility] [address]             October 9, 2020         Dear Ms. Duque,    Below are the results from your recent visit:    Resulted Orders   Comprehensive Metabolic Panel   Result Value Ref Range    Sodium 140 136 - 145 mmol/L    Potassium 4.2 3.5 - 5.0 mmol/L    Chloride 105 98 - 107 mmol/L    CO2 23 22 - 31 mmol/L    Anion Gap, Calculation 12 5 - 18 mmol/L    Glucose 130 (H) 70 - 125 mg/dL    BUN 20 8 - 22 mg/dL    Creatinine 0.93 0.60 - 1.10 mg/dL    GFR MDRD Af Amer >60 >60 mL/min/1.73m2    GFR MDRD Non Af Amer 60 (L) >60 mL/min/1.73m2    Bilirubin, Total 0.6 0.0 - 1.0 mg/dL    Calcium 9.3 8.5 - 10.5 mg/dL    Protein, Total 6.8 6.0 - 8.0 g/dL    Albumin 4.0 3.5 - 5.0 g/dL    Alkaline Phosphatase 105 45 - 120 U/L    AST 20 0 - 40 U/L    ALT 20 0 - 45 U/L    Narrative    Fasting Glucose reference range is 70-99 mg/dL per  American Diabetes Association (ADA) guidelines.   Glycosylated Hemoglobin A1c   Result Value Ref Range    Hemoglobin A1c 7.1 (H) <=5.6 %      Comment:      Normal <5.7% Prediabete 5.7-6.4% Diabletes 6.5% or higher - adopted from ADA consensus guidelines   Microalbumin, Random Urine   Result Value Ref Range    Microalbumin, Random Urine <0.50 0.00 - 1.99 mg/dL    Creatinine, Urine 64.6 mg/dL    Microalbumin/Creatinine Ratio Random Urine        Comment:      \"Unable to calculate: Creatinine and/or Microalbumin value below detectable level\"    Narrative    Microalbumin, Random Urine  <2.0 mg/dL . . . . . . . . Normal  3.0-30.0 mg/dL . . . . . . Microalbuminuria  >30.0 mg/dL . . . . . .  . Clinical Proteinuria    Microalbumin/Creatinine Ratio, Random Urine  <20 mg/g . . . . .. . . . Normal   mg/g . . . . . . . Microalbuminuria  >300 mg/g . . . . . . . . Clinical Proteinuria     "   Vitamin D, Total (25-Hydroxy)   Result Value Ref Range    Vitamin D, Total (25-Hydroxy) 25.9 (L) 30.0 - 80.0 ng/mL    Narrative    Deficiency <10.0 ng/mL  Insufficiency 10.0-29.9 ng/mL  Sufficiency 30.0-80.0 ng/mL  Toxicity (possible) >100.0 ng/mL           Javier Howard,    I am not sure that you are watching your MakeSpace results.  Thought you might like a copy of these results either way.    Please call with questions or contact us using Make[employer].    Sincerely,        Electronically signed by Trish Eagle MD (Betsy)

## 2021-06-20 NOTE — LETTER
Letter by Trish Eagle MD at      Author: Trish Eagle MD Service: -- Author Type: --    Filed:  Encounter Date: 10/8/2020 Status: (Other)         [facility]  10/08/20    Patient: Anita Duque  YOB: 1955  Medical Record Number: [id number]                                                                  Opioid / Opioid Plus Controlled Substance Agreement    I understand that my care provider has prescribed an opioid (narcotic) controlled substance to help manage my condition(s). I am taking this medicine to help me function or work. I know this is strong medicine, and that it can cause serious side effects. Opioid medicine can be sedating, addicting and may cause a dependency on the drug. They can affect my ability to drive or think, and cause depression. They need to be taken exactly as prescribed. Combining opioids with certain medicines or chemicals (such as cocaine, sedatives and tranquilizers, sleeping pills, meth) can be dangerous or even fatal. Also, if I stop opioids suddenly, I may have severe withdrawal symptoms. Last, I understand that opioids do not work for all types of pain nor for all patients. If not helpful, I may be asked to stop them.        The risks, benefits, and side effects of these medicine(s) were explained to me. I agree that:    1. I will take part in other treatments as advised by my care team. This may be psychiatry or counseling, physical therapy, behavioral therapy, group treatment or a referral to a pain clinic. I will reduce or stop my medicine when my care team tells me to do so.  2. I will take my medicines as prescribed. I will not change the dose or schedule unless my care team tells me to. There will be no refills if I run out early.  I may be contactedwithout warning and asked to complete a urine drug test or pill count at any time.   3. I will keep all my appointments, and understand this is part of the monitoring  of opioids. My care team may require an office visit for EVERY opioid/controlled substance refill. If I miss appointments or dont follow instructions, my care team may stop my medicine.  4. I will not ask other providers to prescribe controlled substances, and I will not accept controlled substances from other people. If I need another prescribed controlled substance for a new reason, I will tell my care team within 1 business day.  5. I will use one pharmacy to fill all of my controlled substance prescriptions, and it is up to me to make sure that I do not run out of my medicines on weekends or holidays. If my care team is willing to refill my opioid prescription without a visit, I must request refills only during office hours, refills may take up to 3 days to process, and it may take up to 5 to 7 days for my medicine to be mailed and ready at my pharmacy. Prescriptions will not be mailed anywhere except my pharmacy.        [id number]  Rev 12/18         Registration to scan to EHR                             Page 1 of 2               Controlled Substance Agreement [facility]  10/08/20  Patient: Anita Duque  YOB: 1955  Medical Record Number: [id number]                                                                  6. I am responsible for my prescriptions. If the medicine/prescription is lost or stolen, it will not be replaced. I also agree not to share controlled substance medicines with anyone.  7. I agree to not use ANY illegal or recreational drugs. This includes marijuana, cocaine, bath salts or other drugs. I agree not to use alcohol unless my care team says I may.          I agree to give urine samples whenever asked. If I dont give a urine sample, the care team may stop my medicine.    8. If I enroll in the [place] Medical Marijuana program, I will tell my care team. I will also sign an agreement to share my medical records with my care team.   9. I  will bring in my list of medicines (or my medicine bottles) each time I come to the clinic.   10. I will tell my care team right away if I become pregnant or have a new medical problem treated outside of my regular clinic.  11. I understand that this medicine can affect my thinking and judgment. It may be unsafe for me to drive, use machinery and do dangerous tasks. I will not do any of these things until I know how the medicine affects me. If my dose changes, I will wait to see how it affects me. I will contact my care team if I have concerns about medicine side effects.    I understand that if I do not follow any of the conditions above, my prescriptions or treatment may be stopped.      I agree that my provider, clinic care team, and pharmacy may work with any city, state or federal law enforcement agency that investigates the misuse, sale, or other diversion of my controlled medicine. I will allow my provider to discuss my care with or share a copy of this agreement with any other treating provider, pharmacy or emergency room where I receive care. I agree to give up (waive) any right of privacy or confidentiality with respect to these consents.     I have read this agreement and have asked questions about anything I did not understand.      ________________________________________________________________________  Patient signature - Date/Time -  Anita SINGH [place]                                      ________________________________________________________________________  Witness signature                                                            ________________________________________________________________________  Provider signature - Trish Eagle MD (Betsy)      [id number]  Rev 12/18         Registration to scan to EHR                         Page 2 of 2                   Controlled Substance Agreement Opioid           Page 1 of 2  Opioid Pain Medicines (also known as Narcotics)  What You Need to  Know    What are opioids?   Opioids are pain medicines that must be prescribed by a doctor.  They are also known as narcotics.    Examples are:     morphine (MS Contin, Shahnaz)    oxycodone (Oxycontin)    oxycodone and acetaminophen (Percocet)    hydrocodone and acetaminophen (Vicodin, Norco)     fentanyl patch (Duragesic)     hydromorphone (Dilaudid)     methadone     What do opioids do well?   Opioids are best for short-term pain after a surgery or injury. They also work well for cancer pain. Unlike other pain medicines, they do not cause liver or kidney failure or ulcers. They may help some people with long-lasting (chronic) pain.     What do opioids NOT do well?   Opioids never get rid of pain entirely, and they do not work well for most patients with chronic pain. Opioids do not reduce swelling, one of the causes of pain. They also dont work well for nerve pain.                           For informational purposes only.  Not to replace the advice of your care provider.  Copyright 201 [facility]. All right reserved. [employer] 416576-Ibt 02/18.      Page 2 of 2    Risks and side effects   Talk to your doctor before you start or decide to keep taking one of these medicines. Side effects include:    Lowering your breathing rate enough to cause death    Overdose, including death, especially if taking higher than prescribed doses    Long-term opioid use    Worse depression symptoms; less pleasure in things you usually enjoy    Feeling tired or sluggish    Slower thoughts or cloudy thinking    Being more sensitive to pain over time; pain is harder to control    Trouble sleeping or restless sleep    Changes in hormone levels (for example, less testosterone)    Changes in sex drive or ability to have sex    Constipation    Unsafe driving    Itching and sweating    Feeling dizzy    Nausea, vomiting and dry mouth    What else should I know about opioids?  When someone takes opioids for too long or too  often, they become dependent. This means that if you stop or reduce the medicine too quickly, you will have withdrawal symptoms.    Dependence is not the same as addiction. Addiction is when people keep using a substance that harms their body, their mind or their relations with others. If you have a history of drug or alcohol abuse, taking opioids can cause a relapse.    Over time, opioids dont work as well. Most people will need higher and higher doses. The higher the dose, the more serious the side effects. We dont know the long-term effects of opioids.      Prescribed opioids aren't the best way to manage chronic pain    Other ways to manage pain include:      Ibuprofen or acetaminophen.  You should always try this first.      Treat health problems that may be causing pain.      acupuncture or massage, deep breathing, meditation, visual imagery, aromatherapy.      Use heat or ice at the pain site      Physical therapy and exercise      Stop smoking      See a counselor or therapist                                                  People who have used opioids for a long time may have a lower quality of life, worse depression, higher levels of pain and more visits to doctors.    Never share your opioids with others. Be sure to store opioids in a secure place, locked if possible.Young children can easily swallow them and overdose.     You can overdose on opioids.  Signs of overdose include decrease or loss of consciousness, slowed breathing, trouble waking and blue lips.  If someone is worried about overdose, they should call 911.    If you are at risk for overdose, you may get naloxone (Narcan, a medicine that reverses the effects of opioids.  If you overdose, a friend or family member can give you Narcan while waiting for the ambulance.  They need to know the signs of overdose and how to give Narcan.    While you're taking opioids:    Don't use alcohol or street drugs. Taking them together can cause  death.    Don't take any of these medicines unless your doctor says its okay.  Taking these with opioids can cause death.    Benzodiazepines (such as lorazepam         or diazepam)    Muscle relaxers (such as cyclobenzaprine)    sleeping pills    other opioids    Safe disposal of opioids  Find your area drug take-back program, your pharmacy mail-back program, buy a special disposal bag (such as Deterra) from your pharmacy or flush them down the toilet.  Use the guidelines at:  www.fda.gov/drugs/resourcesforyou

## 2021-06-20 NOTE — LETTER
Letter by Trish Eagle MD at      Author: Trish Eagle MD Service: -- Author Type: --    Filed:  Encounter Date: 3/13/2020 Status: (Other)       My Asthma Action Plan     Name: Anita Duque   YOB: 1955  Date: 3/13/2020   My doctor: Trish Eagle MD (Betsy)   My clinic: [facility]        My Rescue Medicine:   Albuterol (Proair/Ventolin/Proventil HFA) 2-4 puffs EVERY 4 HOURS as needed. Use a spacer if recommended by your provider.   My Asthma Severity:   Intermittent/Exercise Induced  Know your asthma triggers: smoke, upper respiratory infections and humidity             GREEN ZONE   Good Control    I feel good    No cough or wheeze    Can work, sleep and play without asthma symptoms     Take your asthma control medicine every day.     1. If exercise triggers your asthma, take your rescue medication    15 minutes before exercise or sports, and    During exercise if you have asthma symptoms  2. Spacer to use with inhaler: If you have a spacer, make sure to use it with your inhaler             YELLOW ZONE Getting Worse  I have ANY of these:    I do not feel good    Cough or wheeze    Chest feels tight    Wake up at night 1. Keep taking your Green Zone medications  2. Start taking your rescue medicine:    every 20 minutes for up to 1 hour. Then every 4 hours for 24-48 hours.  3. If you stay in the Yellow Zone for more than 12-24 hours, contact your doctor.  4. If you do not return to the Green Zone in 12-24 hours or you get worse, start taking your oral steroid medicine if prescribed by your provider.           RED ZONE Medical Alert - Get Help  I have ANY of these:    I feel awful    Medicine is not helping    Breathing getting harder    Trouble walking or talking    Nose opens wide to breathe     1. Take your rescue medicine NOW  2. If your provider has prescribed an oral steroid medicine, start taking it NOW  3. Call your doctor NOW  4. If  you are still in the Red Zone after 20 minutes and you have not reached your doctor:    Take your rescue medicine again and    Call 911 or go to the emergency room right away    See your regular doctor within 2 weeks of an Emergency Room or Urgent Care visit for follow-up treatment.          Annual Reminders:  Meet with Asthma Educator,  Flu Shot in the Fall, consider Pneumonia Vaccination for patients with asthma (aged 19 and older).    Pharmacy:   [facility] [address] [place] [address], [place] - [address] [address]  Phone: [phone] Fax: [phone]    [facility] - [place], [place] - [address]  [address]  [address]  [place] [address]  Phone: [phone] Fax: [phone]      Electronically signed by Trish Eagle MD (Betsy)   Date: 03/13/20                      Asthma Triggers  How To Control Things That Make Your Asthma Worse    Triggers are things that make your asthma worse.  Look at the list below to help you find your triggers and what you can do about them.  You can help prevent asthma flare-ups by staying away from your triggers.      Trigger                                                          What you can do   Cigarette Smoke  Tobacco smoke can make asthma worse. Do not allow smoking in your home, car or around you.  Be sure no one smokes at a jeff day care or school.  If you smoke, ask your health care provider for ways to help you quit.  Ask family members to quit too.  Ask your health care provider for a referral to Quit Plan to help you quit smoking, or call 8-095-789-PLAN.     Colds, Flu, Bronchitis  These are common triggers of asthma. Wash your hands often.  Dont touch your eyes, nose or mouth.  Get a flu shot every year.     Dust Mites  These are tiny bugs that live in cloth or carpet. They are too small to see. Wash sheets and blankets in hot water every week.   Encase pillows and mattress in dust  mite proof covers.  Avoid having carpet if you can. If you have carpet, vacuum weekly.   Use a dust mask and HEPA vacuum.   Pollen and Outdoor Mold  Some people are allergic to trees, grass, or weed pollen, or molds. Try to keep your windows closed.  Limit time out doors when pollen count is high.   Ask you health care provider about taking medicine during allergy season.     Animal Dander  Some people are allergic to skin flakes, urine or saliva from pets with fur or feathers. Keep pets with fur or feathers out of your home.    If you cant keep the pet outdoors, then keep the pet out of your bedroom.  Keep the bedroom door closed.  Keep pets off cloth furniture and away from stuffed toys.     Mice, Rats, and Cockroaches  Some people are allergic to the waste from these pests.   Cover food and garbage.  Clean up spills and food crumbs.  Store grease in the refrigerator.   Keep food out of the bedroom.   Indoor Mold  This can be a trigger if your home has high moisture. Fix leaking faucets, pipes, or other sources of water.   Clean moldy surfaces.  Dehumidify basement if it is damp and smelly.   Smoke, Strong Odors, and Sprays  These can reduce air quality. Stay away from strong odors and sprays, such as perfume, powder, hair spray, paints, smoke incense, paint, cleaning products, candles and new carpet.   Exercise or Sports  Some people with asthma have this trigger. Be active!  Ask your doctor about taking medicine before sports or exercise to prevent symptoms.    Warm up for 5-10 minutes before and after sports or exercise.     Other Triggers of Asthma  Cold air:  Cover your nose and mouth with a scarf.  Sometimes laughing or crying can be a trigger.  Some medicines and food can trigger asthma.

## 2021-06-20 NOTE — LETTER
Letter by Trish Eagle MD at      Author: Trish Eagle MD Service: -- Author Type: --    Filed:  Encounter Date: 3/13/2020 Status: (Other)         [facility]  03/13/20    Patient: Anita Duque  YOB: 1955  Medical Record Number: [id number]  CSN: [id number]                                                                              Non-opioid Controlled Substance Agreement    I understand that my care provider has prescribed a controlled substance to help manage my condition(s). I am taking this medicine to help me function or work. I know this is strong medicine, and that it can cause serious side effects. Controlled substances can be sedating, addicting and may cause a dependency on the drug. They can affect my ability to drive or think, and cause depression. They need to be taken exactly as prescribed. Combining controlled substances with certain medicines or chemicals (such as cocaine, sedatives and tranquilizers, sleeping pills, meth) can be dangerous or even fatal. Also, if I stop controlled substances suddenly, I may have severe withdrawal symptoms.  If not helpful, I may be asked to stop them.    The risks, benefits, and side effects of these medicine(s) were explained to me. I agree that:    1. I will take part in other treatments as advised by my care team. This may be psychiatry or counseling, physical therapy, behavioral therapy, group treatment or a referral to a pain clinic. I will reduce or stop my medicine when my care team tells me to do so.  2. I will take my medicines as prescribed. I will not change the dose or schedule unless my care team tells me to. There will be no refills if I run out early.  I may be contactedwithout warning and asked to complete a urine drug test or pill count at any time.   3. I will keep all my appointments, and understand this is part of the monitoring of controlled substances. My care team may require an  office visit for EVERY controlled substance refill. If I miss appointments or dont follow instructions, my care team may stop my medicine.  4. I will not ask other providers to prescribe controlled substances, and I will not accept controlled substances from other people. If I need another prescribed controlled substance for a new reason, I will tell my care team within 1 business day.  5. I will use one pharmacy to fill all of my controlled substance prescriptions, and it is up to me to make sure that I do not run out of my medicines on weekends or holidays. If my care team is willing to refill my controlled substance prescription without a visit, I must request refills only during office hours, refills may take up to 3 days to process, and it may take up to 5 to 7 days for my medicine to be mailed and ready at my pharmacy. Prescriptions will not be mailed anywhere except my pharmacy.    6. I am responsible for my prescriptions. If the medicine/prescription is lost or stolen, it will not be replaced. I also agree not to share controlled substance medicines with anyone.          [facility]  03/13/20  Patient:  Anita Duque  YOB: 1955  Medical Record Number: [id number]  CSN: [id number]    7. I agree to not use ANY illegal or recreational drugs. This includes marijuana, cocaine, bath salts or other drugs. I agree not to use alcohol unless my care team says I may. I agree to give urine samples whenever asked. If I dont give a urine sample, the care team may stop my medicine.    8. If I enroll in the [place] Medical Marijuana program, I will tell my care team. I will also sign an agreement to share my medical records with my care team.    9. I will bring in my list of medicines (or my medicine bottles) each time I come to the clinic.   10. I will tell my care team right away if I become pregnant or have a new medical problem treated outside of my regular clinic.  11. I  understand that this medicine can affect my thinking and judgment. It may be unsafe for me to drive, use machinery and do dangerous tasks. I will not do any of these things until I know how the medicine affects me. If my dose changes, I will wait to see how it affects me. I will contact my care team if I have concerns about medicine side effects.    I understand that if I do not follow any of the conditions above, my prescriptions or treatment may be stopped.      I agree that my provider, clinic care team, and pharmacy may work with any city, state or federal law enforcement agency that investigates the misuse, sale, or other diversion of my controlled medicine. I will allow my provider to discuss my care with or share a copy of this agreement with any other treating provider, pharmacy or emergency room where I receive care. I agree to give up (waive) any right of privacy or confidentiality with respect to these consents.   I have read this agreement and have asked questions about anything I did not understand.    ___________________________________________________________________________  Patient signature - Date/Time  -Anita SINGH [place]                                      ___________________________________________________________________________  Witness signature                                                                    ___________________________________________________________________________  Provider signature- Trish Eagle MD (Betsy)

## 2021-06-21 NOTE — PROGRESS NOTES
SUBJECTIVE: Anita Duque is a 63 y.o. White or  female who presents today for her 4-month diabetes check.  She is a relatively complex patient.  She does have type 2 diabetes is well controlled.  When I last saw her in June her A1c was 6.7%.  She has hyperlipidemia with her last LDL being 103.  She has mild persistent asthma she was given an ACT and scored a 24.  She has chronic depression and anxiety stemming from her chronic pain mostly.  She was given a PHQ 9 and scored 7 with fatigue being 1 of the major points.  She scored 2 on a KINDRA 7.  Her last vitamin D was in the deficient range of 18.5.  She believes she is using high-dose over-the-counter vitamin D.  We discuss that she may not be absorbing it well.  We discussed trying to take the vitamin D under the tongue instead.  She has been to [facility] but we have not received anything from that and so I asked her to fill out a  release of information.  She is near due on her colonoscopy.  It was for 10 years and was last done January 2009.  We discuss Cologuard testing is a good alternative.  She refuses the flu vaccine but is willing to do the pneumococcal 23 today.    OBJECTIVE: Pulse 89  Wt 214 lb (97.1 kg)  SpO2 97%  BMI 37.02 kg/m2  General: Obese older female in no acute distress, looking fatigued  Heart: Regular rate and rhythm without murmur  Lungs: Clear bilaterally  Abdomen: Soft, nontender  Extremities: Warm, dry and without edema    ASSESSMENT & PLAN:    1. Type 2 diabetes mellitus (H)  Glycosylated Hemoglobin A1c    metFORMIN (GLUCOPHAGE-XR) 500 MG 24 hr tablet   2. Essential hypertension  Comprehensive Metabolic Panel   3. Hyperlipidemia, unspecified hyperlipidemia type  Lipid Cascade FASTING   4. Chronic pain syndrome  oxyCODONE-acetaminophen (PERCOCET/ENDOCET) 5-325 mg per tablet   5. Mild persistent asthma without complication     6. OAB (overactive bladder)  solifenacin (VESICARE) 5 MG tablet   7. Vitamin D  deficiency  Vitamin D, Total (25-Hydroxy)   8. Screen for colon cancer  Ambulatory referral for Colonoscopy    CANCELED: Ambulatory referral for Colonoscopy   9. Immunization due  Pneumococcal polysaccharide vaccine 23-valent 3 yo or older, subq/IM     I will check the above-mentioned lab work and get back to her by my chart.  I will refill the above-mentioned medications.  I am giving her a referral for Cologuard testing.  She was given the pneumococcal 23 vaccine today.  She has an active controlled substance agreement.  She can follow-up in 4 months time.    Patient Active Problem List   Diagnosis     Essential hypertension     Joint Pain, Localized In The Shoulder     Fibromyalgia     Type 2 diabetes mellitus (H)     Hyperlipidemia     Major Depression, Recurrent     Chronic Constipation     Cellulitis     Sciatica     Abdominal Pain     Chronic pain syndrome     Lower Back Pain     Vaginal Wall Prolapse With Midline Cystocele     Female Stress Incontinence     Lump Or Mass In Breast     Osteoarthrosis, unspecified whether generalized or localized, lower leg     JANINE on CPAP     Mild persistent asthma     Obesity (BMI 30.0-34.9)     Pain management contract signed     Degenerative disc disease, cervical     Degenerative disc disease, lumbar     Morbid obesity (H)       Current Outpatient Prescriptions on File Prior to Visit   Medication Sig Dispense Refill     acetaminophen (TYLENOL) 650 MG CR tablet Take 2 tablets (1,300 mg total) by mouth every 8 (eight) hours as needed for pain. 90 tablet 1     cyclobenzaprine (FLEXERIL) 10 MG tablet Take 1 tablet (10 mg total) by mouth 2 (two) times a day as needed for muscle spasms. 20 tablet 0     dapagliflozin (FARXIGA) 10 mg Tab Take 1 tablet by mouth daily. 90 tablet 1     diclofenac sodium (VOLTAREN) 1 % Gel Apply topically twice daily as needed for arthritis 3 Tube 3     docusate sodium (COLACE) 100 MG capsule Take by mouth.       DULoxetine (CYMBALTA) 60 MG capsule  Take 1 capsule (60 mg total) by mouth daily. 90 capsule 1     ergocalciferol (ERGOCALCIFEROL) 50,000 unit capsule Take 1 capsule (50,000 Units total) by mouth once a week. 12 capsule 3     fluticasone (FLONASE) 50 mcg/actuation nasal spray 1 spray each nostril twice daily 1 g 1     gabapentin (NEURONTIN) 300 MG capsule Take 2 capsules (600 mg total) by mouth 4 (four) times a day. 240 capsule 5     glipiZIDE (GLUCOTROL XL) 5 MG 24 hr tablet TAKE 1 TABLET BY MOUTH ONCE DAILY 90 tablet 1     ibuprofen (ADVIL,MOTRIN) 800 MG tablet TAKE ONE TABLET BY MOUTH THREE TIMES DAILY AS NEEDED 100 tablet 2     INVOKANA 300 mg Tab        ketoconazole (NIZORAL) 2 % shampoo        lisinopril (PRINIVIL,ZESTRIL) 2.5 MG tablet TAKE ONE TABLET BY MOUTH ONCE DAILY 90 tablet 0     lovastatin (MEVACOR) 40 MG tablet Take 1 tablet (40 mg total) by mouth at bedtime. 90 tablet 1     mometasone-formoterol (DULERA) 200-5 mcg/actuation HFAA inhaler Inhale 2 puffs 2 (two) times a day. 30 Inhaler 1     nystatin-triamcinolone (MYCOLOG II) cream Apply small amount to affected 2-3 times daily as needed for the itching. 30 g 0     omeprazole (PRILOSEC) 40 MG capsule 40 mg.        orlistat (XENICAL) 120 MG capsule 1 capsule three times daily       phentermine (ADIPEX-P) 37.5 mg tablet Take 1 tablet (37.5 mg total) by mouth daily before breakfast. 90 tablet 0     polyethylene glycol (GLYCOLAX) 17 gram/dose powder        pramipexole (MIRAPEX) 0.75 MG tablet 0.75 mg.        spironolactone (ALDACTONE) 100 MG tablet        topiramate (TOPAMAX) 50 MG tablet        traZODone (DESYREL) 100 MG tablet        [DISCONTINUED] metFORMIN (GLUCOPHAGE-XR) 500 MG 24 hr tablet 500 mg 4 (four) times a day.        [DISCONTINUED] oxyCODONE-acetaminophen (PERCOCET) 5-325 mg per tablet Take 1 tablet by mouth every 6 (six) hours as needed for pain. 90 tablet 0     [DISCONTINUED] VESICARE 5 mg tablet        estrogens, conjugated,-methylTESTOSTERone (ESTRATEST) 1.25-2.5 mg per  tablet Take 1 tablet by mouth daily.       triamcinolone (KENALOG) 0.025 % ointment Apply sparingly in ear canal 1-2 times daily as needed 15 g 0     No current facility-administered medications on file prior to visit.

## 2021-06-22 NOTE — PROGRESS NOTES
"    SUBJECTIVE: Anita Duque is a 63 y.o. White or  female who presents today for a preop physical.  She was found to have a cataract in her right eye less than a year ago when she got new glasses in April.  This progressed quickly and she started having glaring and blurry vision in the right eye.  She went back to see her eye doctor and he set her up with surgery with Dr. Gilbert Chan.  She will have surgery on 12/14.  She brings in a preop form for me to fill out.  She was recently seen for a med check and lab work.  She has a history of well-controlled type 2 diabetes, hypertension, hyperlipidemia and mild persistent asthma.  She also has chronic vitamin D deficiency and chronic pain.    OBJECTIVE: /64 (Patient Site: Right Arm, Patient Position: Sitting, Cuff Size: Adult Large)   Pulse 80   Temp 98.5  F (36.9  C) (Oral)   Ht 5' 4\" (1.626 m)   Wt 214 lb 11.2 oz (97.4 kg)   SpO2 96%   Breastfeeding? No   BMI 36.85 kg/m    General: Obese older female in no acute distress    Please refer to the scanned in preop physical form for details of this visit and for the physical exam.    ASSESSMENT & PLAN:    1. Preop general physical exam     2. Senile cataract of right eye, unspecified age-related cataract type     3. Type 2 diabetes mellitus without complication, without long-term current use of insulin (H)     4. Essential hypertension     5. Hyperlipidemia, unspecified hyperlipidemia type     6. Chronic pain syndrome     7. Mild persistent asthma without complication       I have cleared her for cataract surgery as she is a good candidate.  Her preop physical form was faxed along with her med list to the surgery center.  She should follow-up in 6 months time for her next med check.    Patient Active Problem List   Diagnosis     Essential hypertension     Joint Pain, Localized In The Shoulder     Fibromyalgia     Type 2 diabetes mellitus (H)     Hyperlipidemia     Major Depression, " Recurrent     Chronic Constipation     Cellulitis     Sciatica     Abdominal Pain     Chronic pain syndrome     Lower Back Pain     Vaginal Wall Prolapse With Midline Cystocele     Female Stress Incontinence     Lump Or Mass In Breast     Osteoarthrosis, unspecified whether generalized or localized, lower leg     JANINE on CPAP     Mild persistent asthma     Obesity (BMI 30.0-34.9)     Degenerative disc disease, cervical     Degenerative disc disease, lumbar     Morbid obesity (H)     Preop general physical exam       Current Outpatient Medications on File Prior to Visit   Medication Sig Dispense Refill     dapagliflozin (FARXIGA) 10 mg Tab Take 1 tablet by mouth daily. 90 tablet 1     diclofenac sodium (VOLTAREN) 1 % Gel Apply topically twice daily as needed for arthritis 3 Tube 3     DULoxetine (CYMBALTA) 60 MG capsule Take 1 capsule (60 mg total) by mouth daily. 90 capsule 1     ergocalciferol (ERGOCALCIFEROL) 50,000 unit capsule Take 1 capsule (50,000 Units total) by mouth once a week. 12 capsule 3     gabapentin (NEURONTIN) 300 MG capsule TAKE TWO CAPSULES BY MOUTH 4 TIMES DAILY 240 capsule 9     glipiZIDE (GLUCOTROL XL) 5 MG 24 hr tablet TAKE 1 TABLET BY MOUTH ONCE DAILY 90 tablet 1     lidocaine (LIDODERM) 5 % Place 1 patch on the skin daily Remove & Discard patch within 12 hours or as directed by MD .       lisinopril (PRINIVIL,ZESTRIL) 2.5 MG tablet TAKE ONE TABLET BY MOUTH ONCE DAILY 90 tablet 0     lovastatin (MEVACOR) 40 MG tablet Take 1 tablet (40 mg total) by mouth at bedtime. 90 tablet 1     metFORMIN (GLUCOPHAGE-XR) 500 MG 24 hr tablet Take 2 tablets (1,000 mg total) by mouth 2 (two) times a day with meals. 360 tablet 1     omeprazole (PRILOSEC) 40 MG capsule Take 40 mg by mouth daily .             oxyCODONE-acetaminophen (PERCOCET/ENDOCET) 5-325 mg per tablet Take 1 tablet by mouth every 6 (six) hours as needed for pain. 90 tablet 0     phentermine (ADIPEX-P) 37.5 mg tablet Take 1 tablet (37.5 mg  total) by mouth daily before breakfast. 90 tablet 0     solifenacin (VESICARE) 5 MG tablet Take 1 tablet (5 mg total) by mouth daily. 90 tablet 1     triamcinolone (KENALOG) 0.025 % ointment Apply sparingly in ear canal 1-2 times daily as needed 15 g 0     [DISCONTINUED] estrogens, conjugated,-methylTESTOSTERone (ESTRATEST) 1.25-2.5 mg per tablet Take 1 tablet by mouth daily.       No current facility-administered medications on file prior to visit.

## 2021-06-23 NOTE — TELEPHONE ENCOUNTER
RN cannot approve Refill Request    RN can NOT refill this medication med is not covered by policy/route to provider.       Sandhya Mckinnon, [facility] Triage/Med Refill 1/30/2019    Requested Prescriptions   Pending Prescriptions Disp Refills     dapagliflozin (FARXIGA) 10 mg Tab 90 tablet 0     Sig: Take 1 tablet by mouth daily.    There is no refill protocol information for this order      Signed Prescriptions Disp Refills     glipiZIDE (GLUCOTROL XL) 5 MG 24 hr tablet 90 tablet 2     Sig: Take 1 tablet (5 mg total) by mouth daily.    Oral Hypoglycemics Refill Protocol Passed - 1/29/2019 10:42 AM       Passed - Visit with PCP or prescribing provider visit in last 6 months      Last office visit with prescriber/PCP: 10/22/2018 OR same dept: 10/22/2018 Trish Eagle MD OR same specialty: 10/22/2018 Trish Eagle MD Last physical: 12/3/2018 Last MTM visit: Visit date not found         Next appt within 3 mo: Visit date not found  Next physical within 3 mo: Visit date not found  Prescriber OR PCP: Trish Eagle MD ([facility])  Last diagnosis associated with med order: 1. Type 2 diabetes mellitus (H)  - glipiZIDE (GLUCOTROL XL) 5 MG 24 hr tablet; Take 1 tablet (5 mg total) by mouth daily.  Dispense: 90 tablet; Refill: 2  - dapagliflozin (FARXIGA) 10 mg Tab; Take 1 tablet by mouth daily.  Dispense: 90 tablet; Refill: 0    2. Chronic pain syndrome  - topiramate (TOPAMAX) 100 MG tablet; Take 1 tablet (100 mg total) by mouth 2 (two) times a day.  Dispense: 180 tablet; Refill: 3     If protocol passes may refill for 12 months if within 3 months of last provider visit (or a total of 15 months).          Passed - A1C in last 6 months    Hemoglobin A1c   Date Value Ref Range Status   10/22/2018 7.0 (H) 3.5 - 6.0 % Final              Passed - Microalbumin in last year     Microalbumin, Random Urine   Date Value Ref Range Status   01/29/2018 <0.50 0.00 - 1.99 mg/dL Final                 Passed - Blood  pressure in last year    BP Readings from Last 1 Encounters:   12/03/18 104/64            Passed - Serum creatinine in last year    Creatinine   Date Value Ref Range Status   10/22/2018 0.97 0.60 - 1.10 mg/dL Final             topiramate (TOPAMAX) 100 MG tablet 180 tablet 3     Sig: Take 1 tablet (100 mg total) by mouth 2 (two) times a day.    Topiramate Refill Protocol  Passed - 1/29/2019 10:42 AM       Passed - Bicarbonate/Electrolyte panel in last 12 months    Sodium   Date Value Ref Range Status   10/22/2018 141 136 - 145 mmol/L Final     Potassium   Date Value Ref Range Status   10/22/2018 4.5 3.5 - 5.0 mmol/L Final     Chloride   Date Value Ref Range Status   10/22/2018 107 98 - 107 mmol/L Final     CO2   Date Value Ref Range Status   10/22/2018 23 22 - 31 mmol/L Final               Passed - PCP or prescribing provider visit in last 12 months or next 3 months    Last office visit with prescriber/PCP: 10/22/2018 Trish Eagle MD OR same dept: 10/22/2018 Trish Eagle MD OR same specialty: 10/22/2018 Trish Eagle MD  Last physical: 12/3/2018 Last MTM visit: Visit date not found   Next visit within 3 mo: Visit date not found  Next physical within 3 mo: Visit date not found  Prescriber OR PCP: Trish Eagle MD ([place])  Last diagnosis associated with med order: 1. Type 2 diabetes mellitus (H)  - glipiZIDE (GLUCOTROL XL) 5 MG 24 hr tablet; Take 1 tablet (5 mg total) by mouth daily.  Dispense: 90 tablet; Refill: 2  - dapagliflozin (FARXIGA) 10 mg Tab; Take 1 tablet by mouth daily.  Dispense: 90 tablet; Refill: 0    2. Chronic pain syndrome  - topiramate (TOPAMAX) 100 MG tablet; Take 1 tablet (100 mg total) by mouth 2 (two) times a day.  Dispense: 180 tablet; Refill: 3    If protocol passes may refill for 12 months if within 3 months of last provider visit (or a total of 15 months).            Passed - Serum creatinine in last 12 months    Creatinine   Date Value Ref Range Status    10/22/2018 0.97 0.60 - 1.10 mg/dL Final

## 2021-06-23 NOTE — TELEPHONE ENCOUNTER
Pt contacted.   Dr López, endocrinologist from [facility], has prescribed this in the past for her back,neck and shoulder pain.  Patient no longer sees this provider in addition to Dr Eagle.      Voltaren cream was alternative for her based on Medicare insurance. She is not back on an insurance(medicare D) that will cover it. She would like to stop the Voltaren and start back on the Flexor patch.

## 2021-06-23 NOTE — TELEPHONE ENCOUNTER
Who is calling:  [place] pharmacist- [employer] department   Reason for Call:  Writer answered questions with [employer] pharmacist from patients chart.   Date of last appointment with primary care: 12/03/18  Has the patient been recently seen:  No  Okay to leave a detailed message: Yes

## 2021-06-23 NOTE — TELEPHONE ENCOUNTER
Refill Approved    Rx renewed per Medication Renewal Policy. Medication was last renewed on 10/4/18. 12/31/18    Sandhya Mckinnon, [facility] Triage/Med Refill 1/30/2019     Requested Prescriptions   Pending Prescriptions Disp Refills     glipiZIDE (GLUCOTROL XL) 5 MG 24 hr tablet 90 tablet 0     Sig: Take 1 tablet (5 mg total) by mouth daily.    Oral Hypoglycemics Refill Protocol Passed - 1/29/2019 10:42 AM       Passed - Visit with PCP or prescribing provider visit in last 6 months      Last office visit with prescriber/PCP: 10/22/2018 OR same dept: 10/22/2018 Trish Eagle MD OR same specialty: 10/22/2018 Trish Eagle MD Last physical: 12/3/2018 Last MTM visit: Visit date not found         Next appt within 3 mo: Visit date not found  Next physical within 3 mo: Visit date not found  Prescriber OR PCP: Trish Eagle MD ([place])  Last diagnosis associated with med order: 1. Type 2 diabetes mellitus (H)  - dapagliflozin (FARXIGA) 10 mg Tab; Take 1 tablet by mouth daily.  Dispense: 90 tablet; Refill: 0     If protocol passes may refill for 12 months if within 3 months of last provider visit (or a total of 15 months).          Passed - A1C in last 6 months    Hemoglobin A1c   Date Value Ref Range Status   10/22/2018 7.0 (H) 3.5 - 6.0 % Final              Passed - Microalbumin in last year     Microalbumin, Random Urine   Date Value Ref Range Status   01/29/2018 <0.50 0.00 - 1.99 mg/dL Final                 Passed - Blood pressure in last year    BP Readings from Last 1 Encounters:   12/03/18 104/64            Passed - Serum creatinine in last year    Creatinine   Date Value Ref Range Status   10/22/2018 0.97 0.60 - 1.10 mg/dL Final             dapagliflozin (FARXIGA) 10 mg Tab 90 tablet 0     Sig: Take 1 tablet by mouth daily.    There is no refill protocol information for this order        topiramate (TOPAMAX) 100 MG tablet 180 tablet 0     Sig: Take 1 tablet (100 mg total) by mouth 2 (two)  times a day.    Topiramate Refill Protocol  Passed - 1/29/2019 10:42 AM       Passed - Bicarbonate/Electrolyte panel in last 12 months    Sodium   Date Value Ref Range Status   10/22/2018 141 136 - 145 mmol/L Final     Potassium   Date Value Ref Range Status   10/22/2018 4.5 3.5 - 5.0 mmol/L Final     Chloride   Date Value Ref Range Status   10/22/2018 107 98 - 107 mmol/L Final     CO2   Date Value Ref Range Status   10/22/2018 23 22 - 31 mmol/L Final               Passed - PCP or prescribing provider visit in last 12 months or next 3 months    Last office visit with prescriber/PCP: 10/22/2018 Trish Eagle MD OR same dept: 10/22/2018 Trish Eagle MD OR same specialty: 10/22/2018 Trish Eagle MD  Last physical: 12/3/2018 Last MTM visit: Visit date not found   Next visit within 3 mo: Visit date not found  Next physical within 3 mo: Visit date not found  Prescriber OR PCP: [place]) SHALOM Eagle MD  Last diagnosis associated with med order: 1. Type 2 diabetes mellitus (H)  - dapagliflozin (FARXIGA) 10 mg Tab; Take 1 tablet by mouth daily.  Dispense: 90 tablet; Refill: 0    If protocol passes may refill for 12 months if within 3 months of last provider visit (or a total of 15 months).            Passed - Serum creatinine in last 12 months    Creatinine   Date Value Ref Range Status   10/22/2018 0.97 0.60 - 1.10 mg/dL Final

## 2021-06-23 NOTE — TELEPHONE ENCOUNTER
[facility], they have started the Prior Authorization Process via Cover My Meds    CoverMyMeds Key: [id number]    Medication Name: Flector 1.3% patch    Insurance Plan: not provided on fax  PBM:   Patient ID: not provided on fax    Please complete the PA process

## 2021-06-23 NOTE — TELEPHONE ENCOUNTER
Medication Request  Medication name: Flexor 3 % patches - 1 patch daily for 12 hours and then off for 12 hours.  Pharmacy Name and Location: [facility] #[id number]  Reason for request: Not on active medication list, writer unable to find medication on patients list - patient reports new insurance will cover medication.   When did you use medication last?:  Last month  Okay to leave a detailed message: yes

## 2021-06-23 NOTE — TELEPHONE ENCOUNTER
PRIOR AUTHORIZATION DENIED    Denial Rational: DX IS NOT AN FDA MEDICARE APPROVED DX. MEDICATION IS ONLY APPROVED TO TREAT ACUTE PAIN.      Appeal Information: PHONE: [phone] FAX: [phone]    Unable to copy/paste in to Epic.

## 2021-06-23 NOTE — TELEPHONE ENCOUNTER
[employer] prescription program  ID # [id number]    Senior linkage line reported that this medication is covered under her [employer] Rx plan. This is the reason she switched plans.     Please resubmit and see what they say.

## 2021-06-23 NOTE — TELEPHONE ENCOUNTER
APPEAL APPROVED:    Approval start date: 2/1/19  Approval end date: 3/1/19    Pharmacy has been notified of approval and will contact patient when medication is ready for pickup.     Unable to copy/paste in to Epic.

## 2021-06-23 NOTE — TELEPHONE ENCOUNTER
Orders being requested: Lidocaine 5%patch  Reason service is needed/diagnosis: chronic ain   When are orders needed by:   Where to send Orders: [facility] pharmacy  Okay to leave detailed message?  No      Pt has new insurance for prescription coverage and would like to go back on the lidocaine patches. She was getting these from her previous endocrinologist Dr López.

## 2021-06-25 NOTE — TELEPHONE ENCOUNTER
Medication Question or Clarification  Who is calling: Patient  What medication are you calling about? (include dose and sig) Phentermine 37.5mg, one daily  Who prescribed the medication?: Trish Eagle MD   What is your question/concern?: Patient now needs medications ordered to [facility] because of insurance change.    Pharmacy: [facility] #[id number]  Okay to leave a detailed message?: Yes  Site CMT - Please call the pharmacy to obtain any additional needed information.      Patient states she is taking 1 1/2 tablets of Phentermine 37.5mg tablets. Provider last wrote the order on 2/5/19 for 1 tablet daily.  Please send correct medication to [facility] pharmacy.

## 2021-06-25 NOTE — TELEPHONE ENCOUNTER
Patient Returning Call  Reason for call:  Patient  returning call to check on the status of this request.  Information relayed to patient:  Pending providers review and approval.  Patient has additional questions:  yes  If YES, what are your questions/concerns:  Patient stated I never go this medication in February due to insurance. I am requesting 1.5 tablets as this how Dr. Shai Sebastian wrote this prior.   Okay to leave a detailed message?: Yes

## 2021-06-25 NOTE — TELEPHONE ENCOUNTER
We will need a new CSA for this and she will need to have a diabetes check anyway.  Get her scheduled. Thanks.

## 2021-06-25 NOTE — TELEPHONE ENCOUNTER
Patient Quality Outreach Summary      Summary:    Patient is due/failing the following:   Diabetic Follow-Up Visit    Type of outreach:    Phone, spoke to patient/parent. appt sched for 6/15    Questions for provider review:    None                                               Gen DEA CMA     Chart routed to n/a.

## 2021-06-25 NOTE — TELEPHONE ENCOUNTER
Refill Approved    Rx renewed per Medication Renewal Policy. Medication was last renewed on 8/12/20.    Robson Elizalde, [facility] Triage/Med Refill 6/10/2021     Requested Prescriptions   Pending Prescriptions Disp Refills     lovastatin (MEVACOR) 40 MG tablet 180 tablet 1     Sig: Take 2 tablets (80 mg total) by mouth at bedtime.       Statins Refill Protocol (Hmg CoA Reductase Inhibitors) Passed - 6/9/2021  1:46 PM        Passed - PCP or prescribing provider visit in past 12 months      Last office visit with prescriber/PCP: 10/8/2020 Trish Eagle MD OR same dept: Visit date not found OR same specialty: 10/8/2020 Trish Eagle MD  Last physical: 12/3/2018 Last MTM visit: Visit date not found   Next visit within 3 mo: Visit date not found  Next physical within 3 mo: Visit date not found  Prescriber OR PCP: Trish Eagle MD ([place])  Last diagnosis associated with med order: 1. Essential hypertension  - lovastatin (MEVACOR) 40 MG tablet; Take 2 tablets (80 mg total) by mouth at bedtime.  Dispense: 180 tablet; Refill: 1    If protocol passes may refill for 12 months if within 3 months of last provider visit (or a total of 15 months).

## 2021-06-25 NOTE — TELEPHONE ENCOUNTER
doesn't show this medication. She may be paying out of pocket.     Detailed message left on voicemail regarding need for appointment. H.DeGree, LIZ

## 2021-06-26 ENCOUNTER — HEALTH MAINTENANCE LETTER (OUTPATIENT)
Age: 66
End: 2021-06-26

## 2021-06-27 NOTE — PROGRESS NOTES
"Progress Notes by Jacqueline Mitchell AuD at 6/4/2019  2:00 PM     Author: Jacqueline Mitchell AuD Service: -- Author Type: Audiologist    Filed: 6/4/2019  6:26 PM Encounter Date: 6/4/2019 Status: Signed    : Jacqueline Mitchell AuD (Audiologist)       Hearing evaluation in conjunction with ENT exam (Dr. Fofana)    Summary:  Audiology visit completed. Please see audiogram below or under \"media\" tab for history and results.    Transducer:  Both insert phones and circumaural headphones were used.    Reliability:    Good    Recommendations:  Follow-up with ENT; retest hearing annually (to monitor) or per medical management.  Wear hearing protection consistently in noise to preserve residual hearing sensitivity.  Anita FRANCISCO Duque is a potential binaural amplification candidate, if patient motivation exists and medical clearance is granted.    Piero Loyola, [facility]  [place] Licensed Audiologist [id number]           "

## 2021-06-29 ENCOUNTER — COMMUNICATION - HEALTHEAST (OUTPATIENT)
Dept: FAMILY MEDICINE | Facility: CLINIC | Age: 66
End: 2021-06-29

## 2021-06-29 DIAGNOSIS — I10 ESSENTIAL HYPERTENSION: ICD-10-CM

## 2021-06-29 RX ORDER — SPIRONOLACTONE 100 MG/1
100 TABLET, FILM COATED ORAL DAILY
Qty: 90 TABLET | Refills: 3 | Status: SHIPPED | OUTPATIENT
Start: 2021-06-29 | End: 2022-11-30

## 2021-06-30 ENCOUNTER — COMMUNICATION - HEALTHEAST (OUTPATIENT)
Dept: FAMILY MEDICINE | Facility: CLINIC | Age: 66
End: 2021-06-30

## 2021-06-30 DIAGNOSIS — K59.01 SLOW TRANSIT CONSTIPATION: ICD-10-CM

## 2021-07-03 NOTE — ADDENDUM NOTE
Addendum Note by Trish Eagle MD at 10/22/2018 11:40 AM     Author: Trish Eagle MD Service: -- Author Type: Physician    Filed: 10/22/2018 11:40 AM Encounter Date: 10/22/2018 Status: Signed    : Trish Eagle MD (Physician)    Addended by: TRISH EAGLE on: 10/22/2018 11:40 AM        Modules accepted: Orders

## 2021-07-03 NOTE — ADDENDUM NOTE
Addendum Note by Trish Eagle MD at 6/12/2018  6:27 PM     Author: Trish Eagle MD Service: -- Author Type: Physician    Filed: 6/12/2018  6:27 PM Encounter Date: 6/11/2018 Status: Signed    : Trish Eagle MD (Physician)    Addended by: TRISH EAGLE on: 6/12/2018 06:27 PM        Modules accepted: Orders

## 2021-07-03 NOTE — ADDENDUM NOTE
Addendum Note by Trish Eagle MD at 9/26/2019  3:31 PM     Author: Trish Eagle MD Service: -- Author Type: Physician    Filed: 9/26/2019  3:31 PM Encounter Date: 9/23/2019 Status: Signed    : Trish Eagle MD (Physician)    Addended by: TRISH EAGLE on: 9/26/2019 03:31 PM        Modules accepted: Orders

## 2021-07-03 NOTE — ADDENDUM NOTE
Addendum Note by Candace Mujica CMA at 12/4/2020  2:27 PM     Author: Candace Mujica CMA Service: -- Author Type: Certified Medical Assistant    Filed: 12/4/2020  2:27 PM Encounter Date: 12/4/2020 Status: Signed    : Candace Mujica CMA (Certified Medical Assistant)    Addended by: CANDACE MUJICA on: 12/4/2020 02:27 PM        Modules accepted: Orders

## 2021-07-03 NOTE — ADDENDUM NOTE
Addendum Note by Trish Eagle MD at 3/2/2020  2:23 PM     Author: Trish Eagle MD Service: -- Author Type: Physician    Filed: 3/2/2020  2:23 PM Encounter Date: 3/2/2020 Status: Signed    : Trish Eagle MD (Physician)    Addended by: TRISH EAGLE on: 3/2/2020 02:23 PM        Modules accepted: Orders

## 2021-07-03 NOTE — ADDENDUM NOTE
Addendum Note by Candace Mujica CMA at 3/2/2020  2:02 PM     Author: Candace Mujica CMA Service: -- Author Type: Certified Medical Assistant    Filed: 3/2/2020  2:02 PM Encounter Date: 3/2/2020 Status: Signed    : Candace Mujica CMA (Certified Medical Assistant)    Addended by: CANDACE MUJICA on: 3/2/2020 02:02 PM        Modules accepted: Orders

## 2021-07-03 NOTE — ADDENDUM NOTE
Addendum Note by Trish Eagle MD at 3/5/2020 10:56 AM     Author: Trish Eagle MD Service: -- Author Type: Physician    Filed: 3/5/2020 10:56 AM Encounter Date: 3/2/2020 Status: Signed    : Trish Eagle MD (Physician)    Addended by: TRISH EAGLE on: 3/5/2020 10:56 AM        Modules accepted: Orders

## 2021-07-04 NOTE — LETTER
Letter by Trish Eagle MD at      Author: Trish Eagle MD Service: -- Author Type: --    Filed:  Encounter Date: 6/15/2021 Status: (Other)       My Asthma Action Plan     Name: Anita Duque   YOB: 1955  Date: 6/15/2021   My doctor: Trish Eagle MD ([place])   My clinic: [facility]        My Rescue Medicine:   Albuterol (Proair/Ventolin/Proventil HFA) 2-4 puffs EVERY 4 HOURS as needed. Use a spacer if recommended by your provider.   My Asthma Severity:   Intermittent/Exercise Induced  Know your asthma triggers: upper respiratory infections and humidity             GREEN ZONE   Good Control    I feel good    No cough or wheeze    Can work, sleep and play without asthma symptoms     Take your asthma control medicine every day.     1. If exercise triggers your asthma, take your rescue medication    15 minutes before exercise or sports, and    During exercise if you have asthma symptoms  2. Spacer to use with inhaler: If you have a spacer, make sure to use it with your inhaler             YELLOW ZONE Getting Worse  I have ANY of these:    I do not feel good    Cough or wheeze    Chest feels tight    Wake up at night 1. Keep taking your Green Zone medications  2. Start taking your rescue medicine:    every 20 minutes for up to 1 hour. Then every 4 hours for 24-48 hours.  3. If you stay in the Yellow Zone for more than 12-24 hours, contact your doctor.  4. If you do not return to the Green Zone in 12-24 hours or you get worse, start taking your oral steroid medicine if prescribed by your provider.           RED ZONE Medical Alert - Get Help  I have ANY of these:    I feel awful    Medicine is not helping    Breathing getting harder    Trouble walking or talking    Nose opens wide to breathe     1. Take your rescue medicine NOW  2. If your provider has prescribed an oral steroid medicine, start taking it NOW  3. Call your doctor NOW  4. If you are  still in the Red Zone after 20 minutes and you have not reached your doctor:    Take your rescue medicine again and    Call 911 or go to the emergency room right away    See your regular doctor within 2 weeks of an Emergency Room or Urgent Care visit for follow-up treatment.          Annual Reminders:  Meet with Asthma Educator,  Flu Shot in the Fall, consider Pneumonia Vaccination for patients with asthma (aged 19 and older).    Pharmacy:   [facility] [address] [place], [place] [address] [address] [address]  Phone: [phone] Fax: [phone]    [facility] - [place], [place] - [address]  [address]  [address]  [place] [address]  Phone: [phone] Fax: [phone]      Electronically signed by Trish Eagle MD ([place])   Date: 06/15/21                      Asthma Triggers  How To Control Things That Make Your Asthma Worse    Triggers are things that make your asthma worse.  Look at the list below to help you find your triggers and what you can do about them.  You can help prevent asthma flare-ups by staying away from your triggers.      Trigger                                                          What you can do   Cigarette Smoke  Tobacco smoke can make asthma worse. Do not allow smoking in your home, car or around you.  Be sure no one smokes at a jeff day care or school.  If you smoke, ask your health care provider for ways to help you quit.  Ask family members to quit too.  Ask your health care provider for a referral to Quit Plan to help you quit smoking, or call 2-272-966-PLAN.     Colds, Flu, Bronchitis  These are common triggers of asthma. Wash your hands often.  Dont touch your eyes, nose or mouth.  Get a flu shot every year.     Dust Mites  These are tiny bugs that live in cloth or carpet. They are too small to see. Wash sheets and blankets in hot water every week.   Encase pillows and mattress in dust mite  proof covers.  Avoid having carpet if you can. If you have carpet, vacuum weekly.   Use a dust mask and HEPA vacuum.   Pollen and Outdoor Mold  Some people are allergic to trees, grass, or weed pollen, or molds. Try to keep your windows closed.  Limit time out doors when pollen count is high.   Ask you health care provider about taking medicine during allergy season.     Animal Dander  Some people are allergic to skin flakes, urine or saliva from pets with fur or feathers. Keep pets with fur or feathers out of your home.    If you cant keep the pet outdoors, then keep the pet out of your bedroom.  Keep the bedroom door closed.  Keep pets off cloth furniture and away from stuffed toys.     Mice, Rats, and Cockroaches  Some people are allergic to the waste from these pests.   Cover food and garbage.  Clean up spills and food crumbs.  Store grease in the refrigerator.   Keep food out of the bedroom.   Indoor Mold  This can be a trigger if your home has high moisture. Fix leaking faucets, pipes, or other sources of water.   Clean moldy surfaces.  Dehumidify basement if it is damp and smelly.   Smoke, Strong Odors, and Sprays  These can reduce air quality. Stay away from strong odors and sprays, such as perfume, powder, hair spray, paints, smoke incense, paint, cleaning products, candles and new carpet.   Exercise or Sports  Some people with asthma have this trigger. Be active!  Ask your doctor about taking medicine before sports or exercise to prevent symptoms.    Warm up for 5-10 minutes before and after sports or exercise.     Other Triggers of Asthma  Cold air:  Cover your nose and mouth with a scarf.  Sometimes laughing or crying can be a trigger.  Some medicines and food can trigger asthma.

## 2021-07-04 NOTE — TELEPHONE ENCOUNTER
Telephone Encounter by Robson Elizalde, RN at 6/30/2021 10:54 AM     Author: Robson Elizalde, RN Service: -- Author Type: Registered Nurse    Filed: 6/30/2021 10:55 AM Encounter Date: 6/30/2021 Status: Signed    : Robson Elizalde, RN (Registered Nurse)       Refill Approved    Rx renewed per Medication Renewal Policy. Medication was last renewed on 6/1/20.    Robson Elizalde, [facility] Connection Triage/Med Refill 6/30/2021     Requested Prescriptions   Pending Prescriptions Disp Refills   ? SENNA 8.6 mg tablet [Pharmacy Med Name: Senna 8.6 MG Oral Tablet] 180 tablet 0     Sig: Take 1 tablet by mouth twice daily       GI Medications Refill Protocol Passed - 6/30/2021  9:49 AM        Passed - PCP or prescribing provider visit in last 12 or next 3 months.     Last office visit with prescriber/PCP: 6/15/2021 Trish Eagle MD OR same dept: 6/15/2021 Trish Eagle MD OR same specialty: 6/15/2021 Trish Eagle MD  Last physical: 12/3/2018 Last MTM visit: Visit date not found   Next visit within 3 mo: Visit date not found  Next physical within 3 mo: Visit date not found  Prescriber OR PCP: Trish Eagle MD ([place])  Last diagnosis associated with med order: 1. Slow transit constipation  - SENNA 8.6 mg tablet [Pharmacy Med Name: Senna 8.6 MG Oral Tablet]; Take 1 tablet by mouth twice daily  Dispense: 180 tablet; Refill: 0    If protocol passes may refill for 12 months if within 3 months of last provider visit (or a total of 15 months).

## 2021-07-04 NOTE — LETTER
Letter by Trish Eagle MD at      Author: Trish Eagle MD Service: -- Author Type: --    Filed:  Encounter Date: 6/18/2021 Status: (Other)         Anita Duque  [address] [facility] [address]             June 18, 2021         Dear Ms. Duque,    Below are the results from your recent visit:    Resulted Orders   Glycosylated Hemoglobin A1c   Result Value Ref Range    Hemoglobin A1c 7.1 (H) <=5.6 %   Lipid Cascade FASTING   Result Value Ref Range    Cholesterol 197 <=199 mg/dL    Triglycerides 208 (H) <=149 mg/dL    HDL Cholesterol 52 >=50 mg/dL    LDL Calculated 103 <=129 mg/dL    Patient Fasting > 8hrs? Yes    Basic Metabolic Panel   Result Value Ref Range    Sodium 138 136 - 145 mmol/L    Potassium 4.9 3.5 - 5.0 mmol/L    Chloride 102 98 - 107 mmol/L    CO2 20 (L) 22 - 31 mmol/L    Anion Gap, Calculation 16 5 - 18 mmol/L    Glucose 143 (H) 70 - 125 mg/dL    Calcium 9.5 8.5 - 10.5 mg/dL    BUN 21 8 - 22 mg/dL    Creatinine 0.86 0.60 - 1.10 mg/dL    GFR MDRD Af Amer >60 >60 mL/min/1.73m2    GFR MDRD Non Af Amer >60 >60 mL/min/1.73m2    Narrative    Fasting Glucose reference range is 70-99 mg/dL per  American Diabetes Association (ADA) guidelines.       Yehuda Anita,    It appears you might not be watching your [employer] account so I am mailing you the results of our labs.  I am noticing that your cholesterol is not as good as it was a year ago.  Make sure you are taking all doses of your cholesterol medication.  Your other labs look good.  Your A1c is stable and in a good range.  See you back in 6 months time.    Please call with questions or contact us using [employer].    Sincerely,        Electronically signed by Trish Eagle MD ([place]y)

## 2021-07-06 VITALS
HEART RATE: 95 BPM | SYSTOLIC BLOOD PRESSURE: 104 MMHG | DIASTOLIC BLOOD PRESSURE: 78 MMHG | RESPIRATION RATE: 20 BRPM | BODY MASS INDEX: 33.3 KG/M2 | WEIGHT: 194 LBS | OXYGEN SATURATION: 93 %

## 2021-07-06 ASSESSMENT — PATIENT HEALTH QUESTIONNAIRE - PHQ9: SUM OF ALL RESPONSES TO PHQ QUESTIONS 1-9: 6

## 2021-07-07 ENCOUNTER — COMMUNICATION - HEALTHEAST (OUTPATIENT)
Dept: FAMILY MEDICINE | Facility: CLINIC | Age: 66
End: 2021-07-07

## 2021-07-07 DIAGNOSIS — K21.9 GASTROESOPHAGEAL REFLUX DISEASE WITHOUT ESOPHAGITIS: ICD-10-CM

## 2021-07-07 RX ORDER — OMEPRAZOLE 40 MG/1
40 CAPSULE, DELAYED RELEASE ORAL DAILY
Qty: 90 CAPSULE | Refills: 1 | Status: SHIPPED | OUTPATIENT
Start: 2021-07-07

## 2021-07-07 NOTE — TELEPHONE ENCOUNTER
Telephone Encounter by Robson Elizalde RN at 7/7/2021 10:13 AM     Author: Robson Elizalde RN Service: -- Author Type: Registered Nurse    Filed: 7/7/2021 10:17 AM Encounter Date: 7/7/2021 Status: Signed    : Robson Elizalde RN (Registered Nurse)       RN cannot approve Refill Request    RN can NOT refill this medication historical medication requested. Last office visit: 6/15/2021 Trish Eagle MD Last Physical: 12/3/2018 Last MTM visit: Visit date not found Last visit same specialty: 6/15/2021 rTish Eagle MD.  Next visit within 3 mo: Visit date not found  Next physical within 3 mo: Visit date not found      [facility] Triage/Med Refill 7/7/2021    Requested Prescriptions   Pending Prescriptions Disp Refills   ? omeprazole (PRILOSEC) 40 MG capsule 90 capsule 0     Sig: Take 1 capsule (40 mg total) by mouth daily.       GI Medications Refill Protocol Passed - 7/7/2021  9:10 AM        Passed - PCP or prescribing provider visit in last 12 or next 3 months.     Last office visit with prescriber/PCP: 6/15/2021 Trish Eagle MD OR same dept: 6/15/2021 Trish Eagle MD OR same specialty: 6/15/2021 Trish Eagle MD  Last physical: 12/3/2018 Last MTM visit: Visit date not found   Next visit within 3 mo: Visit date not found  Next physical within 3 mo: Visit date not found  Prescriber OR PCP: Trish Eagle MD ([place]etsy)  Last diagnosis associated with med order: There are no diagnoses linked to this encounter.  If protocol passes may refill for 12 months if within 3 months of last provider visit (or a total of 15 months).

## 2021-07-07 NOTE — TELEPHONE ENCOUNTER
Refill Approved    Rx renewed per Medication Renewal Policy. Medication was last renewed on 1/14/20.    Robson Elizalde, [facility] Triage/Med Refill 6/29/2021     Requested Prescriptions   Pending Prescriptions Disp Refills     spironolactone (ALDACTONE) 100 MG tablet [Pharmacy Med Name: Spironolactone 100 MG Oral Tablet] 90 tablet 0     Sig: TAKE 1 TABLET BY MOUTH ONCE DAILY . APPOINTMENT REQUIRED FOR FUTURE REFILLS       Diuretics/Combination Diuretics Refill Protocol  Passed - 6/29/2021 11:24 AM        Passed - Visit with PCP or prescribing provider visit in past 12 months     Last office visit with prescriber/PCP: 6/15/2021 Trish Eagle MD OR same dept: 6/15/2021 Trish Eagle MD OR same specialty: 6/15/2021 Trish Eagle MD  Last physical: 12/3/2018 Last MTM visit: Visit date not found   Next visit within 3 mo: Visit date not found  Next physical within 3 mo: Visit date not found  Prescriber OR PCP: Trish Eagle MD ([place])  Last diagnosis associated with med order: 1. Essential hypertension  - spironolactone (ALDACTONE) 100 MG tablet [Pharmacy Med Name: Spironolactone 100 MG Oral Tablet]; TAKE 1 TABLET BY MOUTH ONCE DAILY. APPOINTMENT REQUIRED FOR FUTURE REFILLS  Dispense: 90 tablet; Refill: 0    If protocol passes may refill for 12 months if within 3 months of last provider visit (or a total of 15 months).             Passed - Serum Potassium in past 12 months      Lab Results   Component Value Date    Potassium 4.9 06/15/2021             Passed - Serum Sodium in past 12 months      Lab Results   Component Value Date    Sodium 138 06/15/2021             Passed - Blood pressure on file in past 12 months     BP Readings from Last 1 Encounters:   06/15/21 104/78             Passed - Serum Creatinine in past 12 months      Creatinine   Date Value Ref Range Status   06/15/2021 0.86 0.60 - 1.10 mg/dL Final

## 2021-07-08 ASSESSMENT — ANXIETY QUESTIONNAIRES: GAD7 TOTAL SCORE: 0

## 2021-07-08 ASSESSMENT — ASTHMA QUESTIONNAIRES: ACT_TOTALSCORE: 17

## 2021-07-08 NOTE — PROGRESS NOTES
Progress Notes by Trish Eagle MD at 6/15/2021 12:20 PM     Author: Trish Eagle MD Service: -- Author Type: Physician    Filed: 7/8/2021  3:25 PM Encounter Date: 6/15/2021 Status: Signed    : Trish Eagle MD (Physician)       SUBJECTIVE: Anita Duque is a 66 y.o. White or  female who presents today for her 6-month med check.  I last saw her 10/8/2020.  She is a diabetic but is seen by an endocrinologist as well and so I only see her every 6 months.  Her last A1c was 7.1%.  She has her eye exam due in July and is aware of this.  She has hypertension which is well controlled as is her hyperlipidemia with her last LDL being 70.  Her biggest complaint today is her chronic pain.  She is particularly having pain in her right side of her face due to tooth pain.  She has headache and neck ache and ear pain secondary to it she believes.  She complains that she cannot get into see a dentist as she is on Medicaid and it is difficult to find a dentist who will take her on.  She wonders if I could treat her tooth pain in order to buy her some time.  We do have a controlled substance agreement which was done 10/8/2024 Percocet but she has been seen at the spine care clinic last October.  She has been to a clinic pain clinic in the past but has wanted to follow with me for her Percocet which she does not get picked up every 30 days for sure.  Her last refill was 10/8/2020.  She wants a refill now because of her current facial pain.  Her chronic pain affects her mood as well.  Her PHQ-9 score today is 6 and her KINDRA-7 score is 0 and these are pretty stable since I saw her back in October.  She does not want any change in medication dosing as she feels she is doing well.    She has asthma which is mild persistent at times.  Her ACT back in October was 20 but today is 17 due to the heat and humidity we are experiencing right now.  We do an asthma action plan today.  She has  obstructive sleep apnea and uses a CPAP regularly.  She is obese and has gained weight during the pandemic which she is not happy about.  It is difficult for her to exercise due to her chronic pain.  She had an ER visit in January for a dizzy spells and they noted an abnormal EKG.  She is wondering if we could follow-up on that.  She is high risk for heart issues because of her diabetes, hypertension, hyperlipidemia and obesity.    OBJECTIVE: /78   Pulse 95   Resp 20   Wt 194 lb (88 kg)   SpO2 93%   BMI 33.30 kg/m    General: Mildly obese older female in no acute distress.  HEENT: No noticeable excessive swelling or asymmetry of the face due to her pain  Heart: Regular rate and rhythm without murmur  Lungs: Clear bilaterally  Abdomen: Soft  Extremities: Lower extremities are warm, dry and without edema.  Patient does move more slowly secondary to pain.  Psych: Anxious, low mood, frustrated with pain    ASSESSMENT & PLAN:     1. Chronic pain syndrome  We do have a controlled substance agreement as of 10/2020 and so I will refill her Percocet which she does not fill on a regular basis.  - oxyCODONE-acetaminophen (PERCOCET/ENDOCET) 5-325 mg per tablet; Take 1 tablet by mouth every 6 (six) hours as needed for pain.  Dispense: 90 tablet; Refill: 0    2. Tooth pain  We discussed perhaps a Medrol Dosepak would be helpful with the pain which she would like to try.  I also will give her a course of antibiotic for her tooth issue while she tries to find a dentist.  - clindamycin (CLEOCIN) 300 MG capsule; Take 1 capsule (300 mg total) by mouth 3 (three) times a day for 10 days.  Dispense: 30 capsule; Refill: 0  - methylPREDNISolone (MEDROL DOSEPACK) 4 mg tablet; Take 1 tablet (4 mg total) by mouth daily for 6 days. Follow package directions  Dispense: 21 tablet; Refill: 0    3. Overbite  Possibly part of the reason she is having problems with facial pain.    4. Controlled substance agreement signed    5. Type 2  diabetes mellitus without complication, without long-term current use of insulin (H)  Typically well controlled, will monitor labs.  - Glycosylated Hemoglobin A1c    6. Essential hypertension  Well-controlled, will monitor labs.  - Basic Metabolic Panel    7. Hyperlipidemia, unspecified hyperlipidemia type  Historically well controlled with last LDL of 70 a year ago.  Will monitor labs.  - Lipid [place] FASTING    8. Mild intermittent asthma without complication  Not wholly controlled today as she has an ACT of 17 due to the heat and humidity.  We again discussed the fact that she should have a controller medication which she can use when she has flares of her asthma secondary to allergies or humidity such as these long stretches of the last couple of weeks.  She says she has 1 at home.  We again discussed she should only be using the albuterol a couple times a week for rescue purposes.  We did fill out an asthma action plan today.    9. JANINE on CPAP  Claims to be compliant.    10. Mild episode of recurrent major depressive disorder (H)  Relatively well controlled with a PHQ-9 score today of 6, KINDRA-7 score of 0.  Will continue current medication.    11. Abnormal electrocardiogram  Seen at a recent ER visit in January of this year.  Wants to have a stress test to look at her heart as she is high risk for coronary artery disease.  - NM MPI with Lexiscan; Future    12. Morbid obesity (H)  Recent weight gain with pandemic.  BMI of 33.  Has struggled with her weight for a long time.  Feels unable to exercise due to her chronic pain.    I will get back to her on her lab results by my chart and only call with grossly abnormal values.  I will refill her meds as needed.  She is to have her eye exam in July and have the doctor send me the note.  She is to follow-up in no longer than 6 months time.    40 minutes spent together with the patient today on day of visit, more than 50% spent in counseling, discussing the above  topics.        Patient Active Problem List   Diagnosis   ? Essential hypertension   ? Joint Pain, Localized In The Shoulder   ? Fibromyalgia   ? Type 2 diabetes mellitus (H)   ? Hyperlipidemia   ? Major Depression, Recurrent   ? Chronic Constipation   ? Sciatica   ? Chronic pain syndrome   ? Lower Back Pain   ? Vaginal Wall Prolapse With Midline Cystocele   ? Female Stress Incontinence   ? Lump Or Mass In Breast   ? Osteoarthrosis, unspecified whether generalized or localized, lower leg   ? JANINE on CPAP   ? Obesity (BMI 30.0-34.9)   ? Degenerative disc disease, cervical   ? Degenerative disc disease, lumbar   ? Morbid obesity (H)   ? Mild intermittent asthma   ? Controlled substance agreement signed   ? Gastroesophageal reflux disease without esophagitis       Current Outpatient Medications on File Prior to Visit   Medication Sig Dispense Refill   ? cyanocobalamin, vitamin B-12, (VITAMIN B-12 ORAL) Take 1 tablet by mouth as needed.     ? cyclobenzaprine (FLEXERIL) 10 MG tablet one tablet twice daily 60 tablet 0   ? dapagliflozin (FARXIGA) 10 mg Tab Take 10 mg by mouth daily. 90 tablet 1   ? diclofenac epolamine (FLECTOR) 1.3 % PT12 Place 1 patch on the skin every 12 (twelve) hours as needed. 60 patch 5   ? diclofenac sodium (VOLTAREN) 1 % Gel APPLY TOPICALLY TWICE DAILY AS NEEDED FOR ARTHRITIS 300 g 1   ? DULoxetine (CYMBALTA) 60 MG capsule Take 1 capsule by mouth once daily 90 capsule 3   ? ergocalciferol (VITAMIN D2) 1,250 mcg (50,000 unit) capsule Take 1 capsule (50,000 Units total) by mouth once a week. 12 capsule 1   ? gabapentin (NEURONTIN) 300 MG capsule TAKE 2 CAPSULES BY MOUTH 4 TIMES DAILY 240 capsule 3   ? glipiZIDE (GLUCOTROL XL) 5 MG 24 hr tablet Take 1 tablet by mouth once daily 90 tablet 3   ? ibuprofen (ADVIL,MOTRIN) 800 MG tablet Take 1 tablet by mouth three times daily as needed 100 tablet 3   ? lidocaine (XYLOCAINE) 5 % ointment Apply topically 3 (three) times a day. 120 g 0   ? lisinopriL  (PRINIVIL,ZESTRIL) 2.5 MG tablet Take 1 tablet by mouth once daily 90 tablet 0   ? lovastatin (MEVACOR) 40 MG tablet Take 2 tablets (80 mg total) by mouth at bedtime. 180 tablet 1   ? MAPAP, ACETAMINOPHEN, 500 mg coapsule Take 1,000 mg by mouth every 6 (six) hours as needed. 180 capsule 1   ? metFORMIN (GLUCOPHAGE-XR) 500 MG 24 hr tablet Take 2 tablets (1,000 mg total) by mouth 2 (two) times a day with meals. 360 tablet 2   ? phentermine (ADIPEX-P) 37.5 mg tablet TAKE 1 & 1/2 (ONE & ONE-HALF) TABLETS BY MOUTH ONCE DAILY IN THE MORNING 135 tablet 0   ? topiramate (TOPAMAX) 100 MG tablet Take 1 tablet by mouth twice daily 180 tablet 3   ? triamcinolone (KENALOG) 0.025 % ointment Apply sparingly in ear canal 1-2 times daily as needed 15 g 0   ? VESICARE 5 mg tablet Take 1 tablet by mouth once daily 90 tablet 3   ? albuterol (PROAIR HFA;PROVENTIL HFA;VENTOLIN HFA) 90 mcg/actuation inhaler Inhale 2 puffs every 4 (four) hours as needed for wheezing. 1 Inhaler 1     No current facility-administered medications on file prior to visit.

## 2021-07-22 ENCOUNTER — RECORDS - HEALTHEAST (OUTPATIENT)
Dept: LAB | Facility: CLINIC | Age: 66
End: 2021-07-22

## 2021-07-22 DIAGNOSIS — Z12.31 OTHER SCREENING MAMMOGRAM: ICD-10-CM

## 2021-07-23 ENCOUNTER — RECORDS - HEALTHEAST (OUTPATIENT)
Dept: FAMILY MEDICINE | Facility: CLINIC | Age: 66
End: 2021-07-23

## 2021-07-23 DIAGNOSIS — R92.8 OTHER ABNORMAL FINDINGS ON RADIOLOGICAL EXAMINATION OF BREAST: ICD-10-CM

## 2021-08-02 ENCOUNTER — TRANSFERRED RECORDS (OUTPATIENT)
Dept: HEALTH INFORMATION MANAGEMENT | Facility: CLINIC | Age: 66
End: 2021-08-02

## 2021-08-02 LAB — RETINOPATHY: NEGATIVE

## 2021-08-11 ENCOUNTER — NURSE TRIAGE (OUTPATIENT)
Dept: NURSING | Facility: CLINIC | Age: 66
End: 2021-08-11

## 2021-08-11 DIAGNOSIS — E11.9 TYPE 2 DIABETES MELLITUS WITHOUT COMPLICATION, WITHOUT LONG-TERM CURRENT USE OF INSULIN (H): Primary | ICD-10-CM

## 2021-08-11 DIAGNOSIS — E11.9 TYPE 2 DIABETES MELLITUS (H): ICD-10-CM

## 2021-08-11 NOTE — TELEPHONE ENCOUNTER
Anita is calling and is requesting a refill for Farxiga 10 mg.  Patient called in request to pharmacy and pharmacy has not heard back from clinic yet.  Patient is needing a new prescription for Farxiga.  Pharmacy of Choice is [facility] in [place], telephone number is [phone].  Located on [facility].  Trish also said that she would find Anita a dentist that does work on free for people and MD never phoned with dentist.  Patient is requesting to speak [facility] Trish Moralez.      Reason for Disposition    Caller has urgent medication question about med that PCP prescribed and triager unable to answer question    Additional Information    Negative: MORE THAN A DOUBLE DOSE of a prescription or over-the-counter (OTC) drug    Negative: DOUBLE DOSE (an extra dose or lesser amount) of over-the-counter (OTC) drug and any symptoms (e.g., dizziness, nausea, pain, sleepiness)    Negative: DOUBLE DOSE (an extra dose or lesser amount) of prescription drug and any symptoms (e.g., dizziness, nausea, pain, sleepiness)    Negative: Took another person's prescription drug    Negative: DOUBLE DOSE (an extra dose or lesser amount) of prescription drug and NO symptoms (Exception: a double dose of antibiotics)    Negative: Diabetes drug error or overdose (e.g., took wrong type of insulin or took extra dose)    Negative: Caller has medication question about med not prescribed by PCP and triager unable to answer question (e.g., compatibility with other med, storage)    Negative: Request for URGENT new prescription or refill of 'essential' medication (i.e., likelihood of harm to patient if not taken) and triager unable to fill per department policy    Negative: Prescription not at pharmacy and was prescribed today by PCP    Negative: Pharmacy calling with prescription questions and triager unable to answer question    Protocols used: MEDICATION QUESTION CALL-A-[place]

## 2021-08-11 NOTE — TELEPHONE ENCOUNTER
Medication: Farxiga 10mg tab  Last Date Filled: 12/6/20 #90 RF X1  Last appointment addressing medication: 6/15/21  Last B/P:  BP Readings from Last 3 Encounters:   06/15/21 104/78   01/18/21 96/67   10/08/20 98/60     Last labs pertaining to refill:

## 2021-08-12 RX ORDER — DAPAGLIFLOZIN 10 MG/1
10 TABLET, FILM COATED ORAL DAILY
Qty: 90 TABLET | Refills: 1 | Status: SHIPPED | OUTPATIENT
Start: 2021-08-12 | End: 2022-02-16

## 2021-09-01 ENCOUNTER — NURSE TRIAGE (OUTPATIENT)
Dept: NURSING | Facility: CLINIC | Age: 66
End: 2021-09-01

## 2021-09-01 NOTE — TELEPHONE ENCOUNTER
Patient has a question about taking   CBD oil  For her back pain.    She is also on Lisinipril   Due to being a diabetic, and wants to know if she can take these two together.    Please call patient [phone] with this information.    Ritika Chester, RN RN  [facility] Triage/refill nurse        Reason for Disposition    Caller has urgent medication question about med that PCP prescribed and triager unable to answer question    Protocols used: MEDICATION QUESTION CALL-A-[place]

## 2021-09-03 NOTE — TELEPHONE ENCOUNTER
As far as I know you should be safe to use CBD oil with your medications.  Please continue using your medications.  Let me know if you have any problems.

## 2021-09-08 NOTE — TELEPHONE ENCOUNTER
Encounter Date: 3/15/Lawson Suarez MD  03/15/18 0348

## 2021-09-23 DIAGNOSIS — M25.519 ARTHRALGIA OF SHOULDER, UNSPECIFIED LATERALITY: ICD-10-CM

## 2021-09-23 DIAGNOSIS — M54.50 CHRONIC LOW BACK PAIN: Primary | ICD-10-CM

## 2021-09-23 DIAGNOSIS — G89.29 CHRONIC LOW BACK PAIN: Primary | ICD-10-CM

## 2021-09-24 NOTE — TELEPHONE ENCOUNTER
"  Disp Refills Start End MARQUIS    diclofenac sodium (VOLTAREN) 1 % Gel 300 g 1 3/4/2021  No   Sig: APPLY TOPICALLY TWICE DAILY AS NEEDED FOR ARTHRITIS   Sent to pharmacy as: diclofenac 1 % topical gel (VOLTAREN)   E-Prescribing Status: Receipt confirmed by pharmacy (3/4/2021  6:48 PM CST)       Last Written Prescription Date:  3/4/21  Last Fill Quantity: 300 g,  # refills: 1   Last office visit provider:  6/15/21     Requested Prescriptions   Pending Prescriptions Disp Refills     diclofenac (VOLTAREN) 1 % topical gel [Pharmacy Med Name: Diclofenac Sodium 1 % Transdermal Gel] 300 g 0     Sig: APPLY TOPICALLY TWICE DAILY AS NEEDED FOR ARTHRITIS       Topical Steroids and Nonsteroidals Protocol Passed - 9/23/2021  3:48 PM        Passed - Patient is age 6 or older        Passed - Authorizing prescriber's most recent note related to this medication read.     If refill request is for ophthalmic use, please forward request to provider for approval.          Passed - High potency steroid not ordered        Passed - Recent (12 mo) or future (30 days) visit within the authorizing provider's specialty     Patient has had an office visit with the authorizing provider or a provider within the authorizing providers department within the previous 12 mos or has a future within next 30 days. See \"Patient Info\" tab in inbasket, or \"Choose Columns\" in Meds & Orders section of the refill encounter.              Passed - Medication is active on med list             Robson Elizalde RN 09/24/21 10:27 AM  "

## 2021-10-11 ENCOUNTER — HEALTH MAINTENANCE LETTER (OUTPATIENT)
Age: 66
End: 2021-10-11

## 2021-10-12 DIAGNOSIS — G89.4 CHRONIC PAIN SYNDROME: ICD-10-CM

## 2021-10-13 RX ORDER — ACETAMINOPHEN 500 MG/1
CAPSULE ORAL
Qty: 180 CAPSULE | Refills: 0 | Status: SHIPPED | OUTPATIENT
Start: 2021-10-13 | End: 2022-01-03

## 2021-10-13 NOTE — TELEPHONE ENCOUNTER
"Routing refill request to provider for review/approval because:  A break in medication    Last Written Prescription Date:  3/2/20  Last Fill Quantity: 180,  # refills: 1   Last office visit provider:  6/15/21     Requested Prescriptions   Pending Prescriptions Disp Refills     MAPAP 500 MG CAPS [Pharmacy Med Name: Mapap 500 MG Oral Capsule] 180 capsule 0     Sig: TAKE 2 CAPSULES BY MOUTH EVERY 6 HOURS AS NEEDED       Analgesics (Non-Narcotic Tylenol and ASA Only) Passed - 10/12/2021  4:04 PM        Passed - Recent (12 mo) or future (30 days) visit within the authorizing provider's specialty     Patient has had an office visit with the authorizing provider or a provider within the authorizing providers department within the previous 12 mos or has a future within next 30 days. See \"Patient Info\" tab in inbasket, or \"Choose Columns\" in Meds & Orders section of the refill encounter.              Passed - Patient is 7 months old or older     If patient is a peds patient of the age 7 mos -12 years, ok to refill using weight-based dosing.     If >3g daily and/or sig is not \"prn\", check for liver enzymes. If normal in the last year, ok to refill.  If not, refer to the provider.          Passed - Medication is active on med list             Robson Elizalde RN 10/13/21 1:16 PM  "

## 2021-10-29 ENCOUNTER — OFFICE VISIT (OUTPATIENT)
Dept: FAMILY MEDICINE | Facility: CLINIC | Age: 66
End: 2021-10-29
Payer: COMMERCIAL

## 2021-10-29 ENCOUNTER — HOSPITAL ENCOUNTER (EMERGENCY)
Facility: CLINIC | Age: 66
Discharge: HOME | End: 2021-10-29
Attending: EMERGENCY MEDICINE | Admitting: EMERGENCY MEDICINE
Payer: COMMERCIAL

## 2021-10-29 ENCOUNTER — NURSE TRIAGE (OUTPATIENT)
Dept: NURSING | Facility: CLINIC | Age: 66
End: 2021-10-29

## 2021-10-29 VITALS
BODY MASS INDEX: 33.47 KG/M2 | WEIGHT: 195 LBS | TEMPERATURE: 98.3 F | RESPIRATION RATE: 18 BRPM | OXYGEN SATURATION: 99 % | SYSTOLIC BLOOD PRESSURE: 125 MMHG | HEART RATE: 73 BPM | DIASTOLIC BLOOD PRESSURE: 71 MMHG

## 2021-10-29 VITALS
DIASTOLIC BLOOD PRESSURE: 71 MMHG | SYSTOLIC BLOOD PRESSURE: 116 MMHG | RESPIRATION RATE: 16 BRPM | BODY MASS INDEX: 33.3 KG/M2 | HEART RATE: 66 BPM | WEIGHT: 194 LBS | OXYGEN SATURATION: 99 % | TEMPERATURE: 97.9 F

## 2021-10-29 DIAGNOSIS — J06.9 VIRAL URI WITH COUGH: ICD-10-CM

## 2021-10-29 DIAGNOSIS — R42 LIGHTHEADEDNESS: ICD-10-CM

## 2021-10-29 DIAGNOSIS — R55 PRE-SYNCOPE: ICD-10-CM

## 2021-10-29 DIAGNOSIS — R42 LIGHT HEADED: Primary | ICD-10-CM

## 2021-10-29 LAB
ALBUMIN UR-MCNC: NEGATIVE MG/DL
ANION GAP SERPL CALCULATED.3IONS-SCNC: 10 MMOL/L (ref 5–18)
APPEARANCE UR: CLEAR
ATRIAL RATE - MUSE: 63 BPM
ATRIAL RATE - MUSE: 72 BPM
BASOPHILS # BLD AUTO: 0 10E3/UL (ref 0–0.2)
BASOPHILS NFR BLD AUTO: 1 %
BILIRUB UR QL STRIP: NEGATIVE
BUN SERPL-MCNC: 22 MG/DL (ref 8–22)
CALCIUM SERPL-MCNC: 9.6 MG/DL (ref 8.5–10.5)
CHLORIDE BLD-SCNC: 105 MMOL/L (ref 98–107)
CO2 SERPL-SCNC: 22 MMOL/L (ref 22–31)
COLOR UR AUTO: YELLOW
CREAT SERPL-MCNC: 0.84 MG/DL (ref 0.6–1.1)
DIASTOLIC BLOOD PRESSURE - MUSE: NORMAL MMHG
DIASTOLIC BLOOD PRESSURE - MUSE: NORMAL MMHG
EOSINOPHIL # BLD AUTO: 0.3 10E3/UL (ref 0–0.7)
EOSINOPHIL NFR BLD AUTO: 4 %
ERYTHROCYTE [DISTWIDTH] IN BLOOD BY AUTOMATED COUNT: 12.4 % (ref 10–15)
GFR SERPL CREATININE-BSD FRML MDRD: 72 ML/MIN/1.73M2
GLUCOSE BLD-MCNC: 145 MG/DL (ref 70–125)
GLUCOSE UR STRIP-MCNC: 500 MG/DL
HCT VFR BLD AUTO: 46.3 % (ref 35–47)
HGB BLD-MCNC: 15.1 G/DL (ref 11.7–15.7)
HGB UR QL STRIP: NEGATIVE
IMM GRANULOCYTES # BLD: 0 10E3/UL
IMM GRANULOCYTES NFR BLD: 0 %
INTERPRETATION ECG - MUSE: NORMAL
INTERPRETATION ECG - MUSE: NORMAL
KETONES UR STRIP-MCNC: NEGATIVE MG/DL
LEUKOCYTE ESTERASE UR QL STRIP: NEGATIVE
LYMPHOCYTES # BLD AUTO: 2.5 10E3/UL (ref 0.8–5.3)
LYMPHOCYTES NFR BLD AUTO: 30 %
MCH RBC QN AUTO: 31.2 PG (ref 26.5–33)
MCHC RBC AUTO-ENTMCNC: 32.6 G/DL (ref 31.5–36.5)
MCV RBC AUTO: 96 FL (ref 78–100)
MONOCYTES # BLD AUTO: 0.5 10E3/UL (ref 0–1.3)
MONOCYTES NFR BLD AUTO: 6 %
NEUTROPHILS # BLD AUTO: 4.9 10E3/UL (ref 1.6–8.3)
NEUTROPHILS NFR BLD AUTO: 60 %
NITRATE UR QL: NEGATIVE
P AXIS - MUSE: 34 DEGREES
P AXIS - MUSE: 52 DEGREES
PH UR STRIP: 5 [PH] (ref 5–8)
PLATELET # BLD AUTO: 270 10E3/UL (ref 150–450)
POTASSIUM BLD-SCNC: 4.6 MMOL/L (ref 3.5–5)
PR INTERVAL - MUSE: 134 MS
PR INTERVAL - MUSE: 150 MS
QRS DURATION - MUSE: 74 MS
QRS DURATION - MUSE: 74 MS
QT - MUSE: 392 MS
QT - MUSE: 436 MS
QTC - MUSE: 429 MS
QTC - MUSE: 446 MS
R AXIS - MUSE: -31 DEGREES
R AXIS - MUSE: -48 DEGREES
RBC # BLD AUTO: 4.84 10E6/UL (ref 3.8–5.2)
SODIUM SERPL-SCNC: 137 MMOL/L (ref 136–145)
SP GR UR STRIP: 1.02 (ref 1–1.03)
SYSTOLIC BLOOD PRESSURE - MUSE: NORMAL MMHG
SYSTOLIC BLOOD PRESSURE - MUSE: NORMAL MMHG
T AXIS - MUSE: 36 DEGREES
T AXIS - MUSE: 45 DEGREES
UROBILINOGEN UR STRIP-ACNC: 0.2 E.U./DL
VENTRICULAR RATE- MUSE: 63 BPM
VENTRICULAR RATE- MUSE: 72 BPM
WBC # BLD AUTO: 8.2 10E3/UL (ref 4–11)

## 2021-10-29 PROCEDURE — 93005 ELECTROCARDIOGRAM TRACING: CPT | Performed by: EMERGENCY MEDICINE

## 2021-10-29 PROCEDURE — 99215 OFFICE O/P EST HI 40 MIN: CPT | Mod: 25 | Performed by: PHYSICIAN ASSISTANT

## 2021-10-29 PROCEDURE — 93010 ELECTROCARDIOGRAM REPORT: CPT | Performed by: INTERNAL MEDICINE

## 2021-10-29 PROCEDURE — 80048 BASIC METABOLIC PNL TOTAL CA: CPT | Performed by: PHYSICIAN ASSISTANT

## 2021-10-29 PROCEDURE — U0003 INFECTIOUS AGENT DETECTION BY NUCLEIC ACID (DNA OR RNA); SEVERE ACUTE RESPIRATORY SYNDROME CORONAVIRUS 2 (SARS-COV-2) (CORONAVIRUS DISEASE [COVID-19]), AMPLIFIED PROBE TECHNIQUE, MAKING USE OF HIGH THROUGHPUT TECHNOLOGIES AS DESCRIBED BY CMS-2020-01-R: HCPCS | Performed by: PHYSICIAN ASSISTANT

## 2021-10-29 PROCEDURE — 99283 EMERGENCY DEPT VISIT LOW MDM: CPT

## 2021-10-29 PROCEDURE — 81003 URINALYSIS AUTO W/O SCOPE: CPT | Performed by: PHYSICIAN ASSISTANT

## 2021-10-29 PROCEDURE — U0005 INFEC AGEN DETEC AMPLI PROBE: HCPCS | Performed by: PHYSICIAN ASSISTANT

## 2021-10-29 PROCEDURE — 93005 ELECTROCARDIOGRAM TRACING: CPT | Performed by: PHYSICIAN ASSISTANT

## 2021-10-29 PROCEDURE — 85025 COMPLETE CBC W/AUTO DIFF WBC: CPT | Performed by: PHYSICIAN ASSISTANT

## 2021-10-29 PROCEDURE — 36415 COLL VENOUS BLD VENIPUNCTURE: CPT | Performed by: PHYSICIAN ASSISTANT

## 2021-10-29 NOTE — ED TRIAGE NOTES
Patient is here with dizziness that started three days ago.She does have a productive  cough noted and a headache. Chest XR was clear. She was sent here from the clinic for further evaluation. No other symptoms at this time.

## 2021-10-29 NOTE — ED PROVIDER NOTES
"EMERGENCY DEPARTMENT ENCOUNTER      NAME: Anita Duque  AGE: 66 year old female  YOB: 1955  MRN: [id number]  EVALUATION DATE & TIME: 10/29/2021  3:55 PM    PCP: Trish Eagle    ED PROVIDER: Maninder Mendez M.D.      Chief Complaint   Patient presents with     Dizziness         IMPRESSION  1. Lightheadedness    2. Viral URI with cough        PLAN  - isolate at home while awaiting COVID-19 swab  - close PCP follow up  - discharge to home    ED COURSE & MEDICAL DECISION MAKING      66yoF with history of asthma, T2DM, HTN, chronic pain syndrome, fibromyalgia presenting from clinic for evaluation of lightheadedness. Had unremarkable CXR, CBC, BMP, UA in clinic as well as COVID-19 swab (pending at this time). Sent to ED to rule out ACS. Reports 3 days of white-productive cough with associated lightheadedness with standing; sometimes with leaning forward from the toilet. Denies ever fully passing out. No chest pain or shortness of breath; no pleuritic or exertional symptoms. No fevers, sweats, chills, nausea, vomiting, diarrhea, any other problems or complaints at this time. Went to walk-in clinic where \"they just sent me over here and didn't tell my why\". Patient quite upset that she was not treated in clinic.    Normal vitals on presentation. Exam overall unremarkable with clear lungs, normal work of breathing, benign abdomen, no peripheral edema or calf tenderness, normal neuro exam, no meningismus. No ongoing symptoms here now. EKG unchanged from 1/2021 with no ischemic; prior unchanged poor R wave progression anteriorly but not acute. CBC/BMP from earlier today unremarkable with no LAUREN, no anemia, no glaring electrolyte abnormality. CXR clear as well; doubt pneumonia. UA with no UTI; no urinary symptoms either. COVID-19 swab pending from earlier today. Overall, sounds like mild viral URI with lightheadedness----doubt ongoing ACS or primary cardiac etiology, sepsis, ongoing emergent " life-threatening etiology; ok for outpatient management. Patient frustrated at being sent to the ED; wants to go home. Reasonable given no ongoing symptoms, normal vitals, reassuring exam. Patient able to walk without difficulty. Return precautions and need for PCP follow up discussed and understood. No further questions at the time of discharge.    I wore the following PPE during this patient encounter:  N95 mask, face shield w/ eye protection, gloves, gown    MEDICATIONS GIVEN IN THE EMERGENCY DEPARTMENT  Medications - No data to display    NEW PRESCRIPTIONS STARTED AT TODAY'S ER VISIT  Discharge Medication List as of 10/29/2021  6:18 PM      CONTINUE these medications which have NOT CHANGED    Details   albuterol (PROAIR HFA;PROVENTIL HFA;VENTOLIN HFA) 90 mcg/actuation inhaler [ALBUTEROL (PROAIR HFA;PROVENTIL HFA;VENTOLIN HFA) 90 MCG/ACTUATION INHALER] Inhale 2 puffs every 4 (four) hours as needed for wheezing., Disp-1 Inhaler, R-1, NormalMay substitute the equivalent medication per insurance preference.      cyanocobalamin, vitamin B-12, (VITAMIN B-12 ORAL) [CYANOCOBALAMIN, VITAMIN B-12, (VITAMIN B-12 ORAL)] Take 1 tablet by mouth as needed., Historical      cyclobenzaprine (FLEXERIL) 10 MG tablet [CYCLOBENZAPRINE (FLEXERIL) 10 MG TABLET] one tablet twice daily, Disp-60 tablet, R-0, Normal      dapagliflozin (FARXIGA) 10 MG TABS tablet Take 1 tablet (10 mg) by mouth daily, 10 mg, Oral, DAILY Starting Thu 8/12/2021, Disp-90 tablet, R-1, E-Prescribe      diclofenac (VOLTAREN) 1 % topical gel APPLY TOPICALLY TWICE DAILY AS NEEDED FOR ARTHRITIS, Disp-300 g, R-1, E-Prescribe      diclofenac epolamine (FLECTOR) 1.3 % PT12 [DICLOFENAC EPOLAMINE (FLECTOR) 1.3 % PT12] Place 1 patch on the skin every 12 (twelve) hours as needed., Disp-60 patch, R-5, Normal      diclofenac sodium (VOLTAREN) 1 % Gel [DICLOFENAC SODIUM (VOLTAREN) 1 % GEL] APPLY TOPICALLY TWICE DAILY AS NEEDED FOR ARTHRITIS, Disp-300 g, R-1, Normal       DULoxetine (CYMBALTA) 60 MG capsule [DULOXETINE (CYMBALTA) 60 MG CAPSULE] Take 1 capsule by mouth once daily, Disp-90 capsule, R-3, Normal      ergocalciferol (VITAMIN D2) 1,250 mcg (50,000 unit) capsule [ERGOCALCIFEROL (VITAMIN D2) 1,250 MCG (50,000 UNIT) CAPSULE] Take 1 capsule (50,000 Units total) by mouth once a week., Disp-12 capsule, R-1, Normal      gabapentin (NEURONTIN) 300 MG capsule [GABAPENTIN (NEURONTIN) 300 MG CAPSULE] TAKE 2 CAPSULES BY MOUTH 4 TIMES DAILY, Disp-240 capsule, R-3, Normal      glipiZIDE (GLUCOTROL XL) 5 MG 24 hr tablet [GLIPIZIDE (GLUCOTROL XL) 5 MG 24 HR TABLET] Take 1 tablet by mouth once daily, Disp-90 tablet, R-3, Normal      ibuprofen (ADVIL,MOTRIN) 800 MG tablet [IBUPROFEN (ADVIL,MOTRIN) 800 MG TABLET] Take 1 tablet by mouth three times daily as needed, Disp-100 tablet, R-3, Normal      lidocaine (XYLOCAINE) 5 % ointment [LIDOCAINE (XYLOCAINE) 5 % OINTMENT] Apply topically 3 (three) times a day.Disp-120 g, R-0Normal      lisinopriL (PRINIVIL,ZESTRIL) 2.5 MG tablet [LISINOPRIL (PRINIVIL,ZESTRIL) 2.5 MG TABLET] Take 1 tablet by mouth once daily, Disp-90 tablet, R-0, Normal      lovastatin (MEVACOR) 40 MG tablet [LOVASTATIN (MEVACOR) 40 MG TABLET] Take 2 tablets (80 mg total) by mouth at bedtime., Disp-180 tablet, R-1, Normal      MAPAP 500 MG CAPS TAKE 2 CAPSULES BY MOUTH EVERY 6 HOURS AS NEEDED, Disp-180 capsule, R-0, MARQUIS, E-Prescribe      metFORMIN (GLUCOPHAGE-XR) 500 MG 24 hr tablet [METFORMIN (GLUCOPHAGE-XR) 500 MG 24 HR TABLET] Take 2 tablets (1,000 mg total) by mouth 2 (two) times a day with meals., Disp-360 tablet, R-2, Normal      !! omeprazole (PRILOSEC) 40 MG capsule [OMEPRAZOLE (PRILOSEC) 40 MG CAPSULE] Take 1 capsule (40 mg total) by mouth daily., Disp-90 capsule, R-1, Normal      !! omeprazole (PRILOSEC) 40 MG capsule [OMEPRAZOLE (PRILOSEC) 40 MG CAPSULE] Take 40 mg by mouth daily .      , Historical      oxyCODONE-acetaminophen (PERCOCET/ENDOCET) 5-325 mg per tablet  [OXYCODONE-ACETAMINOPHEN (PERCOCET/ENDOCET) 5-325 MG PER TABLET] Take 1 tablet by mouth every 6 (six) hours as needed for pain., Disp-90 tablet, R-0, Normal      phentermine (ADIPEX-P) 37.5 mg tablet [PHENTERMINE (ADIPEX-P) 37.5 MG TABLET] TAKE 1 & 1/2 (ONE & ONE-HALF) TABLETS BY MOUTH ONCE DAILY IN THE MORNING, Disp-135 tablet, R-0, Normal      SENNA 8.6 mg tablet [SENNA 8.6 MG TABLET] Take 1 tablet by mouth twice daily, Disp-180 tablet, R-3, MARQUIS, Normal      SENNA LAXATIVE 8.6 mg tablet [SENNA LAXATIVE 8.6 MG TABLET] Take 1 tablet by mouth 2 (two) times a day., Disp-180 tablet, R-3, MARQUIS, Normal      !! spironolactone (ALDACTONE) 100 MG tablet [SPIRONOLACTONE (ALDACTONE) 100 MG TABLET] Take 1 tablet (100 mg total) by mouth daily., Disp-90 tablet, R-3, Normal      !! spironolactone (ALDACTONE) 100 MG tablet [SPIRONOLACTONE (ALDACTONE) 100 MG TABLET] TAKE 1 TABLET BY MOUTH ONCE DAILY. APPOINTMENT REQUIRED FOR FUTURE REFILLS, Disp-90 tablet, R-2, Normal      topiramate (TOPAMAX) 100 MG tablet [TOPIRAMATE (TOPAMAX) 100 MG TABLET] Take 1 tablet by mouth twice daily, Disp-180 tablet, R-3, Normal      triamcinolone (KENALOG) 0.025 % ointment [TRIAMCINOLONE (KENALOG) 0.025 % OINTMENT] Apply sparingly in ear canal 1-2 times daily as neededDisp-15 g, R-0Normal      VESICARE 5 mg tablet [VESICARE 5 MG TABLET] Take 1 tablet by mouth once daily, Disp-90 tablet, R-3, MARQUIS, Normal       !! - Potential duplicate medications found. Please discuss with provider.              =================================================================      HPI    Anita L Chambers is a 66 year old female with a pertinent history of history of asthma, T2DM, HTN, chronic pain syndrome, fibromyalgia presenting from clinic for evaluation of lightheadedness. Had unremarkable CXR, CBC, BMP, UA in clinic as well as COVID-19 swab (pending at this time). Sent to ED to rule out ACS. Reports 3 days of white-productive cough with associated lightheadedness  "with standing; sometimes with leaning forward from the toilet. Denies ever fully passing out. No chest pain or shortness of breath; no pleuritic or exertional symptoms. No fevers, sweats, chills, nausea, vomiting, diarrhea, any other problems or complaints at this time. Went to walk-in clinic where \"they just sent me over here and didn't tell my why\". Patient quite upset that she was not treated in clinic.        REVIEW OF SYSTEMS   Review of Systems   Constitutional: Negative for fever.   HENT: Negative for congestion and rhinorrhea.    Eyes: Negative for redness.   Respiratory: Positive for cough. Negative for shortness of breath.    Cardiovascular: Negative for chest pain.   Gastrointestinal: Negative for abdominal pain.   Genitourinary: Negative for difficulty urinating, dysuria, flank pain, frequency and urgency.   Musculoskeletal: Negative for arthralgias and neck stiffness.   Skin: Negative for color change.   Neurological: Positive for light-headedness. Negative for syncope and headaches.        Negative for tingling. Negative for focal weakness.   Psychiatric/Behavioral: Negative for confusion.             --------------- MEDICAL HISTORY ---------------  PAST MEDICAL HISTORY:  Past Medical History:   Diagnosis Date     Asthma      Chronic pain syndrome      Depression      Diabetes mellitus, type II (H)      Fibromyalgia      Hyperlipidemia      Hypertension      Insomnia      Patient Active Problem List   Diagnosis     Essential hypertension     Joint Pain, Localized In The Shoulder     Fibromyalgia     Type 2 diabetes mellitus (H)     Hyperlipidemia     Major Depression, Recurrent     Chronic Constipation     Sciatica     Chronic pain syndrome     Lower Back Pain     Vaginal Wall Prolapse With Midline Cystocele     Female Stress Incontinence     Lump Or Mass In Breast     Osteoarthrosis, unspecified whether generalized or localized, lower leg     JANINE on CPAP     Obesity (BMI 30.0-34.9)     Degenerative " disc disease, cervical     Degenerative disc disease, lumbar     Morbid obesity (H)     Mild intermittent asthma     Controlled substance agreement signed     Gastroesophageal reflux disease without esophagitis       PAST SURGICAL HISTORY:  Past Surgical History:   Procedure Laterality Date     C DELGADILLO W/O FACETEC FORAMOT/[facility] 1/2 VRT SEG, CERVICAL      Description: Laminectomy Lumbar;  Recorded: 10/05/2012;     C SUPRACERV ABD HYSTERECTOMY      Description: Supracervical Hysterectomy;  Recorded: 04/05/2011;     HC REVISE MEDIAN N/CARPAL TUNNEL SURG      Description: Neuroplasty Decompression Median Nerve At Carpal Tunnel;  Recorded: 07/16/2012;     HYSTERECTOMY  2011     HYSTERECTOMY TOTAL ABDOMINAL       IR LUMBAR EPIDURAL STEROID INJECTION  4/13/2011     IR LUMBAR NERVE ROOT INJECTION  3/9/2012     OOPHORECTOMY  2011       CURRENT MEDICATIONS:    No current facility-administered medications for this encounter.    Current Outpatient Medications:      albuterol (PROAIR HFA;PROVENTIL HFA;VENTOLIN HFA) 90 mcg/actuation inhaler, [ALBUTEROL (PROAIR HFA;PROVENTIL HFA;VENTOLIN HFA) 90 MCG/ACTUATION INHALER] Inhale 2 puffs every 4 (four) hours as needed for wheezing., Disp: 1 Inhaler, Rfl: 1     cyanocobalamin, vitamin B-12, (VITAMIN B-12 ORAL), [CYANOCOBALAMIN, VITAMIN B-12, (VITAMIN B-12 ORAL)] Take 1 tablet by mouth as needed., Disp: , Rfl:      cyclobenzaprine (FLEXERIL) 10 MG tablet, [CYCLOBENZAPRINE (FLEXERIL) 10 MG TABLET] one tablet twice daily, Disp: 60 tablet, Rfl: 0     dapagliflozin (FARXIGA) 10 MG TABS tablet, Take 1 tablet (10 mg) by mouth daily, Disp: 90 tablet, Rfl: 1     diclofenac (VOLTAREN) 1 % topical gel, APPLY TOPICALLY TWICE DAILY AS NEEDED FOR ARTHRITIS, Disp: 300 g, Rfl: 1     diclofenac epolamine (FLECTOR) 1.3 % PT12, [DICLOFENAC EPOLAMINE (FLECTOR) 1.3 % PT12] Place 1 patch on the skin every 12 (twelve) hours as needed., Disp: 60 patch, Rfl: 5     diclofenac sodium (VOLTAREN) 1 % Gel, [DICLOFENAC  SODIUM (VOLTAREN) 1 % GEL] APPLY TOPICALLY TWICE DAILY AS NEEDED FOR ARTHRITIS, Disp: 300 g, Rfl: 1     DULoxetine (CYMBALTA) 60 MG capsule, [DULOXETINE (CYMBALTA) 60 MG CAPSULE] Take 1 capsule by mouth once daily, Disp: 90 capsule, Rfl: 3     ergocalciferol (VITAMIN D2) 1,250 mcg (50,000 unit) capsule, [ERGOCALCIFEROL (VITAMIN D2) 1,250 MCG (50,000 UNIT) CAPSULE] Take 1 capsule (50,000 Units total) by mouth once a week., Disp: 12 capsule, Rfl: 1     gabapentin (NEURONTIN) 300 MG capsule, [GABAPENTIN (NEURONTIN) 300 MG CAPSULE] TAKE 2 CAPSULES BY MOUTH 4 TIMES DAILY, Disp: 240 capsule, Rfl: 3     glipiZIDE (GLUCOTROL XL) 5 MG 24 hr tablet, [GLIPIZIDE (GLUCOTROL XL) 5 MG 24 HR TABLET] Take 1 tablet by mouth once daily, Disp: 90 tablet, Rfl: 3     ibuprofen (ADVIL,MOTRIN) 800 MG tablet, [IBUPROFEN (ADVIL,MOTRIN) 800 MG TABLET] Take 1 tablet by mouth three times daily as needed, Disp: 100 tablet, Rfl: 3     lidocaine (XYLOCAINE) 5 % ointment, [LIDOCAINE (XYLOCAINE) 5 % OINTMENT] Apply topically 3 (three) times a day., Disp: 120 g, Rfl: 0     lisinopriL (PRINIVIL,ZESTRIL) 2.5 MG tablet, [LISINOPRIL (PRINIVIL,ZESTRIL) 2.5 MG TABLET] Take 1 tablet by mouth once daily, Disp: 90 tablet, Rfl: 0     lovastatin (MEVACOR) 40 MG tablet, [LOVASTATIN (MEVACOR) 40 MG TABLET] Take 2 tablets (80 mg total) by mouth at bedtime., Disp: 180 tablet, Rfl: 1     MAPAP 500 MG CAPS, TAKE 2 CAPSULES BY MOUTH EVERY 6 HOURS AS NEEDED, Disp: 180 capsule, Rfl: 0     metFORMIN (GLUCOPHAGE-XR) 500 MG 24 hr tablet, [METFORMIN (GLUCOPHAGE-XR) 500 MG 24 HR TABLET] Take 2 tablets (1,000 mg total) by mouth 2 (two) times a day with meals., Disp: 360 tablet, Rfl: 2     omeprazole (PRILOSEC) 40 MG capsule, [OMEPRAZOLE (PRILOSEC) 40 MG CAPSULE] Take 1 capsule (40 mg total) by mouth daily., Disp: 90 capsule, Rfl: 1     omeprazole (PRILOSEC) 40 MG capsule, [OMEPRAZOLE (PRILOSEC) 40 MG CAPSULE] Take 40 mg by mouth daily .      , Disp: , Rfl:       oxyCODONE-acetaminophen (PERCOCET/ENDOCET) 5-325 mg per tablet, [OXYCODONE-ACETAMINOPHEN (PERCOCET/ENDOCET) 5-325 MG PER TABLET] Take 1 tablet by mouth every 6 (six) hours as needed for pain., Disp: 90 tablet, Rfl: 0     phentermine (ADIPEX-P) 37.5 mg tablet, [PHENTERMINE (ADIPEX-P) 37.5 MG TABLET] TAKE 1 & 1/2 (ONE & ONE-HALF) TABLETS BY MOUTH ONCE DAILY IN THE MORNING, Disp: 135 tablet, Rfl: 0     SENNA 8.6 mg tablet, [SENNA 8.6 MG TABLET] Take 1 tablet by mouth twice daily, Disp: 180 tablet, Rfl: 3     SENNA LAXATIVE 8.6 mg tablet, [SENNA LAXATIVE 8.6 MG TABLET] Take 1 tablet by mouth 2 (two) times a day., Disp: 180 tablet, Rfl: 3     spironolactone (ALDACTONE) 100 MG tablet, [SPIRONOLACTONE (ALDACTONE) 100 MG TABLET] Take 1 tablet (100 mg total) by mouth daily., Disp: 90 tablet, Rfl: 3     spironolactone (ALDACTONE) 100 MG tablet, [SPIRONOLACTONE (ALDACTONE) 100 MG TABLET] TAKE 1 TABLET BY MOUTH ONCE DAILY. APPOINTMENT REQUIRED FOR FUTURE REFILLS, Disp: 90 tablet, Rfl: 2     topiramate (TOPAMAX) 100 MG tablet, [TOPIRAMATE (TOPAMAX) 100 MG TABLET] Take 1 tablet by mouth twice daily, Disp: 180 tablet, Rfl: 3     triamcinolone (KENALOG) 0.025 % ointment, [TRIAMCINOLONE (KENALOG) 0.025 % OINTMENT] Apply sparingly in ear canal 1-2 times daily as needed, Disp: 15 g, Rfl: 0     VESICARE 5 mg tablet, [VESICARE 5 MG TABLET] Take 1 tablet by mouth once daily, Disp: 90 tablet, Rfl: 3    ALLERGIES:  Allergies   Allergen Reactions     Bupropion Unknown     Other reaction(s): Agitation       FAMILY HISTORY:  Family History   Problem Relation Age of Onset     No Known Problems Mother      No Known Problems Father      No Known Problems Sister      No Known Problems Daughter      No Known Problems Maternal Grandmother      No Known Problems Maternal Grandfather      No Known Problems Paternal Grandmother      No Known Problems Paternal Grandfather      No Known Problems Maternal Aunt      No Known Problems Paternal Aunt       Hereditary Breast and Ovarian Cancer Syndrome No family hx of      Breast Cancer No family hx of      Cancer No family hx of      Colon Cancer No family hx of      Endometrial Cancer No family hx of      Ovarian Cancer No family hx of        SOCIAL HISTORY:   Social History     Socioeconomic History     Marital status:      Spouse name: Not on file     Number of children: Not on file     Years of education: Not on file     Highest education level: Not on file   Occupational History     Not on file   Tobacco Use     Smoking status: Never Smoker     Smokeless tobacco: Never Used   Substance and Sexual Activity     Alcohol use: Not on file     Drug use: Not on file     Sexual activity: Not on file   Other Topics Concern     Not on file   Social History Narrative     Not on file     Social Determinants of Health     Financial Resource Strain:      Difficulty of Paying Living Expenses:    Food Insecurity:      Worried About Running Out of Food in the Last Year:      Ran Out of Food in the Last Year:    Transportation Needs:      Lack of Transportation (Medical):      Lack of Transportation (Non-Medical):    Physical Activity:      Days of Exercise per Week:      Minutes of Exercise per Session:    Stress:      Feeling of Stress :    Social Connections:      Frequency of Communication with Friends and Family:      Frequency of Social Gatherings with Friends and Family:      Attends Anabaptism Services:      Active Member of Clubs or Organizations:      Attends Club or Organization Meetings:      Marital Status:    Intimate Partner Violence:      Fear of Current or Ex-Partner:      Emotionally Abused:      Physically Abused:      Sexually Abused:          --------------- PHYSICAL EXAM ---------------  Nursing notes and vitals reviewed by me.  VITALS:  Vitals:    10/29/21 1529 10/29/21 1800 10/29/21 1825   BP: (!) 129/92 132/84 125/71   Pulse: 75  73   Resp: 16  18   Temp: 98.3  F (36.8  C)     TempSrc: Temporal      SpO2: 100%  99%   Weight: 88.5 kg (195 lb)         PHYSICAL EXAM:    General:  alert, interactive, no distress  Eyes:  conjunctivae clear, conjugate gaze   HENT:  atraumatic, nose with no rhinorrhea, oropharynx clear  Neck:  no meningismus  Cardiovascular:  HR 80s during exam, regular rhythm, no murmurs, brisk cap refill  Chest:  no chest wall tenderness  Pulmonary:  no stridor, normal phonation, normal work of breathing, clear lungs bilaterally  Abdomen:  soft, nondistended, nontender  :  no CVA tenderness  Back:  no midline spinal tenderness  Musculoskeletal:  no pretibial edema, no calf tenderness. Gross ROM intact to joints of extremities with no obvious deformities.  Skin:  warm, dry, no rash  Neuro:  awake, alert, answers questions appropriately, follows commands, moves all limbs, CN 2-12 intact, negative pronator drift, 5/5 strength to all extremities with sensation to light touch intact, no tremor, no ankle clonus  Psych:  calm, normal affect      --------------- RESULTS ---------------  EKG:    Reviewed and interpreted by me.  NSR at 72bpm, no ST or T wave changes, unchanged poor R wave progression anteriorly, normal intervals  Unchanged from prior on 1/19/21  My read.    LAB:  Reviewed and interpreted by me:    UA = see Epic; no UTI or blood  CBC/BMP = see Epic, glucose 145 with all other values within normal limits      RADIOLOGY:  Reviewed by me. Please see official radiology report.  Recent Results (from the past 24 hour(s))   XR Chest 2 Views    Narrative    EXAM: XR CHEST 2 VW  LOCATION: [facility]  DATE/TIME: 10/29/2021 1:52 PM    INDICATION: cough  COMPARISON: None.      Impression    IMPRESSION: Right costophrenic angle is clipped on the PA view. Minimal linear scarring in the retrosternal space on the lateral view. Lungs are otherwise clear..Heart and pulmonary vascularity are normal. No signs of acute disease.    Focal eventration of the right hemidiaphragm anteriorly.  Moderate spurring in the lower thoracic spine.             Maninder Mendez MD  10/29/21  Emergency Medicine  [facility] EMERGENCY ROOM  [facility] [facility] [id number]  [phone]  Dept: [phone]     Maninder Mendez MD  10/30/21 0318

## 2021-10-29 NOTE — DISCHARGE INSTRUCTIONS
Isolate at home until your COVID-19 swab results; this usually takes 1-2 days.    Drink plenty of fluids to stay hydrated.    Continue all of your previously-prescribed medications.    Follow up with your Primary Care provider in 3 days for a recheck.    Return to the Emergency Department for any difficulty breathing, persistent vomiting, or any other concerns.

## 2021-10-29 NOTE — PROGRESS NOTES
Patient presents with:  Dizziness: light headed and dizzy last time pt states she had a uti       Clinical Decision Making:  I am concerned that the patient has syncope and presyncope and dizziness with findings on EKG. acute coronary syndrome is on the differential diagnosis.  Patient has multiple cardiovascular risk factors include hypertension hyperlipidemia diabetes struct of sleep apnea and use of CPAP and obesity and history of depression.  Patient has had multiple episodes of weakness, syncope and presyncope.  She was concerned initially that she had a urinary tract infection but has not had urinary symptoms other than urinary frequency.  Work-up to include CBC, chest x-ray, UA and basic metabolic panel did not show any abnormalities other than elevated blood glucose level.  Patient has had changes in the anterior leads with possible anterior infarct.  Because the patient is symptomatic and has multiple cardiovascular risk factors she is sent to the emergency room for repeat EKG troponin and work-up for her syncope dizziness and findings on the anterior leads in EKG.  Patient does not use an aspirin a day and she has sent to the emergency room by personal vehicle.  I called and gave report to emergency room physician.  Questions were answered to patient's satisfaction before discharge.    45 min spent on the date of the encounter in chart review, patient visit, review of tests, documentation and/ordiscussion with other providers about the issues documented above.         ICD-10-CM    1. Light headed  R42 UA macro with reflex to Microscopic and Culture - Clinc Collect     CBC with platelets and differential     Basic metabolic panel     XR Chest 2 Views     Symptomatic COVID-19 Virus (Coronavirus) by PCR Nose     EKG 12-lead, tracing only     EKG 12-lead, tracing only   2. Pre-syncope  R55        Patient Instructions   Present to the emergency room for evaluation and treatment.      HPI:  Anita Duque is  a 66 year old female who has multiple cardiovascular risk factors presents today for a three day history of weakness and syncope and presyncope.  Patient states that she has had 2 syncopal episodes over the last 2 days one was on the toilet while she was urinating she fell off the toilet.  She had another syncopal event and some near syncope events.  She also has been lightheaded and dizzy.  She has not had associated chest arm or jaw pain diaphoresis nausea vomiting or shortness of breath.     History obtained from chart review and the patient.    Problem List:  2020-10: Gastroesophageal reflux disease without esophagitis  2020-03: Mild intermittent asthma  2020-03: Controlled substance agreement signed  2018-07: Morbid obesity (H)  2018-06: Degenerative disc disease, cervical  2018-06: Degenerative disc disease, lumbar  2017-03: JANINE on CPAP  2017-03: Obesity (BMI 30.0-34.9)  2015-10: Osteoarthrosis, unspecified whether generalized or localized,  lower leg  Essential hypertension  Joint Pain, Localized In The Shoulder  Fibromyalgia  Type 2 diabetes mellitus (H)  Hyperlipidemia  Major Depression, Recurrent  Chronic Constipation  Sciatica  Chronic pain syndrome  Lower Back Pain  Vaginal Wall Prolapse With Midline Cystocele  Female Stress Incontinence  Lump Or Mass In Breast  Asthma      Past Medical History:   Diagnosis Date     Asthma      Chronic pain syndrome      Depression      Diabetes mellitus, type II (H)      Fibromyalgia      Hyperlipidemia      Hypertension      Insomnia        Social History     Tobacco Use     Smoking status: Never Smoker     Smokeless tobacco: Never Used   Substance Use Topics     Alcohol use: Not on file       Review of Systems  As above in HPI otherwise negative.    Vitals:    10/29/21 1249   BP: 116/71   Pulse: 66   Resp: 16   Temp: 97.9  F (36.6  C)   TempSrc: Oral   SpO2: 99%   Weight: 88 kg (194 lb)     General: Patient is resting comfortably no acute distress is afebrile patient  presents with wheeled walker  HEENT: Head is normocephalic atraumatic   eyes are PERRL EOMI sclera anicteric   TMs are clear bilaterally  Throat is clear without lesions or erythema  No cervical lymphadenopathy present  Neck is supple  LUNGS: Clear to auscultation bilaterally  HEART: Regular rate and rhythm  Skin: Without rash non-diaphoretic    Physical Exam    Labs:  Results for orders placed or performed in visit on 10/29/21   XR Chest 2 Views     Status: None    Narrative    EXAM: XR CHEST 2 VW  LOCATION: [facility]  DATE/TIME: 10/29/2021 1:52 PM    INDICATION: cough  COMPARISON: None.      Impression    IMPRESSION: Right costophrenic angle is clipped on the PA view. Minimal linear scarring in the retrosternal space on the lateral view. Lungs are otherwise clear..Heart and pulmonary vascularity are normal. No signs of acute disease.    Focal eventration of the right hemidiaphragm anteriorly. Moderate spurring in the lower thoracic spine.   UA macro with reflex to Microscopic and Culture - Clinc Collect     Status: Abnormal    Specimen: Urine, Clean Catch   Result Value Ref Range    Color Urine Yellow Colorless, Straw, Light Yellow, Yellow    Appearance Urine Clear Clear    Glucose Urine 500  (A) Negative mg/dL    Bilirubin Urine Negative Negative    Ketones Urine Negative Negative mg/dL    Specific Gravity Urine 1.025 1.005 - 1.030    Blood Urine Negative Negative    pH Urine 5.0 5.0 - 8.0    Protein Albumin Urine Negative Negative mg/dL    Urobilinogen Urine 0.2 0.2, 1.0 E.U./dL    Nitrite Urine Negative Negative    Leukocyte Esterase Urine Negative Negative    Narrative    Microscopic not indicated   Basic metabolic panel     Status: Abnormal   Result Value Ref Range    Sodium 137 136 - 145 mmol/L    Potassium 4.6 3.5 - 5.0 mmol/L    Chloride 105 98 - 107 mmol/L    Carbon Dioxide (CO2) 22 22 - 31 mmol/L    Anion Gap 10 5 - 18 mmol/L    Urea Nitrogen 22 8 - 22 mg/dL    Creatinine 0.84  0.60 - 1.10 mg/dL    Calcium 9.6 8.5 - 10.5 mg/dL    Glucose 145 (H) 70 - 125 mg/dL    GFR Estimate 72 >60 mL/min/1.73m2   CBC with platelets and differential     Status: None   Result Value Ref Range    WBC Count 8.2 4.0 - 11.0 10e3/uL    RBC Count 4.84 3.80 - 5.20 10e6/uL    Hemoglobin 15.1 11.7 - 15.7 g/dL    Hematocrit 46.3 35.0 - 47.0 %    MCV 96 78 - 100 fL    MCH 31.2 26.5 - 33.0 pg    MCHC 32.6 31.5 - 36.5 g/dL    RDW 12.4 10.0 - 15.0 %    Platelet Count 270 150 - 450 10e3/uL    % Neutrophils 60 %    % Lymphocytes 30 %    % Monocytes 6 %    % Eosinophils 4 %    % Basophils 1 %    % Immature Granulocytes 0 %    Absolute Neutrophils 4.9 1.6 - 8.3 10e3/uL    Absolute Lymphocytes 2.5 0.8 - 5.3 10e3/uL    Absolute Monocytes 0.5 0.0 - 1.3 10e3/uL    Absolute Eosinophils 0.3 0.0 - 0.7 10e3/uL    Absolute Basophils 0.0 0.0 - 0.2 10e3/uL    Absolute Immature Granulocytes 0.0 <=0.0 10e3/uL   EKG 12-lead, tracing only     Status: None (Preliminary result)   Result Value Ref Range    Systolic Blood Pressure  mmHg    Diastolic Blood Pressure  mmHg    Ventricular Rate 63 BPM    Atrial Rate 63 BPM    KS Interval 134 ms    QRS Duration 74 ms     ms    QTc 446 ms    P Axis 34 degrees    R AXIS -31 degrees    T Axis 36 degrees    Interpretation ECG       Sinus rhythm  Left axis deviation  Low voltage QRS  Cannot rule out Anterior infarct (cited on or before 18-JAN-2021)  Abnormal ECG  When compared with ECG of 19-JAN-2021 20:25,  No significant change was found     CBC with platelets and differential     Status: None    Narrative    The following orders were created for panel order CBC with platelets and differential.  Procedure                               Abnormality         Status                     ---------                               -----------         ------                     CBC with platelets and d...[[id number]]                      Final result                 Please view results for these tests on the  individual orders.       At the end of the encounter, I discussed results, diagnosis, medications. Discussed red flags for immediate return to clinic/ER, as well as indications for follow up if no improvement. Patient understood and agreed to plan. Patient was stable for discharge.

## 2021-10-29 NOTE — ED PROVIDER NOTES
Expected Patient Referral to ED  3:06 PM    Referring Clinic/Provider:  [facility]    Reason for referral/Clinical facts:  Recurrent syncope.  Subtle ST segment changes    Recommendations provided:  See and treat        Informed caller that recommendations provided are recommendations based only on the facts provided and that they responsible to accept or reject the advice, or to seek a formal in person consultation as needed and that this ED will see/treat patient should they arrive.      Mickey Simpson MD  Emergency Medicine  [facility] EMERGENCY ROOM  [address] [place] [id number] [phone]     Mickey Simpson MD  10/29/21 1258

## 2021-10-29 NOTE — TELEPHONE ENCOUNTER
Bladder infection started two days ago. Lightheaded and dizzy, fell off toilet yesterday when tried to stand up. It presented the same way one year ago. She has side and back pain. I connected with scheduling for an appointment and advised urgent care if they can't get her into the clinic.  Allyson Jones RN  [facility]      Reason for Disposition    Side (flank) or lower back pain present    Additional Information    Negative: Shock suspected (e.g., cold/pale/clammy skin, too weak to stand, low BP, rapid pulse)    Negative: Sounds like a life-threatening emergency to the triager    Negative: Followed a genital area injury    Negative: Followed a genital area injury (penis, scrotum)    Negative: Vaginal discharge    Negative: Pus (white, yellow) or bloody discharge from end of penis    Negative: Discomfort (pain, burning or stinging) when passing urine and pregnant    Negative: Discomfort (pain, burning or stinging) when passing urine and female    Negative: Discomfort (pain, burning or stinging) when passing urine and male    Negative: Pain or itching in the vulvar area    Negative: Pain in scrotum is main symptom    Negative: Blood in the urine is main symptom    Negative: Symptoms arising from use of a urinary catheter ([place] or [place])    Negative: Unable to urinate (or only a few drops) > 4 hours and bladder feels very full (e.g., palpable bladder or strong urge to urinate)    Negative: Decreased urination and drinking very little and dehydration suspected (e.g., dark urine, no urine > 12 hours, very dry mouth, very lightheaded)    Negative: Patient sounds very sick or weak to the triager    Negative: Fever > 100.4 F (38.0 C)    Protocols used: URINARY SYMPTOMS-A-OH

## 2021-10-30 LAB — SARS-COV-2 RNA RESP QL NAA+PROBE: NEGATIVE

## 2021-10-30 ASSESSMENT — ENCOUNTER SYMPTOMS
FEVER: 0
ARTHRALGIAS: 0
CONFUSION: 0
LIGHT-HEADEDNESS: 1
NECK STIFFNESS: 0
FLANK PAIN: 0
DIFFICULTY URINATING: 0
ABDOMINAL PAIN: 0
FREQUENCY: 0
RHINORRHEA: 0
COUGH: 1
DYSURIA: 0
EYE REDNESS: 0
SHORTNESS OF BREATH: 0
COLOR CHANGE: 0
HEADACHES: 0

## 2021-11-09 DIAGNOSIS — N32.81 OAB (OVERACTIVE BLADDER): ICD-10-CM

## 2021-11-10 RX ORDER — SOLIFENACIN SUCCINATE 5 MG/1
TABLET, FILM COATED ORAL
Qty: 90 TABLET | Refills: 1 | Status: SHIPPED | OUTPATIENT
Start: 2021-11-10 | End: 2024-03-25

## 2021-11-10 NOTE — TELEPHONE ENCOUNTER
"Last Written Prescription Date:  12/1/20  Last Fill Quantity: 90,  # refills: 3   Last office visit provider:  6/15/21     Requested Prescriptions   Pending Prescriptions Disp Refills     VESICARE 5 MG tablet [Pharmacy Med Name: VESIcare 5 MG Oral Tablet] 90 tablet 0     Sig: Take 1 tablet by mouth once daily       Muscarinic Antagonists (Urinary Incontinence Agents) Passed - 11/9/2021  9:27 AM        Passed - Recent (12 mo) or future (30 days) visit within the authorizing provider's specialty     Patient has had an office visit with the authorizing provider or a provider within the authorizing providers department within the previous 12 mos or has a future within next 30 days. See \"Patient Info\" tab in inbasket, or \"Choose Columns\" in Meds & Orders section of the refill encounter.              Passed - Patient does not have a diagnosis of glaucoma on the problem list     If glaucoma diagnosis is new, refer refill to physician.          Passed - Medication is active on med list        Passed - Patient is 18 years of age or older             Jayna Milligan RN 11/10/21 1:30 PM  "

## 2021-12-15 DIAGNOSIS — E11.9 TYPE 2 DIABETES MELLITUS (H): ICD-10-CM

## 2021-12-17 RX ORDER — GLIPIZIDE 5 MG/1
TABLET, FILM COATED, EXTENDED RELEASE ORAL
Qty: 90 TABLET | Refills: 0 | Status: SHIPPED | OUTPATIENT
Start: 2021-12-17 | End: 2022-02-16

## 2021-12-17 NOTE — TELEPHONE ENCOUNTER
"Routing refill request to provider for review/approval because:  Labs not current:  A1C  Patient needs to be seen because it has been more than 6 months since last office visit.    Last Written Prescription Date:  12/1/20  Last Fill Quantity: 90,  # refills: 3   Last office visit provider:  6/15/21     Requested Prescriptions   Pending Prescriptions Disp Refills     glipiZIDE (GLUCOTROL XL) 5 MG 24 hr tablet [Pharmacy Med Name: glipiZIDE ER 5 MG Oral Tablet Extended Release 24 Hour] 90 tablet 0     Sig: Take 1 tablet by mouth once daily       Sulfonylurea Agents Failed - 12/15/2021 11:01 AM        Failed - Patient has documented A1c within the specified period of time.     If HgbA1C is 8 or greater, it needs to be on file within the past 3 months.  If less than 8, must be on file within the past 6 months.     Recent Labs   Lab Test 06/15/21  1312   A1C 7.1*             Failed - Recent (6 mo) or future (30 days) visit within the authorizing provider's specialty     Patient had office visit in the last 6 months or has a visit in the next 30 days with authorizing provider or within the authorizing provider's specialty.  See \"Patient Info\" tab in inbasket, or \"Choose Columns\" in Meds & Orders section of the refill encounter.            Passed - Medication is active on med list        Passed - Patient is age 18 or older        Passed - No active pregnancy on record        Passed - Patient has a recent creatinine (normal) within the past 12 mos.     Recent Labs   Lab Test 10/29/21  1402   CR 0.84       Ok to refill medication if creatinine is low          Passed - Patient has not had a positive pregnancy test within the past 12 mos.             Robson Elizalde RN 12/17/21 11:07 AM  "

## 2021-12-30 DIAGNOSIS — M54.9 BACK PAIN: ICD-10-CM

## 2021-12-30 DIAGNOSIS — I10 ESSENTIAL HYPERTENSION: ICD-10-CM

## 2021-12-30 DIAGNOSIS — G89.4 CHRONIC PAIN SYNDROME: ICD-10-CM

## 2022-01-03 RX ORDER — IBUPROFEN 800 MG/1
TABLET, FILM COATED ORAL
Qty: 100 TABLET | Refills: 0 | Status: SHIPPED | OUTPATIENT
Start: 2022-01-03 | End: 2022-06-28

## 2022-01-03 RX ORDER — LISINOPRIL 2.5 MG/1
TABLET ORAL
Qty: 90 TABLET | Refills: 0 | Status: SHIPPED | OUTPATIENT
Start: 2022-01-03 | End: 2022-04-04

## 2022-01-03 RX ORDER — ACETAMINOPHEN 500 MG/1
CAPSULE ORAL
Qty: 270 CAPSULE | Refills: 1 | Status: SHIPPED | OUTPATIENT
Start: 2022-01-03 | End: 2024-02-27

## 2022-01-03 NOTE — TELEPHONE ENCOUNTER
"Routing refill request to provider for review/approval because:  A break in medication  Age warning    Last Written Prescription Date:  7/13/20  Last Fill Quantity: 90,  # refills: 0   Last office visit provider:  6/15/21     Last Written Prescription Date:  12/1/20  Last Fill Quantity: 100,  # refills: 3   Last office visit provider:  6/15/21    Requested Prescriptions   Pending Prescriptions Disp Refills     lisinopril (ZESTRIL) 2.5 MG tablet [Pharmacy Med Name: Lisinopril 2.5 MG Oral Tablet] 90 tablet 0     Sig: Take 1 tablet by mouth once daily       ACE Inhibitors (Including Combos) Protocol Passed - 12/30/2021 12:43 PM        Passed - Blood pressure under 140/90 in past 12 months     BP Readings from Last 3 Encounters:   10/29/21 125/71   10/29/21 116/71   06/15/21 104/78                 Passed - Recent (12 mo) or future (30 days) visit within the authorizing provider's specialty     Patient has had an office visit with the authorizing provider or a provider within the authorizing providers department within the previous 12 mos or has a future within next 30 days. See \"Patient Info\" tab in inbasket, or \"Choose Columns\" in Meds & Orders section of the refill encounter.              Passed - Medication is active on med list        Passed - Patient is age 18 or older        Passed - No active pregnancy on record        Passed - Normal serum creatinine on file in past 12 months     Recent Labs   Lab Test 10/29/21  1402   CR 0.84       Ok to refill medication if creatinine is low          Passed - Normal serum potassium on file in past 12 months     Recent Labs   Lab Test 10/29/21  1402   POTASSIUM 4.6             Passed - No positive pregnancy test within past 12 months           ibuprofen (ADVIL/MOTRIN) 800 MG tablet [Pharmacy Med Name: Ibuprofen 800 MG Oral Tablet] 100 tablet 0     Sig: Take 1 tablet by mouth three times daily as needed       NSAID Medications Failed - 12/30/2021 12:43 PM        Failed - Patient " "is age 6-64 years        Passed - Blood pressure under 140/90 in past 12 months     BP Readings from Last 3 Encounters:   10/29/21 125/71   10/29/21 116/71   06/15/21 104/78                 Passed - Normal ALT on file in past 12 months     Recent Labs   Lab Test 01/18/21  1554   ALT 18             Passed - Normal AST on file in past 12 months     Recent Labs   Lab Test 01/18/21  1554   AST 19             Passed - Recent (12 mo) or future (30 days) visit within the authorizing provider's specialty     Patient has had an office visit with the authorizing provider or a provider within the authorizing providers department within the previous 12 mos or has a future within next 30 days. See \"Patient Info\" tab in inbasket, or \"Choose Columns\" in Meds & Orders section of the refill encounter.              Passed - Normal CBC on file in past 12 months     Recent Labs   Lab Test 10/29/21  1402   WBC 8.2   RBC 4.84   HGB 15.1   HCT 46.3                    Passed - Medication is active on med list        Passed - No active pregnancy on record        Passed - Normal serum creatinine on file in past 12 months     Recent Labs   Lab Test 10/29/21  1402   CR 0.84       Ok to refill medication if creatinine is low          Passed - No positive pregnancy test in past 12 months         Signed Prescriptions Disp Refills    MAPAP 500 MG CAPS 270 capsule 1     Sig: TAKE 2 CAPSULES BY MOUTH EVERY 6 HOURS AS NEEDED       Analgesics (Non-Narcotic Tylenol and ASA Only) Passed - 12/30/2021 12:43 PM        Passed - Recent (12 mo) or future (30 days) visit within the authorizing provider's specialty     Patient has had an office visit with the authorizing provider or a provider within the authorizing providers department within the previous 12 mos or has a future within next 30 days. See \"Patient Info\" tab in inbasket, or \"Choose Columns\" in Meds & Orders section of the refill encounter.              Passed - Patient is 7 months old or " "older     If patient is a peds patient of the age 7 mos -12 years, ok to refill using weight-based dosing.     If >3g daily and/or sig is not \"prn\", check for liver enzymes. If normal in the last year, ok to refill.  If not, refer to the provider.          Passed - Medication is active on med list             Robson Elizalde RN 01/03/22 9:29 AM  "

## 2022-01-03 NOTE — TELEPHONE ENCOUNTER
"Last Written Prescription Date:  10/13/21  Last Fill Quantity: 180,  # refills: 0   Last office visit provider:  6/15/21     Requested Prescriptions   Pending Prescriptions Disp Refills     lisinopril (ZESTRIL) 2.5 MG tablet [Pharmacy Med Name: Lisinopril 2.5 MG Oral Tablet] 90 tablet 0     Sig: Take 1 tablet by mouth once daily       ACE Inhibitors (Including Combos) Protocol Passed - 12/30/2021 12:43 PM        Passed - Blood pressure under 140/90 in past 12 months     BP Readings from Last 3 Encounters:   10/29/21 125/71   10/29/21 116/71   06/15/21 104/78                 Passed - Recent (12 mo) or future (30 days) visit within the authorizing provider's specialty     Patient has had an office visit with the authorizing provider or a provider within the authorizing providers department within the previous 12 mos or has a future within next 30 days. See \"Patient Info\" tab in inbasket, or \"Choose Columns\" in Meds & Orders section of the refill encounter.              Passed - Medication is active on med list        Passed - Patient is age 18 or older        Passed - No active pregnancy on record        Passed - Normal serum creatinine on file in past 12 months     Recent Labs   Lab Test 10/29/21  1402   CR 0.84       Ok to refill medication if creatinine is low          Passed - Normal serum potassium on file in past 12 months     Recent Labs   Lab Test 10/29/21  1402   POTASSIUM 4.6             Passed - No positive pregnancy test within past 12 months           ibuprofen (ADVIL/MOTRIN) 800 MG tablet [Pharmacy Med Name: Ibuprofen 800 MG Oral Tablet] 100 tablet 0     Sig: Take 1 tablet by mouth three times daily as needed       NSAID Medications Failed - 12/30/2021 12:43 PM        Failed - Patient is age 6-64 years        Passed - Blood pressure under 140/90 in past 12 months     BP Readings from Last 3 Encounters:   10/29/21 125/71   10/29/21 116/71   06/15/21 104/78                 Passed - Normal ALT on file in " "past 12 months     Recent Labs   Lab Test 01/18/21  1554   ALT 18             Passed - Normal AST on file in past 12 months     Recent Labs   Lab Test 01/18/21  1554   AST 19             Passed - Recent (12 mo) or future (30 days) visit within the authorizing provider's specialty     Patient has had an office visit with the authorizing provider or a provider within the authorizing providers department within the previous 12 mos or has a future within next 30 days. See \"Patient Info\" tab in inbasket, or \"Choose Columns\" in Meds & Orders section of the refill encounter.              Passed - Normal CBC on file in past 12 months     Recent Labs   Lab Test 10/29/21  1402   WBC 8.2   RBC 4.84   HGB 15.1   HCT 46.3                    Passed - Medication is active on med list        Passed - No active pregnancy on record        Passed - Normal serum creatinine on file in past 12 months     Recent Labs   Lab Test 10/29/21  1402   CR 0.84       Ok to refill medication if creatinine is low          Passed - No positive pregnancy test in past 12 months           MAPAP 500 MG CAPS [Pharmacy Med Name: Mapap 500 MG Oral Capsule] 180 capsule 0     Sig: TAKE 2 CAPSULES BY MOUTH EVERY 6 HOURS AS NEEDED       Analgesics (Non-Narcotic Tylenol and ASA Only) Passed - 12/30/2021 12:43 PM        Passed - Recent (12 mo) or future (30 days) visit within the authorizing provider's specialty     Patient has had an office visit with the authorizing provider or a provider within the authorizing providers department within the previous 12 mos or has a future within next 30 days. See \"Patient Info\" tab in inbasket, or \"Choose Columns\" in Meds & Orders section of the refill encounter.              Passed - Patient is 7 months old or older     If patient is a peds patient of the age 7 mos -12 years, ok to refill using weight-based dosing.     If >3g daily and/or sig is not \"prn\", check for liver enzymes. If normal in the last year, ok to " refill.  If not, refer to the provider.          Passed - Medication is active on med list             Robson Elizalde RN 01/03/22 9:28 AM

## 2022-01-30 ENCOUNTER — HEALTH MAINTENANCE LETTER (OUTPATIENT)
Age: 67
End: 2022-01-30

## 2022-02-13 DIAGNOSIS — M54.50 CHRONIC LOW BACK PAIN: ICD-10-CM

## 2022-02-13 DIAGNOSIS — M25.519 ARTHRALGIA OF SHOULDER, UNSPECIFIED LATERALITY: ICD-10-CM

## 2022-02-13 DIAGNOSIS — E11.9 TYPE 2 DIABETES MELLITUS WITHOUT COMPLICATION, WITHOUT LONG-TERM CURRENT USE OF INSULIN (H): ICD-10-CM

## 2022-02-13 DIAGNOSIS — E11.9 TYPE 2 DIABETES MELLITUS (H): ICD-10-CM

## 2022-02-13 DIAGNOSIS — G89.29 CHRONIC LOW BACK PAIN: ICD-10-CM

## 2022-02-16 RX ORDER — GLIPIZIDE 5 MG/1
TABLET, FILM COATED, EXTENDED RELEASE ORAL
Qty: 30 TABLET | Refills: 0 | Status: SHIPPED | OUTPATIENT
Start: 2022-02-16 | End: 2022-03-31

## 2022-02-16 RX ORDER — METFORMIN HCL 500 MG
TABLET, EXTENDED RELEASE 24 HR ORAL
Qty: 120 TABLET | Refills: 0 | Status: SHIPPED | OUTPATIENT
Start: 2022-02-16 | End: 2022-04-01

## 2022-02-16 RX ORDER — DAPAGLIFLOZIN 10 MG/1
TABLET, FILM COATED ORAL
Qty: 30 TABLET | Refills: 0 | Status: SHIPPED | OUTPATIENT
Start: 2022-02-16 | End: 2022-04-11

## 2022-02-16 NOTE — TELEPHONE ENCOUNTER
"Last Written Prescription Date:  9/24/2021  Last Fill Quantity: 300 g,  # refills: 1   Last office visit provider:  6/15/2021      diclofenac (VOLTAREN) 1 % topical gel [Pharmacy Med Name: Diclofenac Sodium 1 % Transdermal Gel] 300 g 0     Sig: APPLY TOPICALLY TWICE DAILY AS NEEDED FOR ARTHRITIS       Topical Steroids and Nonsteroidals Protocol Passed - 2/16/2022  6:25 AM        Passed - Patient is age 6 or older        Passed - Authorizing prescriber's most recent note related to this medication read.     If refill request is for ophthalmic use, please forward request to provider for approval.          Passed - High potency steroid not ordered        Passed - Recent (12 mo) or future (30 days) visit within the authorizing provider's specialty     Patient has had an office visit with the authorizing provider or a provider within the authorizing providers department within the previous 12 mos or has a future within next 30 days. See \"Patient Info\" tab in inbasket, or \"Choose Columns\" in Meds & Orders section of the refill encounter.              Passed - Medication is active on med list             Tricia Bojorquez RN 02/16/22 6:50 AM  "

## 2022-02-16 NOTE — TELEPHONE ENCOUNTER
"Routing refill request to provider for review/approval because:  Labs out of range:  A1C  Labs not current:  A1C  Patient needs to be seen because:  6 month diabetes management.   Break in medication with Metformin.     Last Written Prescription Date:  8/12/2021  Last Fill Quantity: 90,  # refills: 1   Last office visit provider:  6/15/2021     Requested Prescriptions   Pending Prescriptions Disp Refills     FARXIGA 10 MG TABS tablet [Pharmacy Med Name: Farxiga 10 MG Oral Tablet] 90 tablet 0     Sig: Take 1 tablet by mouth once daily       Sodium Glucose Co-Transport Inhibitor Agents Failed - 2/16/2022  6:24 AM        Failed - Patient has documented A1c within the specified period of time.     If HgbA1C is 8 or greater, it needs to be on file within the past 3 months.  If less than 8, must be on file within the past 6 months.     Recent Labs   Lab Test 06/15/21  1312   A1C 7.1*             Failed - Recent (6 mo) or future (30 days) visit within the authorizing provider's specialty     Patient had office visit in the last 6 months or has a visit in the next 30 days with authorizing provider or within the authorizing provider's specialty.  See \"Patient Info\" tab in inbasket, or \"Choose Columns\" in Meds & Orders section of the refill encounter.            Passed - No creatinine >1.4 or GFR <45 within the past 12 mos     Recent Labs   Lab Test 10/29/21  1402 06/15/21  1312   GFRESTIMATED 72 >60   GFRESTBLACK  --  >60       Recent Labs   Lab Test 10/29/21  1402   CR 0.84             Passed - Medication is active on med list        Passed - Patient is age 18 or older        Passed - Patient is not pregnant        Passed - Patient has documented normal Potassium within the last 12 mos.     Recent Labs   Lab Test 10/29/21  1402   POTASSIUM 4.6             Passed - Patient has no positive pregnancy test within the past 12 mos.        Last Written Prescription Date:  12/13/2020  Last Fill Quantity: 360,  # refills: 2   Last " "office visit provider:  6/15/2021      metFORMIN (GLUCOPHAGE-XR) 500 MG 24 hr tablet [Pharmacy Med Name: metFORMIN HCl  MG Oral Tablet Extended Release 24 Hour] 360 tablet 0     Sig: TAKE 2 TABLETS BY MOUTH TWICE DAILY WITH MEALS       Biguanide Agents Failed - 2/16/2022  6:24 AM        Failed - Patient has documented A1c within the specified period of time.     If HgbA1C is 8 or greater, it needs to be on file within the past 3 months.  If less than 8, must be on file within the past 6 months.     Recent Labs   Lab Test 06/15/21  1312   A1C 7.1*             Failed - Recent (6 mo) or future (30 days) visit within the authorizing provider's specialty     Patient had office visit in the last 6 months or has a visit in the next 30 days with authorizing provider or within the authorizing provider's specialty.  See \"Patient Info\" tab in inbasket, or \"Choose Columns\" in Meds & Orders section of the refill encounter.            Passed - Patient is age 10 or older        Passed - Patient's CR is NOT>1.4 OR Patient's EGFR is NOT<45 within past 12 mos.     Recent Labs   Lab Test 10/29/21  1402 06/15/21  1312   GFRESTIMATED 72 >60   GFRESTBLACK  --  >60       Recent Labs   Lab Test 10/29/21  1402   CR 0.84             Passed - Patient does NOT have a diagnosis of CHF.        Passed - Medication is active on med list        Passed - Patient is not pregnant        Passed - Patient has not had a positive pregnancy test within the past 12 mos.         Last Written Prescription Date:  12/17/2021  Last Fill Quantity: 90,  # refills: 0   Last office visit provider:  6/15/2021      glipiZIDE (GLUCOTROL XL) 5 MG 24 hr tablet [Pharmacy Med Name: glipiZIDE ER 5 MG Oral Tablet Extended Release 24 Hour] 90 tablet 0     Sig: Take 1 tablet by mouth once daily       Sulfonylurea Agents Failed - 2/16/2022  6:24 AM        Failed - Patient has documented A1c within the specified period of time.     If HgbA1C is 8 or greater, it needs to be " "on file within the past 3 months.  If less than 8, must be on file within the past 6 months.     Recent Labs   Lab Test 06/15/21  1312   A1C 7.1*             Failed - Recent (6 mo) or future (30 days) visit within the authorizing provider's specialty     Patient had office visit in the last 6 months or has a visit in the next 30 days with authorizing provider or within the authorizing provider's specialty.  See \"Patient Info\" tab in inbasket, or \"Choose Columns\" in Meds & Orders section of the refill encounter.            Passed - Medication is active on med list        Passed - Patient is age 18 or older        Passed - No active pregnancy on record        Passed - Patient has a recent creatinine (normal) within the past 12 mos.     Recent Labs   Lab Test 10/29/21  1402   CR 0.84       Ok to refill medication if creatinine is low          Passed - Patient has not had a positive pregnancy test within the past 12 mos.         Signed Prescriptions Disp Refills    diclofenac (VOLTAREN) 1 % topical gel 300 g 0     Sig: APPLY TOPICALLY TWICE DAILY AS NEEDED FOR ARTHRITIS       Topical Steroids and Nonsteroidals Protocol Passed - 2/16/2022  6:25 AM        Passed - Patient is age 6 or older        Passed - Authorizing prescriber's most recent note related to this medication read.     If refill request is for ophthalmic use, please forward request to provider for approval.          Passed - High potency steroid not ordered        Passed - Recent (12 mo) or future (30 days) visit within the authorizing provider's specialty     Patient has had an office visit with the authorizing provider or a provider within the authorizing providers department within the previous 12 mos or has a future within next 30 days. See \"Patient Info\" tab in inbasket, or \"Choose Columns\" in Meds & Orders section of the refill encounter.              Passed - Medication is active on med list             Tricia Bojorquez RN 02/16/22 6:52 AM  "

## 2022-02-17 ENCOUNTER — TELEPHONE (OUTPATIENT)
Dept: FAMILY MEDICINE | Facility: CLINIC | Age: 67
End: 2022-02-17
Payer: COMMERCIAL

## 2022-02-28 ENCOUNTER — OFFICE VISIT (OUTPATIENT)
Dept: FAMILY MEDICINE | Facility: CLINIC | Age: 67
End: 2022-02-28
Payer: COMMERCIAL

## 2022-02-28 VITALS
DIASTOLIC BLOOD PRESSURE: 60 MMHG | HEIGHT: 64 IN | WEIGHT: 204 LBS | OXYGEN SATURATION: 98 % | HEART RATE: 72 BPM | SYSTOLIC BLOOD PRESSURE: 96 MMHG | BODY MASS INDEX: 34.83 KG/M2

## 2022-02-28 DIAGNOSIS — E11.9 TYPE 2 DIABETES MELLITUS WITHOUT COMPLICATION, WITHOUT LONG-TERM CURRENT USE OF INSULIN (H): Primary | Chronic | ICD-10-CM

## 2022-02-28 DIAGNOSIS — M25.519 ARTHRALGIA OF SHOULDER, UNSPECIFIED LATERALITY: ICD-10-CM

## 2022-02-28 DIAGNOSIS — E78.2 MIXED HYPERLIPIDEMIA: Chronic | ICD-10-CM

## 2022-02-28 DIAGNOSIS — G89.4 CHRONIC PAIN SYNDROME: ICD-10-CM

## 2022-02-28 DIAGNOSIS — G47.33 OSA ON CPAP: Chronic | ICD-10-CM

## 2022-02-28 DIAGNOSIS — E66.01 MORBID OBESITY (H): ICD-10-CM

## 2022-02-28 DIAGNOSIS — J45.30 MILD PERSISTENT ASTHMA WITHOUT COMPLICATION: ICD-10-CM

## 2022-02-28 DIAGNOSIS — M12.9 ARTHRITIS/ARTHROPATHY OF MULTIPLE JOINTS: ICD-10-CM

## 2022-02-28 DIAGNOSIS — Z79.899 CONTROLLED SUBSTANCE AGREEMENT SIGNED: ICD-10-CM

## 2022-02-28 DIAGNOSIS — R06.02 SOB (SHORTNESS OF BREATH) ON EXERTION: ICD-10-CM

## 2022-02-28 DIAGNOSIS — I10 ESSENTIAL HYPERTENSION: Chronic | ICD-10-CM

## 2022-02-28 DIAGNOSIS — M54.40 CHRONIC LOW BACK PAIN WITH SCIATICA, SCIATICA LATERALITY UNSPECIFIED, UNSPECIFIED BACK PAIN LATERALITY: ICD-10-CM

## 2022-02-28 DIAGNOSIS — F33.1 MODERATE EPISODE OF RECURRENT MAJOR DEPRESSIVE DISORDER (H): Chronic | ICD-10-CM

## 2022-02-28 DIAGNOSIS — G89.29 CHRONIC LOW BACK PAIN WITH SCIATICA, SCIATICA LATERALITY UNSPECIFIED, UNSPECIFIED BACK PAIN LATERALITY: ICD-10-CM

## 2022-02-28 PROBLEM — J45.20 MILD INTERMITTENT ASTHMA: Chronic | Status: RESOLVED | Noted: 2020-03-13 | Resolved: 2022-02-28

## 2022-02-28 LAB
ALBUMIN SERPL-MCNC: 3.9 G/DL (ref 3.5–5)
ALP SERPL-CCNC: 98 U/L (ref 45–120)
ALT SERPL W P-5'-P-CCNC: 16 U/L (ref 0–45)
ANION GAP SERPL CALCULATED.3IONS-SCNC: 13 MMOL/L (ref 5–18)
AST SERPL W P-5'-P-CCNC: 18 U/L (ref 0–40)
BILIRUB SERPL-MCNC: 0.4 MG/DL (ref 0–1)
BUN SERPL-MCNC: 17 MG/DL (ref 8–22)
CALCIUM SERPL-MCNC: 9.5 MG/DL (ref 8.5–10.5)
CHLORIDE BLD-SCNC: 103 MMOL/L (ref 98–107)
CHOLEST SERPL-MCNC: 222 MG/DL
CO2 SERPL-SCNC: 26 MMOL/L (ref 22–31)
CREAT SERPL-MCNC: 0.85 MG/DL (ref 0.6–1.1)
CREAT UR-MCNC: 176 MG/DL
CREAT UR-MCNC: 179 MG/DL
FASTING STATUS PATIENT QL REPORTED: ABNORMAL
GFR SERPL CREATININE-BSD FRML MDRD: 74 ML/MIN/1.73M2
GLUCOSE BLD-MCNC: 124 MG/DL (ref 70–125)
HBA1C MFR BLD: 7.5 % (ref 0–5.6)
HDLC SERPL-MCNC: 49 MG/DL
LDLC SERPL CALC-MCNC: 100 MG/DL
MICROALBUMIN UR-MCNC: 2.27 MG/DL (ref 0–1.99)
MICROALBUMIN/CREAT UR: 12.9 MG/G CR
POTASSIUM BLD-SCNC: 4.1 MMOL/L (ref 3.5–5)
PROT SERPL-MCNC: 6.8 G/DL (ref 6–8)
SODIUM SERPL-SCNC: 142 MMOL/L (ref 136–145)
TRIGL SERPL-MCNC: 363 MG/DL

## 2022-02-28 PROCEDURE — 80307 DRUG TEST PRSMV CHEM ANLYZR: CPT | Performed by: FAMILY MEDICINE

## 2022-02-28 PROCEDURE — 83036 HEMOGLOBIN GLYCOSYLATED A1C: CPT | Performed by: FAMILY MEDICINE

## 2022-02-28 PROCEDURE — 99215 OFFICE O/P EST HI 40 MIN: CPT | Performed by: FAMILY MEDICINE

## 2022-02-28 PROCEDURE — 80053 COMPREHEN METABOLIC PANEL: CPT | Performed by: FAMILY MEDICINE

## 2022-02-28 PROCEDURE — 36415 COLL VENOUS BLD VENIPUNCTURE: CPT | Performed by: FAMILY MEDICINE

## 2022-02-28 PROCEDURE — 96127 BRIEF EMOTIONAL/BEHAV ASSMT: CPT | Performed by: FAMILY MEDICINE

## 2022-02-28 PROCEDURE — 80061 LIPID PANEL: CPT | Performed by: FAMILY MEDICINE

## 2022-02-28 PROCEDURE — 82043 UR ALBUMIN QUANTITATIVE: CPT | Performed by: FAMILY MEDICINE

## 2022-02-28 RX ORDER — FLUTICASONE PROPIONATE 110 UG/1
1 AEROSOL, METERED RESPIRATORY (INHALATION) DAILY
Qty: 12 G | Refills: 3 | Status: SHIPPED | OUTPATIENT
Start: 2022-02-28 | End: 2024-06-19

## 2022-02-28 RX ORDER — OXYCODONE AND ACETAMINOPHEN 5; 325 MG/1; MG/1
1 TABLET ORAL EVERY 6 HOURS PRN
Qty: 30 TABLET | Refills: 0 | Status: SHIPPED | OUTPATIENT
Start: 2022-02-28 | End: 2022-04-27

## 2022-02-28 ASSESSMENT — ANXIETY QUESTIONNAIRES
6. BECOMING EASILY ANNOYED OR IRRITABLE: SEVERAL DAYS
5. BEING SO RESTLESS THAT IT IS HARD TO SIT STILL: NOT AT ALL
1. FEELING NERVOUS, ANXIOUS, OR ON EDGE: NOT AT ALL
IF YOU CHECKED OFF ANY PROBLEMS ON THIS QUESTIONNAIRE, HOW DIFFICULT HAVE THESE PROBLEMS MADE IT FOR YOU TO DO YOUR WORK, TAKE CARE OF THINGS AT HOME, OR GET ALONG WITH OTHER PEOPLE: NOT DIFFICULT AT ALL
2. NOT BEING ABLE TO STOP OR CONTROL WORRYING: NOT AT ALL
GAD7 TOTAL SCORE: 2
3. WORRYING TOO MUCH ABOUT DIFFERENT THINGS: SEVERAL DAYS
7. FEELING AFRAID AS IF SOMETHING AWFUL MIGHT HAPPEN: NOT AT ALL

## 2022-02-28 ASSESSMENT — ASTHMA QUESTIONNAIRES
QUESTION_5 LAST FOUR WEEKS HOW WOULD YOU RATE YOUR ASTHMA CONTROL: WELL CONTROLLED
QUESTION_4 LAST FOUR WEEKS HOW OFTEN HAVE YOU USED YOUR RESCUE INHALER OR NEBULIZER MEDICATION (SUCH AS ALBUTEROL): ONE OR TWO TIMES PER DAY
QUESTION_2 LAST FOUR WEEKS HOW OFTEN HAVE YOU HAD SHORTNESS OF BREATH: ONCE OR TWICE A WEEK
ACT_TOTALSCORE: 17
QUESTION_3 LAST FOUR WEEKS HOW OFTEN DID YOUR ASTHMA SYMPTOMS (WHEEZING, COUGHING, SHORTNESS OF BREATH, CHEST TIGHTNESS OR PAIN) WAKE YOU UP AT NIGHT OR EARLIER THAN USUAL IN THE MORNING: TWO OR THREE NIGHTS A WEEK
QUESTION_1 LAST FOUR WEEKS HOW MUCH OF THE TIME DID YOUR ASTHMA KEEP YOU FROM GETTING AS MUCH DONE AT WORK, SCHOOL OR AT HOME: NONE OF THE TIME
ACT_TOTALSCORE: 17

## 2022-02-28 ASSESSMENT — PATIENT HEALTH QUESTIONNAIRE - PHQ9
5. POOR APPETITE OR OVEREATING: NOT AT ALL
SUM OF ALL RESPONSES TO PHQ QUESTIONS 1-9: 8

## 2022-02-28 NOTE — PROGRESS NOTES
PHQ-9 ([employer]) 2/28/2022   1.  Little interest or pleasure in doing things 1   2.  Feeling down, depressed, or hopeless 0   3.  Trouble falling or staying asleep, or sleeping too much 1   4.  Feeling tired or having little energy 3   5.  Poor appetite or overeating 3   6.  Feeling bad about yourself 0   7.  Trouble concentrating 0   8.  Moving slowly or restless 0   9.  Suicidal or self-harm thoughts 0   PHQ-9 Total Score 8   Difficulty at work, home, or with people Not difficult at all     KINDRA-7   [employer], 2002; Used with Permission) 2/28/2022   1. Feeling nervous, anxious, or on edge 0   2. Not being able to stop or control worrying 0   3. Worrying too much about different things 1   4. Trouble relaxing 0   5. Being so restless that it is hard to sit still 0   6. Becoming easily annoyed or irritable 1   7. Feeling afraid, as if something awful might happen 0   KINDRA-7 Total Score 2   If you checked any problems, how difficult have they made it for you to do your work, take care of things at home, or get along with other people? Not difficult at all     SUBJECTIVE: Anita Duque is a 66 year old White female who presents today for her diabetes check.  I last saw her in June.  She is a complicated patient with polypharmacy and multiple diagnoses including diabetes, chronic pain syndrome, mild persistent asthma, obstructive sleep apnea, hypertension, hyperlipidemia, and depression.  When I last saw her, her diabetes was well controlled with an A1c of 7.1%.  She admits that she is not good at regularly taking her blood sugars.  She is obese and is on phentermine for weight loss.  She also has chronic pain syndrome and we have a CSA for a small amount of Percocet as well as for her phentermine.  She deals with chronic low back pain, shoulder pain and other joint pain.  She is near due for a repeat CSA and so we will do that today.  When I saw her in June she was having an asthma exacerbation and so her ACT score  "was 11.  She had not been taking her controller medication.  Today her ACT score is only 17 as well.  Again we discussed how to manage diabetes with the use of a controller med.  She has asthma which also affects her.  She complains of shortness of breath on exertion and admits that she is deconditioned.  She is high risk for heart issues and we discussed doing a stress test.  She also has obstructive sleep apnea and does use a CPAP.  She also deals with quite a bit of fatigue secondary to her chronic pain.  This changes her mood and causes depression.  Today her PHQ-9 score is 8.  She is on Cymbalta.  Her blood pressure is controlled, her last LDL was 103 which is higher than should be.  She is on lovastatin, which I thought I had replaced with another medication for better control.  We will recheck her cholesterol labs.  She needs her foot exam done today.  We discussed her sickness she had in fall.  She had an ER visit at the end of October for cough and lightheadedness.  At that time she was negative for Covid.  She said she was having cough on and off for months afterward until she realized that if she kept her chest warm with a vest, it helped prevent cough.  She tells me she has had 4 days now with no cough.      OBJECTIVE: BP 96/60   Pulse 72   Ht 1.613 m (5' 3.5\")   Wt 92.5 kg (204 lb)   SpO2 98%   Breastfeeding No   BMI 35.57 kg/m    General: Obese older female, having pain with movement  Heart: Regular rate and rhythm without murmur  Lungs: Clear bilaterally, no wheezes or crackles, possible decreased breath sounds  Abdomen: Soft, nontender  Extremities: Warm, dry and without edema  Diabetic foot exam shows dry skin but without cracks or lesions, fungal toenail changes of the great toes bilaterally only, no deformity of the foot, no neuropathy on filament testing.    ASSESSMENT & PLAN:     Type 2 diabetes mellitus without complication, without long-term current use of insulin (H)  Typically decently " controlled.  Will monitor labs.  Foot exam today.  - Hemoglobin A1c  - Albumin Random Urine Quantitative with Creat Ratio  - C FOOT EXAM  NO CHARGE  - Hemoglobin A1c  - Albumin Random Urine Quantitative with Creat Ratio    Chronic pain syndrome  Needs refill of her Voltaren gel.  We have a controlled substance agreement for Percocet with maximum of 30 tabs in 30 days which she rarely needs monthly.  - [id number] - Urine Drug Confirmation Panel (Comprehensive)  - oxyCODONE-acetaminophen (PERCOCET) 5-325 MG tablet  Dispense: 30 tablet; Refill: 0  - diclofenac (VOLTAREN) 1 % topical gel  Dispense: 300 g; Refill: 0  - [id number] - Urine Drug Confirmation Panel (Comprehensive)    Controlled substance agreement signed  New CSA signed today for Percocet 5/3/25/1932 tabs in 30 days and phentermine 37.5 mg 135 tabs in 30 days.  Will do urine drug screen.  - [id number] - Urine Drug Confirmation Panel (Comprehensive)  - [id number] - Urine Drug Confirmation Panel (Comprehensive)    SOB (shortness of breath) on exertion  Could be deconditioning, but she is high risk for coronary artery disease due to her obesity and diabetes.  Patient agrees to doing a stress test.  - NM Lexiscan stress test    Mild persistent asthma without complication  ACT score today is 17, which is an improvement from previous 11.  Again discussed how to use her controller inhaler and how to move up and down in dosage according to allergy season, upper respiratory infection etc.  Will refill her Flovent.  - fluticasone (FLOVENT HFA) 110 MCG/ACT inhaler  Dispense: 12 g; Refill: 3    JANINE on CPAP  Claims she is faithful with its use.    Essential hypertension  Is on low-dose lisinopril, due to diabetes.  - Comprehensive metabolic panel (BMP + Alb, Alk Phos, ALT, AST, Total. Bili, TP)  - Comprehensive metabolic panel (BMP + Alb, Alk Phos, ALT, AST, Total. Bili, TP)    Mixed hyperlipidemia  Last cholesterol check was rather uncontrolled with LDL of 103.  Currently on  lovastatin which may need to be switched to either Crestor or Lipitor.  Will monitor labs.  - Lipid panel reflex to direct LDL Fasting  - Lipid panel reflex to direct LDL Fasting    Moderate episode of recurrent major depressive disorder (H)  PHQ-9 score is 8.  This number is as high as it is due to her chronic pain.  She is on Cymbalta.    Morbid obesity (H)  BMI currently of 35, but has comorbidities.    Chronic low back pain    Arthralgia of shoulder, unspecified laterality  Complains of chronic shoulder pain.    Arthritis/arthropathy of multiple joints  Would like refill.  - diclofenac (VOLTAREN) 1 % topical gel  Dispense: 300 g; Refill: 0      I will get back to her on her lab results by my chart and only call with grossly abnormal values.  I will also touch base with her after she has her stress test to discuss results.  I will have her return to see me according to her lab and stress test results.  Certainly she should be seen in no longer than 6 months time.    Greater than 40 minutes spent today on review of chart, visit and documentation.    Patient Active Problem List   Diagnosis     Essential hypertension     Joint Pain, Localized In The Shoulder     Fibromyalgia     Type 2 diabetes mellitus (H)     Hyperlipidemia     Major Depression, Recurrent     Chronic Constipation     Sciatica     Chronic pain syndrome     Lower Back Pain     Vaginal Wall Prolapse With Midline Cystocele     Female Stress Incontinence     Lump Or Mass In Breast     Osteoarthrosis, unspecified whether generalized or localized, lower leg     JANINE on CPAP     Obesity (BMI 30.0-34.9)     Degenerative disc disease, cervical     Degenerative disc disease, lumbar     Morbid obesity (H)     Controlled substance agreement signed     Gastroesophageal reflux disease without esophagitis     Mild persistent asthma without complication       Current Outpatient Medications   Medication     cyanocobalamin, vitamin B-12, (VITAMIN B-12 ORAL)      cyclobenzaprine (FLEXERIL) 10 MG tablet     diclofenac (VOLTAREN) 1 % topical gel     diclofenac epolamine (FLECTOR) 1.3 % PT12     diclofenac sodium (VOLTAREN) 1 % Gel     ergocalciferol (VITAMIN D2) 1,250 mcg (50,000 unit) capsule     FARXIGA 10 MG TABS tablet     fluticasone (FLOVENT HFA) 110 MCG/ACT inhaler     gabapentin (NEURONTIN) 300 MG capsule     glipiZIDE (GLUCOTROL XL) 5 MG 24 hr tablet     ibuprofen (ADVIL/MOTRIN) 800 MG tablet     lidocaine (XYLOCAINE) 5 % ointment     lisinopril (ZESTRIL) 2.5 MG tablet     lovastatin (MEVACOR) 40 MG tablet     MAPAP 500 MG CAPS     metFORMIN (GLUCOPHAGE-XR) 500 MG 24 hr tablet     omeprazole (PRILOSEC) 40 MG capsule     omeprazole (PRILOSEC) 40 MG capsule     oxyCODONE-acetaminophen (PERCOCET) 5-325 MG tablet     phentermine (ADIPEX-P) 37.5 mg tablet     SENNA 8.6 mg tablet     SENNA LAXATIVE 8.6 mg tablet     spironolactone (ALDACTONE) 100 MG tablet     spironolactone (ALDACTONE) 100 MG tablet     topiramate (TOPAMAX) 100 MG tablet     triamcinolone (KENALOG) 0.025 % ointment     VESICARE 5 MG tablet     albuterol (PROAIR HFA;PROVENTIL HFA;VENTOLIN HFA) 90 mcg/actuation inhaler     DULoxetine (CYMBALTA) 60 MG capsule     No current facility-administered medications for this visit.     Answers for HPI/ROS submitted by the patient on 2/28/2022  Frequency of checking blood sugars:: not at all  Diabetic concerns:: none  Paraesthesia present:: weight gain  How many servings of fruits and vegetables do you eat daily?: 0-1  On average, how many sweetened beverages do you drink each day (Examples: soda, juice, sweet tea, etc.  Do NOT count diet or artificially sweetened beverages)?: 2  How many minutes a day do you exercise enough to make your heart beat faster?: 9 or less  How many days a week do you exercise enough to make your heart beat faster?: 3 or less  How many days per week do you miss taking your medication?: 1

## 2022-02-28 NOTE — LETTER
Opioid / Opioid Plus Controlled Substance Agreement    This is an agreement between you and your provider about the safe and appropriate use of controlled substance/opioids prescribed by your care team. Controlled substances are medicines that can cause physical and mental dependence (abuse).    There are strict laws about having and using these medicines. We here at [facility] are committing to working with you in your efforts to get better. To support you in this work, we ll help you schedule regular office appointments for medicine refills. If we must cancel or change your appointment for any reason, we ll make sure you have enough medicine to last until your next appointment.     As a Provider, I will:    Listen carefully to your concerns and treat you with respect.     Recommend a treatment plan that I believe is in your best interest. This plan may involve therapies other than opioid pain medication.     Talk with you often about the possible benefits, and the risk of harm of any medicine that we prescribe for you.     Provide a plan on how to taper (discontinue or go off) using this medicine if the decision is made to stop its use.    As a Patient, I understand that opioid(s):     Are a controlled substance prescribed by my care team to help me function or work and manage my condition(s).     Are strong medicines and can cause serious side effects such as:    Drowsiness, which can seriously affect my driving ability    A lower breathing rate, enough to cause death    Harm to my thinking ability     Depression     Abuse of and addiction to this medicine    Need to be taken exactly as prescribed. Combining opioids with certain medicines or chemicals (such as illegal drugs, sedatives, sleeping pills, and benzodiazepines) can be dangerous or even fatal. If I stop opioids suddenly, I may have severe withdrawal symptoms.    Do not work for all types of pain nor for all patients. If they re not helpful, I may  be asked to stop them.        The risks, benefits and side effects of these medicine(s) were explained to me. I agree that:  1. I will take part in other treatments as advised by my care team. This may be psychiatry or counseling, physical therapy, behavioral therapy, group treatment or a referral to a specialist.     2. I will keep all my appointments. I understand that this is part of the monitoring of opioids. My care team may require an office visit for EVERY opioid/controlled substance refill. If I miss appointments or don t follow instructions, my care team may stop my medicine.    3. I will take my medicines as prescribed. I will not change the dose or schedule unless my care team tells me to. There will be no refills if I run out early.     4. I may be asked to come to the clinic and complete a urine drug test or complete a pill count at any time. If I don t give a urine sample or participate in a pill count, the care team may stop my medicine.    5. I will only receive prescriptions from this clinic for chronic pain. If I am treated by another provider for acute pain issues, I will tell them that I am taking opioid pain medication for chronic pain and that I have a treatment agreement with this provider. I will inform my [facility] care team within one business day if I am given a prescription for any pain medication by another healthcare provider. My [facility] care team can contact other providers and pharmacists about my use of any medicines.    6. It is up to me to make sure that I don t run out of my medicines on weekends or holidays. If my care team is willing to refill my opioid prescription without a visit, I must request refills only during office hours. Refills may take up to 3 business days to process. I will use one pharmacy to fill all my opioid and other controlled substance prescriptions. I will notify the clinic about any changes to my insurance or medication  availability.    7. I am responsible for my prescriptions. If the medicine/prescription is lost, stolen or destroyed, it will not be replaced. I also agree not to share controlled substance medicines with anyone.    8. I am aware I should not use any illegal or recreational drugs. I agree not to drink alcohol unless my care team says I can.       9. If I enroll in the [place] [facility] Cann[facility] program, I will tell my care team prior to my next refill.     10. I will tell my care team right away if I become pregnant, have a new medical problem treated outside of my regular clinic, or have a change in my medications.    11. I understand that this medicine can affect my thinking, judgment and reaction time. Alcohol and drugs affect the brain and body, which can affect the safety of my driving. Being under the influence of alcohol or drugs can affect my decision-making, behaviors, personal safety, and the safety of others. Driving while impaired (DWI) can occur if a person is driving, operating, or in physical control of a car, motorcycle, boat, snowmobile, ATV, motorbike, off-road vehicle, or any other motor vehicle ([place] Statute 169A.20). I understand the risk if I choose to drive or operate any vehicle or machinery.    I understand that if I do not follow any of the conditions above, my prescriptions or treatment may be stopped or changed.          Opioids  What You Need to Know    What are opioids?   Opioids are pain medicines that must be prescribed by a doctor. They are also known as narcotics.     Examples are:   1. morphine (MS Contin, Shahnaz)  2. oxycodone (Oxycontin)  3. oxycodone and acetaminophen (Percocet)  4. hydrocodone and acetaminophen (Vicodin, Norco)   5. fentanyl patch (Duragesic)   6. hydromorphone (Dilaudid)   7. methadone  8. codeine (Tylenol #3)     What do opioids do well?   Opioids are best for severe short-term pain such as after a surgery or injury. They may work well for cancer pain. They may  help some people with long-lasting (chronic) pain.     What do opioids NOT do well?   Opioids never get rid of pain entirely, and they don t work well for most patients with chronic pain. Opioids don t reduce swelling, one of the causes of pain.                                    Other ways to manage chronic pain and improve function include:       Treat the health problem that may be causing pain    Anti-inflammation medicines, which reduce swelling and tenderness, such as ibuprofen (Advil, Motrin) or naproxen (Aleve)    Acetaminophen (Tylenol)    Antidepressants and anti-seizure medicines, especially for nerve pain    Topical treatments such as patches or creams    Injections or nerve blocks    Chiropractic or osteopathic treatment    Acupuncture, massage, deep breathing, meditation, visual imagery, aromatherapy    Use heat or ice at the pain site    Physical therapy     Exercise    Stop smoking    Take part in therapy       Risks and side effects     Talk to your doctor before you start or decide to keep taking opioids. Possible side effects include:      Lowering your breathing rate enough to cause death    Overdose, including death, especially if taking higher than prescribed doses    Worse depression symptoms; less pleasure in things you usually enjoy    Feeling tired or sluggish    Slower thoughts or cloudy thinking    Being more sensitive to pain over time; pain is harder to control    Trouble sleeping or restless sleep    Changes in hormone levels (for example, less testosterone)    Changes in sex drive or ability to have sex    Constipation    Unsafe driving    Itching and sweating    Dizziness    Nausea, throwing up and dry mouth    What else should I know about opioids?    Opioids may lead to dependence, tolerance, or addiction.      Dependence means that if you stop or reduce the medicine too quickly, you will have withdrawal symptoms. These include loose poop (diarrhea), jitters, flu-like symptoms,  nervousness and tremors. Dependence is not the same as addiction.                       Tolerance means needing higher doses over time to get the same effect. This may increase the chance of serious side effects.      Addiction is when people improperly use a substance that harms their body, their mind or their relations with others. Use of opiates can cause a relapse of addiction if you have a history of drug or alcohol abuse.      People who have used opioids for a long time may have a lower quality of life, worse depression, higher levels of pain and more visits to doctors.    You can overdose on opioids. Take these steps to lower your risk of overdose:    1. Recognize the signs:  Signs of overdose include decrease or loss of consciousness (blackout), slowed breathing, trouble waking up and blue lips. If someone is worried about overdose, they should call 911.    2. Talk to your doctor about Narcan (naloxone).   If you are at risk for overdose, you may be given a prescription for Narcan. This medicine very quickly reverses the effects of opioids.   If you overdose, a friend or family member can give you Narcan while waiting for the ambulance. They need to know the signs of overdose and how to give Narcan.     3. Don't use alcohol or street drugs.   Taking them with opioids can cause death.    4. Do not take any of these medicines unless your doctor says it s OK. Taking these with opioids can cause death:    Benzodiazepines, such as lorazepam (Ativan), alprazolam (Xanax) or diazepam (Valium)    Muscle relaxers, such as cyclobenzaprine (Flexeril)    Sleeping pills like zolpidem (Ambien)     Other opioids      How to keep you and other people safe while taking opioids:    1. Never share your opioids with others.  Opioid medicines are regulated by the Drug Enforcement Agency (MON[employer]). Selling or sharing medications is a criminal act.    2. Be sure to store opioids in a secure place, locked up if possible. Young children  can easily swallow them and overdose.    3. When you are traveling with your medicines, keep them in the original bottles. If you use a pill box, be sure you also carry a copy of your medicine list from your clinic or pharmacy.    4. Safe disposal of opioids    Most pharmacies have places to get rid of medicine, called disposal kiosks. Medicine disposal options are also available in every [facility]. Search your county and  medication disposal  to find more options. You can find more details at:  [email or web address]     I agree that my provider, clinic care team, and pharmacy may work with any city, state or federal law enforcement agency that investigates the misuse, sale, or other diversion of my controlled medicine. I will allow my provider to discuss my care with, or share a copy of, this agreement with any other treating provider, pharmacy or emergency room where I receive care.    I have read this agreement and have asked questions about anything I did not understand.    _______________________________________________________  Patient Signature - Anita Duque _____________________                   Date     _______________________________________________________  Provider Signature - Trish Eagle MD   _____________________                   Date     _______________________________________________________  Witness Signature (required if provider not present while patient signing)   _____________________                   Date

## 2022-03-01 ASSESSMENT — ANXIETY QUESTIONNAIRES: GAD7 TOTAL SCORE: 2

## 2022-03-03 LAB
GABAPENTIN UR QL CFM: PRESENT
OXYCODONE UR CFM-MCNC: 1340 NG/ML
OXYCODONE/CREAT UR: 749 NG/MG {CREAT}
OXYMORPHONE UR CFM-MCNC: 840 NG/ML
OXYMORPHONE/CREAT UR: 469 NG/MG {CREAT}

## 2022-03-16 ENCOUNTER — HOSPITAL ENCOUNTER (OUTPATIENT)
Dept: CARDIOLOGY | Facility: HOSPITAL | Age: 67
Discharge: HOME | End: 2022-03-16
Attending: FAMILY MEDICINE
Payer: COMMERCIAL

## 2022-03-16 ENCOUNTER — HOSPITAL ENCOUNTER (OUTPATIENT)
Dept: NUCLEAR MEDICINE | Facility: HOSPITAL | Age: 67
Discharge: HOME | End: 2022-03-16
Attending: FAMILY MEDICINE
Payer: COMMERCIAL

## 2022-03-16 DIAGNOSIS — R06.02 SOB (SHORTNESS OF BREATH) ON EXERTION: ICD-10-CM

## 2022-03-16 LAB
CV STRESS CURRENT BP HE: NORMAL
CV STRESS CURRENT HR HE: 53
CV STRESS CURRENT HR HE: 59
CV STRESS CURRENT HR HE: 62
CV STRESS CURRENT HR HE: 66
CV STRESS CURRENT HR HE: 68
CV STRESS CURRENT HR HE: 69
CV STRESS CURRENT HR HE: 70
CV STRESS CURRENT HR HE: 75
CV STRESS CURRENT HR HE: 78
CV STRESS CURRENT HR HE: 79
CV STRESS CURRENT HR HE: 79
CV STRESS DEVIATION TIME HE: NORMAL
CV STRESS ECHO PERCENT HR HE: NORMAL
CV STRESS EXERCISE STAGE HE: NORMAL
CV STRESS FINAL RESTING BP HE: NORMAL
CV STRESS FINAL RESTING HR HE: 79
CV STRESS MAX HR HE: 85
CV STRESS MAX TREADMILL GRADE HE: 0
CV STRESS MAX TREADMILL SPEED HE: 0
CV STRESS PEAK DIA BP HE: NORMAL
CV STRESS PEAK SYS BP HE: NORMAL
CV STRESS PHASE HE: NORMAL
CV STRESS PROTOCOL HE: NORMAL
CV STRESS RESTING PT POSITION HE: NORMAL
CV STRESS ST DEVIATION AMOUNT HE: NORMAL
CV STRESS ST DEVIATION ELEVATION HE: NORMAL
CV STRESS ST EVELATION AMOUNT HE: NORMAL
CV STRESS TEST TYPE HE: NORMAL
CV STRESS TOTAL STAGE TIME MIN 1 HE: NORMAL
RATE PRESSURE PRODUCT: 8840
STRESS ECHO BASELINE DIASTOLIC HE: 68
STRESS ECHO BASELINE HR: 74
STRESS ECHO BASELINE SYSTOLIC BP: 107
STRESS ECHO CALCULATED PERCENT HR: 56 %
STRESS ECHO LAST STRESS DIASTOLIC BP: 59
STRESS ECHO LAST STRESS HR: 75
STRESS ECHO LAST STRESS SYSTOLIC BP: 104
STRESS ECHO TARGET HR: 152
STRESS/REST PERFUSION RATIO: 1.4

## 2022-03-16 PROCEDURE — A9500 TC99M SESTAMIBI: HCPCS | Performed by: FAMILY MEDICINE

## 2022-03-16 PROCEDURE — 78452 HT MUSCLE IMAGE SPECT MULT: CPT

## 2022-03-16 PROCEDURE — 78452 HT MUSCLE IMAGE SPECT MULT: CPT | Mod: 26 | Performed by: INTERNAL MEDICINE

## 2022-03-16 PROCEDURE — 93018 CV STRESS TEST I&R ONLY: CPT | Performed by: INTERNAL MEDICINE

## 2022-03-16 PROCEDURE — 93017 CV STRESS TEST TRACING ONLY: CPT

## 2022-03-16 PROCEDURE — 250N000011 HC RX IP 250 OP 636: Performed by: FAMILY MEDICINE

## 2022-03-16 PROCEDURE — 343N000001 HC RX 343: Performed by: FAMILY MEDICINE

## 2022-03-16 PROCEDURE — 93016 CV STRESS TEST SUPVJ ONLY: CPT | Performed by: INTERNAL MEDICINE

## 2022-03-16 RX ORDER — ALBUTEROL SULFATE 0.83 MG/ML
2.5 SOLUTION RESPIRATORY (INHALATION)
Status: DISCONTINUED | OUTPATIENT
Start: 2022-03-16 | End: 2022-03-16 | Stop reason: HOSPADM

## 2022-03-16 RX ORDER — REGADENOSON 0.08 MG/ML
0.4 INJECTION, SOLUTION INTRAVENOUS ONCE
Status: COMPLETED | OUTPATIENT
Start: 2022-03-16 | End: 2022-03-16

## 2022-03-16 RX ORDER — CAFFEINE CITRATE 20 MG/ML
60 SOLUTION INTRAVENOUS
Status: DISCONTINUED | OUTPATIENT
Start: 2022-03-16 | End: 2022-03-16 | Stop reason: HOSPADM

## 2022-03-16 RX ORDER — AMINOPHYLLINE 25 MG/ML
50 INJECTION, SOLUTION INTRAVENOUS
Status: DISCONTINUED | OUTPATIENT
Start: 2022-03-16 | End: 2022-03-16 | Stop reason: HOSPADM

## 2022-03-16 RX ORDER — CAFFEINE 200 MG
200 TABLET ORAL
Status: DISCONTINUED | OUTPATIENT
Start: 2022-03-16 | End: 2022-03-16 | Stop reason: HOSPADM

## 2022-03-16 RX ADMIN — Medication 8.16 MCI.: at 09:39

## 2022-03-16 RX ADMIN — Medication 31.9 MILLICURIE: at 11:48

## 2022-03-16 RX ADMIN — REGADENOSON 0.4 MG: 0.08 INJECTION, SOLUTION INTRAVENOUS at 10:31

## 2022-03-29 DIAGNOSIS — E11.9 TYPE 2 DIABETES MELLITUS (H): ICD-10-CM

## 2022-03-29 DIAGNOSIS — E78.2 MIXED HYPERLIPIDEMIA: Primary | ICD-10-CM

## 2022-03-29 RX ORDER — ROSUVASTATIN CALCIUM 10 MG/1
10 TABLET, COATED ORAL DAILY
Qty: 90 TABLET | Refills: 1 | Status: SHIPPED | OUTPATIENT
Start: 2022-03-29 | End: 2022-10-07

## 2022-03-30 DIAGNOSIS — E11.9 TYPE 2 DIABETES MELLITUS (H): ICD-10-CM

## 2022-03-31 RX ORDER — GLIPIZIDE 5 MG/1
TABLET, FILM COATED, EXTENDED RELEASE ORAL
Qty: 90 TABLET | Refills: 1 | Status: SHIPPED | OUTPATIENT
Start: 2022-03-31 | End: 2022-07-12

## 2022-03-31 NOTE — TELEPHONE ENCOUNTER
"Last Written Prescription Date:  2/16/22  Last Fill Quantity: 30,  # refills: 0   Last office visit provider:  2/28/22     Requested Prescriptions   Pending Prescriptions Disp Refills     glipiZIDE (GLUCOTROL XL) 5 MG 24 hr tablet [Pharmacy Med Name: glipiZIDE ER 5 MG Oral Tablet Extended Release 24 Hour] 30 tablet 0     Sig: TAKE 1 TABLET BY MOUTH ONCE DAILY . APPOINTMENT REQUIRED FOR FUTURE REFILLS       Sulfonylurea Agents Passed - 3/31/2022 12:47 PM        Passed - Patient has documented A1c within the specified period of time.     If HgbA1C is 8 or greater, it needs to be on file within the past 3 months.  If less than 8, must be on file within the past 6 months.     Recent Labs   Lab Test 02/28/22  1037   A1C 7.5*             Passed - Medication is active on med list        Passed - Patient is age 18 or older        Passed - No active pregnancy on record        Passed - Patient has a recent creatinine (normal) within the past 12 mos.     Recent Labs   Lab Test 02/28/22  1037   CR 0.85       Ok to refill medication if creatinine is low          Passed - Patient has not had a positive pregnancy test within the past 12 mos.        Passed - Recent (6 mo) or future (30 days) visit within the authorizing provider's specialty     Patient had office visit in the last 6 months or has a visit in the next 30 days with authorizing provider or within the authorizing provider's specialty.  See \"Patient Info\" tab in inbasket, or \"Choose Columns\" in Meds & Orders section of the refill encounter.                 Robson Elizalde RN 03/31/22 12:47 PM  "

## 2022-04-01 ENCOUNTER — TRANSFERRED RECORDS (OUTPATIENT)
Dept: HEALTH INFORMATION MANAGEMENT | Facility: CLINIC | Age: 67
End: 2022-04-01

## 2022-04-01 DIAGNOSIS — I10 ESSENTIAL HYPERTENSION: ICD-10-CM

## 2022-04-01 LAB — RETINOPATHY: NORMAL

## 2022-04-01 RX ORDER — METFORMIN HCL 500 MG
TABLET, EXTENDED RELEASE 24 HR ORAL
Qty: 360 TABLET | Refills: 1 | Status: SHIPPED | OUTPATIENT
Start: 2022-04-01 | End: 2022-11-30

## 2022-04-01 NOTE — TELEPHONE ENCOUNTER
"Last Written Prescription Date:  2/16/22  Last Fill Quantity: 120,  # refills: 0   Last office visit provider:  2/28/22     Requested Prescriptions   Pending Prescriptions Disp Refills     metFORMIN (GLUCOPHAGE-XR) 500 MG 24 hr tablet [Pharmacy Med Name: metFORMIN HCl  MG Oral Tablet Extended Release 24 Hour] 120 tablet 0     Sig: TAKE 2 TABLETS BY MOUTH TWICE DAILY WITH MEALS . APPOINTMENT REQUIRED FOR FUTURE REFILLS       Biguanide Agents Passed - 4/1/2022  8:29 AM        Passed - Patient is age 10 or older        Passed - Patient has documented A1c within the specified period of time.     If HgbA1C is 8 or greater, it needs to be on file within the past 3 months.  If less than 8, must be on file within the past 6 months.     Recent Labs   Lab Test 02/28/22  1037   A1C 7.5*             Passed - Patient's CR is NOT>1.4 OR Patient's EGFR is NOT<45 within past 12 mos.     Recent Labs   Lab Test 02/28/22  1037 10/29/21  1402 06/15/21  1312   GFRESTIMATED 74   < > >60   GFRESTBLACK  --   --  >60    < > = values in this interval not displayed.       Recent Labs   Lab Test 02/28/22  1037   CR 0.85             Passed - Patient does NOT have a diagnosis of CHF.        Passed - Medication is active on med list        Passed - Patient is not pregnant        Passed - Patient has not had a positive pregnancy test within the past 12 mos.         Passed - Recent (6 mo) or future (30 days) visit within the authorizing provider's specialty     Patient had office visit in the last 6 months or has a visit in the next 30 days with authorizing provider or within the authorizing provider's specialty.  See \"Patient Info\" tab in inbasket, or \"Choose Columns\" in Meds & Orders section of the refill encounter.                 Robson Elizalde RN 04/01/22 8:29 AM  "

## 2022-04-04 RX ORDER — LISINOPRIL 2.5 MG/1
TABLET ORAL
Qty: 90 TABLET | Refills: 2 | Status: SHIPPED | OUTPATIENT
Start: 2022-04-04 | End: 2023-09-11

## 2022-04-04 NOTE — TELEPHONE ENCOUNTER
"Last Written Prescription Date:  1/3/2022  Last Fill Quantity: 90,  # refills: 0   Last office visit provider:  2/28/2022 Dr. Eagle     Requested Prescriptions   Pending Prescriptions Disp Refills     lisinopril (ZESTRIL) 2.5 MG tablet [Pharmacy Med Name: Lisinopril 2.5 MG Oral Tablet] 90 tablet 0     Sig: Take 1 tablet by mouth once daily       ACE Inhibitors (Including Combos) Protocol Passed - 4/1/2022  7:40 PM        Passed - Blood pressure under 140/90 in past 12 months     BP Readings from Last 3 Encounters:   02/28/22 96/60   10/29/21 125/71   10/29/21 116/71                 Passed - Recent (12 mo) or future (30 days) visit within the authorizing provider's specialty     Patient has had an office visit with the authorizing provider or a provider within the authorizing providers department within the previous 12 mos or has a future within next 30 days. See \"Patient Info\" tab in inbasket, or \"Choose Columns\" in Meds & Orders section of the refill encounter.              Passed - Medication is active on med list        Passed - Patient is age 18 or older        Passed - No active pregnancy on record        Passed - Normal serum creatinine on file in past 12 months     Recent Labs   Lab Test 02/28/22  1037   CR 0.85       Ok to refill medication if creatinine is low          Passed - Normal serum potassium on file in past 12 months     Recent Labs   Lab Test 02/28/22  1037   POTASSIUM 4.1             Passed - No positive pregnancy test within past 12 months             Shanthi Fishman RN 04/04/22 9:33 AM  "

## 2022-04-09 DIAGNOSIS — E11.9 TYPE 2 DIABETES MELLITUS WITHOUT COMPLICATION, WITHOUT LONG-TERM CURRENT USE OF INSULIN (H): ICD-10-CM

## 2022-04-11 RX ORDER — DAPAGLIFLOZIN 10 MG/1
TABLET, FILM COATED ORAL
Qty: 30 TABLET | Refills: 0 | Status: SHIPPED | OUTPATIENT
Start: 2022-04-11 | End: 2022-07-09

## 2022-04-11 NOTE — TELEPHONE ENCOUNTER
"Last Written Prescription Date:  2/16/2022  Last Fill Quantity: 30,  # refills: 0   Last office visit provider:  2/28/2022     Requested Prescriptions   Pending Prescriptions Disp Refills     FARXIGA 10 MG TABS tablet [Pharmacy Med Name: Farxiga 10 MG Oral Tablet] 30 tablet 0     Sig: Take 1 tablet by mouth once daily       Sodium Glucose Co-Transport Inhibitor Agents Passed - 4/9/2022  9:22 AM        Passed - Patient has documented A1c within the specified period of time.     If HgbA1C is 8 or greater, it needs to be on file within the past 3 months.  If less than 8, must be on file within the past 6 months.     Recent Labs   Lab Test 02/28/22  1037   A1C 7.5*             Passed - No creatinine >1.4 or GFR <45 within the past 12 mos     Recent Labs   Lab Test 02/28/22  1037 10/29/21  1402 06/15/21  1312   GFRESTIMATED 74   < > >60   GFRESTBLACK  --   --  >60    < > = values in this interval not displayed.       Recent Labs   Lab Test 02/28/22  1037   CR 0.85             Passed - Medication is active on med list        Passed - Patient is age 18 or older        Passed - Patient is not pregnant        Passed - Patient has documented normal Potassium within the last 12 mos.     Recent Labs   Lab Test 02/28/22  1037   POTASSIUM 4.1             Passed - Patient has no positive pregnancy test within the past 12 mos.        Passed - Recent (6 mo) or future (30 days) visit within the authorizing provider's specialty     Patient had office visit in the last 6 months or has a visit in the next 30 days with authorizing provider or within the authorizing provider's specialty.  See \"Patient Info\" tab in inbasket, or \"Choose Columns\" in Meds & Orders section of the refill encounter.                 Edie Garcia RN 04/11/22 4:11 PM  "

## 2022-04-25 ENCOUNTER — VIRTUAL VISIT (OUTPATIENT)
Dept: FAMILY MEDICINE | Facility: CLINIC | Age: 67
End: 2022-04-25
Payer: COMMERCIAL

## 2022-04-25 DIAGNOSIS — J20.9 ACUTE BRONCHITIS, UNSPECIFIED ORGANISM: Primary | ICD-10-CM

## 2022-04-25 PROCEDURE — 99214 OFFICE O/P EST MOD 30 MIN: CPT | Mod: 95 | Performed by: FAMILY MEDICINE

## 2022-04-25 RX ORDER — AZITHROMYCIN 250 MG/1
TABLET, FILM COATED ORAL
Qty: 6 TABLET | Refills: 0 | Status: SHIPPED | OUTPATIENT
Start: 2022-04-25 | End: 2022-04-30

## 2022-04-25 NOTE — PROGRESS NOTES
Anita is a 66 year old who is being evaluated via a billable video visit.      How would you like to obtain your AVS? MyChart  If the video visit is dropped, the invitation should be resent by: Text to cell phone: [phone]  Will anyone else be joining your video visit? No    Video Start Time: 12:25 PM    Assessment & Plan     Acute bronchitis, unspecified organism  History and clinical presentation are consistent with acute bronchitis.  The patient was advised to schedule an office visit if symptoms persisted and we will proceed with chest x-ray at that time.  I also advised her to proceed with COVID testing if symptoms failed to improve.  Plan:  - azithromycin (ZITHROMAX) 250 MG tablet; Take 2 tablets (500 mg) by mouth daily for 1 day, THEN 1 tablet (250 mg) daily for 4 days.  - Symptomatic; Unknown COVID-19 Virus (Coronavirus) by PCR; Future    No follow-ups on file.    Simone Cabrera MD  [facility]   Anita is a 66 year old who presents for the following health issues:    HPI     Acute Illness  Acute illness concerns: BRONCHITIS OR PNEUMONIA  Onset/Duration: 3DAYS AGO  Symptoms:  Fever: no  Chills/Sweats: no  Headache (location?): YES  Sinus Pressure: YES  Conjunctivitis:  no  Ear Pain: no  Rhinorrhea: no  Congestion: YES  Sore Throat: YES  Cough: YES  Wheeze: YES  Decreased Appetite: no  Nausea: no  Vomiting: no  Diarrhea: no  Dysuria/Freq.: no  Dysuria or Hematuria: no  Fatigue/Achiness: YES  Sick/Strep Exposure: no  Therapies tried and outcome: None  Brother and sister-in-law had pneumonia. Her  contracted it last week but he is healing well.  She started feeling sick on Friday, sore throat, runny nose, congestion and headaches.  Hearing her self wheezing. Feels shortness of breath.   Coughing but mostly dry cough.   Tested for covid last Monday and the test was negative.     Review of Systems   Constitutional, HEENT, cardiovascular, pulmonary, gi and gu  systems are negative, except as otherwise noted.      Objective           Vitals:  No vitals were obtained today due to virtual visit.    Physical Exam   GENERAL: Healthy, alert and no distress  EYES: Eyes grossly normal to inspection.  No discharge or erythema, or obvious scleral/conjunctival abnormalities.  RESP: No audible wheeze, cough, or visible cyanosis.  No visible retractions or increased work of breathing.    SKIN: Visible skin clear. No significant rash, abnormal pigmentation or lesions.  NEURO: Cranial nerves grossly intact.  Mentation and speech appropriate for age.  PSYCH: Mentation appears normal, affect normal/bright, judgement and insight intact, normal speech and appearance well-groomed.    No results found for any visits on 04/25/22.          Video-Visit Details    Type of service:  Video Visit    Video End Time: 12:38 PM    Originating Location (pt. Location): Home    Distant Location (provider location):  [facility]     Platform used for Video Visit: [employer]

## 2022-04-27 ENCOUNTER — MYC REFILL (OUTPATIENT)
Dept: FAMILY MEDICINE | Facility: CLINIC | Age: 67
End: 2022-04-27
Payer: COMMERCIAL

## 2022-04-27 DIAGNOSIS — G89.4 CHRONIC PAIN SYNDROME: ICD-10-CM

## 2022-04-30 NOTE — TELEPHONE ENCOUNTER
Routing refill request to provider for review/approval because:  Controlled substance request    Last Written Prescription Date:  2/28/22  Last Fill Quantity: 30,  # refills: 0   Last office visit provider:  4/25/22     Requested Prescriptions   Pending Prescriptions Disp Refills     oxyCODONE-acetaminophen (PERCOCET) 5-325 MG tablet 30 tablet 0     Sig: Take 1 tablet by mouth every 6 hours as needed for severe pain       There is no refill protocol information for this order          Kenia Mccarthy 04/30/22 3:44 PM

## 2022-05-02 RX ORDER — OXYCODONE AND ACETAMINOPHEN 5; 325 MG/1; MG/1
1 TABLET ORAL EVERY 6 HOURS PRN
Qty: 30 TABLET | Refills: 0 | Status: SHIPPED | OUTPATIENT
Start: 2022-05-02 | End: 2022-07-05

## 2022-06-27 DIAGNOSIS — E66.811 OBESITY (BMI 30.0-34.9): ICD-10-CM

## 2022-06-27 DIAGNOSIS — M54.9 BACK PAIN: ICD-10-CM

## 2022-06-27 DIAGNOSIS — M79.7 FIBROMYALGIA: ICD-10-CM

## 2022-06-28 RX ORDER — PHENTERMINE HYDROCHLORIDE 37.5 MG/1
TABLET ORAL
Qty: 135 TABLET | Refills: 0 | Status: SHIPPED | OUTPATIENT
Start: 2022-06-28 | End: 2022-12-02

## 2022-06-28 RX ORDER — CYCLOBENZAPRINE HCL 10 MG
TABLET ORAL
Qty: 60 TABLET | Refills: 0 | Status: SHIPPED | OUTPATIENT
Start: 2022-06-28

## 2022-06-28 RX ORDER — IBUPROFEN 800 MG/1
TABLET, FILM COATED ORAL
Qty: 100 TABLET | Refills: 0 | Status: SHIPPED | OUTPATIENT
Start: 2022-06-28 | End: 2022-09-08

## 2022-06-28 NOTE — TELEPHONE ENCOUNTER
"Routing refill request to provider for review/approval because:  age    Last Written Prescription Date:  1/3/22  Last Fill Quantity: 100,  # refills: 0   Last office visit provider:  2/28/22     Requested Prescriptions   Pending Prescriptions Disp Refills     ibuprofen (ADVIL/MOTRIN) 800 MG tablet [Pharmacy Med Name: Ibuprofen 800 MG Oral Tablet] 100 tablet 0     Sig: Take 1 tablet by mouth three times daily as needed       NSAID Medications Failed - 6/27/2022 10:30 AM        Failed - Patient is age 6-64 years        Passed - Blood pressure under 140/90 in past 12 months     BP Readings from Last 3 Encounters:   02/28/22 96/60   10/29/21 125/71   10/29/21 116/71                 Passed - Normal ALT on file in past 12 months     Recent Labs   Lab Test 02/28/22  1037   ALT 16             Passed - Normal AST on file in past 12 months     Recent Labs   Lab Test 02/28/22  1037   AST 18             Passed - Recent (12 mo) or future (30 days) visit within the authorizing provider's specialty     Patient has had an office visit with the authorizing provider or a provider within the authorizing providers department within the previous 12 mos or has a future within next 30 days. See \"Patient Info\" tab in inbasket, or \"Choose Columns\" in Meds & Orders section of the refill encounter.              Passed - Normal CBC on file in past 12 months     Recent Labs   Lab Test 10/29/21  1402   WBC 8.2   RBC 4.84   HGB 15.1   HCT 46.3                    Passed - Medication is active on med list        Passed - No active pregnancy on record        Passed - Normal serum creatinine on file in past 12 months     Recent Labs   Lab Test 02/28/22  1037   CR 0.85       Ok to refill medication if creatinine is low          Passed - No positive pregnancy test in past 12 months             Sandhya Mckinnon, RN 06/27/22 7:50 PM  " Clothing

## 2022-06-29 ENCOUNTER — TELEPHONE (OUTPATIENT)
Dept: FAMILY MEDICINE | Facility: CLINIC | Age: 67
End: 2022-06-29

## 2022-06-29 NOTE — TELEPHONE ENCOUNTER
PA Initiation    Medication: phentermine (ADIPEX-P) 37.5 MG tablet  Insurance Company: [employer] - Phone [phone] Fax [phone]  Pharmacy Filling the Rx: [facility] [address] [place] [address]  Filling Pharmacy Phone: [phone]  Filling Pharmacy Fax: [phone]  Start Date: 6/29/2022

## 2022-06-29 NOTE — TELEPHONE ENCOUNTER
Prior Authorization Request  Who s requesting:  [employer]  Pharmacy Name and Location: [facility]  Medication Name: phentermine   Insurance Plan: [facility]  Insurance Member ID Number:  [id number]  CoverMyMeds Key: [id number]  Informed patient that prior authorizations can take up to 10 business days for response:   No  Okay to leave a detailed message: YES

## 2022-06-30 NOTE — TELEPHONE ENCOUNTER
PRIOR AUTHORIZATION DENIED    Medication: phentermine (ADIPEX-P) 37.5 MG tablet    Denial Date: 6/30/2022    Denial Rationale: Medications used for weight loss are excluded from coverage under Medicare rules.    Appeal Information: N/A- no option for appeal

## 2022-07-05 DIAGNOSIS — G89.4 CHRONIC PAIN SYNDROME: ICD-10-CM

## 2022-07-05 RX ORDER — OXYCODONE AND ACETAMINOPHEN 5; 325 MG/1; MG/1
1 TABLET ORAL EVERY 6 HOURS PRN
Qty: 30 TABLET | Refills: 0 | Status: SHIPPED | OUTPATIENT
Start: 2022-07-05 | End: 2022-07-12

## 2022-07-05 NOTE — TELEPHONE ENCOUNTER
Medication: oxycodone/ acetaminophen  Last Date Filled 5/2/222  Last appointment addressing medication use: 2/8/222    Called pt and sched next med check visit for 7/12/22    Taken as prescribed from physician notes? YES    CSA in last year: YES    Random Utox in last year: YES  (if any of the above answer NO - schedule with PCP)     Opioids + benzodiazepines? NO  (if the above answer YES - schedule with PCP every 6 months)       All responses suggest: Refilling prescription

## 2022-07-07 ENCOUNTER — TELEPHONE (OUTPATIENT)
Dept: FAMILY MEDICINE | Facility: CLINIC | Age: 67
End: 2022-07-07

## 2022-07-07 NOTE — TELEPHONE ENCOUNTER
Forms Request  Name of form/paperwork: [employer]  Have you been seen for this request: N/A  Do we have the form: yes, in providers inbox  When is form needed by: when done  How would you like the form returned: fax  Patient Notified form requests are processed in 3-5 business days: n/a    Okay to leave a detailed message? n/a

## 2022-07-08 DIAGNOSIS — E11.9 TYPE 2 DIABETES MELLITUS WITHOUT COMPLICATION, WITHOUT LONG-TERM CURRENT USE OF INSULIN (H): ICD-10-CM

## 2022-07-09 RX ORDER — DAPAGLIFLOZIN 10 MG/1
TABLET, FILM COATED ORAL
Qty: 90 TABLET | Refills: 0 | Status: SHIPPED | OUTPATIENT
Start: 2022-07-09 | End: 2022-07-12

## 2022-07-09 NOTE — TELEPHONE ENCOUNTER
"Last Written Prescription Date:  4/11/22  Last Fill Quantity: 30,  # refills: 0   Last office visit provider:  2/28/22     Requested Prescriptions   Pending Prescriptions Disp Refills     FARXIGA 10 MG TABS tablet [Pharmacy Med Name: Farxiga 10 MG Oral Tablet] 30 tablet 0     Sig: Take 1 tablet by mouth once daily       Sodium Glucose Co-Transport Inhibitor Agents Passed - 7/8/2022 11:00 AM        Passed - Patient has documented A1c within the specified period of time.     If HgbA1C is 8 or greater, it needs to be on file within the past 3 months.  If less than 8, must be on file within the past 6 months.     Recent Labs   Lab Test 02/28/22  1037   A1C 7.5*             Passed - No creatinine >1.4 or GFR <45 within the past 12 mos     Recent Labs   Lab Test 02/28/22  1037 10/29/21  1402 06/15/21  1312   GFRESTIMATED 74   < > >60   GFRESTBLACK  --   --  >60    < > = values in this interval not displayed.       Recent Labs   Lab Test 02/28/22  1037   CR 0.85             Passed - Medication is active on med list        Passed - Patient is age 18 or older        Passed - Patient is not pregnant        Passed - Patient has documented normal Potassium within the last 12 mos.     Recent Labs   Lab Test 02/28/22  1037   POTASSIUM 4.1             Passed - Patient has no positive pregnancy test within the past 12 mos.        Passed - Recent (6 mo) or future (30 days) visit within the authorizing provider's specialty     Patient had office visit in the last 6 months or has a visit in the next 30 days with authorizing provider or within the authorizing provider's specialty.  See \"Patient Info\" tab in inbasket, or \"Choose Columns\" in Meds & Orders section of the refill encounter.                 Kenia Mccarthy 07/09/22 3:56 PM  "

## 2022-07-12 ENCOUNTER — TELEPHONE (OUTPATIENT)
Dept: FAMILY MEDICINE | Facility: CLINIC | Age: 67
End: 2022-07-12

## 2022-07-12 ENCOUNTER — OFFICE VISIT (OUTPATIENT)
Dept: FAMILY MEDICINE | Facility: CLINIC | Age: 67
End: 2022-07-12
Payer: COMMERCIAL

## 2022-07-12 VITALS
OXYGEN SATURATION: 97 % | BODY MASS INDEX: 34.52 KG/M2 | SYSTOLIC BLOOD PRESSURE: 116 MMHG | WEIGHT: 198 LBS | HEART RATE: 74 BPM | RESPIRATION RATE: 18 BRPM | DIASTOLIC BLOOD PRESSURE: 80 MMHG

## 2022-07-12 DIAGNOSIS — G47.33 OSA ON CPAP: Chronic | ICD-10-CM

## 2022-07-12 DIAGNOSIS — J45.30 MILD PERSISTENT ASTHMA WITHOUT COMPLICATION: Chronic | ICD-10-CM

## 2022-07-12 DIAGNOSIS — E11.9 TYPE 2 DIABETES MELLITUS WITHOUT COMPLICATION, WITHOUT LONG-TERM CURRENT USE OF INSULIN (H): Primary | Chronic | ICD-10-CM

## 2022-07-12 DIAGNOSIS — E78.2 MIXED HYPERLIPIDEMIA: Chronic | ICD-10-CM

## 2022-07-12 DIAGNOSIS — F33.1 MODERATE EPISODE OF RECURRENT MAJOR DEPRESSIVE DISORDER (H): Chronic | ICD-10-CM

## 2022-07-12 DIAGNOSIS — I10 ESSENTIAL HYPERTENSION: Chronic | ICD-10-CM

## 2022-07-12 DIAGNOSIS — Z79.899 CONTROLLED SUBSTANCE AGREEMENT SIGNED: Chronic | ICD-10-CM

## 2022-07-12 DIAGNOSIS — G89.4 CHRONIC PAIN SYNDROME: Chronic | ICD-10-CM

## 2022-07-12 LAB
ANION GAP SERPL CALCULATED.3IONS-SCNC: 14 MMOL/L (ref 7–15)
BUN SERPL-MCNC: 22.8 MG/DL (ref 8–23)
CALCIUM SERPL-MCNC: 10.1 MG/DL (ref 8.8–10.2)
CHLORIDE SERPL-SCNC: 101 MMOL/L (ref 98–107)
CHOLEST SERPL-MCNC: 147 MG/DL
CREAT SERPL-MCNC: 0.88 MG/DL (ref 0.51–0.95)
DEPRECATED HCO3 PLAS-SCNC: 24 MMOL/L (ref 22–29)
GFR SERPL CREATININE-BSD FRML MDRD: 71 ML/MIN/1.73M2
GLUCOSE SERPL-MCNC: 179 MG/DL (ref 70–99)
HBA1C MFR BLD: 7.6 % (ref 0–5.6)
HDLC SERPL-MCNC: 63 MG/DL
LDLC SERPL CALC-MCNC: 46 MG/DL
NONHDLC SERPL-MCNC: 84 MG/DL
POTASSIUM SERPL-SCNC: 4.5 MMOL/L (ref 3.4–5.3)
SODIUM SERPL-SCNC: 139 MMOL/L (ref 136–145)
TRIGL SERPL-MCNC: 192 MG/DL

## 2022-07-12 PROCEDURE — 80061 LIPID PANEL: CPT | Performed by: FAMILY MEDICINE

## 2022-07-12 PROCEDURE — 83036 HEMOGLOBIN GLYCOSYLATED A1C: CPT | Performed by: FAMILY MEDICINE

## 2022-07-12 PROCEDURE — 99214 OFFICE O/P EST MOD 30 MIN: CPT | Performed by: FAMILY MEDICINE

## 2022-07-12 PROCEDURE — 96127 BRIEF EMOTIONAL/BEHAV ASSMT: CPT | Performed by: FAMILY MEDICINE

## 2022-07-12 PROCEDURE — 80048 BASIC METABOLIC PNL TOTAL CA: CPT | Performed by: FAMILY MEDICINE

## 2022-07-12 PROCEDURE — 36415 COLL VENOUS BLD VENIPUNCTURE: CPT | Performed by: FAMILY MEDICINE

## 2022-07-12 RX ORDER — DAPAGLIFLOZIN 10 MG/1
10 TABLET, FILM COATED ORAL DAILY
Qty: 90 TABLET | Refills: 1 | Status: SHIPPED | OUTPATIENT
Start: 2022-07-12 | End: 2023-03-08

## 2022-07-12 RX ORDER — OXYCODONE AND ACETAMINOPHEN 5; 325 MG/1; MG/1
1 TABLET ORAL EVERY 6 HOURS PRN
Qty: 90 TABLET | Refills: 0 | Status: SHIPPED | OUTPATIENT
Start: 2022-07-12 | End: 2022-09-12

## 2022-07-12 RX ORDER — OXYCODONE AND ACETAMINOPHEN 5; 325 MG/1; MG/1
1 TABLET ORAL EVERY 6 HOURS PRN
Qty: 30 TABLET | Refills: 0 | Status: CANCELLED | OUTPATIENT
Start: 2022-07-12

## 2022-07-12 RX ORDER — GLIPIZIDE 5 MG/1
TABLET, FILM COATED, EXTENDED RELEASE ORAL
Qty: 90 TABLET | Refills: 1 | Status: SHIPPED | OUTPATIENT
Start: 2022-07-12 | End: 2023-03-08

## 2022-07-12 RX ORDER — DULOXETIN HYDROCHLORIDE 60 MG/1
CAPSULE, DELAYED RELEASE ORAL
Qty: 90 CAPSULE | Refills: 1 | Status: SHIPPED | OUTPATIENT
Start: 2022-07-12 | End: 2023-06-19

## 2022-07-12 RX ORDER — DAPAGLIFLOZIN 10 MG/1
10 TABLET, FILM COATED ORAL DAILY
Qty: 90 TABLET | Refills: 0 | Status: CANCELLED | OUTPATIENT
Start: 2022-07-12

## 2022-07-12 ASSESSMENT — ANXIETY QUESTIONNAIRES
1. FEELING NERVOUS, ANXIOUS, OR ON EDGE: NOT AT ALL
GAD7 TOTAL SCORE: 1
4. TROUBLE RELAXING: SEVERAL DAYS
5. BEING SO RESTLESS THAT IT IS HARD TO SIT STILL: NOT AT ALL
2. NOT BEING ABLE TO STOP OR CONTROL WORRYING: NOT AT ALL
8. IF YOU CHECKED OFF ANY PROBLEMS, HOW DIFFICULT HAVE THESE MADE IT FOR YOU TO DO YOUR WORK, TAKE CARE OF THINGS AT HOME, OR GET ALONG WITH OTHER PEOPLE?: SOMEWHAT DIFFICULT
3. WORRYING TOO MUCH ABOUT DIFFERENT THINGS: NOT AT ALL
GAD7 TOTAL SCORE: 1
7. FEELING AFRAID AS IF SOMETHING AWFUL MIGHT HAPPEN: NOT AT ALL
6. BECOMING EASILY ANNOYED OR IRRITABLE: NOT AT ALL
7. FEELING AFRAID AS IF SOMETHING AWFUL MIGHT HAPPEN: NOT AT ALL
GAD7 TOTAL SCORE: 1

## 2022-07-12 ASSESSMENT — PATIENT HEALTH QUESTIONNAIRE - PHQ9
SUM OF ALL RESPONSES TO PHQ QUESTIONS 1-9: 3
SUM OF ALL RESPONSES TO PHQ QUESTIONS 1-9: 3
10. IF YOU CHECKED OFF ANY PROBLEMS, HOW DIFFICULT HAVE THESE PROBLEMS MADE IT FOR YOU TO DO YOUR WORK, TAKE CARE OF THINGS AT HOME, OR GET ALONG WITH OTHER PEOPLE: SOMEWHAT DIFFICULT

## 2022-07-12 ASSESSMENT — PAIN SCALES - GENERAL: PAINLEVEL: MODERATE PAIN (4)

## 2022-07-12 NOTE — TELEPHONE ENCOUNTER
[facility] pharmacist calling about percocet prescription sent in today. She is asking if this is chronic pain or acute pain.   Chronic pain relayed with diagnosis code G89.4     No further questions.

## 2022-07-12 NOTE — LETTER
My Asthma Action Plan    Name: Anita Duque   YOB: 1955  Date: 7/11/2022   My doctor: Trish Eagle MD   My clinic: [facility]        My Rescue Medicine:   Albuterol inhaler (Proair/Ventolin/Proventil HFA)  2-4 puffs EVERY 4 HOURS as needed. Use a spacer if recommended by your provider.   My Asthma Severity:   Intermittent / Exercise Induced  Know your asthma triggers: upper respiratory infections and humidity             GREEN ZONE   Good Control    I feel good    No cough or wheeze    Can work, sleep and play without asthma symptoms       Take your asthma control medicine every day.     1. If exercise triggers your asthma, take your rescue medication    15 minutes before exercise or sports, and    During exercise if you have asthma symptoms  2. Spacer to use with inhaler: If you have a spacer, make sure to use it with your inhaler             YELLOW ZONE Getting Worse  I have ANY of these:    I do not feel good    Cough or wheeze    Chest feels tight    Wake up at night   1. Keep taking your Green Zone medications  2. Start taking your rescue medicine:    every 20 minutes for up to 1 hour. Then every 4 hours for 24-48 hours.  3. If you stay in the Yellow Zone for more than 12-24 hours, contact your doctor.  4. If you do not return to the Green Zone in 12-24 hours or you get worse, start taking your oral steroid medicine if prescribed by your provider.           RED ZONE Medical Alert - Get Help  I have ANY of these:    I feel awful    Medicine is not helping    Breathing getting harder    Trouble walking or talking    Nose opens wide to breathe       1. Take your rescue medicine NOW  2. If your provider has prescribed an oral steroid medicine, start taking it NOW  3. Call your doctor NOW  4. If you are still in the Red Zone after 20 minutes and you have not reached your doctor:    Take your rescue medicine again and    Call 911 or go to the emergency room  right away    See your regular doctor within 2 weeks of an Emergency Room or Urgent Care visit for follow-up treatment.          Annual Reminders:  Meet with Asthma Educator,  Flu Shot in the Fall, consider Pneumonia Vaccination for patients with asthma (aged 19 and older).    Pharmacy:    [facility] [address] [id number] [facility] [address]    Electronically signed by Trish Eagle MD   Date: 07/11/22                    Asthma Triggers  How To Control Things That Make Your Asthma Worse    Triggers are things that make your asthma worse.  Look at the list below to help you find your triggers and   what you can do about them. You can help prevent asthma flare-ups by staying away from your triggers.      Trigger                                                          What you can do   Cigarette Smoke  Tobacco smoke can make asthma worse. Do not allow smoking in your home, car or around you.  Be sure no one smokes at a child s day care or school.  If you smoke, ask your health care provider for ways to help you quit.  Ask family members to quit too.  Ask your health care provider for a referral to Quit Plan to help you quit smoking, or call 5-744-005-PLAN.     Colds, Flu, Bronchitis  These are common triggers of asthma. Wash your hands often.  Don t touch your eyes, nose or mouth.  Get a flu shot every year.     Dust Mites  These are tiny bugs that live in cloth or carpet. They are too small to see. Wash sheets and blankets in hot water every week.   Encase pillows and mattress in dust mite proof covers.  Avoid having carpet if you can. If you have carpet, vacuum weekly.   Use a dust mask and HEPA vacuum.   Pollen and Outdoor Mold  Some people are allergic to trees, grass, or weed pollen, or molds. Try to keep your windows closed.  Limit time out doors when pollen count is high.   Ask you health care provider about taking medicine  during allergy season.     Animal Dander  Some people are allergic to skin flakes, urine or saliva from pets with fur or feathers. Keep pets with fur or feathers out of your home.    If you can t keep the pet outdoors, then keep the pet out of your bedroom.  Keep the bedroom door closed.  Keep pets off cloth furniture and away from stuffed toys.     Mice, Rats, and Cockroaches  Some people are allergic to the waste from these pests.   Cover food and garbage.  Clean up spills and food crumbs.  Store grease in the refrigerator.   Keep food out of the bedroom.   Indoor Mold  This can be a trigger if your home has high moisture. Fix leaking faucets, pipes, or other sources of water.   Clean moldy surfaces.  Dehumidify basement if it is damp and smelly.   Smoke, Strong Odors, and Sprays  These can reduce air quality. Stay away from strong odors and sprays, such as perfume, powder, hair spray, paints, smoke incense, paint, cleaning products, candles and new carpet.   Exercise or Sports  Some people with asthma have this trigger. Be active!  Ask your doctor about taking medicine before sports or exercise to prevent symptoms.    Warm up for 5-10 minutes before and after sports or exercise.     Other Triggers of Asthma  Cold air:  Cover your nose and mouth with a scarf.  Sometimes laughing or crying can be a trigger.  Some medicines and food can trigger asthma.

## 2022-07-12 NOTE — PROGRESS NOTES
Type 2 diabetes mellitus without complication, without long-term current use of insulin (H)  Typically well controlled with oral medications.  Last A1c was 7.5% but the 2 previous were closer to 7%.  Patient tells me she has an eye exam scheduled for 8/8/2022 with [facility] in [place].  - Hemoglobin A1c  - glipiZIDE (GLUCOTROL XL) 5 MG 24 hr tablet  Dispense: 90 tablet; Refill: 1  - dapagliflozin (FARXIGA) 10 MG TABS tablet  Dispense: 90 tablet; Refill: 1  - Hemoglobin A1c    Essential hypertension  Well-controlled, will monitor labs.  - Basic metabolic panel  - Basic metabolic panel    Mixed hyperlipidemia  Previously on lovastatin 40 with LDL of 100 and triglycerides at 363.  Switch to Crestor 10 mg.  Will monitor labs.  - Lipid panel reflex to direct LDL Fasting  - Lipid panel reflex to direct LDL Fasting    Mild persistent asthma without complication  New ACT was done but was not documented by nursing.  Asthma action plan done today.  Controller medication Flovent 110 mcg inhaler.    Chronic pain syndrome  Patient complains that her Percocet was not filled correctly on 7/5.  Has been having more trouble with her back recently.  New prescription sent.  Cymbalta refilled.  - oxyCODONE-acetaminophen (PERCOCET) 5-325 MG tablet  Dispense: 90 tablet; Refill: 0  - DULoxetine (CYMBALTA) 60 MG capsule  Dispense: 90 capsule; Refill: 1    Controlled substance agreement signed  Last done 2/28/2022.  Was incorrect and so modified today 90 Percocet tabs per 90 days.    Moderate episode of recurrent major depressive disorder (H)  PHQ-9 score today is 3, down from previous 8.  Has seen psychiatrist and therapist in the past.    JANINE on CPAP  Not currently using CPAP because her machine was on recall and she never followed back up with the clinic to get a different machine.  Will refer her back.  - Adult Sleep Eval & Management  Referral    I will get back to her on her lab results by [facility] and only call with  grossly abnormal values.  She should see me back in 4 months time for an annual wellness visit.    Willem Howard is a 67 year old, presenting for the following health issues:  Medication Update  This is a complicated patient who has type 2 diabetes, chronic pain syndrome, mild persistent asthma which is sometimes uncontrolled, obstructive sleep apnea, hypertension, hyperlipidemia, depression and anxiety, and history of bariatric surgery.  I last saw this patient on 2/28/2022 and at that time her PHQ-9 score was 8 and her ACT was uncontrolled at 17.    History of Present Illness       Back Pain:  She presents for follow up of back pain. Patient's back pain is a chronic problem.  Location of back pain:  Right lower back, left lower back, right middle of back, left upper back, right side of neck, left side of neck, right buttock, left buttock, right hip, left hip and right side of waist  Description of back pain: dull ache, sharp and stabbing  Back pain spreads: right knee, right side of neck and left side of neck    Since patient first noticed back pain, pain is: always present, but gets better and worse  Does back pain interfere with her job:  Yes      Diabetes:   She presents for follow up of diabetes.  She is checking home blood glucose a few times a month. She checks blood glucose at bedtime.  Blood glucose is never over 200 and never under 70. She is aware of hypoglycemia symptoms including dizziness, weakness and blurred vision. She has no concerns regarding her diabetes at this time.  She is having blurry vision. The patient has had a diabetic eye exam in the last 12 months. Eye exam performed on April 2022. Location of last eye exam [facility].        She eats 0-1 servings of fruits and vegetables daily.She consumes 1 sweetened beverage(s) daily.She exercises with enough effort to increase her heart rate 9 or less minutes per day.  She exercises with enough effort to increase her heart  rate 3 or less days per week. She is missing 3 dose(s) of medications per week.    Today's PHQ-9         PHQ-9 Total Score: 3    PHQ-9 Q9 Thoughts of better off dead/self-harm past 2 weeks :   Not at all    How difficult have these problems made it for you to do your work, take care of things at home, or get along with other people: Somewhat difficult  Today's KINDRA-7 Score: 1     PHQ-9:  Last PHQ-9 7/12/2022   1.  Little interest or pleasure in doing things 0   2.  Feeling down, depressed, or hopeless 0   3.  Trouble falling or staying asleep, or sleeping too much 2   4.  Feeling tired or having little energy 1   5.  Poor appetite or overeating 0   6.  Feeling bad about yourself 0   7.  Trouble concentrating 0   8.  Moving slowly or restless 0   Q9: Thoughts of better off dead/self-harm past 2 weeks 0   PHQ-9 Total Score 3   Difficulty at work, home, or with people -       GAD7:  KINDRA-7  7/12/2022   1. Feeling nervous, anxious, or on edge 0   2. Not being able to stop or control worrying 0   3. Worrying too much about different things 0   4. Trouble relaxing 1   5. Being so restless that it is hard to sit still 0   6. Becoming easily annoyed or irritable 0   7. Feeling afraid, as if something awful might happen 0   KINDRA-7 Total Score 1   If you checked any problems, how difficult have they made it for you to do your work, take care of things at home, or get along with other people? -         Objective    /80   Pulse 74   Resp 18   Wt 89.8 kg (198 lb)   SpO2 97%   BMI 34.52 kg/m    Body mass index is 34.52 kg/m .  Physical Exam   GENERAL: healthy, alert, no distress and obese  EYES: Eyes grossly normal to inspection, PERRL and conjunctivae and sclerae normal  HENT: nose and mouth without ulcers or lesions  RESP: lungs clear to auscultation - no rales, rhonchi or wheezes  CV: regular rates and rhythm and no murmur, click or rub  ABDOMEN: soft, nontender and bowel sounds normal  MS: no gross musculoskeletal  defects noted, no edema  NEURO: mentation intact, cranial nerves 2-12 intact and no focal deficits noted  PSYCH: mentation appears normal, affect flat, anxious, fatigued and appearance well groomed            .  ..

## 2022-07-15 ENCOUNTER — TELEPHONE (OUTPATIENT)
Dept: FAMILY MEDICINE | Facility: CLINIC | Age: 67
End: 2022-07-15

## 2022-07-15 ENCOUNTER — MYC MEDICAL ADVICE (OUTPATIENT)
Dept: FAMILY MEDICINE | Facility: CLINIC | Age: 67
End: 2022-07-15

## 2022-07-15 NOTE — TELEPHONE ENCOUNTER
Prior Authorization Request  Who s requesting:  patient  Pharmacy Name and Location: [facility]  Medication Name: Percocet 5-325mg  Insurance Plan: Medicare  Insurance Member ID Number:  [id number]  CoverMyMeds Key:   Informed patient that prior authorizations can take up to 10 business days for response:     Okay to leave a detailed message:         Pt requesting 90 pills for 90 day supply per refill.

## 2022-07-15 NOTE — TELEPHONE ENCOUNTER
Pt received 30 pills today.  CSA states 90pills/90 days.   Insurance wont allow this qty. Will start prior authorization. Pt notified.

## 2022-07-15 NOTE — TELEPHONE ENCOUNTER
Central Prior Authorization Team  Phone: [phone]    Prior Authorization Not Needed per Insurance    Medication: oxyCODONE-acetaminophen (PERCOCET) 5-325 MG tablet  Insurance Company: [employer] - Phone [phone] Fax [phone]  Expected CoPay:      Pharmacy Filling the Rx: [facility] [address] [place] [address]  Pharmacy Notified:  YES   Patient Notified:  YES- PT FILLED TODAY WITH PD CLAIM

## 2022-07-17 ENCOUNTER — HEALTH MAINTENANCE LETTER (OUTPATIENT)
Age: 67
End: 2022-07-17

## 2022-08-08 ENCOUNTER — TRANSFERRED RECORDS (OUTPATIENT)
Dept: HEALTH INFORMATION MANAGEMENT | Facility: CLINIC | Age: 67
End: 2022-08-08

## 2022-08-08 LAB — RETINOPATHY: NEGATIVE

## 2022-09-07 ENCOUNTER — MYC REFILL (OUTPATIENT)
Dept: FAMILY MEDICINE | Facility: CLINIC | Age: 67
End: 2022-09-07

## 2022-09-07 DIAGNOSIS — G89.4 CHRONIC PAIN SYNDROME: Chronic | ICD-10-CM

## 2022-09-07 DIAGNOSIS — M54.9 BACK PAIN: ICD-10-CM

## 2022-09-07 NOTE — TELEPHONE ENCOUNTER
"Routing refill request to provider for review/approval because:  age    Last Written Prescription Date:  6/28/22  Last Fill Quantity: 100,  # refills: 0   Last office visit provider:  7/12/22     Requested Prescriptions   Pending Prescriptions Disp Refills     ibuprofen (ADVIL/MOTRIN) 800 MG tablet [Pharmacy Med Name: Ibuprofen 800 MG Oral Tablet] 100 tablet 0     Sig: Take 1 tablet by mouth three times daily as needed       NSAID Medications Failed - 9/7/2022 10:32 AM        Failed - Patient is age 6-64 years        Passed - Blood pressure under 140/90 in past 12 months     BP Readings from Last 3 Encounters:   07/12/22 116/80   02/28/22 96/60   10/29/21 125/71                 Passed - Normal ALT on file in past 12 months     Recent Labs   Lab Test 02/28/22  1037   ALT 16             Passed - Normal AST on file in past 12 months     Recent Labs   Lab Test 02/28/22  1037   AST 18             Passed - Recent (12 mo) or future (30 days) visit within the authorizing provider's specialty     Patient has had an office visit with the authorizing provider or a provider within the authorizing providers department within the previous 12 mos or has a future within next 30 days. See \"Patient Info\" tab in inbasket, or \"Choose Columns\" in Meds & Orders section of the refill encounter.              Passed - Normal CBC on file in past 12 months     Recent Labs   Lab Test 10/29/21  1402   WBC 8.2   RBC 4.84   HGB 15.1   HCT 46.3                    Passed - Medication is active on med list        Passed - No active pregnancy on record        Passed - Normal serum creatinine on file in past 12 months     Recent Labs   Lab Test 07/12/22  1002   CR 0.88       Ok to refill medication if creatinine is low          Passed - No positive pregnancy test in past 12 months             Sandhya Mckinnon, RN 09/07/22 6:26 PM  "

## 2022-09-08 RX ORDER — OXYCODONE AND ACETAMINOPHEN 5; 325 MG/1; MG/1
1 TABLET ORAL EVERY 6 HOURS PRN
Qty: 90 TABLET | Refills: 0 | OUTPATIENT
Start: 2022-09-08

## 2022-09-08 RX ORDER — IBUPROFEN 800 MG/1
TABLET, FILM COATED ORAL
Qty: 100 TABLET | Refills: 0 | Status: SHIPPED | OUTPATIENT
Start: 2022-09-08 | End: 2022-11-15

## 2022-09-08 NOTE — TELEPHONE ENCOUNTER
Are controlled substance agreement is for 90 tabs in 90 days.  It has only been 30 days.  I cannot refill this.  If she is having a lot of pain, then she will need to go back to the pain clinic and get reestablished if needed.

## 2022-09-12 RX ORDER — OXYCODONE AND ACETAMINOPHEN 5; 325 MG/1; MG/1
1 TABLET ORAL EVERY 6 HOURS PRN
Qty: 30 TABLET | Refills: 0 | Status: SHIPPED | OUTPATIENT
Start: 2022-09-12 | End: 2022-11-15

## 2022-09-12 NOTE — TELEPHONE ENCOUNTER
Pt states insurance will only cover 90 tablets for 23 days. Current Rx states 90 tablets for 90 days.  Pt only receiving 30 due to this. Will check with pharmacy when they open.

## 2022-09-12 NOTE — TELEPHONE ENCOUNTER
Spoke with pharmacy. Patient needs to fill monthly. Insurance wont pay for more than 30 day supply at each fill.     Order pending for Percocet 5/325mg #30 for one month.

## 2022-09-25 ENCOUNTER — HEALTH MAINTENANCE LETTER (OUTPATIENT)
Age: 67
End: 2022-09-25

## 2022-10-07 DIAGNOSIS — E78.2 MIXED HYPERLIPIDEMIA: ICD-10-CM

## 2022-10-07 DIAGNOSIS — K59.01 SLOW TRANSIT CONSTIPATION: ICD-10-CM

## 2022-10-07 RX ORDER — ROSUVASTATIN CALCIUM 10 MG/1
TABLET, COATED ORAL
Qty: 90 TABLET | Refills: 2 | Status: SHIPPED | OUTPATIENT
Start: 2022-10-07 | End: 2023-11-16

## 2022-10-07 NOTE — TELEPHONE ENCOUNTER
"Routing refill request to provider for review/approval because:  Drug not on the [facility] refill protocol     Last Written Prescription Date:  6/1/20  Last Fill Quantity: 180,  # refills: 3   Last office visit provider:  7/12/22     Requested Prescriptions   Pending Prescriptions Disp Refills     rosuvastatin (CRESTOR) 10 MG tablet [Pharmacy Med Name: Rosuvastatin Calcium 10 MG Oral Tablet] 90 tablet 0     Sig: Take 1 tablet by mouth once daily       Statins Protocol Passed - 10/7/2022 11:20 AM        Passed - LDL on file in past 12 months     Recent Labs   Lab Test 07/12/22  1002   LDL 46             Passed - No abnormal creatine kinase in past 12 months     No lab results found.             Passed - Recent (12 mo) or future (30 days) visit within the authorizing provider's specialty     Patient has had an office visit with the authorizing provider or a provider within the authorizing providers department within the previous 12 mos or has a future within next 30 days. See \"Patient Info\" tab in inbasket, or \"Choose Columns\" in Meds & Orders section of the refill encounter.              Passed - Medication is active on med list        Passed - Patient is age 18 or older        Passed - No active pregnancy on record        Passed - No positive pregnancy test in past 12 months           sennosides (SENOKOT) 8.6 MG tablet [Pharmacy Med Name: Senna 8.6 MG Oral Tablet] 180 tablet 0     Sig: Take 1 tablet by mouth twice daily       There is no refill protocol information for this order          Sandhya Mckinnon RN 10/07/22 4:12 PM  "

## 2022-10-08 RX ORDER — SENNOSIDES 8.6 MG
TABLET ORAL
Qty: 180 TABLET | Refills: 0 | Status: SHIPPED | OUTPATIENT
Start: 2022-10-08 | End: 2022-12-02

## 2022-11-08 ENCOUNTER — VIRTUAL VISIT (OUTPATIENT)
Dept: SLEEP MEDICINE | Facility: CLINIC | Age: 67
End: 2022-11-08
Attending: FAMILY MEDICINE
Payer: COMMERCIAL

## 2022-11-08 VITALS — BODY MASS INDEX: 29.37 KG/M2 | WEIGHT: 172 LBS | HEIGHT: 64 IN

## 2022-11-08 DIAGNOSIS — G47.33 OSA ON CPAP: Primary | Chronic | ICD-10-CM

## 2022-11-08 PROCEDURE — 99443 PR PHYSICIAN TELEPHONE EVALUATION 21-30 MIN: CPT | Performed by: PHYSICIAN ASSISTANT

## 2022-11-08 ASSESSMENT — SLEEP AND FATIGUE QUESTIONNAIRES
HOW LIKELY ARE YOU TO NOD OFF OR FALL ASLEEP WHILE LYING DOWN TO REST IN THE AFTERNOON WHEN CIRCUMSTANCES PERMIT: SLIGHT CHANCE OF DOZING
HOW LIKELY ARE YOU TO NOD OFF OR FALL ASLEEP WHEN YOU ARE A PASSENGER IN A CAR FOR AN HOUR WITHOUT A BREAK: WOULD NEVER DOZE
HOW LIKELY ARE YOU TO NOD OFF OR FALL ASLEEP WHILE SITTING AND READING: WOULD NEVER DOZE
HOW LIKELY ARE YOU TO NOD OFF OR FALL ASLEEP WHILE SITTING INACTIVE IN A PUBLIC PLACE: WOULD NEVER DOZE
HOW LIKELY ARE YOU TO NOD OFF OR FALL ASLEEP WHILE SITTING QUIETLY AFTER LUNCH WITHOUT ALCOHOL: WOULD NEVER DOZE
HOW LIKELY ARE YOU TO NOD OFF OR FALL ASLEEP WHILE SITTING AND TALKING TO SOMEONE: WOULD NEVER DOZE
HOW LIKELY ARE YOU TO NOD OFF OR FALL ASLEEP IN A CAR, WHILE STOPPED FOR A FEW MINUTES IN TRAFFIC: WOULD NEVER DOZE
HOW LIKELY ARE YOU TO NOD OFF OR FALL ASLEEP WHILE WATCHING TV: SLIGHT CHANCE OF DOZING

## 2022-11-08 NOTE — PROGRESS NOTES
"Anita Duque is a 67 year old female being evaluated via a billable telephone visit.     \"This telephone visit will be conducted via a call between you and your physician/provider. We have found that certain health care needs can be provided without the need for an in-person visit or physical exam.  This service lets us provide the care you need with a telephone conversation.  If a prescription is necessary we can send it directly to your pharmacy.  If lab work is needed we can place an order for that and you can then stop by our lab to have the test done at a later time.\"    Telephone visits are billed at different rates depending on your insurance coverage.  Please reach out to your insurance provider with any questions.    Patient has given verbal consent for  a Telephone visit? Yes    What telephone number would you like your provider to contact at at:  [phone]    How would you like to obtain your AVS? MyChart      Telephone Visit Details:     Telephone Visit Start Time: 8:05 AM    Telephone Visit End Time:8:36 AM        Outpatient Sleep Medicine Consultation:      Name: Anita Duque MRN# [id number]   Age: 67 year old YOB: 1955     Date of Consultation: November 8, 2022  Consultation is requested by: Trish Eagle MD  [id number]  [address] [address] Trish Eagle  Primary care provider: Trish Eagle       Reason for Sleep Consult:     Anita Duque is sent by Trish Eagle for a sleep consultation regarding obstructive sleep apnea.    Patient s Reason for visit  Anita Duque main reason for visit:  Start using CPAP  Patient states problem(s) started:    Anita Duque's goals for this visit:             Assessment and Plan:     Summary Sleep Diagnoses & Recommendations:  1. Moderate to severe obstructive sleep apnea, currently not treated-  She presents with snoring, daytime fatigue, difficulty falling and staying asleep. " Co-morbid HTN and diabetes type 2.  We reviewed treatment options.  She wants to re-initiate CPAP therapy.  Comprehensive DME order placed for a replacement CPAP.    2. Insomnia, sleep onset and sleep maintenance, likely due to a variety of factors including inadequate sleep hygiene. Co-occurring depression identified and insomnia might be a secondary symptom. Untreated obstructive sleep apnea may be implicated in sleep maintenance difficulties. Can't rule out paradoxical insomnia (only getting 3-4 hours and ESS 2). We reviewed the pros and cons of pharmacological versus cognitive behavioral treatment. Patient is not interested in formal CBT-I referral at this time and wants to continue on Trazodone. We briefly discussed CBT-I for insomnia and I did emphasize that the patient should consider this in the near future.         Summary Recommendations:  Orders Placed This Encounter   Procedures     Comprehensive DME         Summary Counseling:    Sleep Testing Reviewed  Obstructive Sleep Apnea Reviewed  Complications of Untreated Sleep Apnea Reviewed      Medical Decision-making:   Educational materials provided in instructions    Total time spent reviewing medical records, history and physical examination, review of previous testing and interpretation as well as documentation on this date:55 minutes    CC: Trish Eagle          History of Present Illness:     Past Sleep Evaluations:    Anita Duque is a 67 year old female with medical history remarkable for hypertension, diabetes type 2, chronic pain and obstructive sleep apnea.     Patient previously evaluated at [facility] with concerns of snoring and daytime fatigue. Polysomnogram recommended.     Sleep study done at [facility] on 11/25/2016 (189#)-AHI 35 (restricted AHI of 19), RDI 42,  lowest oxygen saturation was 82%, CPAP 8 cm/H20.    She was diagnosed with moderate-severe sleep apnea and CPAP prescribed. She reports that she consistently used CPAP for about 3  years. She stopped CPAP about a year ago because it was recalled. She is not sure if she registered her machine with [employer]. Her machine is about 6 year old.     DME:     SLEEP-WAKE SCHEDULE:     Work/School Days: Patient goes to school/work:  no  Usually gets into bed at  11p-1a  Takes patient about  2-3 hours to fall asleep. Her mind is active  Has trouble falling asleep  7 nights per week  Wakes up in the middle of the night  2 times.  Wakes up due to  go to the bathroom  She has trouble falling back asleep   times a week.   It usually takes   to get back to sleep  Patient is usually up at  9a  Uses alarm:      Weekends/Non-work Days/All Other Days:  The sleep schedule is similar.     Sleep Need  Patient gets   3-4 hours sleep on average   Patient thinks she needs about  6 hourssleep    Anita Duque prefers to sleep in this position(s):  sides  Patient states they do the following activities in bed:  TV in bed    Naps  Patient takes a purposeful nap  0 times a week and naps are usually   in duration  She feels better after a nap:    She dozes off unintentionally  0 days per week  Patient has had a driving accident or near-miss due to sleepiness/drowsiness:  no      SLEEP DISRUPTIONS:    Breathing/Snoring  Patient snores: yes  Other people complain about her snoring:  yes  Patient has been told she stops breathing in her sleep: no  She has issues with the following:      Movement:  Patient gets pain, discomfort, with an urge to move:   yes (1-2 times per week). Happens when she rolls on her back  It happens when she is resting:   yes  It happens more at night:     Patient has been told she kicks her legs at night:   yes     Behaviors in Sleep:  Anita Duque has experienced the following behaviors while sleeping:  no  She has experienced sudden muscle weakness during the day:  no      Is there anything else you would like your sleep provider to know:        CAFFEINE AND OTHER  SUBSTANCES:    Patient consumes caffeinated beverages per day:   occasionally  Last caffeine use is usually:    List of any prescribed or over the counter stimulants that patient takes:  none  List of any prescribed or over the counter sleep medication patient takes:    List of previous sleep medications that patient has tried:  Trazodone  Patient drinks alcohol to help them sleep:  no  Patient drinks alcohol near bedtime:      Family History:  Patient has a family member been diagnosed with a sleep disorder:  no        SCALES:    EPWORTH SLEEPINESS SCALE      [place] Sleepiness Scale ( ANÍBAL Mendez  1990-1997[facility] [place]/English - Final version - 21 Nov 07 - [facility] [place].) 11/8/2022   Sitting and reading Would never doze   Watching TV Slight chance of dozing   Sitting, inactive in a public place (e.g. a theatre or a meeting) Would never doze   As a passenger in a car for an hour without a break Would never doze   Lying down to rest in the afternoon when circumstances permit Slight chance of dozing   Sitting and talking to someone Would never doze   Sitting quietly after a lunch without alcohol Would never doze   In a car, while stopped for a few minutes in traffic Would never doze   [place] Score (MC) 2   [place] Score (Sleep) 2         INSOMNIA SEVERITY INDEX (VIVIAN)      Insomnia Severity Index (VIVIAN) 11/8/2022   Difficulty falling asleep 3   Difficulty staying asleep 2   Problems waking up too early 2   How SATISFIED/DISSATISFIED are you with your CURRENT sleep pattern? 3   How NOTICEABLE to others do you think your sleep problem is in terms of impairing the quality of your life? 4   How WORRIED/DISTRESSED are you about your current sleep problem? 0   To what extent do you consider your sleep problem to INTERFERE with your daily functioning (e.g. daytime fatigue, mood, ability to function at work/daily chores, concentration, memory, mood, etc.) CURRENTLY? 2   VIVIAN Total Score 16       Guidelines for  "Scoring/Interpretation:  Total score categories:  0-7 = No clinically significant insomnia   8-14 = Subthreshold insomnia   15-21 = Clinical insomnia (moderate severity)  22-28 = Clinical insomnia (severe)  Used via courtesy of www.[employer].va.gov with permission from Mitch Mckay PhD., [facility]      STOP BANG     STOP BANG Questionnaire (  2008, the American Society of Anesthesiologists, Inc. [employer] 11/8/2022   1. Snoring - Do you snore loudly (louder than talking or loud enough to be heard through closed doors)? Yes   2. Tired - Do you often feel tired, fatigued, or sleepy during daytime? Yes   3. Observed - Has anyone observed you stop breathing during your sleep? No   4. Blood pressure - Do you have or are you being treated for high blood pressure? Yes   5. BMI - BMI more than 35 kg/m2? No   6. Age - Age over 50 yr old? Yes   7. Neck circumference - Neck circumference greater than 40 cm? No   8. Gender - Gender male? No   STOP BANG Score (MC): 5 (High risk of JANINE)   B/P Clinic: -   BMI Clinic: 29.99         GAD7    KINDRA-7  7/12/2022   1. Feeling nervous, anxious, or on edge 0   2. Not being able to stop or control worrying 0   3. Worrying too much about different things 0   4. Trouble relaxing 1   5. Being so restless that it is hard to sit still 0   6. Becoming easily annoyed or irritable 0   7. Feeling afraid, as if something awful might happen 0   KINDRA-7 Total Score 1   If you checked any problems, how difficult have they made it for you to do your work, take care of things at home, or get along with other people? -         CAGE-AID    No flowsheet data found.    CAGE-AID reprinted with permission from the [place] Medical Journal, ABDI Hernandez. and KM Wells, \"Conjoint screening questionnaires for alcohol and drug abuse\" [place] Medical Journal 94: 135-140, 1995.      PATIENT HEALTH QUESTIONNAIRE-9 (PHQ - 9)    PHQ-9 ([employer]) 7/12/2022   1.  Little interest or " pleasure in doing things 0   2.  Feeling down, depressed, or hopeless 0   3.  Trouble falling or staying asleep, or sleeping too much 2   4.  Feeling tired or having little energy 1   5.  Poor appetite or overeating 0   6.  Feeling bad about yourself 0   7.  Trouble concentrating 0   8.  Moving slowly or restless 0   9.  Suicidal or self-harm thoughts 0   PHQ-9 Total Score 3   Difficulty at work, home, or with people -   1.  Little interest or pleasure in doing things Not at all   2.  Feeling down, depressed, or hopeless Not at all   3.  Trouble falling or staying asleep, or sleeping too much More than half the days   4.  Feeling tired or having little energy Several days   5.  Poor appetite or overeating Not at all   6.  Feeling bad about yourself Not at all   7.  Trouble concentrating Not at all   8.  Moving slowly or restless Not at all   9.  Suicidal or self-harm thoughts Not at all   PHQ-9 via [employer] TOTAL SCORE-----> 3 (Minimal depression)   Difficulty at work, home, or with people Somewhat difficult       Developed by Atul Casillas, Rae Eagle, Jesse Muñoz and colleagues, with an educational bre from [employer] No permission required to reproduce, translate, display or distribute.        Allergies:    Allergies   Allergen Reactions     Bupropion Unknown     Other reaction(s): Agitation       Medications:    Current Outpatient Medications   Medication Sig Dispense Refill     CHERATUSSIN -10 MG/5ML solution TAKE ONE TO TWO TEASPOONSFUL BY MOUTH EVERY 4 TO 6 HOURS AS NEEDED FOR COUGH 180 mL 0     cyanocobalamin, vitamin B-12, (VITAMIN B-12 ORAL) [CYANOCOBALAMIN, VITAMIN B-12, (VITAMIN B-12 ORAL)] Take 1 tablet by mouth as needed.       cyclobenzaprine (FLEXERIL) 10 MG tablet Take 1 tablet by mouth twice daily 60 tablet 0     dapagliflozin (FARXIGA) 10 MG TABS tablet Take 1 tablet (10 mg) by mouth daily 90 tablet 1     diclofenac (VOLTAREN) 1 % topical gel APPLY TOPICALLY TWICE DAILY  AS NEEDED FOR ARTHRITIS 300 g 0     diclofenac epolamine (FLECTOR) 1.3 % PT12 [DICLOFENAC EPOLAMINE (FLECTOR) 1.3 % PT12] Place 1 patch on the skin every 12 (twelve) hours as needed. 60 patch 5     DULoxetine (CYMBALTA) 60 MG capsule Take 1 capsule by mouth once daily 90 capsule 1     ergocalciferol (VITAMIN D2) 1,250 mcg (50,000 unit) capsule [ERGOCALCIFEROL (VITAMIN D2) 1,250 MCG (50,000 UNIT) CAPSULE] Take 1 capsule (50,000 Units total) by mouth once a week. 12 capsule 1     fluticasone (FLOVENT HFA) 110 MCG/ACT inhaler Inhale 1 puff into the lungs daily 12 g 3     gabapentin (NEURONTIN) 300 MG capsule TAKE 2 CAPSULES BY MOUTH 4 TIMES DAILY 240 capsule 1     glipiZIDE (GLUCOTROL XL) 5 MG 24 hr tablet TAKE 1 TABLET BY MOUTH ONCE DAILY . APPOINTMENT REQUIRED FOR FUTURE REFILLS 90 tablet 1     ibuprofen (ADVIL/MOTRIN) 800 MG tablet Take 1 tablet by mouth three times daily as needed 100 tablet 0     lidocaine (XYLOCAINE) 5 % ointment [LIDOCAINE (XYLOCAINE) 5 % OINTMENT] Apply topically 3 (three) times a day. 120 g 0     lisinopril (ZESTRIL) 2.5 MG tablet Take 1 tablet by mouth once daily 90 tablet 2     MAPAP 500 MG CAPS TAKE 2 CAPSULES BY MOUTH EVERY 6 HOURS AS NEEDED 270 capsule 1     metFORMIN (GLUCOPHAGE-XR) 500 MG 24 hr tablet TAKE 2 TABLETS BY MOUTH TWICE DAILY WITH MEALS . APPOINTMENT REQUIRED FOR FUTURE REFILLS 360 tablet 1     omeprazole (PRILOSEC) 40 MG capsule [OMEPRAZOLE (PRILOSEC) 40 MG CAPSULE] Take 1 capsule (40 mg total) by mouth daily. 90 capsule 1     oxyCODONE-acetaminophen (PERCOCET) 5-325 MG tablet Take 1 tablet by mouth every 6 hours as needed for severe pain 30 tablet 0     phentermine (ADIPEX-P) 37.5 MG tablet TAKE 1 & 1/2 (ONE & ONE-HALF) TABLETS BY MOUTH ONCE DAILY IN THE MORNING 135 tablet 0     rosuvastatin (CRESTOR) 10 MG tablet Take 1 tablet by mouth once daily 90 tablet 2     sennosides (SENOKOT) 8.6 MG tablet Take 1 tablet by mouth twice daily 180 tablet 0     spironolactone  (ALDACTONE) 100 MG tablet [SPIRONOLACTONE (ALDACTONE) 100 MG TABLET] Take 1 tablet (100 mg total) by mouth daily. 90 tablet 3     topiramate (TOPAMAX) 100 MG tablet [TOPIRAMATE (TOPAMAX) 100 MG TABLET] Take 1 tablet by mouth twice daily 180 tablet 3     triamcinolone (KENALOG) 0.025 % ointment [TRIAMCINOLONE (KENALOG) 0.025 % OINTMENT] Apply sparingly in ear canal 1-2 times daily as needed 15 g 0     VESICARE 5 MG tablet Take 1 tablet by mouth once daily 90 tablet 1     albuterol (PROAIR HFA;PROVENTIL HFA;VENTOLIN HFA) 90 mcg/actuation inhaler [ALBUTEROL (PROAIR HFA;PROVENTIL HFA;VENTOLIN HFA) 90 MCG/ACTUATION INHALER] Inhale 2 puffs every 4 (four) hours as needed for wheezing. 1 Inhaler 1       Problem List:  Patient Active Problem List    Diagnosis Date Noted     Mild persistent asthma without complication 02/28/2022     Priority: Medium     Gastroesophageal reflux disease without esophagitis 10/09/2020     Priority: Medium     Controlled substance agreement signed 03/13/2020     Priority: Medium     3/13/20  Phentermine 37.5 mg  #135/90 days  9/9/19  Percocet 5/325 mg #30/30 days   Flexeril 10 mg #60/30 days         Major Depression, Recurrent      Priority: Medium     Created by Conversion  Replacement Utility updated for latest IMO load         Fibromyalgia      Priority: Medium     Created by Conversion         Degenerative disc disease, cervical 06/11/2018     Priority: Medium     Degenerative disc disease, lumbar 06/11/2018     Priority: Medium     Essential hypertension      Priority: Medium     Created by Conversion  Replacement Utility updated for latest IMO load         Type 2 diabetes mellitus (H)      Priority: Medium     Created by Conversion         Hyperlipidemia      Priority: Medium     Created by Conversion         Chronic pain syndrome      Priority: Medium     Created by Conversion         JANINE on CPAP 03/24/2017     Priority: Medium     Sleep study done at [facility] on 11/25/2016 (189#)-AHI 35  (restricted AHI of 19), RDI 42,  lowest oxygen saturation was 82%, CPAP 8 cm/H20       Obesity (BMI 30.0-34.9) 03/24/2017     Priority: Medium     Chronic Constipation      Priority: Medium     Created by Conversion  Replacement Utility updated for latest IMO load         Osteoarthrosis, unspecified whether generalized or localized, lower leg 10/05/2015     Priority: Medium     Replacement Utility updated for latest IMO load    Replacing diagnoses that were inactivated after the 10/1/2021 regulatory import.       Vaginal Wall Prolapse With Midline Cystocele      Priority: Medium     Created by Conversion         Female Stress Incontinence      Priority: Medium     Created by Conversion         Lump Or Mass In Breast      Priority: Medium     Created by Conversion         Joint Pain, Localized In The Shoulder      Priority: Medium     Created by Conversion         Sciatica      Priority: Medium     Created by Conversion         Lower Back Pain      Priority: Medium     Created by Conversion            Past Medical/Surgical History:  Past Medical History:   Diagnosis Date     Asthma      Chronic pain syndrome      Depression      Diabetes mellitus, type II (H)      Fibromyalgia      Hyperlipidemia      Hypertension      Insomnia      Past Surgical History:   Procedure Laterality Date     HC REVISE MEDIAN N/CARPAL TUNNEL SURG      Description: Neuroplasty Decompression Median Nerve At Carpal Tunnel;  Recorded: 07/16/2012;     HYSTERECTOMY  2011     HYSTERECTOMY TOTAL ABDOMINAL       IR LUMBAR EPIDURAL STEROID INJECTION  4/13/2011     IR LUMBAR NERVE ROOT INJECTION  3/9/2012     OOPHORECTOMY  2011     ZMATY DELGADILLO W/O FACETEC FORAMOT/[facility] 1/2 VRT SEG, CERVICAL      Description: Laminectomy Lumbar;  Recorded: 10/05/2012;     EDVINC SUPRACERV ABD HYSTERECTOMY      Description: Supracervical Hysterectomy;  Recorded: 04/05/2011;       Social History:  Social History     Socioeconomic History     Marital status:      Spouse  "name: Not on file     Number of children: Not on file     Years of education: Not on file     Highest education level: Not on file   Occupational History     Not on file   Tobacco Use     Smoking status: Never     Smokeless tobacco: Never   Substance and Sexual Activity     Alcohol use: Not on file     Drug use: Not on file     Sexual activity: Not on file   Other Topics Concern     Not on file   Social History Narrative     Not on file     Social Determinants of Health     Financial Resource Strain: Not on file   Food Insecurity: Not on file   Transportation Needs: Not on file   Physical Activity: Not on file   Stress: Not on file   Social Connections: Not on file   Intimate Partner Violence: Not on file   Housing Stability: Not on file       Family History:  Family History   Problem Relation Age of Onset     No Known Problems Mother      No Known Problems Father      No Known Problems Sister      No Known Problems Daughter      No Known Problems Maternal Grandmother      No Known Problems Maternal Grandfather      No Known Problems Paternal Grandmother      No Known Problems Paternal Grandfather      No Known Problems Maternal Aunt      No Known Problems Paternal Aunt      Hereditary Breast and Ovarian Cancer Syndrome No family hx of      Breast Cancer No family hx of      Cancer No family hx of      Colon Cancer No family hx of      Endometrial Cancer No family hx of      Ovarian Cancer No family hx of        Review of Systems:  A complete review of systems reviewed by me is negative with the exeption of what has been mentioned in the history of present illness.        Physical Examination:  Vitals: Ht 1.613 m (5' 3.5\")   Wt 78 kg (172 lb)   BMI 29.99 kg/m    BMI= Body mass index is 29.99 kg/m .                Data: All pertinent previous laboratory data reviewed     Recent Labs   Lab Test 07/12/22  1002 02/28/22  1037    142   POTASSIUM 4.5 4.1   CHLORIDE 101 103   CO2 24 26   ANIONGAP 14 13   * " 124   BUN 22.8 17   CR 0.88 0.85   BEENA 10.1 9.5       Recent Labs   Lab Test 10/29/21  1402   WBC 8.2   RBC 4.84   HGB 15.1   HCT 46.3   MCV 96   MCH 31.2   MCHC 32.6   RDW 12.4          Recent Labs   Lab Test 02/28/22  1037   PROTTOTAL 6.8   ALBUMIN 3.9   BILITOTAL 0.4   ALKPHOS 98   AST 18   ALT 16       TSH (uIU/mL)   Date Value   01/18/2021 1.78         Becky Gonzales PA-C 11/8/2022

## 2022-11-08 NOTE — NURSING NOTE
Chief Complaint   Patient presents with     Video Visit     JANINE on Cpap, but recalled, hasn't used in over a year     Patient confirms medications and allergies are accurate via patients echeck in completion, and or denies any changes since last reviewed/verified.     Edie Poole, Virtual Facilitator/LPN

## 2022-11-15 ENCOUNTER — OFFICE VISIT (OUTPATIENT)
Dept: FAMILY MEDICINE | Facility: CLINIC | Age: 67
End: 2022-11-15
Payer: COMMERCIAL

## 2022-11-15 VITALS
DIASTOLIC BLOOD PRESSURE: 68 MMHG | BODY MASS INDEX: 31.18 KG/M2 | TEMPERATURE: 98.3 F | WEIGHT: 176 LBS | SYSTOLIC BLOOD PRESSURE: 108 MMHG | HEART RATE: 91 BPM | RESPIRATION RATE: 16 BRPM | OXYGEN SATURATION: 99 % | HEIGHT: 63 IN

## 2022-11-15 DIAGNOSIS — G47.33 OSA ON CPAP: Chronic | ICD-10-CM

## 2022-11-15 DIAGNOSIS — I10 ESSENTIAL HYPERTENSION: Chronic | ICD-10-CM

## 2022-11-15 DIAGNOSIS — F33.1 MODERATE EPISODE OF RECURRENT MAJOR DEPRESSIVE DISORDER (H): Chronic | ICD-10-CM

## 2022-11-15 DIAGNOSIS — Z23 IMMUNIZATION DUE: ICD-10-CM

## 2022-11-15 DIAGNOSIS — M54.50 ACUTE MIDLINE LOW BACK PAIN WITHOUT SCIATICA: ICD-10-CM

## 2022-11-15 DIAGNOSIS — J45.30 MILD PERSISTENT ASTHMA WITHOUT COMPLICATION: Chronic | ICD-10-CM

## 2022-11-15 DIAGNOSIS — Z79.899 CONTROLLED SUBSTANCE AGREEMENT SIGNED: Chronic | ICD-10-CM

## 2022-11-15 DIAGNOSIS — E11.9 TYPE 2 DIABETES MELLITUS WITHOUT COMPLICATION, WITHOUT LONG-TERM CURRENT USE OF INSULIN (H): Chronic | ICD-10-CM

## 2022-11-15 DIAGNOSIS — G89.4 CHRONIC PAIN SYNDROME: Primary | Chronic | ICD-10-CM

## 2022-11-15 DIAGNOSIS — E78.2 MIXED HYPERLIPIDEMIA: Chronic | ICD-10-CM

## 2022-11-15 LAB
ALBUMIN SERPL BCG-MCNC: 4.6 G/DL (ref 3.5–5.2)
ALP SERPL-CCNC: 97 U/L (ref 35–104)
ALT SERPL W P-5'-P-CCNC: 20 U/L (ref 10–35)
ANION GAP SERPL CALCULATED.3IONS-SCNC: 15 MMOL/L (ref 7–15)
AST SERPL W P-5'-P-CCNC: 28 U/L (ref 10–35)
BILIRUB SERPL-MCNC: 0.6 MG/DL
BUN SERPL-MCNC: 23.3 MG/DL (ref 8–23)
CALCIUM SERPL-MCNC: 9.5 MG/DL (ref 8.8–10.2)
CHLORIDE SERPL-SCNC: 105 MMOL/L (ref 98–107)
CHOLEST SERPL-MCNC: 123 MG/DL
CREAT SERPL-MCNC: 0.91 MG/DL (ref 0.51–0.95)
CREAT UR-MCNC: 83.6 MG/DL
DEPRECATED HCO3 PLAS-SCNC: 21 MMOL/L (ref 22–29)
GFR SERPL CREATININE-BSD FRML MDRD: 68 ML/MIN/1.73M2
GLUCOSE SERPL-MCNC: 123 MG/DL (ref 70–99)
HBA1C MFR BLD: 6.7 % (ref 0–5.6)
HDLC SERPL-MCNC: 63 MG/DL
LDLC SERPL CALC-MCNC: 41 MG/DL
MICROALBUMIN UR-MCNC: <12 MG/L
MICROALBUMIN/CREAT UR: NORMAL MG/G{CREAT}
NONHDLC SERPL-MCNC: 60 MG/DL
POTASSIUM SERPL-SCNC: 4 MMOL/L (ref 3.4–5.3)
PROT SERPL-MCNC: 7.1 G/DL (ref 6.4–8.3)
SODIUM SERPL-SCNC: 141 MMOL/L (ref 136–145)
TRIGL SERPL-MCNC: 96 MG/DL

## 2022-11-15 PROCEDURE — 36415 COLL VENOUS BLD VENIPUNCTURE: CPT | Performed by: FAMILY MEDICINE

## 2022-11-15 PROCEDURE — 80061 LIPID PANEL: CPT | Performed by: FAMILY MEDICINE

## 2022-11-15 PROCEDURE — 82043 UR ALBUMIN QUANTITATIVE: CPT | Performed by: FAMILY MEDICINE

## 2022-11-15 PROCEDURE — 90662 IIV NO PRSV INCREASED AG IM: CPT | Performed by: FAMILY MEDICINE

## 2022-11-15 PROCEDURE — G0008 ADMIN INFLUENZA VIRUS VAC: HCPCS | Performed by: FAMILY MEDICINE

## 2022-11-15 PROCEDURE — 99215 OFFICE O/P EST HI 40 MIN: CPT | Mod: 25 | Performed by: FAMILY MEDICINE

## 2022-11-15 PROCEDURE — 83036 HEMOGLOBIN GLYCOSYLATED A1C: CPT | Performed by: FAMILY MEDICINE

## 2022-11-15 PROCEDURE — 80053 COMPREHEN METABOLIC PANEL: CPT | Performed by: FAMILY MEDICINE

## 2022-11-15 RX ORDER — IBUPROFEN 800 MG/1
800 TABLET, FILM COATED ORAL 3 TIMES DAILY PRN
Qty: 100 TABLET | Refills: 3 | Status: SHIPPED | OUTPATIENT
Start: 2022-11-15 | End: 2024-02-05

## 2022-11-15 RX ORDER — OXYCODONE AND ACETAMINOPHEN 5; 325 MG/1; MG/1
1 TABLET ORAL EVERY 6 HOURS PRN
Qty: 90 TABLET | Refills: 0 | Status: SHIPPED | OUTPATIENT
Start: 2022-11-15 | End: 2023-03-07

## 2022-11-15 RX ORDER — OXYCODONE AND ACETAMINOPHEN 5; 325 MG/1; MG/1
1 TABLET ORAL EVERY 6 HOURS PRN
Qty: 90 TABLET | Refills: 0 | Status: SHIPPED | OUTPATIENT
Start: 2022-11-15 | End: 2022-11-15

## 2022-11-15 ASSESSMENT — ASTHMA QUESTIONNAIRES
QUESTION_3 LAST FOUR WEEKS HOW OFTEN DID YOUR ASTHMA SYMPTOMS (WHEEZING, COUGHING, SHORTNESS OF BREATH, CHEST TIGHTNESS OR PAIN) WAKE YOU UP AT NIGHT OR EARLIER THAN USUAL IN THE MORNING: TWO OR THREE NIGHTS A WEEK
QUESTION_2 LAST FOUR WEEKS HOW OFTEN HAVE YOU HAD SHORTNESS OF BREATH: ONCE OR TWICE A WEEK
QUESTION_1 LAST FOUR WEEKS HOW MUCH OF THE TIME DID YOUR ASTHMA KEEP YOU FROM GETTING AS MUCH DONE AT WORK, SCHOOL OR AT HOME: A LITTLE OF THE TIME
QUESTION_5 LAST FOUR WEEKS HOW WOULD YOU RATE YOUR ASTHMA CONTROL: SOMEWHAT CONTROLLED
ACT_TOTALSCORE: 16
QUESTION_4 LAST FOUR WEEKS HOW OFTEN HAVE YOU USED YOUR RESCUE INHALER OR NEBULIZER MEDICATION (SUCH AS ALBUTEROL): TWO OR THREE TIMES PER WEEK
QUESTION_1 LAST FOUR WEEKS HOW MUCH OF THE TIME DID YOUR ASTHMA KEEP YOU FROM GETTING AS MUCH DONE AT WORK, SCHOOL OR AT HOME: A LITTLE OF THE TIME
QUESTION_3 LAST FOUR WEEKS HOW OFTEN DID YOUR ASTHMA SYMPTOMS (WHEEZING, COUGHING, SHORTNESS OF BREATH, CHEST TIGHTNESS OR PAIN) WAKE YOU UP AT NIGHT OR EARLIER THAN USUAL IN THE MORNING: TWO OR THREE NIGHTS A WEEK
QUESTION_2 LAST FOUR WEEKS HOW OFTEN HAVE YOU HAD SHORTNESS OF BREATH: ONCE OR TWICE A WEEK
QUESTION_4 LAST FOUR WEEKS HOW OFTEN HAVE YOU USED YOUR RESCUE INHALER OR NEBULIZER MEDICATION (SUCH AS ALBUTEROL): TWO OR THREE TIMES PER WEEK
ACT_TOTALSCORE: 16
ACT_TOTALSCORE: 16
QUESTION_5 LAST FOUR WEEKS HOW WOULD YOU RATE YOUR ASTHMA CONTROL: SOMEWHAT CONTROLLED

## 2022-11-15 NOTE — LETTER
November 18, 2022      Anita Duque  [address] [facility] [id number]        Dear ,    We are writing to inform you of your test results.    Your lab results are all very nice.  Because you have been so active then have lost some weight, they have improved.  Keep up the good work.  Happy holidays.    Resulted Orders   Albumin Random Urine Quantitative with Creat Ratio   Result Value Ref Range    Albumin Urine mg/L <12.0 mg/L      Comment:      The reference ranges have not been established in urine albumin. The results should be integrated into the clinical context for interpretation.    Albumin Urine mg/g Cr        Comment:      Unable to calculate, urine albumin and/or urine creatinine is outside detectable limits.  Microalbuminuria is defined as an albumin:creatinine ratio of 17 to 299 for males and 25 to 299 for females. A ratio of albumin:creatinine of 300 or higher is indicative of overt proteinuria.  Due to biologic variability, positive results should be confirmed by a second, first-morning random or 24-hour timed urine specimen. If there is discrepancy, a third specimen is recommended. When 2 out of 3 results are in the microalbuminuria range, this is evidence for incipient nephropathy and warrants increased efforts at glucose control, blood pressure control, and institution of therapy with an angiotensin-converting-enzyme (ACE) inhibitor (if the patient can tolerate it).      Creatinine Urine mg/dL 83.6 mg/dL      Comment:      The reference ranges have not been established in urine creatinine. The results should be integrated into the clinical context for interpretation.   Hemoglobin A1c   Result Value Ref Range    Hemoglobin A1C 6.7 (H) 0.0 - 5.6 %      Comment:      Normal <5.7%   Prediabetes 5.7-6.4%    Diabetes 6.5% or higher     Note: Adopted from ADA consensus guidelines.   Lipid panel reflex to direct LDL Non-fasting   Result Value Ref Range    Cholesterol 123 <200  mg/dL    Triglycerides 96 <150 mg/dL    Direct Measure HDL 63 >=50 mg/dL    LDL Cholesterol Calculated 41 <=100 mg/dL    Non HDL Cholesterol 60 <130 mg/dL    Narrative    Cholesterol  Desirable:  <200 mg/dL    Triglycerides  Normal:  Less than 150 mg/dL  Borderline High:  150-199 mg/dL  High:  200-499 mg/dL  Very High:  Greater than or equal to 500 mg/dL    Direct Measure HDL  Female:  Greater than or equal to 50 mg/dL   Male:  Greater than or equal to 40 mg/dL    LDL Cholesterol  Desirable:  <100mg/dL  Above Desirable:  100-129 mg/dL   Borderline High:  130-159 mg/dL   High:  160-189 mg/dL   Very High:  >= 190 mg/dL    Non HDL Cholesterol  Desirable:  130 mg/dL  Above Desirable:  130-159 mg/dL  Borderline High:  160-189 mg/dL  High:  190-219 mg/dL  Very High:  Greater than or equal to 220 mg/dL   Comprehensive metabolic panel (BMP + Alb, Alk Phos, ALT, AST, Total. Bili, TP)   Result Value Ref Range    Sodium 141 136 - 145 mmol/L    Potassium 4.0 3.4 - 5.3 mmol/L    Chloride 105 98 - 107 mmol/L    Carbon Dioxide (CO2) 21 (L) 22 - 29 mmol/L    Anion Gap 15 7 - 15 mmol/L    Urea Nitrogen 23.3 (H) 8.0 - 23.0 mg/dL    Creatinine 0.91 0.51 - 0.95 mg/dL    Calcium 9.5 8.8 - 10.2 mg/dL    Glucose 123 (H) 70 - 99 mg/dL    Alkaline Phosphatase 97 35 - 104 U/L    AST 28 10 - 35 U/L    ALT 20 10 - 35 U/L    Protein Total 7.1 6.4 - 8.3 g/dL    Albumin 4.6 3.5 - 5.2 g/dL    Bilirubin Total 0.6 <=1.2 mg/dL    GFR Estimate 68 >60 mL/min/1.73m2      Comment:      Effective December 21, 2021 eGFRcr in adults is calculated using the 2021 CKD-EPI creatinine equation which includes age and gender (Chayito spence al., NEJM, DOI: 10.1056/AAFQpd3018309)       If you have any questions or concerns, please call the clinic at the number listed above.       Sincerely,      Trish Eagle MD

## 2022-11-15 NOTE — PROGRESS NOTES
Chronic pain syndrome  Having some problems right now because she has been working so hard physically.  Patient wants high-dose ibuprofen refilled.  - ibuprofen (ADVIL/MOTRIN) 800 MG tablet  Dispense: 100 tablet; Refill: 3    Acute midline low back pain without sciatica  Also has neck strain and shoulder strain.  Will refill Percocet per controlled substance agreement.  - oxyCODONE-acetaminophen (PERCOCET) 5-325 MG tablet  Dispense: 90 tablet; Refill: 0    Controlled substance agreement signed  Done on 7/12/2022.      Type 2 diabetes mellitus without complication, without long-term current use of insulin (H)  Typically pretty well controlled.  Will monitor labs.  Last A1c was 7.6%.  - Albumin Random Urine Quantitative with Creat Ratio  - Hemoglobin A1c  - Albumin Random Urine Quantitative with Creat Ratio  - Hemoglobin A1c    Essential hypertension  Blood pressure today is very good.  Will monitor labs.  - Comprehensive metabolic panel (BMP + Alb, Alk Phos, ALT, AST, Total. Bili, TP)  - Comprehensive metabolic panel (BMP + Alb, Alk Phos, ALT, AST, Total. Bili, TP)    Mixed hyperlipidemia  She had a recent switch to Crestor 10 mg with last LDL of 46.  Will monitor.  - Lipid panel reflex to direct LDL Non-fasting  - Lipid panel reflex to direct LDL Non-fasting    Mild persistent asthma without complication  ACT score today is low at 16 due to the fact that she is working in the house with dust and mold as she is cleaning it out in order to upgrade it so that she can move into it.  On exam today she was clear however.  On Flovent 110 mcg controller.    Moderate episode of recurrent major depressive disorder (H)  Has done well recently.  In July her PHQ-9 score was 3 and her KINDRA-7 score was 1.    JANINE on CPAP  Was seen at the sleep medicine clinic on 11/8/2022.  Faithful with CPAP.    Immunization due  Patient declines COVID-vaccine but is willing to do her flu shot today.  - INFLUENZA, QUAD, HIGH DOSE, PF, 65YR +  (FLUZONE HD)    We had a long discussion about stressors of family and family in interactions and housing.  I will get back to her on lab results by mail or MyChart and only call with grossly abnormal values.  She should return to clinic in 4 months time for her annual wellness visit.    48 minutes spent on day of visit in preparation and during visit and in part of documentation.    Willem Howard is a 67 year old, presenting for the following health issues:  Medication Update  Patient has a long history of chronic pain syndrome.  She has low back pain sometimes with sciatica and sometimes without.  She has been working on her house recently.  She has been putting in long hard hours that are quite physical.  She takes gabapentin and Cymbalta and we have a controlled substance agreement for oxycodone.  Our agreement was always 90 tablets in 90 days.  She uses down in spurts and for some months she does not fill at all.  She takes them only when she needs them.  Her insurance does not like the way it is filled.  Our last CSA was done 7/12/2022.  She also has type 2 diabetes, hypertension, hyperlipidemia, mild persistent asthma on Flovent, obstructive sleep apnea and depression.    History of Present Illness       Diabetes:   She presents for follow up of diabetes.  She is checking home blood glucose a few times a month. She checks blood glucose before and after meals.  Blood glucose is never over 200 and never under 70. She is aware of hypoglycemia symptoms including shakiness, dizziness and weakness. She has no concerns regarding her diabetes at this time.  She is not experiencing numbness or burning in feet, excessive thirst, blurry vision, weight changes or redness, sores or blisters on feet.         Hypertension: She presents for follow up of hypertension.  She does check blood pressure  regularly outside of the clinic. Outpatient blood pressures have not been over 140/90. She does not follow a low salt  diet.                 Hyperlipidemia Follow-Up      Are you regularly taking any medication or supplement to lower your cholesterol?   Yes- rosuvastatin     Are you having muscle aches or other side effects that you think could be caused by your cholesterol lowering medication?  No      BP Readings from Last 2 Encounters:   11/15/22 108/68   07/12/22 116/80     Hemoglobin A1C (%)   Date Value   11/15/2022 6.7 (H)   07/12/2022 7.6 (H)     LDL Cholesterol Calculated (mg/dL)   Date Value   11/15/2022 41   07/12/2022 46     LDL Cholesterol Direct (mg/dL)   Date Value   09/09/2011 49   09/11/2009 96       PHQ-9:  Last PHQ-9 7/12/2022   1.  Little interest or pleasure in doing things 0   2.  Feeling down, depressed, or hopeless 0   3.  Trouble falling or staying asleep, or sleeping too much 2   4.  Feeling tired or having little energy 1   5.  Poor appetite or overeating 0   6.  Feeling bad about yourself 0   7.  Trouble concentrating 0   8.  Moving slowly or restless 0   Q9: Thoughts of better off dead/self-harm past 2 weeks 0   PHQ-9 Total Score 3   Difficulty at work, home, or with people -       GAD7:  KINDRA-7  7/12/2022   1. Feeling nervous, anxious, or on edge 0   2. Not being able to stop or control worrying 0   3. Worrying too much about different things 0   4. Trouble relaxing 1   5. Being so restless that it is hard to sit still 0   6. Becoming easily annoyed or irritable 0   7. Feeling afraid, as if something awful might happen 0   KINDRA-7 Total Score 1   If you checked any problems, how difficult have they made it for you to do your work, take care of things at home, or get along with other people? -       Asthma Follow-Up    Was ACT completed today?    Yes    ACT Total Scores 11/15/2022   ACT TOTAL SCORE (Goal Greater than or Equal to 20) 16   In the past 12 months, how many times did you visit the emergency room for your asthma without being admitted to the hospital? 0   In the past 12 months, how many times  "were you hospitalized overnight because of your asthma? 0         How many days per week do you miss taking your asthma controller medication?  0    Please describe any recent triggers for your asthma: dust mites, pollens, mold, humidity and exercise or sports    Have you had any Emergency Room Visits, Urgent Care Visits, or Hospital Admissions since your last office visit?  No    Objective    /68   Pulse 91   Temp 98.3  F (36.8  C) (Oral)   Resp 16   Ht 1.607 m (5' 3.25\")   Wt 79.8 kg (176 lb)   SpO2 99%   BMI 30.93 kg/m    Body mass index is 30.93 kg/m .  Physical Exam   GENERAL: healthy, alert and no distress  RESP: lungs clear to auscultation - no rales, rhonchi or wheezes  CV: regular rates and rhythm, no peripheral edema and without murmur  ABDOMEN: soft, nontender and bowel sounds normal  MS: no gross musculoskeletal defects noted, no edema  PSYCH: mentation appears normal, affect normal/bright                "

## 2022-11-19 DIAGNOSIS — G89.4 CHRONIC PAIN SYNDROME: ICD-10-CM

## 2022-11-19 DIAGNOSIS — M12.9 ARTHRITIS/ARTHROPATHY OF MULTIPLE JOINTS: ICD-10-CM

## 2022-11-21 NOTE — TELEPHONE ENCOUNTER
"Last Written Prescription Date:  2/28/22  Last Fill Quantity: 300,  # refills: 0   Last office visit provider:  11/15/22     Requested Prescriptions   Pending Prescriptions Disp Refills     diclofenac (VOLTAREN) 1 % topical gel [Pharmacy Med Name: Diclofenac Sodium 1 % Transdermal Gel] 300 g 0     Sig: APPLY TOPICALLY TWICE DAILY AS NEEDED FOR ARTHRITIS       Topical Steroids and Nonsteroidals Protocol Passed - 11/19/2022 10:01 AM        Passed - Patient is age 6 or older        Passed - Authorizing prescriber's most recent note related to this medication read.     If refill request is for ophthalmic use, please forward request to provider for approval.          Passed - High potency steroid not ordered        Passed - Recent (12 mo) or future (30 days) visit within the authorizing provider's specialty     Patient has had an office visit with the authorizing provider or a provider within the authorizing providers department within the previous 12 mos or has a future within next 30 days. See \"Patient Info\" tab in inbasket, or \"Choose Columns\" in Meds & Orders section of the refill encounter.              Passed - Medication is active on med list             Sandhya Mckinnon RN 11/20/22 6:21 PM  "

## 2022-12-01 ENCOUNTER — TELEPHONE (OUTPATIENT)
Dept: SLEEP MEDICINE | Facility: CLINIC | Age: 67
End: 2022-12-01

## 2022-12-01 NOTE — TELEPHONE ENCOUNTER
PER MEDICARE REVIEW TEAM,, PT WILL NEED TO BE RE-STUDIED SINCE THEY HAVE STOPPED USING MACHINE.  CALLED PT AND EXPLAINED THIS. PT SAID SHE DOESN'T BELIEVE SHE NEEDS TO BE RE-STUDIED AND THAT HER PROVIDER NEVER BROUGHT THIS UP. I EXPLAINED THAT HER OTHER OPTION IS TO PAY OOP.PT SAID THAT'S NOT AFFORDABLE TO HER. I SUGGESTED HER TO CONTACT HER PROVIDER FOR NEXT STEPS. PT UNDERSTOOD.

## 2023-03-06 ENCOUNTER — OFFICE VISIT (OUTPATIENT)
Dept: FAMILY MEDICINE | Facility: CLINIC | Age: 68
End: 2023-03-06
Attending: FAMILY MEDICINE
Payer: COMMERCIAL

## 2023-03-06 VITALS
HEIGHT: 63 IN | SYSTOLIC BLOOD PRESSURE: 108 MMHG | BODY MASS INDEX: 31.54 KG/M2 | OXYGEN SATURATION: 100 % | WEIGHT: 178 LBS | TEMPERATURE: 98.6 F | RESPIRATION RATE: 16 BRPM | HEART RATE: 82 BPM | DIASTOLIC BLOOD PRESSURE: 64 MMHG

## 2023-03-06 DIAGNOSIS — M79.641 PAIN IN BOTH HANDS: ICD-10-CM

## 2023-03-06 DIAGNOSIS — E78.2 MIXED HYPERLIPIDEMIA: Chronic | ICD-10-CM

## 2023-03-06 DIAGNOSIS — Z12.31 VISIT FOR SCREENING MAMMOGRAM: ICD-10-CM

## 2023-03-06 DIAGNOSIS — Z79.899 CONTROLLED SUBSTANCE AGREEMENT SIGNED: Chronic | ICD-10-CM

## 2023-03-06 DIAGNOSIS — I10 ESSENTIAL HYPERTENSION: Chronic | ICD-10-CM

## 2023-03-06 DIAGNOSIS — F33.1 MODERATE EPISODE OF RECURRENT MAJOR DEPRESSIVE DISORDER (H): Chronic | ICD-10-CM

## 2023-03-06 DIAGNOSIS — G47.33 OSA ON CPAP: Chronic | ICD-10-CM

## 2023-03-06 DIAGNOSIS — Z00.00 ENCOUNTER FOR MEDICARE ANNUAL WELLNESS EXAM: Primary | ICD-10-CM

## 2023-03-06 DIAGNOSIS — M79.642 PAIN IN BOTH HANDS: ICD-10-CM

## 2023-03-06 DIAGNOSIS — E11.9 TYPE 2 DIABETES MELLITUS WITHOUT COMPLICATION, WITHOUT LONG-TERM CURRENT USE OF INSULIN (H): Chronic | ICD-10-CM

## 2023-03-06 DIAGNOSIS — J45.30 MILD PERSISTENT ASTHMA WITHOUT COMPLICATION: Chronic | ICD-10-CM

## 2023-03-06 DIAGNOSIS — G89.4 CHRONIC PAIN SYNDROME: Chronic | ICD-10-CM

## 2023-03-06 LAB
AMPHETAMINES UR QL SCN: NORMAL
ANION GAP SERPL CALCULATED.3IONS-SCNC: 15 MMOL/L (ref 7–15)
BARBITURATES UR QL SCN: NORMAL
BENZODIAZ UR QL SCN: NORMAL
BUN SERPL-MCNC: 24 MG/DL (ref 8–23)
BZE UR QL SCN: NORMAL
CALCIUM SERPL-MCNC: 9.8 MG/DL (ref 8.8–10.2)
CANNABINOIDS UR QL SCN: NORMAL
CHLORIDE SERPL-SCNC: 105 MMOL/L (ref 98–107)
CREAT SERPL-MCNC: 0.9 MG/DL (ref 0.51–0.95)
DEPRECATED HCO3 PLAS-SCNC: 21 MMOL/L (ref 22–29)
ETHANOL UR QL SCN: NORMAL
GFR SERPL CREATININE-BSD FRML MDRD: 69 ML/MIN/1.73M2
GLUCOSE SERPL-MCNC: 142 MG/DL (ref 70–99)
HBA1C MFR BLD: 7.1 % (ref 0–5.6)
OPIATES UR QL SCN: NORMAL
PCP QUAL URINE (ROCHE): NORMAL
POTASSIUM SERPL-SCNC: 4.1 MMOL/L (ref 3.4–5.3)
SODIUM SERPL-SCNC: 141 MMOL/L (ref 136–145)

## 2023-03-06 PROCEDURE — 99214 OFFICE O/P EST MOD 30 MIN: CPT | Mod: 25 | Performed by: FAMILY MEDICINE

## 2023-03-06 PROCEDURE — G0439 PPPS, SUBSEQ VISIT: HCPCS | Performed by: FAMILY MEDICINE

## 2023-03-06 PROCEDURE — 36415 COLL VENOUS BLD VENIPUNCTURE: CPT | Performed by: FAMILY MEDICINE

## 2023-03-06 PROCEDURE — 83036 HEMOGLOBIN GLYCOSYLATED A1C: CPT | Performed by: FAMILY MEDICINE

## 2023-03-06 PROCEDURE — 80048 BASIC METABOLIC PNL TOTAL CA: CPT | Performed by: FAMILY MEDICINE

## 2023-03-06 PROCEDURE — 80307 DRUG TEST PRSMV CHEM ANLYZR: CPT | Performed by: FAMILY MEDICINE

## 2023-03-06 ASSESSMENT — ENCOUNTER SYMPTOMS
ABDOMINAL PAIN: 0
MYALGIAS: 1
HEMATURIA: 0
ARTHRALGIAS: 1
FREQUENCY: 1
SORE THROAT: 0
HEADACHES: 1
DYSURIA: 0
DIZZINESS: 0
WEAKNESS: 1
HEMATOCHEZIA: 0
JOINT SWELLING: 0
FEVER: 0
CHILLS: 0
SHORTNESS OF BREATH: 0
HEARTBURN: 0
NERVOUS/ANXIOUS: 0
COUGH: 0
CONSTIPATION: 1
DIARRHEA: 0
NAUSEA: 0
EYE PAIN: 0
BREAST MASS: 0
PALPITATIONS: 0
PARESTHESIAS: 0

## 2023-03-06 ASSESSMENT — PATIENT HEALTH QUESTIONNAIRE - PHQ9
SUM OF ALL RESPONSES TO PHQ QUESTIONS 1-9: 6
SUM OF ALL RESPONSES TO PHQ QUESTIONS 1-9: 6
10. IF YOU CHECKED OFF ANY PROBLEMS, HOW DIFFICULT HAVE THESE PROBLEMS MADE IT FOR YOU TO DO YOUR WORK, TAKE CARE OF THINGS AT HOME, OR GET ALONG WITH OTHER PEOPLE: SOMEWHAT DIFFICULT

## 2023-03-06 ASSESSMENT — ANXIETY QUESTIONNAIRES
5. BEING SO RESTLESS THAT IT IS HARD TO SIT STILL: NOT AT ALL
6. BECOMING EASILY ANNOYED OR IRRITABLE: NOT AT ALL
GAD7 TOTAL SCORE: 0
3. WORRYING TOO MUCH ABOUT DIFFERENT THINGS: NOT AT ALL
7. FEELING AFRAID AS IF SOMETHING AWFUL MIGHT HAPPEN: NOT AT ALL
2. NOT BEING ABLE TO STOP OR CONTROL WORRYING: NOT AT ALL
4. TROUBLE RELAXING: NOT AT ALL
8. IF YOU CHECKED OFF ANY PROBLEMS, HOW DIFFICULT HAVE THESE MADE IT FOR YOU TO DO YOUR WORK, TAKE CARE OF THINGS AT HOME, OR GET ALONG WITH OTHER PEOPLE?: NOT DIFFICULT AT ALL
GAD7 TOTAL SCORE: 0
GAD7 TOTAL SCORE: 0
IF YOU CHECKED OFF ANY PROBLEMS ON THIS QUESTIONNAIRE, HOW DIFFICULT HAVE THESE PROBLEMS MADE IT FOR YOU TO DO YOUR WORK, TAKE CARE OF THINGS AT HOME, OR GET ALONG WITH OTHER PEOPLE: NOT DIFFICULT AT ALL
1. FEELING NERVOUS, ANXIOUS, OR ON EDGE: NOT AT ALL
7. FEELING AFRAID AS IF SOMETHING AWFUL MIGHT HAPPEN: NOT AT ALL

## 2023-03-06 ASSESSMENT — ASTHMA QUESTIONNAIRES
QUESTION_2 LAST FOUR WEEKS HOW OFTEN HAVE YOU HAD SHORTNESS OF BREATH: NOT AT ALL
QUESTION_5 LAST FOUR WEEKS HOW WOULD YOU RATE YOUR ASTHMA CONTROL: COMPLETELY CONTROLLED
QUESTION_4 LAST FOUR WEEKS HOW OFTEN HAVE YOU USED YOUR RESCUE INHALER OR NEBULIZER MEDICATION (SUCH AS ALBUTEROL): NOT AT ALL
ACT_TOTALSCORE: 25
ACT_TOTALSCORE: 25
QUESTION_1 LAST FOUR WEEKS HOW MUCH OF THE TIME DID YOUR ASTHMA KEEP YOU FROM GETTING AS MUCH DONE AT WORK, SCHOOL OR AT HOME: NONE OF THE TIME
QUESTION_3 LAST FOUR WEEKS HOW OFTEN DID YOUR ASTHMA SYMPTOMS (WHEEZING, COUGHING, SHORTNESS OF BREATH, CHEST TIGHTNESS OR PAIN) WAKE YOU UP AT NIGHT OR EARLIER THAN USUAL IN THE MORNING: NOT AT ALL

## 2023-03-06 ASSESSMENT — ACTIVITIES OF DAILY LIVING (ADL): CURRENT_FUNCTION: HOUSEWORK REQUIRES ASSISTANCE

## 2023-03-06 NOTE — PROGRESS NOTES
"SUBJECTIVE:   Anita is a 67 year old who presents for Preventive Visit.  Patient has been advised of split billing requirements and indicates understanding: Yes  Are you in the first 12 months of your Medicare coverage?  No    Healthy Habits:     In general, how would you rate your overall health?  Good    Frequency of exercise:  None    Do you usually eat at least 4 servings of fruit and vegetables a day, include whole grains    & fiber and avoid regularly eating high fat or \"junk\" foods?  No    Taking medications regularly:  Yes    Medication side effects:  None    Ability to successfully perform activities of daily living:  Housework requires assistance    Home Safety:  No safety concerns identified    Hearing Impairment:  Difficulty following a conversation in a noisy restaurant or crowded room, difficulty understanding soft or whispered speech and difficulty understanding speech on the telephone    In the past 6 months, have you been bothered by leaking of urine? Yes    In general, how would you rate your overall mental or emotional health?  Good      PHQ-2 Total Score: 1    Additional concerns today:  No      Have you ever done Advance Care Planning? (For example, a Health Directive, POLST, or a discussion with a medical provider or your loved ones about your wishes): Yes, patient states has an Advance Care Planning document and will bring a copy to the clinic.       Fall risk  Fallen 2 or more times in the past year?: Yes  Any fall with injury in the past year?: No    Cognitive Screening   1) Repeat 3 items (Leader, Season, Table)    2) Clock draw: NORMAL  3) 3 item recall: Recalls 1 object   Results: NORMAL clock, 1-2 items recalled: COGNITIVE IMPAIRMENT LESS LIKELY    Mini-CogTM Copyright WAYNE Quinteros. Licensed by the author for use in [facility]; reprinted with permission ([email or web address].[email or web address]). All rights reserved.      Do you have sleep apnea, excessive snoring or daytime drowsiness?: yes   CPAP " ordered by sleep medicine. Needs new face mask    Reviewed and updated as needed this visit by clinical staff   Tobacco  Allergies  Meds              Reviewed and updated as needed this visit by Provider                 Social History     Tobacco Use     Smoking status: Never     Smokeless tobacco: Never   Substance Use Topics     Alcohol use: Not Currently         Alcohol Use 3/6/2023   Prescreen: >3 drinks/day or >7 drinks/week? Not Applicable       Diabetes Follow-up    How often are you checking your blood sugar? A few times a month  What time of day are you checking your blood sugars (select all that apply)?  Before meals and At bedtime  Have you had any blood sugars above 200?  No  Have you had any blood sugars below 70?  No    What symptoms do you notice when your blood sugar is low?  Lethargy and Blurred vision    What concerns do you have today about your diabetes? None     Do you have any of these symptoms? (Select all that apply)  No numbness or tingling in feet.  No redness, sores or blisters on feet.  No complaints of excessive thirst.  No reports of blurry vision.  No significant changes to weight.        Hyperlipidemia Follow-Up      Are you regularly taking any medication or supplement to lower your cholesterol?   Yes- rosuvastatin 10mg    Are you having muscle aches or other side effects that you think could be caused by your cholesterol lowering medication?  No    Hypertension Follow-up      Do you check your blood pressure regularly outside of the clinic? No     Are you following a low salt diet? Yes    Are your blood pressures ever more than 140 on the top number (systolic) OR more   than 90 on the bottom number (diastolic), for example 140/90? No    BP Readings from Last 2 Encounters:   03/06/23 108/64   11/15/22 108/68     Hemoglobin A1C (%)   Date Value   03/06/2023 7.1 (H)   11/15/2022 6.7 (H)     LDL Cholesterol Calculated (mg/dL)   Date Value   11/15/2022 41   07/12/2022 46     LDL  Cholesterol Direct (mg/dL)   Date Value   09/09/2011 49   09/11/2009 96       Depression Followup    How are you doing with your depression since your last visit? No change    Are you having other symptoms that might be associated with depression? No    Have you had a significant life event?  Grief or Loss     Are you feeling anxious or having panic attacks?   No    Do you have any concerns with your use of alcohol or other drugs? No    Social History     Tobacco Use     Smoking status: Never     Smokeless tobacco: Never   Substance Use Topics     Alcohol use: Not Currently     Drug use: Never     PHQ 2/28/2022 7/12/2022 3/6/2023   PHQ-9 Total Score 8 3 6   Q9: Thoughts of better off dead/self-harm past 2 weeks Not at all Not at all Not at all     KINDRA-7 SCORE 2/28/2022 7/12/2022 3/6/2023   Total Score - 1 (minimal anxiety) 0 (minimal anxiety)   Total Score 2 1 0     Last PHQ-9 3/6/2023   1.  Little interest or pleasure in doing things 0   2.  Feeling down, depressed, or hopeless 0   3.  Trouble falling or staying asleep, or sleeping too much 3   4.  Feeling tired or having little energy 2   5.  Poor appetite or overeating 1   6.  Feeling bad about yourself 0   7.  Trouble concentrating 0   8.  Moving slowly or restless 0   Q9: Thoughts of better off dead/self-harm past 2 weeks 0   PHQ-9 Total Score 6   Difficulty at work, home, or with people -     KINDRA-7  3/6/2023   1. Feeling nervous, anxious, or on edge 0   2. Not being able to stop or control worrying 0   3. Worrying too much about different things 0   4. Trouble relaxing 0   5. Being so restless that it is hard to sit still 0   6. Becoming easily annoyed or irritable 0   7. Feeling afraid, as if something awful might happen 0   KINDRA-7 Total Score 0   If you checked any problems, how difficult have they made it for you to do your work, take care of things at home, or get along with other people? Not difficult at all       Asthma Follow-Up    Was ACT completed  today?    Yes    ACT Total Scores 3/6/2023   ACT TOTAL SCORE (Goal Greater than or Equal to 20) 25   In the past 12 months, how many times did you visit the emergency room for your asthma without being admitted to the hospital? 0   In the past 12 months, how many times were you hospitalized overnight because of your asthma? 0          How many days per week do you miss taking your asthma controller medication?  0    Please describe any recent triggers for your asthma: upper respiratory infections and humidity    Have you had any Emergency Room Visits, Urgent Care Visits, or Hospital Admissions since your last office visit?  No      Current providers sharing in care for this patient include:   Patient Care Team:  Trish Eagle MD as PCP - General (Family Practice)  Trish Eagle MD as Assigned PCP  Trish Eagle MD as Assigned Pain Medication Provider    The following health maintenance items are reviewed in Epic and correct as of today:  Health Maintenance   Topic Date Due     DEPRESSION ACTION PLAN  Never done     DTAP/TDAP/TD IMMUNIZATION (2 - Td or Tdap) 09/03/2018     COVID-19 Vaccine (4 - Booster for Moderna series) 03/09/2022     MAMMO SCREENING  12/28/2022     DIABETIC FOOT EXAM  02/28/2023     URINE DRUG SCREEN  02/28/2023     ASTHMA ACTION PLAN  07/12/2023     EYE EXAM  08/08/2023     A1C  09/06/2023     ASTHMA CONTROL TEST  09/06/2023     PHQ-9  09/06/2023     BMP  11/15/2023     LIPID  11/15/2023     MICROALBUMIN  11/15/2023     MEDICARE ANNUAL WELLNESS VISIT  03/06/2024     ANNUAL REVIEW OF HM ORDERS  03/06/2024     FALL RISK ASSESSMENT  03/06/2024     COLORECTAL CANCER SCREENING  07/22/2024     ADVANCE CARE PLANNING  03/06/2028     DEXA  12/29/2035     HEPATITIS C SCREENING  Completed     INFLUENZA VACCINE  Completed     Pneumococcal Vaccine: 65+ Years  Completed     ZOSTER IMMUNIZATION  Completed     IPV IMMUNIZATION  Aged Out     MENINGITIS IMMUNIZATION  Aged Out     Labs reviewed  "in EPIC  BP Readings from Last 3 Encounters:   03/06/23 108/64   11/15/22 108/68   07/12/22 116/80    Wt Readings from Last 3 Encounters:   03/06/23 80.7 kg (178 lb)   11/15/22 79.8 kg (176 lb)   11/08/22 78 kg (172 lb)             Mammogram Screening: Mammogram Screening: Recommended mammography every 1-2 years with patient discussion and risk factor consideration    FHS-7: No flowsheet data found.    Mammogram Screening: Recommended mammography every 1-2 years with patient discussion and risk factor consideration  Pertinent mammograms are reviewed under the imaging tab.    Review of Systems   Constitutional: Negative for chills and fever.   HENT: Positive for ear pain and hearing loss. Negative for congestion and sore throat.    Eyes: Positive for visual disturbance. Negative for pain.   Respiratory: Negative for cough and shortness of breath.    Cardiovascular: Negative for chest pain, palpitations and peripheral edema.   Gastrointestinal: Positive for constipation. Negative for abdominal pain, diarrhea, heartburn, hematochezia and nausea.   Breasts:  Negative for tenderness, breast mass and discharge.   Genitourinary: Positive for frequency and urgency. Negative for dysuria, genital sores, hematuria, pelvic pain, vaginal bleeding and vaginal discharge.   Musculoskeletal: Positive for arthralgias and myalgias. Negative for joint swelling.   Skin: Negative for rash.   Neurological: Positive for weakness and headaches. Negative for dizziness and paresthesias.   Psychiatric/Behavioral: Negative for mood changes. The patient is not nervous/anxious.        OBJECTIVE:   /64   Pulse 82   Temp 98.6  F (37  C) (Oral)   Resp 16   Ht 1.6 m (5' 3\")   Wt 80.7 kg (178 lb)   SpO2 100%   BMI 31.53 kg/m   Estimated body mass index is 31.53 kg/m  as calculated from the following:    Height as of this encounter: 1.6 m (5' 3\").    Weight as of this encounter: 80.7 kg (178 lb).  Physical Exam  General appearance - alert, " well appearing, and in no distress and overweight  Mental status - normal behavior, speech, dress, motor activity, and thought processes, with flat affect, anxious  Eyes - pupils equal and reactive, extraocular eye movements intact  Ears - bilateral TM's and external ear canals normal  Nose - normal and patent, no erythema, discharge   Mouth - mucous membranes moist, pharynx normal without lesions  Neck - supple, no significant adenopathy, carotids upstroke normal bilaterally, no bruits, thyroid exam: thyroid is normal in size without nodules or tenderness  Chest - clear to auscultation, no wheezes, rales or rhonchi, symmetric air entry  Heart - normal rate and regular rhythm, no murmurs noted  Abdomen - soft, nontender, nondistended, no masses or organomegaly  Breasts - not examined  Pelvic - examination not indicated  Back exam - full range of motion, no tenderness, palpable spasm or pain on motion  Neurological - alert, oriented, normal speech, no focal findings or movement disorder noted, cranial nerves II through XII intact, DTR's normal and symmetric  Musculoskeletal - no joint tenderness, deformity or swelling  Extremities - peripheral pulses normal, no pedal edema, no clubbing or cyanosis  Skin - normal coloration and turgor, no rashes, no suspicious skin lesions noted  Diabetic foot exam: No deformity of the foot, no open skin lesions but with some dryness and callus that is mild, mild onychomycosis, good pulses and no neuropathy on filament testing.      Labs reviewed in Epic  Results for orders placed or performed in visit on 03/06/23   [id number] Drug abuse screen 8 urine (UR) ([facility] Only)     Status: Normal   Result Value Ref Range    Amphetamines Urine Screen Negative Screen Negative    Barbituates Urine Screen Negative Screen Negative    Benzodiazepine Urine Screen Negative Screen Negative    Cannabinoids Urine Screen Negative Screen Negative    Cocaine Urine Screen Negative Screen Negative    Ethanol  Urine Screen Negative Screen Negative    Opiates Urine Screen Negative Screen Negative    PCP Urine Screen Negative Screen Negative   Hemoglobin A1c     Status: Abnormal   Result Value Ref Range    Hemoglobin A1C 7.1 (H) 0.0 - 5.6 %   Basic metabolic panel     Status: Abnormal   Result Value Ref Range    Sodium 141 136 - 145 mmol/L    Potassium 4.1 3.4 - 5.3 mmol/L    Chloride 105 98 - 107 mmol/L    Carbon Dioxide (CO2) 21 (L) 22 - 29 mmol/L    Anion Gap 15 7 - 15 mmol/L    Urea Nitrogen 24.0 (H) 8.0 - 23.0 mg/dL    Creatinine 0.90 0.51 - 0.95 mg/dL    Calcium 9.8 8.8 - 10.2 mg/dL    Glucose 142 (H) 70 - 99 mg/dL    GFR Estimate 69 >60 mL/min/1.73m2       ASSESSMENT / PLAN:     Encounter for Medicare annual wellness exam  Patient's last colonoscopy was 2019 good for 5 years.  Patient is due for her mammogram as it was last done in December 2020.  The DEXA scan was done at that time as well.  She is due for COVID and tetanus vaccines but declines.  - REVIEW OF HEALTH MAINTENANCE PROTOCOL ORDERS    Chronic pain syndrome  I do have a CSA with this patient for Percocet that she uses only on an as-needed basis as well as phentermine for weight control.  - [id number] Drug abuse screen 8 urine (UR) ([facility] Only)  - [id number] Drug abuse screen 8 urine (UR) ([facility] Only)    Controlled substance agreement signed  New CSA was signed today for 90 tablets to last a minimum of 90 days for Percocets and phentermine.  Urine drug screen today.  - [id number] Drug abuse screen 8 urine (UR) ([facility] Only)  - [id number] Drug abuse screen 8 urine (UR) ([facility] Only)    Pain in both hands  Having complaints of trigger fingers and hand pain which has gone from just left ring finger to more general hand pain with multiple trigger fingers for which she is wearing what looks like compression gloves.  She tells me it is easier for her to unlock her trigger fingers.  She would like a referral back to [place] where she has been seen before for her hand issues.  -  "Orthopedic  Referral    Type 2 diabetes mellitus without complication, without long-term current use of insulin (H)  Typically well controlled.  Last A1c was 6.7%.  Will monitor labs.  - Hemoglobin A1c  - Hemoglobin A1c    Essential hypertension  Well-controlled today, will monitor labs.  - Basic metabolic panel  - Basic metabolic panel    Mixed hyperlipidemia  Patient is on Crestor 10 mg with last LDL 41.    Mild persistent asthma without complication  Using her controller twice daily and has been doing well and scored 25 on her ACT today.    JANINE on CPAP  Patient has not been using her CPAP machine because at first it was recalled and she was waiting for a replacement and recently she got the replacement but then there was no mask however she has not followed up on that and so she was given the number to call the sleep medicine clinic so as to get that mask ordered for her.    Moderate episode of recurrent major depressive disorder (H)  Patient's PHQ-9 score today is 6 which is up from previous 3.  She has been dealing with cleaning out two houses.  She is taking over the family house so she had to clean her brother stuff out of it and then she had to clean her own house for sale.  This has been stressful for her.    Visit for screening mammogram  Patient is behind on her mammogram.  I will order it so they will call her.  - MA SCREENING DIGITAL BILAT - Future  (s+30)    I will get back to her on her lab results by MyChart or mail and only call with grossly abnormal values.  She should see me back in 4 to 6 months depending on her lab results.    Patient has been advised of split billing requirements and indicates understanding: Yes      COUNSELING:  Reviewed preventive health counseling, as reflected in patient instructions      BMI:   Estimated body mass index is 31.53 kg/m  as calculated from the following:    Height as of this encounter: 1.6 m (5' 3\").    Weight as of this encounter: 80.7 kg (178 lb). "   Weight management plan: Discussed healthy diet and exercise guidelines      She reports that she has never smoked. She has never used smokeless tobacco.      Appropriate preventive services were discussed with this patient, including applicable screening as appropriate for cardiovascular disease, diabetes, osteopenia/osteoporosis, and glaucoma.  As appropriate for age/gender, discussed screening for colorectal cancer, prostate cancer, breast cancer, and cervical cancer. Checklist reviewing preventive services available has been given to the patient.    Reviewed patients plan of care and provided an AVS. The Complex Care Plan (for patients with higher acuity and needing more deliberate coordination of services) for Anita meets the Care Plan requirement. This Care Plan has been established and reviewed with the Patient.      Trish Eagle MD  [facility]    Identified Health Risks:    The patient's PHQ-9 score is consistent with mild depression. She was provided with information regarding depression and was advised to schedule a follow up appointment in 12 weeks to further address this issue.  She is at risk for falling and has been provided with information to reduce the risk of falling at home.  Answers for HPI/ROS submitted by the patient on 3/6/2023  If you checked off any problems, how difficult have these problems made it for you to do your work, take care of things at home, or get along with other people?: Somewhat difficult  PHQ9 TOTAL SCORE: 6  KINDRA 7 TOTAL SCORE: 0

## 2023-03-06 NOTE — LETTER
Opioid / Opioid Plus Controlled Substance Agreement    This is an agreement between you and your provider about the safe and appropriate use of controlled substance/opioids prescribed by your care team. Controlled substances are medicines that can cause physical and mental dependence (abuse).    There are strict laws about having and using these medicines. We here at [facility] are committing to working with you in your efforts to get better. To support you in this work, we ll help you schedule regular office appointments for medicine refills. If we must cancel or change your appointment for any reason, we ll make sure you have enough medicine to last until your next appointment.     As a Provider, I will:    Listen carefully to your concerns and treat you with respect.     Recommend a treatment plan that I believe is in your best interest. This plan may involve therapies other than opioid pain medication.     Talk with you often about the possible benefits, and the risk of harm of any medicine that we prescribe for you.     Provide a plan on how to taper (discontinue or go off) using this medicine if the decision is made to stop its use.    As a Patient, I understand that opioid(s):     Are a controlled substance prescribed by my care team to help me function or work and manage my condition(s).     Are strong medicines and can cause serious side effects such as:    Drowsiness, which can seriously affect my driving ability    A lower breathing rate, enough to cause death    Harm to my thinking ability     Depression     Abuse of and addiction to this medicine    Need to be taken exactly as prescribed. Combining opioids with certain medicines or chemicals (such as illegal drugs, sedatives, sleeping pills, and benzodiazepines) can be dangerous or even fatal. If I stop opioids suddenly, I may have severe withdrawal symptoms.    Do not work for all types of pain nor for all patients. If they re not helpful, I may  be asked to stop them.        The risks, benefits and side effects of these medicine(s) were explained to me. I agree that:  1. I will take part in other treatments as advised by my care team. This may be psychiatry or counseling, physical therapy, behavioral therapy, group treatment or a referral to a specialist.     2. I will keep all my appointments. I understand that this is part of the monitoring of opioids. My care team may require an office visit for EVERY opioid/controlled substance refill. If I miss appointments or don t follow instructions, my care team may stop my medicine.    3. I will take my medicines as prescribed. I will not change the dose or schedule unless my care team tells me to. There will be no refills if I run out early.     4. I may be asked to come to the clinic and complete a urine drug test or complete a pill count at any time. If I don t give a urine sample or participate in a pill count, the care team may stop my medicine.    5. I will only receive prescriptions from this clinic for chronic pain. If I am treated by another provider for acute pain issues, I will tell them that I am taking opioid pain medication for chronic pain and that I have a treatment agreement with this provider. I will inform my [facility] care team within one business day if I am given a prescription for any pain medication by another healthcare provider. My [facility] care team can contact other providers and pharmacists about my use of any medicines.    6. It is up to me to make sure that I don t run out of my medicines on weekends or holidays. If my care team is willing to refill my opioid prescription without a visit, I must request refills only during office hours. Refills may take up to 3 business days to process. I will use one pharmacy to fill all my opioid and other controlled substance prescriptions. I will notify the clinic about any changes to my insurance or medication  availability.    7. I am responsible for my prescriptions. If the medicine/prescription is lost, stolen or destroyed, it will not be replaced. I also agree not to share controlled substance medicines with anyone.    8. I am aware I should not use any illegal or recreational drugs. I agree not to drink alcohol unless my care team says I can.       9. If I enroll in the [place] [facility] Cannabis program, I will tell my care team prior to my next refill.     10. I will tell my care team right away if I become pregnant, have a new medical problem treated outside of my regular clinic, or have a change in my medications.    11. I understand that this medicine can affect my thinking, judgment and reaction time. Alcohol and drugs affect the brain and body, which can affect the safety of my driving. Being under the influence of alcohol or drugs can affect my decision-making, behaviors, personal safety, and the safety of others. Driving while impaired (DWI) can occur if a person is driving, operating, or in physical control of a car, motorcycle, boat, snowmobile, ATV, motorbike, off-road vehicle, or any other motor vehicle ([place] Statute 169A.20). I understand the risk if I choose to drive or operate any vehicle or machinery.    I understand that if I do not follow any of the conditions above, my prescriptions or treatment may be stopped or changed.          Opioids  What You Need to Know    What are opioids?   Opioids are pain medicines that must be prescribed by a doctor. They are also known as narcotics.     Examples are:   1. morphine (MS Contin, Shahnaz)  2. oxycodone (Oxycontin)  3. oxycodone and acetaminophen (Percocet)  4. hydrocodone and acetaminophen (Vicodin, Norco)   5. fentanyl patch (Duragesic)   6. hydromorphone (Dilaudid)   7. methadone  8. codeine (Tylenol #3)     What do opioids do well?   Opioids are best for severe short-term pain such as after a surgery or injury. They may work well for cancer pain. They may  help some people with long-lasting (chronic) pain.     What do opioids NOT do well?   Opioids never get rid of pain entirely, and they don t work well for most patients with chronic pain. Opioids don t reduce swelling, one of the causes of pain.                                    Other ways to manage chronic pain and improve function include:       Treat the health problem that may be causing pain    Anti-inflammation medicines, which reduce swelling and tenderness, such as ibuprofen (Advil, Motrin) or naproxen (Aleve)    Acetaminophen (Tylenol)    Antidepressants and anti-seizure medicines, especially for nerve pain    Topical treatments such as patches or creams    Injections or nerve blocks    Chiropractic or osteopathic treatment    Acupuncture, massage, deep breathing, meditation, visual imagery, aromatherapy    Use heat or ice at the pain site    Physical therapy     Exercise    Stop smoking    Take part in therapy       Risks and side effects     Talk to your doctor before you start or decide to keep taking opioids. Possible side effects include:      Lowering your breathing rate enough to cause death    Overdose, including death, especially if taking higher than prescribed doses    Worse depression symptoms; less pleasure in things you usually enjoy    Feeling tired or sluggish    Slower thoughts or cloudy thinking    Being more sensitive to pain over time; pain is harder to control    Trouble sleeping or restless sleep    Changes in hormone levels (for example, less testosterone)    Changes in sex drive or ability to have sex    Constipation    Unsafe driving    Itching and sweating    Dizziness    Nausea, throwing up and dry mouth    What else should I know about opioids?    Opioids may lead to dependence, tolerance, or addiction.      Dependence means that if you stop or reduce the medicine too quickly, you will have withdrawal symptoms. These include loose poop (diarrhea), jitters, flu-like symptoms,  nervousness and tremors. Dependence is not the same as addiction.                       Tolerance means needing higher doses over time to get the same effect. This may increase the chance of serious side effects.      Addiction is when people improperly use a substance that harms their body, their mind or their relations with others. Use of opiates can cause a relapse of addiction if you have a history of drug or alcohol abuse.      People who have used opioids for a long time may have a lower quality of life, worse depression, higher levels of pain and more visits to doctors.    You can overdose on opioids. Take these steps to lower your risk of overdose:    1. Recognize the signs:  Signs of overdose include decrease or loss of consciousness (blackout), slowed breathing, trouble waking up and blue lips. If someone is worried about overdose, they should call 911.    2. Talk to your doctor about Narcan (naloxone).   If you are at risk for overdose, you may be given a prescription for Narcan. This medicine very quickly reverses the effects of opioids.   If you overdose, a friend or family member can give you Narcan while waiting for the ambulance. They need to know the signs of overdose and how to give Narcan.     3. Don't use alcohol or street drugs.   Taking them with opioids can cause death.    4. Do not take any of these medicines unless your doctor says it s OK. Taking these with opioids can cause death:    Benzodiazepines, such as lorazepam (Ativan), alprazolam (Xanax) or diazepam (Valium)    Muscle relaxers, such as cyclobenzaprine (Flexeril)    Sleeping pills like zolpidem (Ambien)     Other opioids      How to keep you and other people safe while taking opioids:    1. Never share your opioids with others.  Opioid medicines are regulated by the Drug Enforcement Agency (MONA). Selling or sharing medications is a criminal act.    2. Be sure to store opioids in a secure place, locked up if possible. Young children  can easily swallow them and overdose.    3. When you are traveling with your medicines, keep them in the original bottles. If you use a pill box, be sure you also carry a copy of your medicine list from your clinic or pharmacy.    4. Safe disposal of opioids    Most pharmacies have places to get rid of medicine, called disposal kiosks. Medicine disposal options are also available in every [facility]. Search your county and  medication disposal  to find more options. You can find more details at:  [email or web address]./living-green/managing-[email or web address]-medications     I agree that my provider, clinic care team, and pharmacy may work with any city, state or federal law enforcement agency that investigates the misuse, sale, or other diversion of my controlled medicine. I will allow my provider to discuss my care with, or share a copy of, this agreement with any other treating provider, pharmacy or emergency room where I receive care.    I have read this agreement and have asked questions about anything I did not understand.    _______________________________________________________  Patient Signature - Anita Duque _____________________                   Date     _______________________________________________________  Provider Signature - Trish Eagle MD   _____________________                   Date     _______________________________________________________  Witness Signature (required if provider not present while patient signing)   _____________________                   Date

## 2023-03-06 NOTE — PATIENT INSTRUCTIONS
"Sleep Medicine: [id number] for new CPAP mask    Patient Education   Personalized Prevention Plan  You are due for the preventive services outlined below.  Your care team is available to assist you in scheduling these services.  If you have already completed any of these items, please share that information with your care team to update in your medical record.  Health Maintenance Due   Topic Date Due    Discuss Advance Care Planning  Never done    Depression Action Plan  Never done    Diptheria Tetanus Pertussis (DTAP/TDAP/TD) Vaccine (2 - Td or Tdap) 09/03/2018    COVID-19 Vaccine (4 - Booster for Moderna series) 03/09/2022    Mammogram  12/28/2022    Diabetic Foot Exam  02/28/2023    URINE DRUG SCREEN  02/28/2023    ANNUAL REVIEW OF HM ORDERS  02/28/2023       Depression and Suicide in Older Adults    Nearly 2 million older Americans have some type of depression. Some of them even take their own lives. Yet depression among older adults is often ignored. Learn the warning signs. You may help spare a loved one needless pain. You may also save a life.   What is depression?  Depression is a common and serious illness that affects the way you think and feel. It is not a normal part of aging, nor is it a sign of weakness, a character flaw, or something you can snap out of. Most people with depression need treatment to get better. The most common symptom is a feeling of deep sadness. People who are depressed also may seem tired and listless. And nothing seems to give them pleasure. It s normal to grieve or be sad sometimes. But sadness lessens or passes with time. Depression rarely goes away or improves on its own. A person with clinical depression can't \"snap out of it.\" Other symptoms of depression are:   Sleeping more or less than normal  Eating more or less than normal  Having headaches, stomachaches, or other pains that don t go away  Feeling nervous,  empty,  or worthless  Crying a great deal  Thinking or talking " about suicide or death  Loss of interest in activities previously enjoyed  Social isolation  Feeling confused or forgetful  What causes it?  The causes of depression aren t fully known. But it is thought to result from a complex blend of these factors:   Biochemistry. Certain chemicals in the brain play a role.  Genes. Depression does run in families.  Life stress. Life stresses can also trigger depression in some people. Older adults often face many stressors, such as death of friends or a spouse, health problems, and financial concerns.  Chronic conditions. This includes conditions such as diabetes, heart disease, or cancer. These can cause symptoms of depression. Medicine side effects can cause changes in thoughts and behaviors.  How you can help  Often, depressed people may not want to ask for help. When they do, they may be ignored. Or, they may receive the wrong treatment. You can help by showing parents and older friends love and support. If they seem depressed, don t lecture the person, ignore the symptoms, or discount the symptoms as a  normal  part of aging -which they are not. Get involved, listen, and show interest and support.   Help them understand that depression is a treatable illness. Tell them you can help them find the right treatment. Offer to go to their healthcare provider's appointment with them for support when the symptoms are discussed. With their approval, contact a local mental health center, social service agency, or hospital about services.   You can be an advocate for him or her at healthcare appointments. Many older adults have chronic illnesses that can cause symptoms of depression. Medicine side effects can change thoughts and behaviors. You can help make sure that the healthcare provider looks at all of these factors. He or she should refer your family member or friend to a mental healthcare provider when needed. in some cases, untreated depression can lead to a misdiagnosis. A  person may be diagnosed with a brain disorder such as dementia. If the healthcare provider does not take the issue of depression seriously, help your family member or friend to find another provider.   Don't be afraid to ask  If you think an older person you care about could be suicidal, ask,  Have you thought about suicide?  Most people will tell you the truth. If they say  yes,  they may already have a plan for how and when they will attempt it. Find out as much as you can. The more detailed the plan, and the easier it is to carry out, the more danger the person is in right now. Tell the person you are there for them and do not want them to harm him or herself. Don't wait to get help for the person. Call the person's healthcare provider, local hospital, or emergency services.   To learn more  National Suicide Prevention Lifeline (crisis hotline) 306-149-KEXU (815-117-3060)  National [place] of Mental Tfisva921-464-6329imq.[facility].nih.gov  National [place] on Mental Fyjswez873-030-6781pay.[place].org  Mental Health Wbodhtp304-681-3996rjt.[facility].org  National Suicide Ifzacyv839-CIGYPTL (932-308-3417)    Call 911  Never leave the person alone. A person who is actively suicidal needs psychiatric care right away. They will need constant supervision. Never leave the person out of sight. Call 911 or the national 24-hour suicide crisis hotline at 237-702-DFKR (495-129-2973). You can also take the person to the closest emergency room.   [employer] last reviewed this educational content on 5/1/2020 2000-2021 [employer]. All rights reserved. This information is not intended as a substitute for professional medical care. Always follow your healthcare professional's instructions.          Preventing Falls at Home  A person can fall for many reasons. Older adults may fall because reaction time slows down as we age. Your muscles and joints may get stiff, weak, or less flexible because of illness, medicines, or a  physical condition.   Other health problems that make falls more likely include:   Arthritis  Dizziness or lightheadedness when you stand up (orthostatic hypotension)  History of a stroke  Dizziness  Anemia  Certain medicines taken for mental illness or to control blood pressure.  Problems with balance or gait  Bladder or urinary problems  History of falling  Changes in vision (vision impairment)  Changes in thinking skills and memory (cognitive impairment)  Injuries from a fall can include serious injuries such as broken bones, dislocated joints, internal bleeding and cuts. Injuries like these can limit your independence.   Prevention tips  To help prevent falls and fall-related injuries, follow the tips below.    Floors  To make floors safer:   Put nonskid pads under area rugs.  Remove small rugs.  Replace worn floor coverings.  Tack carpets firmly to each step on carpeted stairs. Put nonskid strips on the edges of uncarpeted stairs.  Keep floors and stairs free of clutter and cords.  Arrange furniture so there are clear pathways.  Clean up any spills right away.  Bathrooms    To make bathrooms safer:   Install grab bars in the tub or shower.  Apply nonskid strips or put a nonskid rubber mat in the tub or shower.  Sit on a bath chair to bathe.  Use bathmats with nonskid backing.  Lighting  To improve visibility in your home:    Keep a flashlight in each room. Or put a lamp next to the bed within easy reach.  Put nightlights in the bedrooms, hallways, kitchen, and bathrooms.  Make sure all stairways have good lighting.  Take your time when going up and down stairs.  Put handrails on both sides of stairs and in walkways for more support. To prevent injury to your wrist or arm, don t use handrails to pull yourself up.  Install grab bars to pull yourself up.  Move or rearrange items that you use often. This will make them easier to find or reach.  Look at your home to find any safety hazards. Especially look at  doorways, walkways, and the driveway. Remove or repair any safety problems that you find.  Other changes to make  Look around to find any safety hazards. Look closely at doorways, walkways, and the driveway. Remove or repair any safety problems that you find.  Wear shoes that fit well.  Take your time when going up and down stairs.  Put handrails on both sides of stairs and in walkways for more support. To prevent injury to your wrist or arm, don t use handrails to pull yourself up.  Install grab bars wherever needed to pull yourself up.  Arrange items that you use often. This will make them easier to find or reach.    [employer] last reviewed this educational content on 3/1/2020    6456-4763 [employer]. All rights reserved. This information is not intended as a substitute for professional medical care. Always follow your healthcare professional's instructions.

## 2023-03-07 ENCOUNTER — TRANSFERRED RECORDS (OUTPATIENT)
Dept: HEALTH INFORMATION MANAGEMENT | Facility: CLINIC | Age: 68
End: 2023-03-07

## 2023-03-07 DIAGNOSIS — G89.29 OTHER CHRONIC PAIN: ICD-10-CM

## 2023-03-07 DIAGNOSIS — G89.4 CHRONIC PAIN SYNDROME: ICD-10-CM

## 2023-03-07 DIAGNOSIS — E11.9 TYPE 2 DIABETES MELLITUS WITHOUT COMPLICATION, WITHOUT LONG-TERM CURRENT USE OF INSULIN (H): Chronic | ICD-10-CM

## 2023-03-08 RX ORDER — DAPAGLIFLOZIN 10 MG/1
TABLET, FILM COATED ORAL
Qty: 90 TABLET | Refills: 1 | Status: SHIPPED | OUTPATIENT
Start: 2023-03-08 | End: 2024-03-25

## 2023-03-08 RX ORDER — GLIPIZIDE 5 MG/1
TABLET, FILM COATED, EXTENDED RELEASE ORAL
Qty: 90 TABLET | Refills: 1 | Status: SHIPPED | OUTPATIENT
Start: 2023-03-08 | End: 2023-11-01

## 2023-03-09 RX ORDER — GABAPENTIN 300 MG/1
CAPSULE ORAL
Qty: 240 CAPSULE | Refills: 0 | Status: SHIPPED | OUTPATIENT
Start: 2023-03-09 | End: 2023-06-19

## 2023-03-09 RX ORDER — TOPIRAMATE 100 MG/1
TABLET, FILM COATED ORAL
Qty: 180 TABLET | Refills: 0 | Status: SHIPPED | OUTPATIENT
Start: 2023-03-09

## 2023-03-09 NOTE — TELEPHONE ENCOUNTER
"Routing refill request to provider for review/approval because:  Drug not on the [facility] refill protocol : Gabapentin  Last Written Prescription Date:  3/25/2022  Last Fill Quantity: 240,  # refills: 1  Topiramate: check with provider  Last written prescription date: 12/1/2020  Last fill quantity: 180, # refills: 3     Passed protocol: Glipizide and Farxiga  Last written prescription date: 7/12/2022  Last fill quantity: 90, # refills: 1    Last office visit provider:  3/6/2023 with PCP Dr CLAY Eagle     Requested Prescriptions   Pending Prescriptions Disp Refills     gabapentin (NEURONTIN) 300 MG capsule [Pharmacy Med Name: Gabapentin 300 MG Oral Capsule] 240 capsule 0     Sig: TAKE 2 CAPSULES BY MOUTH 4 TIMES DAILY       There is no refill protocol information for this order        topiramate (TOPAMAX) 100 MG tablet [Pharmacy Med Name: Topiramate 100 MG Oral Tablet] 180 tablet 0     Sig: Take 1 tablet by mouth twice daily       Anti-Seizure Meds Protocol  Failed - 3/7/2023  9:46 AM        Failed - Review Authorizing provider's last note.      Refer to last progress notes: confirm request is for original authorizing provider (cannot be through other providers).          Passed - Recent (12 mo) or future (30 days) visit within the authorizing provider's specialty     Patient has had an office visit with the authorizing provider or a provider within the authorizing providers department within the previous 12 mos or has a future within next 30 days. See \"Patient Info\" tab in inbasket, or \"Choose Columns\" in Meds & Orders section of the refill encounter.              Passed - Normal CBC on file in past 26 months     Recent Labs   Lab Test 10/29/21  1402   WBC 8.2   RBC 4.84   HGB 15.1   HCT 46.3                    Passed - Normal serum creatinine on file in past 26 months     Recent Labs   Lab Test 03/06/23  1127   CR 0.90       Ok to refill medication if creatinine is low          Passed - Normal ALT or AST on file " "in past 26 months     Recent Labs   Lab Test 11/15/22  1141   ALT 20     Recent Labs   Lab Test 11/15/22  1141   AST 28             Passed - Normal platelet count on file in past 26 months     Recent Labs   Lab Test 10/29/21  1402                  Passed - Medication is active on med list        Passed - No active pregnancy on record        Passed - No positive pregnancy test in last 12 months           glipiZIDE (GLUCOTROL XL) 5 MG 24 hr tablet [Pharmacy Med Name: glipiZIDE ER 5 MG Oral Tablet Extended Release 24 Hour] 90 tablet 0     Sig: TAKE 1 TABLET BY MOUTH ONCE DAILY . APPOINTMENT REQUIRED FOR FUTURE REFILLS       Sulfonylurea Agents Passed - 3/7/2023  9:46 AM        Passed - Patient has documented A1c within the specified period of time.     If HgbA1C is 8 or greater, it needs to be on file within the past 3 months.  If less than 8, must be on file within the past 6 months.     Recent Labs   Lab Test 03/06/23  1127   A1C 7.1*             Passed - Medication is active on med list        Passed - Patient is age 18 or older        Passed - No active pregnancy on record        Passed - Patient has a recent creatinine (normal) within the past 12 mos.     Recent Labs   Lab Test 03/06/23  1127   CR 0.90       Ok to refill medication if creatinine is low          Passed - Patient has not had a positive pregnancy test within the past 12 mos.        Passed - Recent (6 mo) or future (30 days) visit within the authorizing provider's specialty     Patient had office visit in the last 6 months or has a visit in the next 30 days with authorizing provider or within the authorizing provider's specialty.  See \"Patient Info\" tab in inbasket, or \"Choose Columns\" in Meds & Orders section of the refill encounter.               FARXIGA 10 MG TABS tablet [Pharmacy Med Name: Farxiga 10 MG Oral Tablet] 90 tablet 0     Sig: Take 1 tablet by mouth once daily       Sodium Glucose Co-Transport Inhibitor Agents Passed - 3/7/2023  " "9:46 AM        Passed - Patient has documented A1c within the specified period of time.     If HgbA1C is 8 or greater, it needs to be on file within the past 3 months.  If less than 8, must be on file within the past 6 months.     Recent Labs   Lab Test 03/06/23  1127   A1C 7.1*             Passed - No creatinine >1.4 or GFR <45 within the past 12 mos     Recent Labs   Lab Test 03/06/23  1127 10/29/21  1402 06/15/21  1312   GFRESTIMATED 69   < > >60   GFRESTBLACK  --   --  >60    < > = values in this interval not displayed.       Recent Labs   Lab Test 03/06/23  1127   CR 0.90             Passed - Medication is active on med list        Passed - Patient is age 18 or older        Passed - Patient is not pregnant        Passed - Patient has documented normal Potassium within the last 12 mos.     Recent Labs   Lab Test 03/06/23  1127   POTASSIUM 4.1             Passed - Patient has no positive pregnancy test within the past 12 mos.        Passed - Recent (6 mo) or future (30 days) visit within the authorizing provider's specialty     Patient had office visit in the last 6 months or has a visit in the next 30 days with authorizing provider or within the authorizing provider's specialty.  See \"Patient Info\" tab in inbasket, or \"Choose Columns\" in Meds & Orders section of the refill encounter.                 Azeb Lopez RN 03/08/23 9:38 PM  "

## 2023-03-17 ENCOUNTER — OFFICE VISIT (OUTPATIENT)
Dept: FAMILY MEDICINE | Facility: CLINIC | Age: 68
End: 2023-03-17
Payer: COMMERCIAL

## 2023-03-17 ENCOUNTER — APPOINTMENT (OUTPATIENT)
Dept: CT IMAGING | Facility: CLINIC | Age: 68
End: 2023-03-17
Attending: STUDENT IN AN ORGANIZED HEALTH CARE EDUCATION/TRAINING PROGRAM
Payer: COMMERCIAL

## 2023-03-17 ENCOUNTER — APPOINTMENT (OUTPATIENT)
Dept: RADIOLOGY | Facility: CLINIC | Age: 68
End: 2023-03-17
Attending: EMERGENCY MEDICINE
Payer: COMMERCIAL

## 2023-03-17 ENCOUNTER — HOSPITAL ENCOUNTER (EMERGENCY)
Facility: CLINIC | Age: 68
Discharge: HOME | End: 2023-03-17
Attending: STUDENT IN AN ORGANIZED HEALTH CARE EDUCATION/TRAINING PROGRAM | Admitting: STUDENT IN AN ORGANIZED HEALTH CARE EDUCATION/TRAINING PROGRAM
Payer: COMMERCIAL

## 2023-03-17 ENCOUNTER — APPOINTMENT (OUTPATIENT)
Dept: CT IMAGING | Facility: CLINIC | Age: 68
End: 2023-03-17
Attending: EMERGENCY MEDICINE
Payer: COMMERCIAL

## 2023-03-17 ENCOUNTER — NURSE TRIAGE (OUTPATIENT)
Dept: NURSING | Facility: CLINIC | Age: 68
End: 2023-03-17
Payer: COMMERCIAL

## 2023-03-17 ENCOUNTER — HOSPITAL ENCOUNTER (OUTPATIENT)
Dept: MAMMOGRAPHY | Facility: CLINIC | Age: 68
Discharge: HOME | End: 2023-03-17
Attending: FAMILY MEDICINE | Admitting: FAMILY MEDICINE
Payer: COMMERCIAL

## 2023-03-17 VITALS
TEMPERATURE: 97.6 F | HEIGHT: 64 IN | BODY MASS INDEX: 29.53 KG/M2 | OXYGEN SATURATION: 100 % | RESPIRATION RATE: 16 BRPM | DIASTOLIC BLOOD PRESSURE: 66 MMHG | HEART RATE: 78 BPM | SYSTOLIC BLOOD PRESSURE: 118 MMHG | WEIGHT: 173 LBS

## 2023-03-17 VITALS
RESPIRATION RATE: 16 BRPM | BODY MASS INDEX: 31.53 KG/M2 | OXYGEN SATURATION: 98 % | HEART RATE: 85 BPM | SYSTOLIC BLOOD PRESSURE: 120 MMHG | DIASTOLIC BLOOD PRESSURE: 75 MMHG | WEIGHT: 178 LBS | TEMPERATURE: 97.8 F
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
## 2023-03-17 DIAGNOSIS — H61.23 BILATERAL IMPACTED CERUMEN: ICD-10-CM

## 2023-03-17 DIAGNOSIS — M25.551 HIP PAIN, RIGHT: ICD-10-CM

## 2023-03-17 DIAGNOSIS — M25.561 ACUTE PAIN OF RIGHT KNEE: ICD-10-CM

## 2023-03-17 DIAGNOSIS — G44.319 ACUTE POST-TRAUMATIC HEADACHE, NOT INTRACTABLE: ICD-10-CM

## 2023-03-17 DIAGNOSIS — R29.818 ROMBERG'S TEST POSITIVE: ICD-10-CM

## 2023-03-17 DIAGNOSIS — M54.2 NECK PAIN: ICD-10-CM

## 2023-03-17 DIAGNOSIS — Z12.31 VISIT FOR SCREENING MAMMOGRAM: ICD-10-CM

## 2023-03-17 DIAGNOSIS — S09.90XA INJURY OF HEAD, INITIAL ENCOUNTER: ICD-10-CM
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
## 2023-03-17 LAB
ANION GAP SERPL CALCULATED.3IONS-SCNC: 8 MMOL/L (ref 5–18)
ATRIAL RATE - MUSE: 82 BPM
BUN SERPL-MCNC: 21 MG/DL (ref 8–22)
CALCIUM SERPL-MCNC: 9.1 MG/DL (ref 8.5–10.5)
CHLORIDE BLD-SCNC: 107 MMOL/L (ref 98–107)
CO2 SERPL-SCNC: 25 MMOL/L (ref 22–31)
CREAT SERPL-MCNC: 0.78 MG/DL (ref 0.6–1.1)
DIASTOLIC BLOOD PRESSURE - MUSE: NORMAL MMHG
ERYTHROCYTE [DISTWIDTH] IN BLOOD BY AUTOMATED COUNT: 13 % (ref 10–15)
GFR SERPL CREATININE-BSD FRML MDRD: 82 ML/MIN/1.73M2
GLUCOSE BLD-MCNC: 171 MG/DL (ref 70–125)
HCT VFR BLD AUTO: 41.4 % (ref 35–47)
HGB BLD-MCNC: 13.6 G/DL (ref 11.7–15.7)
HOLD SPECIMEN: NORMAL
HOLD SPECIMEN: NORMAL
INTERPRETATION ECG - MUSE: NORMAL
MCH RBC QN AUTO: 30.9 PG (ref 26.5–33)
MCHC RBC AUTO-ENTMCNC: 32.9 G/DL (ref 31.5–36.5)
MCV RBC AUTO: 94 FL (ref 78–100)
P AXIS - MUSE: 46 DEGREES
PLATELET # BLD AUTO: 267 10E3/UL (ref 150–450)
POTASSIUM BLD-SCNC: 3.8 MMOL/L (ref 3.5–5)
PR INTERVAL - MUSE: 140 MS
QRS DURATION - MUSE: 74 MS
QT - MUSE: 374 MS
QTC - MUSE: 436 MS
R AXIS - MUSE: -42 DEGREES
RBC # BLD AUTO: 4.4 10E6/UL (ref 3.8–5.2)
SODIUM SERPL-SCNC: 140 MMOL/L (ref 136–145)
SYSTOLIC BLOOD PRESSURE - MUSE: NORMAL MMHG
T AXIS - MUSE: 33 DEGREES
TROPONIN I SERPL-MCNC: <0.01 NG/ML (ref 0–0.29)
VENTRICULAR RATE- MUSE: 82 BPM
WBC # BLD AUTO: 9.5 10E3/UL (ref 4–11)

## 2023-03-17 PROCEDURE — 36415 COLL VENOUS BLD VENIPUNCTURE: CPT | Performed by: STUDENT IN AN ORGANIZED HEALTH CARE EDUCATION/TRAINING PROGRAM

## 2023-03-17 PROCEDURE — 72128 CT CHEST SPINE W/O DYE: CPT

## 2023-03-17 PROCEDURE — 77067 SCR MAMMO BI INCL CAD: CPT

## 2023-03-17 PROCEDURE — 73562 X-RAY EXAM OF KNEE 3: CPT | Mod: RT

## 2023-03-17 PROCEDURE — 72125 CT NECK SPINE W/O DYE: CPT

## 2023-03-17 PROCEDURE — 70450 CT HEAD/BRAIN W/O DYE: CPT

## 2023-03-17 PROCEDURE — 250N000013 HC RX MED GY IP 250 OP 250 PS 637: Performed by: STUDENT IN AN ORGANIZED HEALTH CARE EDUCATION/TRAINING PROGRAM

## 2023-03-17 PROCEDURE — 84484 ASSAY OF TROPONIN QUANT: CPT | Performed by: STUDENT IN AN ORGANIZED HEALTH CARE EDUCATION/TRAINING PROGRAM

## 2023-03-17 PROCEDURE — 93005 ELECTROCARDIOGRAM TRACING: CPT | Performed by: STUDENT IN AN ORGANIZED HEALTH CARE EDUCATION/TRAINING PROGRAM

## 2023-03-17 PROCEDURE — 99214 OFFICE O/P EST MOD 30 MIN: CPT | Mod: 25 | Performed by: PHYSICIAN ASSISTANT

## 2023-03-17 PROCEDURE — 72131 CT LUMBAR SPINE W/O DYE: CPT

## 2023-03-17 PROCEDURE — 69209 REMOVE IMPACTED EAR WAX UNI: CPT | Mod: 50 | Performed by: PHYSICIAN ASSISTANT

## 2023-03-17 PROCEDURE — 71046 X-RAY EXAM CHEST 2 VIEWS: CPT

## 2023-03-17 PROCEDURE — 85027 COMPLETE CBC AUTOMATED: CPT | Performed by: STUDENT IN AN ORGANIZED HEALTH CARE EDUCATION/TRAINING PROGRAM

## 2023-03-17 PROCEDURE — 73502 X-RAY EXAM HIP UNI 2-3 VIEWS: CPT

## 2023-03-17 PROCEDURE — 93005 ELECTROCARDIOGRAM TRACING: CPT | Performed by: EMERGENCY MEDICINE

## 2023-03-17 PROCEDURE — 80048 BASIC METABOLIC PNL TOTAL CA: CPT | Performed by: STUDENT IN AN ORGANIZED HEALTH CARE EDUCATION/TRAINING PROGRAM

## 2023-03-17 PROCEDURE — 99285 EMERGENCY DEPT VISIT HI MDM: CPT | Mod: 25

## 2023-03-17 RX ORDER — OXYCODONE HYDROCHLORIDE 5 MG/1
5 TABLET ORAL ONCE
Status: COMPLETED | OUTPATIENT
Start: 2023-03-17 | End: 2023-03-17

## 2023-03-17 RX ADMIN — OXYCODONE HYDROCHLORIDE 5 MG: 5 TABLET ORAL at 17:27

## 2023-03-17 ASSESSMENT — ENCOUNTER SYMPTOMS
LIGHT-HEADEDNESS: 1
NAUSEA: 1
HEADACHES: 1
APPETITE CHANGE: 1
VOMITING: 0
NECK PAIN: 1

## 2023-03-17 ASSESSMENT — ACTIVITIES OF DAILY LIVING (ADL): ADLS_ACUITY_SCORE: 35

## 2023-03-17 NOTE — ED NOTES
Expected Patient Referral to ED  3:12 PM    Referring Clinic/Provider:  [facility]    Reason for referral/Clinical facts:  Yesterday, fall, non syncopal, head injury, 7/10 headache, neck pain, msk pain    Recommendations provided:  Send to ED for further evaluation    Caller was informed that this institution does possess the capabilities and/or resources to provide for patient and should be transferred to our facility.    Discussed that if direct admit is sought and any hurdles are encountered, this ED would be happy to see the patient and evaluate.    Informed caller that recommendations provided are recommendations based only on the facts provided and that they responsible to accept or reject the advice, or to seek a formal in person consultation as needed and that this ED will see/treat patient should they arrive.      Fabio Delacruz MD  [facility] EMERGENCY ROOM  [address] [place] [id number]  [phone]       Fabio Delacruz MD  03/17/23 [id number]

## 2023-03-17 NOTE — ED TRIAGE NOTES
Pt presents to ED after fall yesterday landing on entire anterior surface. Reports head, neck, chest, entire back, right hip and right knee pain. No cervical tenderness with palpation. Denies LOC. Not on blood thinners  . Reports bruising to chest. Pt drove self to ED. Pt moving head and neck in all directions in triage without any apparent difficulty.    Triage Assessment       Row Name 03/17/23 [id number]       Triage Assessment (Adult)    Airway WDL WDL       Respiratory WDL    Respiratory WDL WDL       Skin Circulation/Temperature WDL    Skin Circulation/Temperature WDL WDL       Cardiac WDL    Cardiac WDL WDL       Peripheral/Neurovascular WDL    Peripheral Neurovascular WDL WDL       Cognitive/Neuro/Behavioral WDL    Cognitive/Neuro/Behavioral WDL WDL

## 2023-03-17 NOTE — PROGRESS NOTES
Patient presents with:  Head Injury: Fell inside her home last night tripped over vacumn landed on rt knee  fell forward hit head on wooden floor has headache neck pain hurts all over      Clinical Decision Making:  Patient had fall last night.  Not on thinners.  No loss of consciousness.  Main symptoms are right hip, chest, neck, and head pain.  Cerumen impactions were removed via irrigation to visualize tympanic membranes.  No hemotympanum noted.  Positive Romberg sign concerning enough to have head CT.  Report given to [facility] emergency department provider for handoff.      ICD-10-CM    1. Fall, initial encounter  W19.XXXA       2. Neck pain  M54.2       3. Acute post-traumatic headache, not intractable  G44.319       4. Romberg's test positive  R29.818           There are no Patient Instructions on file for this visit.    HPI:  Anita Duque is a 67 year old female who presents today complaining of fall that occurred last night.  Patient tripped over a vacuum landing on her right knee and hitting the front right part her head on the wood floor.  She had a small cut near her right eyebrow.  Patient reports headache, neck pain, and diffuse discomfort. No LOC.  After the fall the wind was knocked out of her for about 20 minutes.  She is not on any blood thinners. HA is 7/10 pain. Neck pain is 10/10. Right back and hip pain is 10/10. Patient's head bounced of the ground and her neck snapped back, and she believes her neck pain is from whiplash. Patient has fibromyalgia and takes Flexeril, Percocet, Gabapentin, Topamax and Tylenol.  Patient has underlying balance issues and has been seen by physical therapy for them.  She had gone a long time without falls, but has had one other one this winter due to slipping on ice.  See ROS below.    History obtained from the patient.    Problem List:  2022-02: Mild persistent asthma without complication  2020-10: Gastroesophageal reflux disease without esophagitis  2020-03:  Mild intermittent asthma  2020-03: Controlled substance agreement signed  2018-06: Degenerative disc disease, cervical  2018-06: Degenerative disc disease, lumbar  2017-03: JANINE on CPAP  2017-03: Obesity (BMI 30.0-34.9)  2015-10: Osteoarthrosis, unspecified whether generalized or localized,  lower leg  Essential hypertension  Joint Pain, Localized In The Shoulder  Fibromyalgia  Type 2 diabetes mellitus (H)  Hyperlipidemia  Major Depression, Recurrent  Chronic Constipation  Sciatica  Chronic pain syndrome  Lower Back Pain  Vaginal Wall Prolapse With Midline Cystocele  Female Stress Incontinence  Lump Or Mass In Breast  Asthma      Past Medical History:   Diagnosis Date     Arthritis      Asthma      Chronic pain syndrome      Depression      Diabetes mellitus, type II (H)      Fibromyalgia      Hyperlipidemia      Hypertension      Insomnia        Social History     Tobacco Use     Smoking status: Never     Smokeless tobacco: Never   Substance Use Topics     Alcohol use: Not Currently       Review of Systems   Constitutional: Positive for appetite change (decreased due to pain).   Eyes: Negative for visual disturbance.   Gastrointestinal: Positive for nausea. Negative for vomiting.   Musculoskeletal: Positive for neck pain.   Neurological: Positive for light-headedness and headaches.       Vitals:    03/17/23 1322   BP: 120/75   Pulse: 85   Resp: 16   Temp: 97.8  F (36.6  C)   TempSrc: Oral   SpO2: 98%   Weight: 80.7 kg (178 lb)       Physical Exam  Vitals and nursing note reviewed.   Constitutional:       General: She is not in acute distress.     Appearance: She is not toxic-appearing or diaphoretic.   HENT:      Head: Normocephalic and atraumatic.      Right Ear: Tympanic membrane, ear canal and external ear normal. There is impacted cerumen.      Left Ear: Tympanic membrane, ear canal and external ear normal. There is impacted cerumen.      Ears:      Comments: Cerumen impactions were bilaterally removed.  No  findings concerning for hemotympanums bilaterally  Eyes:      Extraocular Movements: Extraocular movements intact.      Conjunctiva/sclera: Conjunctivae normal.      Pupils: Pupils are equal, round, and reactive to light.   Cardiovascular:      Rate and Rhythm: Normal rate and regular rhythm.      Heart sounds: No murmur heard.  Pulmonary:      Effort: Pulmonary effort is normal. No respiratory distress.      Breath sounds: No stridor. No wheezing, rhonchi or rales.   Neurological:      Mental Status: She is alert.      Coordination: Romberg sign positive.   Psychiatric:         Mood and Affect: Mood normal.         Behavior: Behavior normal.         Thought Content: Thought content normal.         Judgment: Judgment normal.         At the end of the encounter, I discussed results, diagnosis, medications. Discussed red flags for immediate return to clinic/ER, as well as indications for follow up if no improvement. Patient understood and agreed to plan. Patient was stable for discharge.

## 2023-03-17 NOTE — ED PROVIDER NOTES
NAME: Anita Duque  AGE: 67 year old female  YOB: 1955  MRN: [id number]  EVALUATION DATE & TIME: No admission date for patient encounter.    PCP: Trish Eagle  ED PROVIDER: Lisa Gustafson MD.    Chief Complaint   Patient presents with     Fall     Back Pain     Chest Pain     Hip Pain       FINAL IMPRESSION:  1. Fall, initial encounter    2. Injury of head, initial encounter    3. Acute pain of right knee    4. Hip pain, right        MEDICAL DECISION MAKIN:18 PM I met with the patient, obtained history, performed an initial exam, and discussed options and plan for diagnostics and treatment here in the ED.   6:09 PM I updated the patient. We discussed the plan for discharge and the patient is agreeable. Reviewed supportive cares, symptomatic treatment, outpatient follow up, and reasons to return to the Emergency Department. All questions and concerns were addressed. Patient to be discharged by ED RN.      MDM: 66 y/o F who presents with fall. Patient reports tripping over vacuum  and has various areas of pain to her head, back, hip, knee. She is neurovascularly intact. No abdominal tenderness. CT head, C/T/L spine, CXR, xray pelvis, xray knee without any acute findings. She reported that this fall was mechanical but earlier in the day she had some dizziness. EKG is sinus without ST elevation, normal intervals. Troponin WNL. Chest pain occurred after fall and is reproducible, do not suspect ACS, dissection, PE. Other labs are reassuring. No neuro deficits do not suspect stroke. On reassessment here her pain has improved and she feels comfortable with plan for discharge with close outpatient follow up. Strict return precautions discussed and patient is in agreement with plan, endorses understanding and her questions were answered.    Medical Decision Making    History:    Supplemental history from: N/A    External Record(s) reviewed: Documented in chart, if applicable.    Work  "Up:    Chart documentation includes differential considered and any EKGs or imaging interpreted by provider.    In additional to work up documented, I considered the following work up: Documented in chart, if applicable.    External consultation:    Discussion of management with another provider: Documented in chart, if applicable    Complicating factors:    Care impacted by chronic illness: Chronic Lung Disease, Chronic Pain, Diabetes, Hyperlipidemia and Hypertension    Care affected by social determinants of health: N/A    Disposition considerations: Discharge. No recommendations on prescription strength medication(s). N/A.    MEDICATIONS GIVEN IN THE EMERGENCY:  Medications   oxyCODONE (ROXICODONE) tablet 5 mg (5 mg Oral $Given 3/17/23 3610)       NEW PRESCRIPTIONS STARTED AT TODAY'S ER VISIT:  New Prescriptions    No medications on file        =================================================================  HPI    Patient information was obtained from: patient   Use of : N/A      Anita Duque is a 67 year old female with a past medical history of DM type II, HTN, HLD, chronic pain, and DDD who presents to the ED for evaluation of fall.    The patient reports she had a mechanical fall yesterday after turning quickly and tripping on a vacuum  on the ground. States she fell forward, striking her right knee and then her forehead on the hard wood floor. She reports her head \"bounced\" off the ground. She states she also hit her chest on the ground and feels she had the wind \"knowcked out of her\". Had difficulty breathing and pain in her chest immediately upon impact. States she was \"gasping for air\" for the few moments following her fall. She also noticed bleeding from her forehead. Reports she sustained a small laceration to the skin just above her lateral right eyebrow. Bleeding has since resolved. Since the fall, the patient has also had sever pain in her posterior neck \"from ear to ear\", " pain down the middle of her entire back, a headache, and bilateral shoulder pain. She reports pain in her right knee and hips as well, which she attributes to the fact that she fell mainly on her right leg. She denies any pain in her left leg. She notes some bruising to her chest and right knee today. The patient has been taking percocet with moderate but incomplete relief. The patient denies any abdominal pain since her fall. She denies having any chest pain prior to her fall. She does report feeling dizzy in the morning yesterday prior to her fall. She otherwise denies any recent illness including any fevers, cough, nausea, vomiting, or diarrhea.  Patient denies additional medical concerns or complaints at this time.        REVIEW OF SYSTEMS   All systems reviewed, please see HPI for pertinent findings    PAST MEDICAL HISTORY:  Past Medical History:   Diagnosis Date     Arthritis      Asthma      Chronic pain syndrome      Depression      Diabetes mellitus, type II (H)      Fibromyalgia      Hyperlipidemia      Hypertension      Insomnia        PAST SURGICAL HISTORY:  Past Surgical History:   Procedure Laterality Date     HC REVISE MEDIAN N/CARPAL TUNNEL SURG      Description: Neuroplasty Decompression Median Nerve At Carpal Tunnel;  Recorded: 07/16/2012;     HYSTERECTOMY  2011     HYSTERECTOMY TOTAL ABDOMINAL       IR LUMBAR EPIDURAL STEROID INJECTION  4/13/2011     IR LUMBAR NERVE ROOT INJECTION  3/9/2012     OOPHORECTOMY  2011     [facility] DELGADILLO W/O FACETEC FORAMOT/[facility] 1/2 VRT SEG, CERVICAL      Description: Laminectomy Lumbar;  Recorded: 10/05/2012;     [facility] SUPRACERV ABD HYSTERECTOMY      Description: Supracervical Hysterectomy;  Recorded: 04/05/2011;       CURRENT MEDICATIONS:    No current facility-administered medications for this encounter.    Current Outpatient Medications:      albuterol (PROAIR HFA;PROVENTIL HFA;VENTOLIN HFA) 90 mcg/actuation inhaler, [ALBUTEROL (PROAIR HFA;PROVENTIL HFA;VENTOLIN HFA) 90  MCG/ACTUATION INHALER] Inhale 2 puffs every 4 (four) hours as needed for wheezing., Disp: 1 Inhaler, Rfl: 1     CHERATUSSIN -10 MG/5ML solution, TAKE ONE TO TWO TEASPOONSFUL BY MOUTH EVERY 4 TO 6 HOURS AS NEEDED FOR COUGH (Patient not taking: Reported on 3/6/2023), Disp: 180 mL, Rfl: 0     cyanocobalamin, vitamin B-12, (VITAMIN B-12 ORAL), [CYANOCOBALAMIN, VITAMIN B-12, (VITAMIN B-12 ORAL)] Take 1 tablet by mouth as needed., Disp: , Rfl:      cyclobenzaprine (FLEXERIL) 10 MG tablet, Take 1 tablet by mouth twice daily, Disp: 60 tablet, Rfl: 0     diclofenac (VOLTAREN) 1 % topical gel, APPLY TOPICALLY TWICE DAILY AS NEEDED FOR ARTHRITIS, Disp: 300 g, Rfl: 3     diclofenac epolamine (FLECTOR) 1.3 % PT12, [DICLOFENAC EPOLAMINE (FLECTOR) 1.3 % PT12] Place 1 patch on the skin every 12 (twelve) hours as needed., Disp: 60 patch, Rfl: 5     DULoxetine (CYMBALTA) 60 MG capsule, Take 1 capsule by mouth once daily, Disp: 90 capsule, Rfl: 1     ergocalciferol (VITAMIN D2) 1,250 mcg (50,000 unit) capsule, [ERGOCALCIFEROL (VITAMIN D2) 1,250 MCG (50,000 UNIT) CAPSULE] Take 1 capsule (50,000 Units total) by mouth once a week., Disp: 12 capsule, Rfl: 1     FARXIGA 10 MG TABS tablet, Take 1 tablet by mouth once daily, Disp: 90 tablet, Rfl: 1     fluticasone (FLOVENT HFA) 110 MCG/ACT inhaler, Inhale 1 puff into the lungs daily, Disp: 12 g, Rfl: 3     gabapentin (NEURONTIN) 300 MG capsule, TAKE 2 CAPSULES BY MOUTH 4 TIMES DAILY, Disp: 240 capsule, Rfl: 0     glipiZIDE (GLUCOTROL XL) 5 MG 24 hr tablet, TAKE 1 TABLET BY MOUTH ONCE DAILY ., Disp: 90 tablet, Rfl: 1     ibuprofen (ADVIL/MOTRIN) 800 MG tablet, Take 1 tablet (800 mg) by mouth 3 times daily as needed, Disp: 100 tablet, Rfl: 3     lidocaine (XYLOCAINE) 5 % ointment, [LIDOCAINE (XYLOCAINE) 5 % OINTMENT] Apply topically 3 (three) times a day., Disp: 120 g, Rfl: 0     lisinopril (ZESTRIL) 2.5 MG tablet, Take 1 tablet by mouth once daily, Disp: 90 tablet, Rfl: 2     MAPAP 500  MG CAPS, TAKE 2 CAPSULES BY MOUTH EVERY 6 HOURS AS NEEDED, Disp: 270 capsule, Rfl: 1     metFORMIN (GLUCOPHAGE XR) 500 MG 24 hr tablet, TAKE 2 TABLETS BY MOUTH TWICE DAILY WITH MEALS **APPOINTMENT  REQUIRED  FOR  FUTURE  REFILLS**, Disp: 360 tablet, Rfl: 1     omeprazole (PRILOSEC) 40 MG capsule, [OMEPRAZOLE (PRILOSEC) 40 MG CAPSULE] Take 1 capsule (40 mg total) by mouth daily., Disp: 90 capsule, Rfl: 1     oxyCODONE-acetaminophen (PERCOCET) 5-325 MG tablet, Take 1 tablet by mouth every 6 hours as needed for severe pain (7-10), Disp: 90 tablet, Rfl: 0     phentermine (ADIPEX-P) 37.5 MG tablet, TAKE 1 & 1/2 (ONE & ONE-HALF) TABLETS BY MOUTH ONCE DAILY IN THE MORNING, Disp: 135 tablet, Rfl: 1     rosuvastatin (CRESTOR) 10 MG tablet, Take 1 tablet by mouth once daily, Disp: 90 tablet, Rfl: 2     sennosides (SENOKOT) 8.6 MG tablet, Take 1 tablet by mouth twice daily, Disp: 180 tablet, Rfl: 0     spironolactone (ALDACTONE) 100 MG tablet, Take 1 tablet by mouth once daily, Disp: 90 tablet, Rfl: 3     topiramate (TOPAMAX) 100 MG tablet, Take 1 tablet by mouth twice daily, Disp: 180 tablet, Rfl: 0     triamcinolone (KENALOG) 0.025 % ointment, [TRIAMCINOLONE (KENALOG) 0.025 % OINTMENT] Apply sparingly in ear canal 1-2 times daily as needed, Disp: 15 g, Rfl: 0     VESICARE 5 MG tablet, Take 1 tablet by mouth once daily, Disp: 90 tablet, Rfl: 1    ALLERGIES:  Allergies   Allergen Reactions     Bupropion Unknown     Other reaction(s): Agitation       FAMILY HISTORY:  Family History   Problem Relation Age of Onset     No Known Problems Mother      Diabetes Father      Other Cancer Sister      No Known Problems Daughter      No Known Problems Maternal Grandmother      No Known Problems Maternal Grandfather      No Known Problems Paternal Grandmother      No Known Problems Paternal Grandfather      No Known Problems Maternal Aunt      No Known Problems Paternal Aunt      Other Cancer Brother      Hereditary Breast and Ovarian  "Cancer Syndrome No family hx of      Breast Cancer No family hx of      Cancer No family hx of      Colon Cancer No family hx of      Endometrial Cancer No family hx of      Ovarian Cancer No family hx of        SOCIAL HISTORY:   Social History     Socioeconomic History     Marital status:    Tobacco Use     Smoking status: Never     Smokeless tobacco: Never   Substance and Sexual Activity     Alcohol use: Not Currently     Drug use: Never     Sexual activity: Not Currently     Partners: Male     Birth control/protection: None   Other Topics Concern     Parent/sibling w/ CABG, MI or angioplasty before 65F 55M? Yes     Comment: Brother       PHYSICAL EXAM:    Vitals: /68   Pulse 85   Temp 97.8  F (36.6  C) (Temporal)   Resp 18   Ht 1.626 m (5' 4\")   Wt 78.5 kg (173 lb)   SpO2 100%   BMI 29.70 kg/m     Constitutional: Well developed, well nourished. No acute distress.  HENT: Normocephalic, atraumatic. Neck-gross ROM intact, no C spine tenderness  Eyes: Pupils mid-range and equal, sclera white  Respiratory: CTAB, no respiratory distress, speaks full sentences easily. Bruising to right chest wall noted  Cardiovascular: Normal heart rate, regular rhythm. Extremities warm/well perfused  GI: Soft, not distended, not tender to palpation,  Musculoskeletal: Some pain to the right hip without deformity. Ecchymosis to right knee with mild tenderness, joint grossly stable, no swelling appreciated. Mild pain to both shoulder but no focal bony tenderness and normal ROM. Otherwise extremities nontender without deformities   Skin: Warm, dry.  Neurologic: Alert & oriented, speech clear, CNs grossly intact, strength/sensation intact  Back: mild diffuse T&L spine tenderness, no step offs or deformities    LAB:  All pertinent labs reviewed and interpreted.  Labs Ordered and Resulted from Time of ED Arrival to Time of ED Departure   BASIC METABOLIC PANEL - Abnormal       Result Value    Sodium 140      Potassium 3.8   "    Chloride 107      Carbon Dioxide (CO2) 25      Anion Gap 8      Urea Nitrogen 21      Creatinine 0.78      Calcium 9.1      Glucose 171 (*)     GFR Estimate 82     CBC WITH PLATELETS - Normal    WBC Count 9.5      RBC Count 4.40      Hemoglobin 13.6      Hematocrit 41.4      MCV 94      MCH 30.9      MCHC 32.9      RDW 13.0      Platelet Count 267     TROPONIN I - Normal    Troponin I <0.01         RADIOLOGY:  Chest XR,  PA & LAT   Final Result   IMPRESSION: No focal airspace consolidation. No pleural effusion or pneumothorax.      Cardiomediastinal silhouette is normal. Tortuosity of the thoracic aorta. Mild eventration at the right hemidiaphragm.      XR Pelvis w Hip Right G/E 2 Views   Final Result   IMPRESSION: No fracture. Mild degenerative changes in the right hip. Status post lower lumbar spinal fusion.      XR Knee Right 3 Views   Final Result   IMPRESSION: Tricompartmental degenerative changes which are mild to moderate in the medial compartment. No evidence of an acute fracture. No significant joint effusion. Extensor mechanism enthesopathy.      CT Lumbar Spine w/o Contrast   Final Result   CONCLUSION:   HEAD CT:   1.  No CT evidence for hemorrhage, mass effect, or recent infarct.      CERVICAL SPINE CT:   1.  No fracture or subluxation.      THORACIC SPINE CT:   1.  No fracture or subluxation.      LUMBAR SPINE CT:   1.  No fracture or subluxation.         CT Thoracic Spine w/o Contrast   Final Result   CONCLUSION:   HEAD CT:   1.  No CT evidence for hemorrhage, mass effect, or recent infarct.      CERVICAL SPINE CT:   1.  No fracture or subluxation.      THORACIC SPINE CT:   1.  No fracture or subluxation.      LUMBAR SPINE CT:   1.  No fracture or subluxation.         Cervical spine CT w/o contrast   Final Result   CONCLUSION:   HEAD CT:   1.  No CT evidence for hemorrhage, mass effect, or recent infarct.      CERVICAL SPINE CT:   1.  No fracture or subluxation.      THORACIC SPINE CT:   1.  No  fracture or subluxation.      LUMBAR SPINE CT:   1.  No fracture or subluxation.         Head CT w/o contrast   Final Result   CONCLUSION:   HEAD CT:   1.  No CT evidence for hemorrhage, mass effect, or recent infarct.      CERVICAL SPINE CT:   1.  No fracture or subluxation.      THORACIC SPINE CT:   1.  No fracture or subluxation.      LUMBAR SPINE CT:   1.  No fracture or subluxation.             EKG:   Performed at: 14:53 3/17/23.  Impression: Sinus rhythm. Left axis deviation. Low voltage QRS. Possible anterolateral infarct (cited on of before 1/18/2021). Compared with ECG from 10/29/21, no significant change.  Rate: 82 BPM.  Rhythm: Sinus.  QRS Interval: 74 ms.  QTc Interval: 436 ms.  Comparison: Compared with ECG from 10/29/21, no significant change.  I have independently reviewed and interpreted the EKG(s) documented above.     PROCEDURES:   Procedures     I, Kayla Braden, am serving as a scribe to document services personally performed by Dr. Lisa Gustafson based on my observation and the provider's statements to me. I, Lisa Gustafson MD attest that Kayla Braden is acting in a scribe capacity, has observed my performance of the services and has documented them in accordance with my direction.    Lisa Gustafson M.D.  Emergency Medicine  [facility] EMERGENCY ROOM  [address] [id number] [phone]  Dept: [phone]     Lisa Gustafson MD  03/18/23 6327

## 2023-03-17 NOTE — DISCHARGE INSTRUCTIONS
Thankfully your images today did not show any serious injuries.  Please follow-up closely with your primary care doctor.  Return to the emergency department with increased dizziness, chest pain, difficulty breathing, abdominal pain or other worsening symptoms or concerns

## 2023-03-17 NOTE — TELEPHONE ENCOUNTER
Pt calling with concerns about;    Fall last PM onto wood floor  Remembers her head literally bouncing off the floor when she fell  Has bruising on right side of body  Right eyebrow with 1 inch   Right side   Hit head  1 inch long laceration above right eyebrow area  Today, has headache and neck pain and wondering if she can go buy a neck brace somewhere?    Pt Denies;  Unable to stand/bear weight  New weakness    According to the protocol, patient should see a Physician or HCP today.   Message routed to PCP/Care Team to advise Pt at;     [phone]           Care advice given. Patient verbalizes understanding and agrees with plan of care.     Theresa Painter RN, Nurse Advisor 10:00 AM 3/17/2023  Reason for Disposition    Patient has a concerning injury to a specific part of the body (e.g., chest, leg, head)    High-risk adult (e.g., age > 64 years, rheumatoid arthritis, ankylosing spondylitis, cervical spine abnormalities)    Additional Information    Negative: Difficult to awaken or acting confused (e.g., disoriented, slurred speech)    Negative: Weakness of the face, arm or leg on one side of the body and new-onset    Negative: Numbness of the face, arm or leg on one side of the body and new-onset    Negative: Loss of speech or garbled speech and new-onset    Negative: Passed out (i.e., fainted, collapsed and was not responding)    Negative: Sounds like a life-threatening emergency to the triager    Negative: Major injury from dangerous force (e.g., fall > 10 feet or 3 meters)    Negative: Major bleeding (e.g., actively dripping or spurting) and can't be stopped    Negative: Shock suspected (e.g., cold/pale/clammy skin, too weak to stand)    Negative: Difficult to awaken or acting confused (e.g., disoriented, slurred speech)    Negative: SEVERE weakness (i.e., unable to walk or barely able to walk, requires support) and new-onset or worsening    Negative: Can't stand (bear weight) or walk and new-onset after  fall    Negative: Sounds like a life-threatening emergency to the triager    Negative: Fainted (passed out)    Negative: New-onset or worsening weakness of the face, arm or leg on one side of the body    Negative: New-onset or worsening dizziness and described as spinning or off balance (i.e., vertigo)    Negative: New-onset or worsening dizziness and NO spinning sensation or trouble with balance    Negative: Pregnant and fall    Negative: Neck pain not from an injury    Negative: Dangerous mechanism of injury (e.g., MVA, contact sports, diving, fall on trampoline, fall > 10 feet or 3 meters)  (Exception: Neck pain began > 1 hour after injury)    Negative: Weakness of arms or legs    Negative: Numbness, tingling, or burning of arms, upper back/chest or legs  (Exception: Brief, now gone.)    Negative: Major bleeding (actively dripping or spurting) that can't be stopped    Negative: Bullet, knife or other serious penetrating wound    Negative: Difficulty breathing (e.g., choking or stridor)    Negative: Knocked out (unconscious) > 1 minute    Negative: Direct blow to front of neck and cough, hoarseness, abnormal voice, or can't talk    Negative: Sounds like a serious injury to the triager    Negative: Sounds like a life-threatening emergency to the triager    Negative: Dangerous mechanism of injury (e.g., MVA, contact sports, diving, fall on trampoline, fall > 10 feet or 3 meters) and neck pain or stiffness began > 1 hour after injury    Negative: Numbness, tingling, or burning of arms, upper back/chest or legs and not present now (i.e., completely resolved)    Negative: Coughing up blood    Negative: Difficulty swallowing or drooling    Negative: Skin is split open or gaping  (length > 1/2 inch or 12 mm)    Negative: Puncture wound of neck    Negative: Bleeding won't stop after 10 minutes of direct pressure (using correct technique)    Negative: Large swelling or bruise (> 2 inches or 5 cm)    Negative: SEVERE neck  pain (e.g., excruciating)    Negative: MILD or MODERATE neck pain and fall from 3 feet (1 meter) or 5 stairs, or higher    Negative: Patient is confused or is an unreliable provider of information (e.g., dementia, severe intellectual disability, alcohol intoxication)    Protocols used: FALLS AND [id number]-B-FD, NECK INJURY-A-OH, HEADACHE-A-OH

## 2023-03-23 ENCOUNTER — APPOINTMENT (OUTPATIENT)
Dept: RADIOLOGY | Facility: CLINIC | Age: 68
End: 2023-03-23
Attending: NURSE PRACTITIONER
Payer: COMMERCIAL

## 2023-03-23 ENCOUNTER — APPOINTMENT (OUTPATIENT)
Dept: RADIOLOGY | Facility: CLINIC | Age: 68
End: 2023-03-23
Attending: EMERGENCY MEDICINE
Payer: COMMERCIAL

## 2023-03-23 ENCOUNTER — APPOINTMENT (OUTPATIENT)
Dept: CT IMAGING | Facility: CLINIC | Age: 68
End: 2023-03-23
Attending: NURSE PRACTITIONER
Payer: COMMERCIAL

## 2023-03-23 ENCOUNTER — HOSPITAL ENCOUNTER (EMERGENCY)
Facility: CLINIC | Age: 68
Discharge: HOME | End: 2023-03-23
Attending: EMERGENCY MEDICINE | Admitting: EMERGENCY MEDICINE
Payer: COMMERCIAL

## 2023-03-23 VITALS
OXYGEN SATURATION: 96 % | WEIGHT: 171 LBS | DIASTOLIC BLOOD PRESSURE: 83 MMHG | HEIGHT: 64 IN | TEMPERATURE: 96.9 F | HEART RATE: 94 BPM | RESPIRATION RATE: 24 BRPM | SYSTOLIC BLOOD PRESSURE: 141 MMHG | BODY MASS INDEX: 29.19 KG/M2

## 2023-03-23 DIAGNOSIS — S46.012A TRAUMATIC TEAR OF LEFT ROTATOR CUFF, UNSPECIFIED TEAR EXTENT, INITIAL ENCOUNTER: ICD-10-CM

## 2023-03-23 PROCEDURE — 250N000013 HC RX MED GY IP 250 OP 250 PS 637: Performed by: EMERGENCY MEDICINE

## 2023-03-23 PROCEDURE — 73502 X-RAY EXAM HIP UNI 2-3 VIEWS: CPT

## 2023-03-23 PROCEDURE — 99285 EMERGENCY DEPT VISIT HI MDM: CPT | Mod: 25

## 2023-03-23 PROCEDURE — 72125 CT NECK SPINE W/O DYE: CPT

## 2023-03-23 PROCEDURE — 72070 X-RAY EXAM THORAC SPINE 2VWS: CPT

## 2023-03-23 PROCEDURE — 72100 X-RAY EXAM L-S SPINE 2/3 VWS: CPT

## 2023-03-23 PROCEDURE — 250N000013 HC RX MED GY IP 250 OP 250 PS 637: Performed by: NURSE PRACTITIONER

## 2023-03-23 PROCEDURE — 73030 X-RAY EXAM OF SHOULDER: CPT | Mod: LT

## 2023-03-23 PROCEDURE — 70450 CT HEAD/BRAIN W/O DYE: CPT

## 2023-03-23 RX ORDER — OXYCODONE HYDROCHLORIDE 5 MG/1
5 TABLET ORAL ONCE
Status: COMPLETED | OUTPATIENT
Start: 2023-03-23 | End: 2023-03-23

## 2023-03-23 RX ORDER — ACETAMINOPHEN 325 MG/1
975 TABLET ORAL ONCE
Status: COMPLETED | OUTPATIENT
Start: 2023-03-23 | End: 2023-03-23

## 2023-03-23 RX ORDER — OXYCODONE AND ACETAMINOPHEN 5; 325 MG/1; MG/1
1 TABLET ORAL ONCE
Status: COMPLETED | OUTPATIENT
Start: 2023-03-23 | End: 2023-03-23

## 2023-03-23 RX ORDER — ONDANSETRON 4 MG/1
4 TABLET, ORALLY DISINTEGRATING ORAL EVERY 8 HOURS PRN
Qty: 10 TABLET | Refills: 0 | Status: SHIPPED | OUTPATIENT
Start: 2023-03-23 | End: 2023-03-26

## 2023-03-23 RX ORDER — OXYCODONE HYDROCHLORIDE 5 MG/1
5 TABLET ORAL EVERY 6 HOURS PRN
Qty: 12 TABLET | Refills: 0 | Status: SHIPPED | OUTPATIENT
Start: 2023-03-23 | End: 2023-03-26

## 2023-03-23 RX ADMIN — ACETAMINOPHEN 975 MG: 325 TABLET ORAL at 19:24

## 2023-03-23 RX ADMIN — OXYCODONE AND ACETAMINOPHEN 1 TABLET: 5; 325 TABLET ORAL at 15:03

## 2023-03-23 RX ADMIN — OXYCODONE HYDROCHLORIDE 5 MG: 5 TABLET ORAL at 19:25

## 2023-03-23 ASSESSMENT — ACTIVITIES OF DAILY LIVING (ADL): ADLS_ACUITY_SCORE: 35

## 2023-03-23 ASSESSMENT — ENCOUNTER SYMPTOMS: NUMBNESS: 1

## 2023-03-23 NOTE — ED TRIAGE NOTES
Pt here after fall from a slip on ice today at 1350. Sling placed on arm, cms intact, cervical collar on pt as pt has neck pain too. No LOC, no blood thinners.

## 2023-03-23 NOTE — ED PROVIDER NOTES
EMERGENCY DEPARTMENT ENCOUNTER      NAME: Anita Duque  AGE: 67 year old female  YOB: 1955  MRN: [id number]  EVALUATION DATE & TIME: No admission date for patient encounter.    PCP: Trish Eagle    ED PROVIDER: Azeb White MD      No chief complaint on file.        FINAL IMPRESSION:  1. Traumatic tear of left rotator cuff, unspecified tear extent, initial encounter          ED COURSE & MEDICAL DECISION MAKING:    Pertinent Labs & Imaging studies reviewed. (See chart for details)  67 year old female presents to the Emergency Department for evaluation after a mechanical fall.  The patient reports she slipped on ice and fell on her left side.  Her pain is predominantly in her left shoulder.  She also has pain in her left hip.  She does not believe she hit her head and had no LOC.  X-ray of her left shoulder is concerning for a rotator cuff tear.  No acute fracture or dislocation.  CT scan of the head and cervical spine is unremarkable.  X-ray of the hips is negative for acute fracture or dislocation.  With evaluation of the patient, the patient's pain is mostly in the left hip.  She also had midline T-spine and L-spine tenderness, no difficulty with ambulation.  I discussed with [facility] concern for rotatar cuff tear on x-ray of the left shoulder.  He was able to look at the x-ray and believes that this is an acute on chronic rotator cuff tear.  Would recommend a sling, pain management and have the patient follow-up with orthopedics this week.  Given findings of midline T-spine and L-spine tenderness upon further evaluation, x-rays ordered for thoracic and lumbar spine for further evaluation.    4:40 PM Due to a shortage of available emergency department rooms, with the patient's permission I met with the patient for the initial interview and physical examination in a triage room. Discussed plan for treatment and workup in the ED.    5:17 PM I spoke to [facility]  Orth[facility].  He suspects that this is an acute on chronic rotator cuff injury.  Likely, the patient already had wear-and-tear of the rotator cuff which was made worse by the fall.  He would recommend sling and have the patient follow-up in clinic.  5:20 PM I updated the patient. Patient requested to switch from a hard C-collar to soft C-collar. Adjusted LUE sling.   7:40 PM x-ray of the thoracic and lumbar spine with no acute findings.  Patient will be discharged home with pain medication, recommendations to use the sling with close follow-up with orthopedics.  We discussed the plan for discharge and the patient is agreeable. Reviewed supportive cares, symptomatic treatment, outpatient follow up, and reasons to return to the Emergency Department. Patient to be discharged by ED RN.     At the conclusion of the encounter I discussed the results of all of the tests and the disposition. The questions were answered. The patient or family acknowledged understanding and was agreeable with the care plan.       Medical Decision Making    History:    Supplemental history from: Family Member/Significant Other    External Record(s) reviewed: Inpatient Record: ED Note - 3/17/23    Work Up:    Chart documentation includes differential considered and any EKGs or imaging independently interpreted by provider, where specified.    In additional to work up documented, I considered the following work up: Documented in chart, if applicable.    External consultation:    Discussion of management with another provider: Orthopedics    Complicating factors:    Care impacted by chronic illness: Chronic Pain, Diabetes, Hyperlipidemia and Hypertension    Care affected by social determinants of health: N/A    Disposition considerations: Discharge. I prescribed additional prescription strength medication(s) as charted. See documentation for any additional details.      MEDICATIONS GIVEN IN THE EMERGENCY:  Medications   oxyCODONE-acetaminophen  "(PERCOCET) 5-325 MG per tablet 1 tablet (1 tablet Oral $Given 3/23/23 1503)   acetaminophen (TYLENOL) tablet 975 mg (975 mg Oral $Given 3/23/23 1924)   oxyCODONE (ROXICODONE) tablet 5 mg (5 mg Oral $Given 3/23/23 1925)       NEW PRESCRIPTIONS STARTED AT TODAY'S ER VISIT  New Prescriptions    ONDANSETRON (ZOFRAN ODT) 4 MG ODT TAB    Take 1 tablet (4 mg) by mouth every 8 hours as needed for nausea    OXYCODONE (ROXICODONE) 5 MG TABLET    Take 1 tablet (5 mg) by mouth every 6 hours as needed for pain          =================================================================    HPI    Patient information was obtained from: Patient and Family Member     Use of : N/A         Anita Duque is a 67 year old female with a pertinent history of HTN, HLD, DM II, chronic pain syndrome, fibromyalgia, and asthma, who presents to this ED by private vehicle accompanied by daughter for evaluation of fall.     At approximately 1:50 PM (~3 hours ago), the patient reports having a mechanical fall, where she slipped on ice and fell, landing on her left shoulder and left hip. She denies head injury and loss of consciousness. She was able to ambulate afterwards. Since then, the patient endorses significant pain to her left shoulder, noting of some numbness to her left arm. Here in the ED, the patient reports that the numbness in her left arm has minimally subsided, but still endorses pain to her left shoulder. The patient reports that her left hip is \"sore,\" but notes that her hip pain is not as severe. Per family member, the patient had bilateral s/p rotator cuff surgeries ~10 years ago. Patient otherwise denies any other injuries or complaints at this time.     Per chart review, the patient was seen in the ED on 3/17/23 (~6 days ago) for fall. CT head, C/T/L spine, CXR, xray pelvis, xray knee without any acute findings. She reported that this fall was mechanical but earlier in the day she had some dizziness. EKG sinus " without ST elevation, normal intervals. Troponin WNL. Discharged with close outpatient follow up.     REVIEW OF SYSTEMS   Review of Systems   Musculoskeletal: Negative for gait problem.        Positive for left shoulder pain, hip soreness, and fall.   Neurological: Positive for numbness (improved).        Negative for head injury and loss of consciousness.   All other systems reviewed and are negative.       PAST MEDICAL HISTORY:  Past Medical History:   Diagnosis Date     Arthritis      Asthma      Chronic pain syndrome      Depression      Diabetes mellitus, type II (H)      Fibromyalgia      Hyperlipidemia      Hypertension      Insomnia        PAST SURGICAL HISTORY:  Past Surgical History:   Procedure Laterality Date     HC REVISE MEDIAN N/CARPAL TUNNEL SURG      Description: Neuroplasty Decompression Median Nerve At Carpal Tunnel;  Recorded: 07/16/2012;     HYSTERECTOMY  2011     HYSTERECTOMY TOTAL ABDOMINAL       IR LUMBAR EPIDURAL STEROID INJECTION  4/13/2011     IR LUMBAR NERVE ROOT INJECTION  3/9/2012     OOPHORECTOMY  2011     [facility] W/O FACETEC FORAMOT/[facility] 1/2 VRT SEG, CERVICAL      Description: Laminectomy Lumbar;  Recorded: 10/05/2012;     KRYSTAL SUPRACERV ABD HYSTERECTOMY      Description: Supracervical Hysterectomy;  Recorded: 04/05/2011;           CURRENT MEDICATIONS:    ondansetron (ZOFRAN ODT) 4 MG ODT tab  oxyCODONE (ROXICODONE) 5 MG tablet  albuterol (PROAIR HFA;PROVENTIL HFA;VENTOLIN HFA) 90 mcg/actuation inhaler  CHERATUSSIN -10 MG/5ML solution  cyanocobalamin, vitamin B-12, (VITAMIN B-12 ORAL)  cyclobenzaprine (FLEXERIL) 10 MG tablet  diclofenac (VOLTAREN) 1 % topical gel  diclofenac epolamine (FLECTOR) 1.3 % PT12  DULoxetine (CYMBALTA) 60 MG capsule  ergocalciferol (VITAMIN D2) 1,250 mcg (50,000 unit) capsule  FARXIGA 10 MG TABS tablet  fluticasone (FLOVENT HFA) 110 MCG/ACT inhaler  gabapentin (NEURONTIN) 300 MG capsule  glipiZIDE (GLUCOTROL XL) 5 MG 24 hr tablet  ibuprofen  "(ADVIL/MOTRIN) 800 MG tablet  lidocaine (XYLOCAINE) 5 % ointment  lisinopril (ZESTRIL) 2.5 MG tablet  MAPAP 500 MG CAPS  metFORMIN (GLUCOPHAGE XR) 500 MG 24 hr tablet  omeprazole (PRILOSEC) 40 MG capsule  oxyCODONE-acetaminophen (PERCOCET) 5-325 MG tablet  phentermine (ADIPEX-P) 37.5 MG tablet  rosuvastatin (CRESTOR) 10 MG tablet  sennosides (SENOKOT) 8.6 MG tablet  spironolactone (ALDACTONE) 100 MG tablet  topiramate (TOPAMAX) 100 MG tablet  triamcinolone (KENALOG) 0.025 % ointment  VESICARE 5 MG tablet        ALLERGIES:  Allergies   Allergen Reactions     Bupropion Unknown     Other reaction(s): Agitation       FAMILY HISTORY:  Family History   Problem Relation Age of Onset     No Known Problems Mother      Diabetes Father      Other Cancer Sister      No Known Problems Daughter      No Known Problems Maternal Grandmother      No Known Problems Maternal Grandfather      No Known Problems Paternal Grandmother      No Known Problems Paternal Grandfather      No Known Problems Maternal Aunt      No Known Problems Paternal Aunt      Other Cancer Brother      Hereditary Breast and Ovarian Cancer Syndrome No family hx of      Breast Cancer No family hx of      Cancer No family hx of      Colon Cancer No family hx of      Endometrial Cancer No family hx of      Ovarian Cancer No family hx of        SOCIAL HISTORY:   Social History     Socioeconomic History     Marital status:    Tobacco Use     Smoking status: Never     Smokeless tobacco: Never   Substance and Sexual Activity     Alcohol use: Not Currently     Drug use: Never     Sexual activity: Not Currently     Partners: Male     Birth control/protection: None   Other Topics Concern     Parent/sibling w/ CABG, MI or angioplasty before 65F 55M? Yes     Comment: Brother       VITALS:  BP (!) 141/83   Pulse 94   Temp 96.9  F (36.1  C)   Resp 24   Ht 1.613 m (5' 3.5\")   Wt 77.6 kg (171 lb)   SpO2 96%   BMI 29.82 kg/m      PHYSICAL EXAM    Constitutional: " Well developed, Well nourished, NAD  HENT: Normocephalic, Atraumatic, Bilateral external ears normal, Oropharynx normal, mucous membranes moist, Nose normal.   Neck- Normal range of motion, No tenderness, Supple, No stridor.  Eyes: PERRL, EOMI, Conjunctiva normal, No discharge.   Respiratory: Normal breath sounds, No respiratory distress  Cardiovascular: Normal heart rate, Regular rhythm  GI: Bowel sounds normal, Soft, No tenderness,   Musculoskeletal: No edema. No or major deformities noted. Diffusive tenderness to left shoulder. Any ROM of left shoulder with tenderness. Normal gait. ROM of left hip with no tenderness.   Back: No midline C-spine tenderness, Midline T and L spine tenderness.  Integument: Warm, Dry, No erythema, No rash, healing laceration to the right temple  Neurologic: Alert & oriented x 3, Normal motor function, Normal sensory function, No focal deficits noted. Normal gait.   Psychiatric: Affect normal, Judgment normal, Mood normal.      LAB:  All pertinent labs reviewed and interpreted.  Results for orders placed or performed during the hospital encounter of 03/23/23   Head CT w/o contrast    Impression    IMPRESSION:  HEAD CT:  1.  No acute traumatic intracranial abnormality.    CERVICAL SPINE CT:  1.  No acute osseous abnormality of the cervical spine.       Cervical spine CT w/o contrast    Impression    IMPRESSION:  HEAD CT:  1.  No acute traumatic intracranial abnormality.    CERVICAL SPINE CT:  1.  No acute osseous abnormality of the cervical spine.       XR Pelvis and Hip Left 2 Views    Impression    IMPRESSION: Both hips are negative for fracture or dislocation. Mild degenerative change at both hip joints. Pelvis negative for fracture. Postoperative changes lumbar spine. Chronic enthesitis at the gluteal tendon attachments to the greater trochanters   bilaterally.   XR Shoulder Left 2 Views    Impression    IMPRESSION: Complete effacement of the subacromial space worrisome for  high-grade rotator cuff tearing. Resection or resorption of the distal clavicle. No evidence for acute fracture or dislocation.   Lumbar spine XR, 2-3 views    Impression    IMPRESSION: Please note that when counting from above there are 12 rib-bearing thoracic type vertebra, 5 lumbar type vertebra and an additional transitional lumbarized S1 segment. Utilizing this nomenclature there is chronic grade 1 anterolisthesis with   previous posterior hardware instrumentation at the L5-S1 level. Close correlation between radiographs and cross-sectional imaging is advised prior to any planned intervention as this nomenclature does differ from that utilized previously. Unchanged   vertebral body heights. No fracture or subluxation identified. Multilevel lumbar spondylosis with moderate to advanced loss of disc height and endplate spurring at L3-L4 and L4-L5.   XR Thoracic Spine 2 Views    Impression    IMPRESSION: 12 rib bearing thoracic type vertebra. Mild thoracic levocurvature which is increased since the previous exam and may be in part positional. Unchanged vertebral body heights. No fracture or subluxation identified. Multilevel thoracic   spondylosis with minor loss of disc height and ventral bridging osteophytes at multiple mid and lower thoracic levels. Included lung fields are clear.        RADIOLOGY:  Reviewed all pertinent imaging. Please see official radiology report.  XR Thoracic Spine 2 Views   Final Result   IMPRESSION: 12 rib bearing thoracic type vertebra. Mild thoracic levocurvature which is increased since the previous exam and may be in part positional. Unchanged vertebral body heights. No fracture or subluxation identified. Multilevel thoracic    spondylosis with minor loss of disc height and ventral bridging osteophytes at multiple mid and lower thoracic levels. Included lung fields are clear.       Lumbar spine XR, 2-3 views   Final Result   IMPRESSION: Please note that when counting from above there  are 12 rib-bearing thoracic type vertebra, 5 lumbar type vertebra and an additional transitional lumbarized S1 segment. Utilizing this nomenclature there is chronic grade 1 anterolisthesis with    previous posterior hardware instrumentation at the L5-S1 level. Close correlation between radiographs and cross-sectional imaging is advised prior to any planned intervention as this nomenclature does differ from that utilized previously. Unchanged    vertebral body heights. No fracture or subluxation identified. Multilevel lumbar spondylosis with moderate to advanced loss of disc height and endplate spurring at L3-L4 and L4-L5.      XR Shoulder Left 2 Views   Final Result   IMPRESSION: Complete effacement of the subacromial space worrisome for high-grade rotator cuff tearing. Resection or resorption of the distal clavicle. No evidence for acute fracture or dislocation.      XR Pelvis and Hip Left 2 Views   Final Result   IMPRESSION: Both hips are negative for fracture or dislocation. Mild degenerative change at both hip joints. Pelvis negative for fracture. Postoperative changes lumbar spine. Chronic enthesitis at the gluteal tendon attachments to the greater trochanters    bilaterally.      Cervical spine CT w/o contrast   Final Result   IMPRESSION:   HEAD CT:   1.  No acute traumatic intracranial abnormality.      CERVICAL SPINE CT:   1.  No acute osseous abnormality of the cervical spine.            Head CT w/o contrast   Final Result   IMPRESSION:   HEAD CT:   1.  No acute traumatic intracranial abnormality.      CERVICAL SPINE CT:   1.  No acute osseous abnormality of the cervical spine.                  I, Maximo Woodruff, am serving as a scribe to document services personally performed by Azeb White, based on my observation and the provider's statements to me. I, Azeb White MD, attest that Maximo Woodruff is acting in a scribe capacity, has observed my performance of the services and has documented them in  accordance with my direction.    Azeb White MD  Emergency Medicine  [facility] EMERGENCY ROOM  [facility] [facility] [id number] [phone]     Azeb White MD  03/23/23 2005

## 2023-03-28 ENCOUNTER — TELEPHONE (OUTPATIENT)
Dept: ORTHOPEDICS | Facility: CLINIC | Age: 68
End: 2023-03-28
Payer: MEDICAID

## 2023-03-28 NOTE — TELEPHONE ENCOUNTER
LVM for patient to call back, Per provider would like to offer earlier  appointment on 3/31, anytime from 1-2:20 pm.    Rinku Olvera ATC

## 2023-03-29 NOTE — TELEPHONE ENCOUNTER
Talked with patient. Patients 3/31 appointment will need to be rescheduled due to emergent surgery. Patient may be rescheduled for 3/29,3/30 or anytime the following week. Okay to double book per provider  Patient was given scheduling number to reschedule     Rinku Olvera ATC

## 2023-04-05 ENCOUNTER — OFFICE VISIT (OUTPATIENT)
Dept: ORTHOPEDICS | Facility: CLINIC | Age: 68
End: 2023-04-05
Attending: EMERGENCY MEDICINE
Payer: COMMERCIAL

## 2023-04-05 VITALS — BODY MASS INDEX: 30.16 KG/M2 | WEIGHT: 173 LBS | DIASTOLIC BLOOD PRESSURE: 70 MMHG | SYSTOLIC BLOOD PRESSURE: 115 MMHG

## 2023-04-05 DIAGNOSIS — S46.012A TRAUMATIC TEAR OF LEFT ROTATOR CUFF, UNSPECIFIED TEAR EXTENT, INITIAL ENCOUNTER: Primary | ICD-10-CM

## 2023-04-05 PROCEDURE — 99204 OFFICE O/P NEW MOD 45 MIN: CPT | Performed by: ORTHOPAEDIC SURGERY

## 2023-04-05 NOTE — PATIENT INSTRUCTIONS
[facility]   [address] [place] [address], [address]  [address] [address]   Appointments: [phone] Appointments: [phone]   Fax: [phone] Fax: [phone]       Follow up 2 days after MRI.    For scheduling at [facility] ([facility] [place]), call [phone] or [phone]     For scheduling in the [place] ([facility], and [place]) call  [phone] or  [phone]        For scheduling in the [place] ([facility]) call [phone]  or   [phone]          Please contact my office with any questions, [phone].

## 2023-04-05 NOTE — PROGRESS NOTES
CHIEF COMPLAINT: Left shoulder pain    DIAGNOSIS: Left shoulder acute rotator cuff tear with history of prior rotator cuff repair    OCCUPATION/SPORT: Not working, no recreational activities    HPI:   Anita Duque is a very pleasant 67 year old, right-hand dominant female who presents for evaluation of left shoulder pain.  Symptoms started in 13 days. There was a precipitating event patient slipped on ice and landed on her left shoulder on the ice while trying to get in her car. The pain is located to the anterior superior  shoulder. Worst pain is rated a 10 of 10, and current pain is rated at 6 of 10. Symptoms are worsened by patient sling and immobilized  Moving sleeping, getting dressed . Symptoms are improved with sling, ice, Voltaren percocet. Patient has tried ice sling, Voltaren and percocet with moderate  relief. Associated symptoms include sharp with movements, dull and achy at rest . Patient has pain radiating down the arm. Notably, the patient has had XR (3/23/23 . No other concerns or complaints at this time.  Patient has a history of bilateral rotator cuff repairs, believes that they were in 2007 and 2009.  Prior to this injury patient reports the ability to lift overhead.    PAST MEDICAL HISTORY:  Past Medical History:   Diagnosis Date     Arthritis      Asthma      Chronic pain syndrome      Depression      Diabetes mellitus, type II (H)      Fibromyalgia      Hyperlipidemia      Hypertension      Insomnia        PAST SURGICAL HISTORY:  Past Surgical History:   Procedure Laterality Date     HC REVISE MEDIAN N/CARPAL TUNNEL SURG      Description: Neuroplasty Decompression Median Nerve At Carpal Tunnel;  Recorded: 07/16/2012;     HYSTERECTOMY  2011     HYSTERECTOMY TOTAL ABDOMINAL       IR LUMBAR EPIDURAL STEROID INJECTION  4/13/2011     IR LUMBAR NERVE ROOT INJECTION  3/9/2012     OOPHORECTOMY  2011     KRYSTAL DELGADILLO W/O FACETEC FORAMOT/[facility] 1/2 VRT SEG, CERVICAL      Description: Laminectomy Lumbar;   Recorded: 10/05/2012;     [facility]ST[facility]      Description: Supracervical Hysterectomy;  Recorded: 04/05/2011;       CURRENT MEDICATIONS:  Current Outpatient Medications   Medication Sig Dispense Refill     albuterol (PROAIR HFA;PROVENTIL HFA;VENTOLIN HFA) 90 mcg/actuation inhaler [ALBUTEROL (PROAIR HFA;PROVENTIL HFA;VENTOLIN HFA) 90 MCG/ACTUATION INHALER] Inhale 2 puffs every 4 (four) hours as needed for wheezing. 1 Inhaler 1     CHERATUSSIN -10 MG/5ML solution TAKE ONE TO TWO TEASPOONSFUL BY MOUTH EVERY 4 TO 6 HOURS AS NEEDED FOR COUGH (Patient not taking: Reported on 3/6/2023) 180 mL 0     cyanocobalamin, vitamin B-12, (VITAMIN B-12 ORAL) [CYANOCOBALAMIN, VITAMIN B-12, (VITAMIN B-12 ORAL)] Take 1 tablet by mouth as needed.       cyclobenzaprine (FLEXERIL) 10 MG tablet Take 1 tablet by mouth twice daily 60 tablet 0     diclofenac (VOLTAREN) 1 % topical gel APPLY TOPICALLY TWICE DAILY AS NEEDED FOR ARTHRITIS 300 g 3     diclofenac epolamine (FLECTOR) 1.3 % PT12 [DICLOFENAC EPOLAMINE (FLECTOR) 1.3 % PT12] Place 1 patch on the skin every 12 (twelve) hours as needed. 60 patch 5     DULoxetine (CYMBALTA) 60 MG capsule Take 1 capsule by mouth once daily 90 capsule 1     ergocalciferol (VITAMIN D2) 1,250 mcg (50,000 unit) capsule [ERGOCALCIFEROL (VITAMIN D2) 1,250 MCG (50,000 UNIT) CAPSULE] Take 1 capsule (50,000 Units total) by mouth once a week. 12 capsule 1     FARXIGA 10 MG TABS tablet Take 1 tablet by mouth once daily 90 tablet 1     fluticasone (FLOVENT HFA) 110 MCG/ACT inhaler Inhale 1 puff into the lungs daily 12 g 3     gabapentin (NEURONTIN) 300 MG capsule TAKE 2 CAPSULES BY MOUTH 4 TIMES DAILY 240 capsule 0     glipiZIDE (GLUCOTROL XL) 5 MG 24 hr tablet TAKE 1 TABLET BY MOUTH ONCE DAILY . 90 tablet 1     ibuprofen (ADVIL/MOTRIN) 800 MG tablet Take 1 tablet (800 mg) by mouth 3 times daily as needed 100 tablet 3     lidocaine (XYLOCAINE) 5 % ointment [LIDOCAINE (XYLOCAINE) 5 % OINTMENT]  Apply topically 3 (three) times a day. 120 g 0     lisinopril (ZESTRIL) 2.5 MG tablet Take 1 tablet by mouth once daily 90 tablet 2     MAPAP 500 MG CAPS TAKE 2 CAPSULES BY MOUTH EVERY 6 HOURS AS NEEDED 270 capsule 1     metFORMIN (GLUCOPHAGE XR) 500 MG 24 hr tablet TAKE 2 TABLETS BY MOUTH TWICE DAILY WITH MEALS **APPOINTMENT  REQUIRED  FOR  FUTURE  REFILLS** 360 tablet 1     omeprazole (PRILOSEC) 40 MG capsule [OMEPRAZOLE (PRILOSEC) 40 MG CAPSULE] Take 1 capsule (40 mg total) by mouth daily. 90 capsule 1     oxyCODONE-acetaminophen (PERCOCET) 5-325 MG tablet Take 1 tablet by mouth every 6 hours as needed for severe pain (7-10) 90 tablet 0     phentermine (ADIPEX-P) 37.5 MG tablet TAKE 1 & 1/2 (ONE & ONE-HALF) TABLETS BY MOUTH ONCE DAILY IN THE MORNING 135 tablet 1     rosuvastatin (CRESTOR) 10 MG tablet Take 1 tablet by mouth once daily 90 tablet 2     sennosides (SENOKOT) 8.6 MG tablet Take 1 tablet by mouth twice daily 180 tablet 0     spironolactone (ALDACTONE) 100 MG tablet Take 1 tablet by mouth once daily 90 tablet 3     topiramate (TOPAMAX) 100 MG tablet Take 1 tablet by mouth twice daily 180 tablet 0     triamcinolone (KENALOG) 0.025 % ointment [TRIAMCINOLONE (KENALOG) 0.025 % OINTMENT] Apply sparingly in ear canal 1-2 times daily as needed 15 g 0     VESICARE 5 MG tablet Take 1 tablet by mouth once daily 90 tablet 1       ALLERGIES:      Allergies   Allergen Reactions     Bupropion Unknown     Other reaction(s): Agitation         FAMILY HISTORY: No pertinent family history, reviewed in EMR.    SOCIAL HISTORY:   Social History     Socioeconomic History     Marital status:      Spouse name: Not on file     Number of children: Not on file     Years of education: Not on file     Highest education level: Not on file   Occupational History     Not on file   Tobacco Use     Smoking status: Never     Smokeless tobacco: Never   Vaping Use     Vaping status: Not on file   Substance and Sexual Activity      Alcohol use: Not Currently     Drug use: Never     Sexual activity: Not Currently     Partners: Male     Birth control/protection: None   Other Topics Concern     Parent/sibling w/ CABG, MI or angioplasty before 65F 55M? Yes     Comment: Brother   Social History Narrative     Not on file     Social Determinants of Health     Financial Resource Strain: Not on file   Food Insecurity: Not on file   Transportation Needs: Not on file   Physical Activity: Not on file   Stress: Not on file   Social Connections: Not on file   Intimate Partner Violence: Not on file   Housing Stability: Not on file       REVIEW OF SYSTEMS: Positive for that noted in past medical history and history of present illness and otherwise reviewed in EMR    PHYSICAL EXAM:  Patient is Data Unavailable and weighs 0 lbs 0 oz There were no vitals taken for this visit.  Body mass index is 30.16 kg/m .   Constitutional: Well-developed, well-nourished, healthy appearing female.  Skin: Warm, dry   HEENT: Normal  Cardiac: Well perfused extremities, strong 2+ peripheral pulses. No edema.   Pulmonary: Breathing room air    Musculoskeletal:   Left Shoulder:  AROM left shoulder: 40/0/hip   AROM right shoulder: 160/50/L1   4/5 supraspinatus, 4/5 infraspinatus, 4/5 subscapularis  no AC joint pain, negative cross body adduction  negative Hornblower's  negative ER lag  Neurovascular exam and cervical spine exam are normal.    X-RAYS: I personally reviewed the prior xrays which demonstrate a high riding humeral head, decreased acromiohumeral interval, inferior humeral head osteophyte      ADVANCED IMAGING:     IMPRESSION: 67 year old-year-old right hand dominant female, with acute traumatic rotator cuff tear    PLAN:     I discussed with the patient the etiology of their condition. We discussed at length the options as noted above.  I discussed with the patient that I believe that she sustained an acute rotator cuff tear.  The patient does have a history of a prior  rotator cuff repair.  Potentially the high riding humeral head and nothing on the x-ray is more chronic in nature although the patient notes that she was prior able to lift the arm.  For now she is due to pseudo paralytic.  We discussed obtaining an MRI for further evaluation of the status of the rotator cuff.  I can do a video visit or telephone visit with her to go through the results and next steps.  I do not know that a revision rotator cuff repair is going to be feasible given that the humeral head is significantly high riding on the x-ray, potentially the only treatment option may be a reverse shoulder arthroplasty but I will know more after the MRI.  Prior to any surgical intervention she would need new shoulder x-rays.    At the conclusion of the office visit, Anita verbally acknowledged that I answered all of her questions satisfactorily.    Jacqueline Morales MD  Orthopedic Surgery Sports Medicine and Shoulder Surgery

## 2023-04-10 ENCOUNTER — HOSPITAL ENCOUNTER (OUTPATIENT)
Dept: MRI IMAGING | Facility: CLINIC | Age: 68
Discharge: HOME | End: 2023-04-10
Attending: ORTHOPAEDIC SURGERY | Admitting: ORTHOPAEDIC SURGERY
Payer: COMMERCIAL

## 2023-04-10 ENCOUNTER — TELEPHONE (OUTPATIENT)
Dept: ORTHOPEDICS | Facility: CLINIC | Age: 68
End: 2023-04-10

## 2023-04-10 DIAGNOSIS — S46.012A TRAUMATIC TEAR OF LEFT ROTATOR CUFF, UNSPECIFIED TEAR EXTENT, INITIAL ENCOUNTER: ICD-10-CM

## 2023-04-10 PROCEDURE — 73221 MRI JOINT UPR EXTREM W/O DYE: CPT | Mod: 26 | Performed by: RADIOLOGY

## 2023-04-10 PROCEDURE — 73221 MRI JOINT UPR EXTREM W/O DYE: CPT | Mod: LT

## 2023-04-10 NOTE — TELEPHONE ENCOUNTER
Per Provider offered patient telephone visit instead of in clinic. Patient will call daughter to determine whether or not she would like a telephone visit or in person. Patient was given scheduling number to call back.    Rinku Olvera ATC

## 2023-04-19 ENCOUNTER — VIRTUAL VISIT (OUTPATIENT)
Dept: ORTHOPEDICS | Facility: CLINIC | Age: 68
End: 2023-04-19
Payer: COMMERCIAL

## 2023-04-19 VITALS — BODY MASS INDEX: 29.82 KG/M2 | WEIGHT: 171 LBS

## 2023-04-19 DIAGNOSIS — M12.811 RIGHT ROTATOR CUFF TEAR ARTHROPATHY: Primary | ICD-10-CM

## 2023-04-19 DIAGNOSIS — M75.101 RIGHT ROTATOR CUFF TEAR ARTHROPATHY: Primary | ICD-10-CM

## 2023-04-19 PROCEDURE — 99214 OFFICE O/P EST MOD 30 MIN: CPT | Mod: TEL | Performed by: ORTHOPAEDIC SURGERY

## 2023-04-19 NOTE — LETTER
4/19/2023         RE: Anita Duque  [address] [id number]        Dear Colleague,    Thank you for referring your patient, Anita Duque, to the [facility] [place]. Please see a copy of my visit note below.    Anita is a 67 year old who is being evaluated via a billable telephone visit.      What phone number would you like to be contacted at? [phone]  How would you like to obtain your AVS? Mail a copy    Distant Location (provider location):  On-site  Phone call duration: 15 minutes    Anita is a 67 year old who is being evaluated via a billable telephone visit.    CHIEF COMPLAINT: Left shoulder pain    DIAGNOSIS: Left shoulder acute rotator cuff tear with history of prior rotator cuff repair    OCCUPATION/SPORT: Not working, no recreational activities    HPI:   Anita Duque is a very pleasant 67 year old, right-hand dominant female who presents for evaluation of left shoulder pain.  Patient has a history of bilateral rotator cuff repairs.  Patient notes inability to lift the arm prior to falling on the ice.  Continues to use the sling, continues to have pain.      PAST MEDICAL HISTORY:  Past Medical History:   Diagnosis Date     Arthritis      Asthma      Chronic pain syndrome      Depression      Diabetes mellitus, type II (H)      Fibromyalgia      Hyperlipidemia      Hypertension      Insomnia        PAST SURGICAL HISTORY:  Past Surgical History:   Procedure Laterality Date     HC REVISE MEDIAN N/CARPAL TUNNEL SURG      Description: Neuroplasty Decompression Median Nerve At Carpal Tunnel;  Recorded: 07/16/2012;     HYSTERECTOMY  2011     HYSTERECTOMY TOTAL ABDOMINAL       IR LUMBAR EPIDURAL STEROID INJECTION  4/13/2011     IR LUMBAR NERVE ROOT INJECTION  3/9/2012     OOPHORECTOMY  2011     KRYSTAL DELGADILLO W/O FACETEC FORAMOT/[facility] 1/2 VRT SEG, CERVICAL      Description: Laminectomy Lumbar;  Recorded: 10/05/2012;     [facility] SUPRACERV ABD  HYSTERECTOMY      Description: Supracervical Hysterectomy;  Recorded: 04/05/2011;       CURRENT MEDICATIONS:  Current Outpatient Medications   Medication Sig Dispense Refill     albuterol (PROAIR HFA;PROVENTIL HFA;VENTOLIN HFA) 90 mcg/actuation inhaler [ALBUTEROL (PROAIR HFA;PROVENTIL HFA;VENTOLIN HFA) 90 MCG/ACTUATION INHALER] Inhale 2 puffs every 4 (four) hours as needed for wheezing. 1 Inhaler 1     CHERATUSSIN -10 MG/5ML solution TAKE ONE TO TWO TEASPOONSFUL BY MOUTH EVERY 4 TO 6 HOURS AS NEEDED FOR COUGH (Patient not taking: Reported on 3/6/2023) 180 mL 0     cyanocobalamin, vitamin B-12, (VITAMIN B-12 ORAL) [CYANOCOBALAMIN, VITAMIN B-12, (VITAMIN B-12 ORAL)] Take 1 tablet by mouth as needed.       cyclobenzaprine (FLEXERIL) 10 MG tablet Take 1 tablet by mouth twice daily 60 tablet 0     diclofenac (VOLTAREN) 1 % topical gel APPLY TOPICALLY TWICE DAILY AS NEEDED FOR ARTHRITIS 300 g 3     diclofenac epolamine (FLECTOR) 1.3 % PT12 [DICLOFENAC EPOLAMINE (FLECTOR) 1.3 % PT12] Place 1 patch on the skin every 12 (twelve) hours as needed. 60 patch 5     DULoxetine (CYMBALTA) 60 MG capsule Take 1 capsule by mouth once daily 90 capsule 1     ergocalciferol (VITAMIN D2) 1,250 mcg (50,000 unit) capsule [ERGOCALCIFEROL (VITAMIN D2) 1,250 MCG (50,000 UNIT) CAPSULE] Take 1 capsule (50,000 Units total) by mouth once a week. 12 capsule 1     FARXIGA 10 MG TABS tablet Take 1 tablet by mouth once daily 90 tablet 1     fluticasone (FLOVENT HFA) 110 MCG/ACT inhaler Inhale 1 puff into the lungs daily 12 g 3     gabapentin (NEURONTIN) 300 MG capsule TAKE 2 CAPSULES BY MOUTH 4 TIMES DAILY 240 capsule 0     glipiZIDE (GLUCOTROL XL) 5 MG 24 hr tablet TAKE 1 TABLET BY MOUTH ONCE DAILY . 90 tablet 1     ibuprofen (ADVIL/MOTRIN) 800 MG tablet Take 1 tablet (800 mg) by mouth 3 times daily as needed 100 tablet 3     lidocaine (XYLOCAINE) 5 % ointment [LIDOCAINE (XYLOCAINE) 5 % OINTMENT] Apply topically 3 (three) times a day. 120 g 0      lisinopril (ZESTRIL) 2.5 MG tablet Take 1 tablet by mouth once daily 90 tablet 2     MAPAP 500 MG CAPS TAKE 2 CAPSULES BY MOUTH EVERY 6 HOURS AS NEEDED 270 capsule 1     metFORMIN (GLUCOPHAGE XR) 500 MG 24 hr tablet TAKE 2 TABLETS BY MOUTH TWICE DAILY WITH MEALS **APPOINTMENT  REQUIRED  FOR  FUTURE  REFILLS** 360 tablet 1     omeprazole (PRILOSEC) 40 MG capsule [OMEPRAZOLE (PRILOSEC) 40 MG CAPSULE] Take 1 capsule (40 mg total) by mouth daily. 90 capsule 1     oxyCODONE-acetaminophen (PERCOCET) 5-325 MG tablet Take 1 tablet by mouth every 6 hours as needed for severe pain (7-10) 90 tablet 0     phentermine (ADIPEX-P) 37.5 MG tablet TAKE 1 & 1/2 (ONE & ONE-HALF) TABLETS BY MOUTH ONCE DAILY IN THE MORNING 135 tablet 1     rosuvastatin (CRESTOR) 10 MG tablet Take 1 tablet by mouth once daily 90 tablet 2     sennosides (SENOKOT) 8.6 MG tablet Take 1 tablet by mouth twice daily 180 tablet 0     spironolactone (ALDACTONE) 100 MG tablet Take 1 tablet by mouth once daily 90 tablet 3     topiramate (TOPAMAX) 100 MG tablet Take 1 tablet by mouth twice daily 180 tablet 0     triamcinolone (KENALOG) 0.025 % ointment [TRIAMCINOLONE (KENALOG) 0.025 % OINTMENT] Apply sparingly in ear canal 1-2 times daily as needed 15 g 0     VESICARE 5 MG tablet Take 1 tablet by mouth once daily 90 tablet 1       ALLERGIES:      Allergies   Allergen Reactions     Bupropion Unknown     Other reaction(s): Agitation       PHYSICAL EXAM:  Patient is Data Unavailable and weighs 0 lbs 0 oz There were no vitals taken for this visit.  There is no height or weight on file to calculate BMI.   Constitutional: Well-developed, well-nourished, healthy appearing female.  Unable to assess on telephone visit     IMAGING: I personally reviewed the MRI which demonstrates a high riding humeral head, there is moderate to severe glenohumeral joint narrowing with the inferior humeral head osteophyte, no significant glenoid deformity, there is evidence of the prior  rotator cuff repair with anchors in the greater tuberosity, tear of the supraspinatus and infraspinatus with grade 1-2 fatty atrophy of the rotator cuff.    IMPRESSION: 67 year old-year-old right hand dominant female, with acute on chronic rotator cuff tear, glenohumeral joint arthritis    PLAN:     I discussed with the patient the etiology of their condition. We discussed at length the options as noted above.  Went through the results of the MRI today.  I discussed with the patient that there is evidence of significant glenohumeral joint arthritis, the humeral head is high riding on x-ray and MRI, there is a rotator cuff tear.  We discussed options including conservative versus surgical intervention.  Patient notes that prior to the fall she was able to lift her arm and had minimal pain.  Given that this is likely acute on chronic I believe that the patient was not pseudo paralytic prior to this more recent injury.  We discussed options including trying to do physical therapy to retrain the deltoid to see if this improves function, pain.  We discussed that would not anticipate complete pain control with this but it may help.  Additionally the patient could have a cortisone injection if needed to help with pain.  Alternatively we discussed surgical intervention.  I do not believe that a rotator cuff repair alone will be successful given the significant degree of glenohumeral joint arthritis, fatty atrophy of the rotator cuff.  We discussed that likely the patient would be a candidate for reverse shoulder arthroplasty but given that this is a more acute injury and patient was previously well-functioning we discussed a trial physical therapy first.  If the patient continues to have significant pain and dysfunction after physical therapy could consider doing a reverse shoulder arthroplasty at that time.  Patient demonstrated understanding and will start with physical therapy.  I can follow-up with the patient in 3 to 4  months if needed.  Patient will need new shoulder x-rays at follow-up.  At the conclusion of the office visit, Anita verbally acknowledged that I answered all of her questions satisfactorily.    Jacqueline Amaya MD  Orthopedic Surgery Sports Medicine and Shoulder Surgery    What phone number would you like to be contacted at? [phone]  How would you like to obtain your AVS? MyChart    Distant Location (provider location):  On-site  Phone call duration: 15 minutes        Again, thank you for allowing me to participate in the care of your patient.        Sincerely,        JACQUELINE AMAYA MD

## 2023-04-19 NOTE — PROGRESS NOTES
Anita is a 67 year old who is being evaluated via a billable telephone visit.      What phone number would you like to be contacted at? [phone]  How would you like to obtain your AVS? Mail a copy    Distant Location (provider location):  On-site  Phone call duration: 15 minutes    Anita is a 67 year old who is being evaluated via a billable telephone visit.    CHIEF COMPLAINT: Left shoulder pain    DIAGNOSIS: Left shoulder acute rotator cuff tear with history of prior rotator cuff repair    OCCUPATION/SPORT: Not working, no recreational activities    HPI:   Anita Duque is a very pleasant 67 year old, right-hand dominant female who presents for evaluation of left shoulder pain.  Patient has a history of bilateral rotator cuff repairs.  Patient notes inability to lift the arm prior to falling on the ice.  Continues to use the sling, continues to have pain.      PAST MEDICAL HISTORY:  Past Medical History:   Diagnosis Date     Arthritis      Asthma      Chronic pain syndrome      Depression      Diabetes mellitus, type II (H)      Fibromyalgia      Hyperlipidemia      Hypertension      Insomnia        PAST SURGICAL HISTORY:  Past Surgical History:   Procedure Laterality Date     HC REVISE MEDIAN N/CARPAL TUNNEL SURG      Description: Neuroplasty Decompression Median Nerve At Carpal Tunnel;  Recorded: 07/16/2012;     HYSTERECTOMY  2011     HYSTERECTOMY TOTAL ABDOMINAL       IR LUMBAR EPIDURAL STEROID INJECTION  4/13/2011     IR LUMBAR NERVE ROOT INJECTION  3/9/2012     OOPHORECTOMY  2011     ZZC DEL[place]AD[place]O W/O FACETEC FORAMOT/DSKC 1/2 VRT SEG, CERVICAL      Description: Laminectomy Lumbar;  Recorded: 10/05/2012;     ZZC SUPRACERV ABD HYSTERECTOMY      Description: Supracervical Hysterectomy;  Recorded: 04/05/2011;       CURRENT MEDICATIONS:  Current Outpatient Medications   Medication Sig Dispense Refill     albuterol (PROAIR HFA;PROVENTIL HFA;VENTOLIN HFA) 90 mcg/actuation inhaler [ALBUTEROL (PROAIR HFA;PROVENTIL  HFA;VENTOLIN HFA) 90 MCG/ACTUATION INHALER] Inhale 2 puffs every 4 (four) hours as needed for wheezing. 1 Inhaler 1     CHERATUSSIN -10 MG/5ML solution TAKE ONE TO TWO TEASPOONSFUL BY MOUTH EVERY 4 TO 6 HOURS AS NEEDED FOR COUGH (Patient not taking: Reported on 3/6/2023) 180 mL 0     cyanocobalamin, vitamin B-12, (VITAMIN B-12 ORAL) [CYANOCOBALAMIN, VITAMIN B-12, (VITAMIN B-12 ORAL)] Take 1 tablet by mouth as needed.       cyclobenzaprine (FLEXERIL) 10 MG tablet Take 1 tablet by mouth twice daily 60 tablet 0     diclofenac (VOLTAREN) 1 % topical gel APPLY TOPICALLY TWICE DAILY AS NEEDED FOR ARTHRITIS 300 g 3     diclofenac epolamine (FLECTOR) 1.3 % PT12 [DICLOFENAC EPOLAMINE (FLECTOR) 1.3 % PT12] Place 1 patch on the skin every 12 (twelve) hours as needed. 60 patch 5     DULoxetine (CYMBALTA) 60 MG capsule Take 1 capsule by mouth once daily 90 capsule 1     ergocalciferol (VITAMIN D2) 1,250 mcg (50,000 unit) capsule [ERGOCALCIFEROL (VITAMIN D2) 1,250 MCG (50,000 UNIT) CAPSULE] Take 1 capsule (50,000 Units total) by mouth once a week. 12 capsule 1     FARXIGA 10 MG TABS tablet Take 1 tablet by mouth once daily 90 tablet 1     fluticasone (FLOVENT HFA) 110 MCG/ACT inhaler Inhale 1 puff into the lungs daily 12 g 3     gabapentin (NEURONTIN) 300 MG capsule TAKE 2 CAPSULES BY MOUTH 4 TIMES DAILY 240 capsule 0     glipiZIDE (GLUCOTROL XL) 5 MG 24 hr tablet TAKE 1 TABLET BY MOUTH ONCE DAILY . 90 tablet 1     ibuprofen (ADVIL/MOTRIN) 800 MG tablet Take 1 tablet (800 mg) by mouth 3 times daily as needed 100 tablet 3     lidocaine (XYLOCAINE) 5 % ointment [LIDOCAINE (XYLOCAINE) 5 % OINTMENT] Apply topically 3 (three) times a day. 120 g 0     lisinopril (ZESTRIL) 2.5 MG tablet Take 1 tablet by mouth once daily 90 tablet 2     MAPAP 500 MG CAPS TAKE 2 CAPSULES BY MOUTH EVERY 6 HOURS AS NEEDED 270 capsule 1     metFORMIN (GLUCOPHAGE XR) 500 MG 24 hr tablet TAKE 2 TABLETS BY MOUTH TWICE DAILY WITH MEALS **APPOINTMENT   REQUIRED  FOR  FUTURE  REFILLS** 360 tablet 1     omeprazole (PRILOSEC) 40 MG capsule [OMEPRAZOLE (PRILOSEC) 40 MG CAPSULE] Take 1 capsule (40 mg total) by mouth daily. 90 capsule 1     oxyCODONE-acetaminophen (PERCOCET) 5-325 MG tablet Take 1 tablet by mouth every 6 hours as needed for severe pain (7-10) 90 tablet 0     phentermine (ADIPEX-P) 37.5 MG tablet TAKE 1 & 1/2 (ONE & ONE-HALF) TABLETS BY MOUTH ONCE DAILY IN THE MORNING 135 tablet 1     rosuvastatin (CRESTOR) 10 MG tablet Take 1 tablet by mouth once daily 90 tablet 2     sennosides (SENOKOT) 8.6 MG tablet Take 1 tablet by mouth twice daily 180 tablet 0     spironolactone (ALDACTONE) 100 MG tablet Take 1 tablet by mouth once daily 90 tablet 3     topiramate (TOPAMAX) 100 MG tablet Take 1 tablet by mouth twice daily 180 tablet 0     triamcinolone (KENALOG) 0.025 % ointment [TRIAMCINOLONE (KENALOG) 0.025 % OINTMENT] Apply sparingly in ear canal 1-2 times daily as needed 15 g 0     VESICARE 5 MG tablet Take 1 tablet by mouth once daily 90 tablet 1       ALLERGIES:      Allergies   Allergen Reactions     Bupropion Unknown     Other reaction(s): Agitation       PHYSICAL EXAM:  Patient is Data Unavailable and weighs 0 lbs 0 oz There were no vitals taken for this visit.  There is no height or weight on file to calculate BMI.   Constitutional: Well-developed, well-nourished, healthy appearing female.  Unable to assess on telephone visit     IMAGING: I personally reviewed the MRI which demonstrates a high riding humeral head, there is moderate to severe glenohumeral joint narrowing with the inferior humeral head osteophyte, no significant glenoid deformity, there is evidence of the prior rotator cuff repair with anchors in the greater tuberosity, tear of the supraspinatus and infraspinatus with grade 1-2 fatty atrophy of the rotator cuff.    IMPRESSION: 67 year old-year-old right hand dominant female, with acute on chronic rotator cuff tear, glenohumeral joint  arthritis    PLAN:     I discussed with the patient the etiology of their condition. We discussed at length the options as noted above.  Went through the results of the MRI today.  I discussed with the patient that there is evidence of significant glenohumeral joint arthritis, the humeral head is high riding on x-ray and MRI, there is a rotator cuff tear.  We discussed options including conservative versus surgical intervention.  Patient notes that prior to the fall she was able to lift her arm and had minimal pain.  Given that this is likely acute on chronic I believe that the patient was not pseudo paralytic prior to this more recent injury.  We discussed options including trying to do physical therapy to retrain the deltoid to see if this improves function, pain.  We discussed that would not anticipate complete pain control with this but it may help.  Additionally the patient could have a cortisone injection if needed to help with pain.  Alternatively we discussed surgical intervention.  I do not believe that a rotator cuff repair alone will be successful given the significant degree of glenohumeral joint arthritis, fatty atrophy of the rotator cuff.  We discussed that likely the patient would be a candidate for reverse shoulder arthroplasty but given that this is a more acute injury and patient was previously well-functioning we discussed a trial physical therapy first.  If the patient continues to have significant pain and dysfunction after physical therapy could consider doing a reverse shoulder arthroplasty at that time.  Patient demonstrated understanding and will start with physical therapy.  I can follow-up with the patient in 3 to 4 months if needed.  Patient will need new shoulder x-rays at follow-up.  At the conclusion of the office visit, Anita verbally acknowledged that I answered all of her questions satisfactorily.    Jacqueline Morales MD  Orthopedic Surgery Sports Medicine and Shoulder  Surgery    What phone number would you like to be contacted at? [phone]  How would you like to obtain your AVS? MyChart    Distant Location (provider location):  On-site  Phone call duration: 15 minutes

## 2023-05-05 ENCOUNTER — HOSPITAL ENCOUNTER (OUTPATIENT)
Dept: PHYSICAL THERAPY | Facility: REHABILITATION | Age: 68
Discharge: HOME | End: 2023-05-05
Attending: ORTHOPAEDIC SURGERY
Payer: COMMERCIAL

## 2023-05-05 DIAGNOSIS — M75.101 RIGHT ROTATOR CUFF TEAR ARTHROPATHY: ICD-10-CM

## 2023-05-05 DIAGNOSIS — M12.811 RIGHT ROTATOR CUFF TEAR ARTHROPATHY: ICD-10-CM

## 2023-05-05 PROCEDURE — 97140 MANUAL THERAPY 1/> REGIONS: CPT | Mod: GP | Performed by: PHYSICAL THERAPIST

## 2023-05-05 PROCEDURE — 97162 PT EVAL MOD COMPLEX 30 MIN: CPT | Mod: GP | Performed by: PHYSICAL THERAPIST

## 2023-05-05 PROCEDURE — 97110 THERAPEUTIC EXERCISES: CPT | Mod: GP | Performed by: PHYSICAL THERAPIST

## 2023-05-05 NOTE — PROGRESS NOTES
[facility]    OUTPATIENT PHYSICAL THERAPY ORTHOPEDIC EVALUATION  PLAN OF TREATMENT FOR OUTPATIENT REHABILITATION  (COMPLETE FOR INITIAL CLAIMS ONLY)  Patient's Last Name, First Name, M.I.  YOB: 1955  Anita Duque    Provider s Name:  [facility]   Medical Record No.  [id number]   Start of Care Date:  05/05/23   Onset Date:  03/21/23   Type:     _X__PT   ___OT   ___SLP Medical Diagnosis:  Right rotator cuff tear arthropathy     PT Diagnosis:  Left RC tear   Visits from SOC:  1      _________________________________________________________________________________  Plan of Treatment/Functional Goals:  balance training, gait training, joint mobilization, manual therapy, neuromuscular re-education, ROM, strengthening, stretching, other (see comments)  Dry needling        Goals  Goal Identifier: SPADI  Goal Description: Anita will demonstrate a decrease in pain and increase in function as measured by scoring </= 70% on the SPADI.  Target Date: 08/03/23    Goal Identifier: HEP  Goal Description: Anita will be independent in their HEP in order to facilitate return to prior level of function.  Target Date: 08/03/23    Goal Identifier: Shoulder PROM flexion and abduction  Goal Description: Anita will increase shoulder flexion and abduction PROM to >/= 100 without pain in order to improve the quality of their ADLs.  Target Date: 08/03/23                  Therapy Frequency:  1 time/week  Predicted Duration of Therapy Intervention:  90 days    HENRY DUNCAN, PT                 I CERTIFY THE NEED FOR THESE SERVICES FURNISHED UNDER        THIS PLAN OF TREATMENT AND WHILE UNDER MY CARE     (Physician co-signature of this document indicates review and certification of the therapy plan).                     Certification Date From:  05/05/23   Certification Date To:   08/03/23    Referring Provider:  Jacqueline Morales MD    Initial Assessment        See Epic Evaluation Start of Care Date: 05/05/23 05/05/23 1100   General Information   Type of Visit Initial OP Ortho PT Evaluation   Start of Care Date 05/05/23   Referring Physician Jacqueline Morales MD   Patient/Family Goals Statement Stop pain   Orders Evaluate and Treat   Date of Order 04/19/23   Certification Required? Yes   Medical Diagnosis Right rotator cuff tear arthropathy   Surgical/Medical history reviewed Yes   Precautions/Limitations no known precautions/limitations   Body Part(s)   Body Part(s) Shoulder   Presentation and Etiology   Pertinent history of current problem (include personal factors and/or comorbidities that impact the POC) See note   Impairments A. Pain;D. Decreased ROM;E. Decreased flexibility   Functional Limitations perform activities of daily living;perform desired leisure / sports activities   Symptom Location Left shoulder, around deltoid tuberosity. Tingling goes into her fingers   How/Where did it occur With a fall   Onset date of current episode/exacerbation 03/21/23   Chronicity Chronic   Pain rating (0-10 point scale) Best (/10);Worst (/10)   Best (/10) 7   Worst (/10) 10   Pain quality C. Aching;D. Burning;E. Shooting;A. Sharp;B. Dull   Frequency of pain/symptoms A. Constant   Pain/symptoms are: The same all the time   Pain/symptoms exacerbated by C. Lifting;D. Carrying;J. ADL;K. Home tasks;G. Certain positions;H. Overhead reach   Pain/symptoms eased by H. Cold;I. OTC medication(s);K. Other;C. Rest   Pain eased by comment Voltaren, prescription pain meds, muscle relaxers.   Progression of symptoms since onset: Unchanged   Fall Risk Screen   Fall screen completed by PT   Have you fallen 2 or more times in the past year? Yes   Have you fallen and had an injury in the past year? Yes   Is patient a fall risk? Yes   Fall screen comments Falls due to dizziness and decreased balance. She has  fallen 5-6 times in the last year.   Abuse Screen (yes response referral indicated)   Feels Unsafe at Home or Work/School no   Feels Threatened by Someone no   Does Anyone Try to Keep You From Having Contact with Others or Doing Things Outside Your Home? no   Physical Signs of Abuse Present no   Patient needs abuse support services and resources No   System Outcome Measures   Outcome Measures   (SPADI: 82.3%)   Shoulder Objective Findings   Side (if bilateral, select both right and left) Left   Shoulder Special Tests Comments Special testing and strength testing not completed due to patient's elevated pain levels and known RC tears.   Palpation Significant pain with light to moderate pressure throughout the shoulder.   Posture Wearing sling on her left shoulder.   Left Shoulder Flexion AROM 20   Left Shoulder Flexion PROM 45   Left Shoulder Abduction AROM 40   Left Shoulder Abduction PROM 45   Left Shoulder IR AROM L5   Planned Therapy Interventions   Planned Therapy Interventions balance training;gait training;joint mobilization;manual therapy;neuromuscular re-education;ROM;strengthening;stretching;other (see comments)   Planned Therapy Interventions Comment Dry needling   Clinical Impression   Criteria for Skilled Therapeutic Interventions Met yes, treatment indicated   PT Diagnosis Left RC tear   Clinical Presentation Evolving/Changing   Clinical Decision Making (Complexity) Moderate complexity   Therapy Frequency 1 time/week   Predicted Duration of Therapy Intervention (days/wks) 90 days   Risk & Benefits of therapy have been explained Yes   Patient, Family & other staff in agreement with plan of care Yes   ORTHO GOALS   PT Ortho Eval Goals 1;2;3   Ortho Goal 1   Goal Identifier SPADI   Goal Description Anita will demonstrate a decrease in pain and increase in function as measured by scoring </= 70% on the SPADI.   Goal Progress 82.3%   Target Date 08/03/23   Ortho Goal 2   Goal Identifier HEP   Goal  Description Anita will be independent in their HEP in order to facilitate return to prior level of function.   Goal Progress In progress   Target Date 08/03/23   Ortho Goal 3   Goal Identifier Shoulder PROM flexion and abduction   Goal Description Antia will increase shoulder flexion and abduction PROM to >/= 100 without pain in order to improve the quality of their ADLs.   Goal Progress 45 flexion, 45 abduction   Target Date 08/03/23   Total Evaluation Time   PT Eval, Moderate Complexity Minutes (31327) 25   Therapy Certification   Certification date from 05/05/23   Certification date to 08/03/23   Medical Diagnosis Right rotator cuff tear arthropathy

## 2023-05-05 NOTE — PROGRESS NOTES
Subjective: She fell in the kitchen at the end of March and hurt her neck and back. She then slipped on ice in her driveway and landed on her left shoulder. She has had RC repairs on both of her shoulders in the late 2000's. She was informed that due to the nature of her tear she is unable to get it repaired and the only surgical intervention is a reverse total shoulder.    Assessment: Anita presents to therapy wearing a sling and a shoulder brace over her left shoulder. She c/o significant pain in her shoulder with all movements and while at rest. Even when not wearing the sling she holds her arm tight against her flank. We spent a significant portion of today discussing her injury, therapeutic neuroscience education, and on the importance of motion in her shoulder. She noted that she had less pain in her shoulder at the end of the session and was able to achieve higher PROM during the table slide exercise. Anita will benefit from PT to promote decreased pain and improve her shoulder function.    Therapeutic Exercise    Date 5/5/23   Exercise    Diaphragmatic breathing x3'   Codman's x2'   AAROM flex on table x10

## 2023-05-10 ENCOUNTER — HOSPITAL ENCOUNTER (OUTPATIENT)
Dept: PHYSICAL THERAPY | Facility: REHABILITATION | Age: 68
Discharge: HOME | End: 2023-05-10
Payer: COMMERCIAL

## 2023-05-10 ENCOUNTER — TELEPHONE (OUTPATIENT)
Dept: ORTHOPEDICS | Facility: CLINIC | Age: 68
End: 2023-05-10

## 2023-05-10 DIAGNOSIS — M75.101 RIGHT ROTATOR CUFF TEAR ARTHROPATHY: Primary | ICD-10-CM

## 2023-05-10 DIAGNOSIS — M12.811 RIGHT ROTATOR CUFF TEAR ARTHROPATHY: Primary | ICD-10-CM

## 2023-05-10 PROCEDURE — 97110 THERAPEUTIC EXERCISES: CPT | Mod: GP | Performed by: PHYSICAL THERAPIST

## 2023-05-10 PROCEDURE — 97140 MANUAL THERAPY 1/> REGIONS: CPT | Mod: GP | Performed by: PHYSICAL THERAPIST

## 2023-05-10 NOTE — TELEPHONE ENCOUNTER
[facility] Call Center    Phone Message    May a detailed message be left on voicemail: yes     Reason for Call: Other: Shruthi is a physical therapist working with pt. Shruthi is asking for more information about how long the patient should wear the sling , can she be out of the sling for physical therapy and what are some of the goals . She said the patient was not helpful so the more information Shruthi can get the better. please call [phone] and leave a very detailed message she will be out the rest of the week    Action Taken: Other: ortho Hines    Travel Screening: Not Applicable

## 2023-05-10 NOTE — PROGRESS NOTES
Assessment: Anita presents to therapy wearing a sling again and felt she has not made progress.  As the therapist got her talking about what she has been doing since her last visit, she realized she has more motion, the exercises don't hurt and she has been out of her sling a few hours. Pt does not know how long she is to be wearing her sling so suggested she call her MD. She continues to be very guarded and pain focused and needs constant encouragement to relax, and breathe, which helps decrease her pain levels.  Continued with some neuroscience education and re-edu on importance of moving her shoulder. She was able to add a few new stretches to her HEP and felt she could complete them painfree if she focused on relaxing.  Anita will benefit from PT to promote decreased pain and improve her shoulder function.    Therapeutic Exercise    Date 5/10/23 5/5/23   Exercise     Diaphragmatic breathing  x3'   Codman's  x2'   AAROM flex on table Hold 10-20 seconds X 2 x10   Pulley's: AAROM  Flexion  abduction   X10  X 10    Wall [id number] Hold 20 seconds x 2    Sitting: shoulder abduction Hold 10-20 seconds x 2    Supine: shoulder flex with cane Hold 5-10 seconds x 3-5 reps

## 2023-05-16 NOTE — TELEPHONE ENCOUNTER
Patient last seen 4/19/23. Please see telephone encounter and  advise on how long patient shoulder remain in the sling, and if there are any other goals for patient other than retaining deltoid to improve function/pain    Rinku Olvera ATC

## 2023-05-24 ENCOUNTER — THERAPY VISIT (OUTPATIENT)
Dept: PHYSICAL THERAPY | Facility: REHABILITATION | Age: 68
End: 2023-05-24
Payer: COMMERCIAL

## 2023-05-24 DIAGNOSIS — M12.811 RIGHT ROTATOR CUFF TEAR ARTHROPATHY: Primary | ICD-10-CM

## 2023-05-24 DIAGNOSIS — M75.101 RIGHT ROTATOR CUFF TEAR ARTHROPATHY: Primary | ICD-10-CM

## 2023-05-24 PROCEDURE — 97140 MANUAL THERAPY 1/> REGIONS: CPT | Mod: GP | Performed by: PHYSICAL THERAPIST

## 2023-05-24 PROCEDURE — 97110 THERAPEUTIC EXERCISES: CPT | Mod: GP | Performed by: PHYSICAL THERAPIST

## 2023-06-01 ENCOUNTER — THERAPY VISIT (OUTPATIENT)
Dept: PHYSICAL THERAPY | Facility: REHABILITATION | Age: 68
End: 2023-06-01
Attending: ORTHOPAEDIC SURGERY
Payer: COMMERCIAL

## 2023-06-01 DIAGNOSIS — M75.101 RIGHT ROTATOR CUFF TEAR ARTHROPATHY: Primary | ICD-10-CM

## 2023-06-01 DIAGNOSIS — M12.811 RIGHT ROTATOR CUFF TEAR ARTHROPATHY: Primary | ICD-10-CM

## 2023-06-01 PROCEDURE — 97140 MANUAL THERAPY 1/> REGIONS: CPT | Mod: GP | Performed by: PHYSICAL THERAPIST

## 2023-06-01 PROCEDURE — 97110 THERAPEUTIC EXERCISES: CPT | Mod: GP | Performed by: PHYSICAL THERAPIST

## 2023-06-08 ENCOUNTER — THERAPY VISIT (OUTPATIENT)
Dept: PHYSICAL THERAPY | Facility: REHABILITATION | Age: 68
End: 2023-06-08
Attending: ORTHOPAEDIC SURGERY
Payer: COMMERCIAL

## 2023-06-08 DIAGNOSIS — M12.811 RIGHT ROTATOR CUFF TEAR ARTHROPATHY: Primary | ICD-10-CM

## 2023-06-08 DIAGNOSIS — M75.101 RIGHT ROTATOR CUFF TEAR ARTHROPATHY: Primary | ICD-10-CM

## 2023-06-08 PROCEDURE — 97110 THERAPEUTIC EXERCISES: CPT | Mod: GP | Performed by: PHYSICAL THERAPIST

## 2023-06-08 PROCEDURE — 97140 MANUAL THERAPY 1/> REGIONS: CPT | Mod: GP | Performed by: PHYSICAL THERAPIST

## 2023-07-05 ASSESSMENT — ASTHMA QUESTIONNAIRES: ACT_TOTALSCORE: 21

## 2023-07-05 ASSESSMENT — PATIENT HEALTH QUESTIONNAIRE - PHQ9
SUM OF ALL RESPONSES TO PHQ QUESTIONS 1-9: 4
10. IF YOU CHECKED OFF ANY PROBLEMS, HOW DIFFICULT HAVE THESE PROBLEMS MADE IT FOR YOU TO DO YOUR WORK, TAKE CARE OF THINGS AT HOME, OR GET ALONG WITH OTHER PEOPLE: NOT DIFFICULT AT ALL
SUM OF ALL RESPONSES TO PHQ QUESTIONS 1-9: 4

## 2023-07-05 ASSESSMENT — ANXIETY QUESTIONNAIRES
IF YOU CHECKED OFF ANY PROBLEMS ON THIS QUESTIONNAIRE, HOW DIFFICULT HAVE THESE PROBLEMS MADE IT FOR YOU TO DO YOUR WORK, TAKE CARE OF THINGS AT HOME, OR GET ALONG WITH OTHER PEOPLE: NOT DIFFICULT AT ALL
1. FEELING NERVOUS, ANXIOUS, OR ON EDGE: NOT AT ALL
2. NOT BEING ABLE TO STOP OR CONTROL WORRYING: NOT AT ALL
7. FEELING AFRAID AS IF SOMETHING AWFUL MIGHT HAPPEN: NOT AT ALL
4. TROUBLE RELAXING: MORE THAN HALF THE DAYS
3. WORRYING TOO MUCH ABOUT DIFFERENT THINGS: NOT AT ALL
GAD7 TOTAL SCORE: 2
6. BECOMING EASILY ANNOYED OR IRRITABLE: NOT AT ALL
GAD7 TOTAL SCORE: 2
5. BEING SO RESTLESS THAT IT IS HARD TO SIT STILL: NOT AT ALL

## 2023-07-06 ENCOUNTER — TELEPHONE (OUTPATIENT)
Dept: FAMILY MEDICINE | Facility: CLINIC | Age: 68
End: 2023-07-06

## 2023-07-06 ENCOUNTER — OFFICE VISIT (OUTPATIENT)
Dept: FAMILY MEDICINE | Facility: CLINIC | Age: 68
End: 2023-07-06
Payer: COMMERCIAL

## 2023-07-06 VITALS
OXYGEN SATURATION: 97 % | TEMPERATURE: 97.9 F | DIASTOLIC BLOOD PRESSURE: 60 MMHG | BODY MASS INDEX: 30.73 KG/M2 | HEART RATE: 56 BPM | SYSTOLIC BLOOD PRESSURE: 112 MMHG | RESPIRATION RATE: 18 BRPM | HEIGHT: 64 IN | WEIGHT: 180 LBS

## 2023-07-06 DIAGNOSIS — E78.2 MIXED HYPERLIPIDEMIA: Chronic | ICD-10-CM

## 2023-07-06 DIAGNOSIS — M19.112 POST-TRAUMATIC OSTEOARTHRITIS OF BOTH SHOULDERS: ICD-10-CM

## 2023-07-06 DIAGNOSIS — M67.912 BILATERAL ROTATOR CUFF DYSFUNCTION: ICD-10-CM

## 2023-07-06 DIAGNOSIS — G89.4 CHRONIC PAIN SYNDROME: Primary | Chronic | ICD-10-CM

## 2023-07-06 DIAGNOSIS — J45.30 MILD PERSISTENT ASTHMA WITHOUT COMPLICATION: Chronic | ICD-10-CM

## 2023-07-06 DIAGNOSIS — M67.911 BILATERAL ROTATOR CUFF DYSFUNCTION: ICD-10-CM

## 2023-07-06 DIAGNOSIS — G47.33 OSA ON CPAP: Chronic | ICD-10-CM

## 2023-07-06 DIAGNOSIS — M19.111 POST-TRAUMATIC OSTEOARTHRITIS OF BOTH SHOULDERS: ICD-10-CM

## 2023-07-06 DIAGNOSIS — S46.911D: ICD-10-CM

## 2023-07-06 DIAGNOSIS — Z79.899 CONTROLLED SUBSTANCE AGREEMENT SIGNED: Chronic | ICD-10-CM

## 2023-07-06 DIAGNOSIS — F33.1 MODERATE EPISODE OF RECURRENT MAJOR DEPRESSIVE DISORDER (H): Chronic | ICD-10-CM

## 2023-07-06 DIAGNOSIS — E11.9 TYPE 2 DIABETES MELLITUS WITHOUT COMPLICATION, WITHOUT LONG-TERM CURRENT USE OF INSULIN (H): Chronic | ICD-10-CM

## 2023-07-06 DIAGNOSIS — I10 ESSENTIAL HYPERTENSION: Chronic | ICD-10-CM

## 2023-07-06 LAB
ANION GAP SERPL CALCULATED.3IONS-SCNC: 12 MMOL/L (ref 7–15)
BUN SERPL-MCNC: 19.7 MG/DL (ref 8–23)
CALCIUM SERPL-MCNC: 9.4 MG/DL (ref 8.8–10.2)
CHLORIDE SERPL-SCNC: 104 MMOL/L (ref 98–107)
CHOLEST SERPL-MCNC: 131 MG/DL
CREAT SERPL-MCNC: 0.82 MG/DL (ref 0.51–0.95)
DEPRECATED HCO3 PLAS-SCNC: 23 MMOL/L (ref 22–29)
GFR SERPL CREATININE-BSD FRML MDRD: 77 ML/MIN/1.73M2
GLUCOSE SERPL-MCNC: 137 MG/DL (ref 70–99)
HBA1C MFR BLD: 7.2 % (ref 0–5.6)
HDLC SERPL-MCNC: 69 MG/DL
LDLC SERPL CALC-MCNC: 34 MG/DL
NONHDLC SERPL-MCNC: 62 MG/DL
POTASSIUM SERPL-SCNC: 4.4 MMOL/L (ref 3.4–5.3)
SODIUM SERPL-SCNC: 139 MMOL/L (ref 136–145)
TRIGL SERPL-MCNC: 140 MG/DL

## 2023-07-06 PROCEDURE — 99214 OFFICE O/P EST MOD 30 MIN: CPT | Performed by: FAMILY MEDICINE

## 2023-07-06 PROCEDURE — 80048 BASIC METABOLIC PNL TOTAL CA: CPT | Performed by: FAMILY MEDICINE

## 2023-07-06 PROCEDURE — 36415 COLL VENOUS BLD VENIPUNCTURE: CPT | Performed by: FAMILY MEDICINE

## 2023-07-06 PROCEDURE — 83036 HEMOGLOBIN GLYCOSYLATED A1C: CPT | Performed by: FAMILY MEDICINE

## 2023-07-06 PROCEDURE — 80061 LIPID PANEL: CPT | Performed by: FAMILY MEDICINE

## 2023-07-06 RX ORDER — MELOXICAM 15 MG/1
1 TABLET ORAL DAILY
COMMUNITY
Start: 2023-06-01

## 2023-07-06 RX ORDER — OXYCODONE HYDROCHLORIDE 5 MG/1
5 TABLET ORAL EVERY 6 HOURS PRN
COMMUNITY
Start: 2023-03-30 | End: 2023-07-06

## 2023-07-06 RX ORDER — DICLOFENAC EPOLAMINE 0.01 G/1
1 SYSTEM TOPICAL EVERY 12 HOURS PRN
Qty: 60 PATCH | Refills: 5 | Status: SHIPPED | OUTPATIENT
Start: 2023-07-06 | End: 2023-07-06

## 2023-07-06 RX ORDER — OXYCODONE HYDROCHLORIDE 5 MG/1
5 TABLET ORAL EVERY 6 HOURS PRN
Qty: 90 TABLET | Refills: 0 | Status: SHIPPED | OUTPATIENT
Start: 2023-07-06 | End: 2023-10-12

## 2023-07-06 RX ORDER — DICLOFENAC EPOLAMINE 0.01 G/1
1 SYSTEM TOPICAL EVERY 12 HOURS PRN
Qty: 60 PATCH | Refills: 5 | Status: SHIPPED | OUTPATIENT
Start: 2023-07-06 | End: 2024-02-22

## 2023-07-06 ASSESSMENT — PATIENT HEALTH QUESTIONNAIRE - PHQ9
SUM OF ALL RESPONSES TO PHQ QUESTIONS 1-9: 4
10. IF YOU CHECKED OFF ANY PROBLEMS, HOW DIFFICULT HAVE THESE PROBLEMS MADE IT FOR YOU TO DO YOUR WORK, TAKE CARE OF THINGS AT HOME, OR GET ALONG WITH OTHER PEOPLE: NOT DIFFICULT AT ALL

## 2023-07-06 ASSESSMENT — ANXIETY QUESTIONNAIRES: GAD7 TOTAL SCORE: 2

## 2023-07-06 NOTE — LETTER
My Asthma Action Plan    Name: Anita Duque   YOB: 1955  Date: 7/15/2023   My doctor: Trish Eagle MD   My clinic: [facility]        My Control Medicine: Fluticasone propionate (Flovent HFA) - 110 mcg 1-2 puffs daily  My Rescue Medicine: Albuterol (Proair/Ventolin/Proventil HFA) 2-4 puffs EVERY 4 HOURS as needed. Use a spacer if recommended by your provider.  My Oral Steroid Medicine: Prednisone 10 mg-2 tablets twice a day for 3 days, then 1 tablet twice a day for 3 days, then 1/2 tablet twice a day for 3 days, then 1/2 tablet daily for three days. My Asthma Severity:   Mild Persistent  Know your asthma triggers: smoke, upper respiratory infections, pollens, animal dander, insects/rodents, mold, humidity, exercise or sports, and cold air  upper respiratory infections  humidity            GREEN ZONE   Good Control  I feel good  No cough or wheeze  Can work, sleep and play without asthma symptoms       Take your asthma control medicine every day.     If exercise triggers your asthma, take your rescue medication  15 minutes before exercise or sports, and  During exercise if you have asthma symptoms  Spacer to use with inhaler: If you have a spacer, make sure to use it with your inhaler             YELLOW ZONE Getting Worse  I have ANY of these:  I do not feel good  Cough or wheeze  Chest feels tight  Wake up at night   Keep taking your Green Zone medications  Start taking your rescue medicine:  every 20 minutes for up to 1 hour. Then every 4 hours for 24-48 hours.  If you stay in the Yellow Zone for more than 12-24 hours, contact your doctor.  If you do not return to the Green Zone in 12-24 hours or you get worse, start taking your oral steroid medicine if prescribed by your provider.           RED ZONE Medical Alert - Get Help  I have ANY of these:  I feel awful  Medicine is not helping  Breathing getting harder  Trouble walking or talking  Nose opens wide to  breathe       Take your rescue medicine NOW  If your provider has prescribed an oral steroid medicine, start taking it NOW  Call your doctor NOW  If you are still in the Red Zone after 20 minutes and you have not reached your doctor:  Take your rescue medicine again and  Call 911 or go to the emergency room right away    See your regular doctor within 2 weeks of an Emergency Room or Urgent Care visit for follow-up treatment.          Annual Reminders:  Meet with Asthma Educator,  Flu Shot in the Fall, consider Pneumonia Vaccination for patients with asthma (aged 19 and older).    Pharmacy:    [place] [id number] [facility] [place] [facility] [address]    Electronically signed by Tirsh Eagle MD   Date: 07/15/23                      Asthma Triggers  How To Control Things That Make Your Asthma Worse    Triggers are things that make your asthma worse.  Look at the list below to help you find your triggers and what you can do about them.  You can help prevent asthma flare-ups by staying away from your triggers.      Trigger                                                          What you can do   Cigarette Smoke  Tobacco smoke can make asthma worse. Do not allow smoking in your home, car or around you.  Be sure no one smokes at a child s day care or school.  If you smoke, ask your health care provider for ways to help you quit.  Ask family members to quit too.  Ask your health care provider for a referral to Quit Plan to help you quit smoking, or call 2-782-739-PLAN.     Colds, Flu, Bronchitis  These are common triggers of asthma. Wash your hands often.  Don t touch your eyes, nose or mouth.  Get a flu shot every year.     Dust Mites  These are tiny bugs that live in cloth or carpet. They are too small to see. Wash sheets and blankets in hot water every week.   Encase pillows and mattress in dust mite proof covers.  Avoid having carpet if  you can. If you have carpet, vacuum weekly.   Use a dust mask and HEPA vacuum.   Pollen and Outdoor Mold  Some people are allergic to trees, grass, or weed pollen, or molds. Try to keep your windows closed.  Limit time out doors when pollen count is high.   Ask you health care provider about taking medicine during allergy season.     Animal Dander  Some people are allergic to skin flakes, urine or saliva from pets with fur or feathers. Keep pets with fur or feathers out of your home.    If you can t keep the pet outdoors, then keep the pet out of your bedroom.  Keep the bedroom door closed.  Keep pets off cloth furniture and away from stuffed toys.     Mice, Rats, and Cockroaches   Some people are allergic to the waste from these pests.   Cover food and garbage.  Clean up spills and food crumbs.  Store grease in the refrigerator.   Keep food out of the bedroom.   Indoor Mold  This can be a trigger if your home has high moisture. Fix leaking faucets, pipes, or other sources of water.   Clean moldy surfaces.  Dehumidify basement if it is damp and smelly.   Smoke, Strong Odors, and Sprays  These can reduce air quality. Stay away from strong odors and sprays, such as perfume, powder, hair spray, paints, smoke incense, paint, cleaning products, candles and new carpet.   Exercise or Sports  Some people with asthma have this trigger. Be active!  Ask your doctor about taking medicine before sports or exercise to prevent symptoms.    Warm up for 5-10 minutes before and after sports or exercise.     Other Triggers of Asthma  Cold air:  Cover your nose and mouth with a scarf.  Sometimes laughing or crying can be a trigger.  Some medicines and food can trigger asthma.

## 2023-07-06 NOTE — TELEPHONE ENCOUNTER
Do you know what indications can be used for this medication to get covered?  The patient has a lot of degenerative disc disease, arthritis of multiple joints along with fibromyalgia.  I am sure I am just not using the correct indication.

## 2023-07-06 NOTE — TELEPHONE ENCOUNTER
PRIOR AUTHORIZATION DENIED    Medication: DICLOFENAC EPOLAMINE 1.3 % EX PTCH  Insurance Company: [employer] ([facility]) - Phone [phone] Fax [phone]  Denial Date: 7/6/2023  Denial Rational:    Appeal Information:     Patient Notified: No

## 2023-07-06 NOTE — TELEPHONE ENCOUNTER
"Hello -     I am covering for Yuki while she is out of the office. The FDA labeled indication is: \"Topical treatment of acute pain due to minor strains, sprains, and contusions\"  I included examples from two studies below (copied from [facility]) in case that's helpful in an appeal letter.    Emmie Arriaza PharmD  Medication Therapy Management (MTM) Pharmacist      ------------------------------------------------------------------------------------    Diclofenac Epolamine  Pain, acute, Due to minor strains, sprains, and contusions  FDA Labeled Indication  a) Overview  FDA Approval: Adult, yes; Pediatric, yes (6 years or older)  Efficacy: Adult, Evidence favors efficacy  Recommendation: Adult, Class IIa  Strength of Evidence: Adult, Category B  See Drug Consult reference: RECOMMENDATION AND EVIDENCE RATINGS    b) Summary:  Indication  Diclofenac epolamine 1.3% topical system is indicated for topical treatment of acute pain due to minor strains, sprains, and contusions in adults and pediatric patients 6 years or older [5].    Evidence (Adult)  Reduction of acute pain was significantly greater with the diclofenac epolamine 1.3% topical patch compared with a placebo patch in patients with ankle sprains and mild or moderate soft tissue injuries (ie, sprain, strain, or contusions) in 3 randomized studies [6][7].    Adult:  1) Mean pain-on-movement scores, as assessed by a self-reported visual analog scale (VAS), were significantly reduced at 3 hours and at 7 days (data in table below) with use of diclofenac epolamine topical patch 1.3% compared with placebo in patients treated for acute pain due to a mild-to-moderate ankle sprain in pooled analysis from 2 randomized studies (N=274). Investigator assessment of global efficacy found significantly more patients with \"excellent\" response to diclofenac patch versus placebo at day 7 (study 1, 50% vs 29%; study 2, 60% vs 30%). By day 7, pain at rest, pain on passive stretch, " pain on palpitation, and possibility of single foot leaning were also significantly improved with diclofenac patch versus placebo. No significant difference in adverse effects or in use of rescue medication (acetaminophen) between groups was reported. Patients applied 1 patch every 24 hours for 7 days [6].    2) Post-treatment acute pain caused by normal activity and movement was significantly reduced from baseline, using score as proportion of baseline visual analog score (VAS), by an average of 18.2% with diclofenac epolamine topical patch compared with placebo (0.435 vs 0.532, respectively) in patients with recent (within 7 days) mild or moderate sprain, strain, or contusion in a randomized study (N=418). However, as assessed per injury location (shoulder, ankle, knee, foot, lower leg, elbow, wrist) there was no significant difference in the terminal VAS score between the diclofenac and placebo patch groups. Investigator global assessment of efficacy was significantly superior for the diclofenac patch compared with placebo and 58% of diclofenac-treated patients reported excellent or good response compared with 49% of placebo-treated patients. Median time to pain resolution was significantly shorter for diclofenac patch versus placebo (5.5 vs 7.5 days). Patches were applied every 12 hours until pain resolution or up to a maximum of 14 days. Overall incidence of adverse events was similar between groups [7].

## 2023-07-06 NOTE — PROGRESS NOTES
Chronic pain syndrome  Has had chronic low back pain and shoulder and knee pain in the past.    Controlled substance agreement signed  We have a controlled substance agreement for Percocet 5/325, 90 tablets for 90 days along with phentermine 135 tablets for 90 days.    Post-traumatic osteoarthritis of both shoulders  Had a recent fall which precipitated an ER visit on 3/17 and 3/23/2023 for which she had head injury, neck and shoulder injury.  She has had physical therapy.    Upper extremity tendon tear, right, subsequent encounter  MRI of her left shoulder showed full width tear of the supraspinatus.  She actually feels better with her left shoulder and now her right shoulder is worse.  - diclofenac (FLECTOR) 1.3 % patch  Dispense: 60 patch; Refill: 5    Bilateral rotator cuff dysfunction  Patient needs a refill on her oxycodone.  - oxyCODONE (ROXICODONE) 5 MG tablet  Dispense: 90 tablet; Refill: 0    Mild persistent asthma without complication  Asthma action plan was done today.  ACT score today was 21 but was previously 25 in March.  On Flovent 110 mcg.    Type 2 diabetes mellitus without complication, without long-term current use of insulin (H)  Well-controlled diabetic with last A1c of 7.1%.  Previously 6.7 and 7.6.  Will monitor labs.  Will refill meds as needed.  - Hemoglobin A1c  - Hemoglobin A1c    Essential hypertension  Well-controlled on her current regimen.  Will monitor labs.  - Basic metabolic panel  - Basic metabolic panel    Mixed hyperlipidemia  Currently on 10 mg of rosuvastatin.  Will monitor labs.  - Lipid panel reflex to direct LDL Fasting  - Lipid panel reflex to direct LDL Fasting    Moderate episode of recurrent major depressive disorder (H)  PHQ-9 score today is 4.  KINDRA-7 score is 2.  We had asked extensive conversation concerning her stressors with moving from 1 house to another and financial issues.  Considering what we talked out these numbers seem low.    JANINE on CPAP  Not really  using this because she tells me she has no mask for the CPAP change she has.  She excuses for why she has not gotten the mask.  We discussed how to go about doing so.    Patient was offered the bivalent COVID-vaccine today which she has not had as of yet.  She declines this.  I will get back to her on her lab results by [facility] and will call with grossly abnormal values.  She should follow-up for her next diabetes check in 4 months.      Willem Howard is a 68 year old, presenting for the following health issues:  Diabetes        7/6/2023    10:03 AM   Additional Questions   Roomed by Trisha BLACK CMA     History of Present Illness     Asthma:  She presents for follow up of asthma.  She has no cough, no wheezing, and no shortness of breath. She is using a relief medication a few times a month. She does not miss any doses of her controller medication throughout the week.Patient is aware of the following triggers: none. The patient has had a visit to the Emergency Room, Urgent Care or Hospital due to asthma since the last clinic visit. She has been to the Emergency Room or Urgent Care 0 times.She has had a Hospitalization 0 times.    Back Pain:  She presents for follow up of back pain. Patient's back pain is a chronic problem.  Location of back pain:  Right lower back, left lower back, right middle of back, left middle of back, right upper back, left upper back, right side of neck, left side of neck, right shoulder, left shoulder, right side of waist and left side of waist  Description of back pain: burning, dull ache, sharp and stabbing  Back pain spreads: right shoulder, left shoulder, right side of neck and left side of neck    Since patient first noticed back pain, pain is: always present, but gets better and worse  Does back pain interfere with her job:  Yes      Mental Health Follow-up:  Patient presents to follow-up on Depression.Patient's depression since last visit has been:  Good  The patient is not having  "other symptoms associated with depression.      Any significant life events: health concerns  Patient is not feeling anxious or having panic attacks.  Patient has no concerns about alcohol or drug use.    Diabetes:   She presents for follow up of diabetes.  She is checking home blood glucose a few times a month. She checks blood glucose before meals.  Blood glucose is never over 200 and never under 70. She is aware of hypoglycemia symptoms including dizziness and weakness. She has no concerns regarding her diabetes at this time.  She is not experiencing numbness or burning in feet, excessive thirst, blurry vision, weight changes or redness, sores or blisters on feet.         Hyperlipidemia:  She presents for follow up of hyperlipidemia.  She is taking medication to lower cholesterol. She is not having myalgia or other side effects to statin medications.    Hypertension: She presents for follow up of hypertension.  She does not check blood pressure  regularly outside of the clinic. Outpatient blood pressures have not been over 140/90. She does not follow a low salt diet.     Headaches:   Since the patient's last clinic visit, headaches are: improved  The patient is getting headaches:  Head aches becauce of kneck  She is able to do normal daily activities when she has a migraine.  The patient is taking the following rescue/relief medications:  Ibuprofen (Advil, Motrin), Tylenol and Excedrin   Patient states \"I get total relief\" from the rescue/relief medications.   The patient is taking the following medications to prevent migraines:  No medications to prevent migraines  In the past 4 weeks, the patient has gone to an Urgent Care or Emergency Room 0 times times due to headaches.    Reason for visit:  Pain    She eats 0-1 servings of fruits and vegetables daily.She consumes 1 sweetened beverage(s) daily.She exercises with enough effort to increase her heart rate 9 or less minutes per day.  She exercises with enough " "effort to increase her heart rate 3 or less days per week.   She is taking medications regularly.    Today's PHQ-9         PHQ-9 Total Score: 4    PHQ-9 Q9 Thoughts of better off dead/self-harm past 2 weeks :   Not at all    How difficult have these problems made it for you to do your work, take care of things at home, or get along with other people: Not difficult at all  Today's KINDRA-7 Score: 2  PHQ-9:      7/5/2023    10:33 AM   Last PHQ-9   1.  Little interest or pleasure in doing things 1   2.  Feeling down, depressed, or hopeless 0   3.  Trouble falling or staying asleep, or sleeping too much 1   4.  Feeling tired or having little energy 1   5.  Poor appetite or overeating 1   6.  Feeling bad about yourself 0   7.  Trouble concentrating 0   8.  Moving slowly or restless 0   Q9: Thoughts of better off dead/self-harm past 2 weeks 0   PHQ-9 Total Score 4       GAD7:      7/5/2023    10:34 AM   KINDRA-7    1. Feeling nervous, anxious, or on edge 0   2. Not being able to stop or control worrying 0   3. Worrying too much about different things 0   4. Trouble relaxing 2   5. Being so restless that it is hard to sit still 0   6. Becoming easily annoyed or irritable 0   7. Feeling afraid, as if something awful might happen 0   KINDRA-7 Total Score 2   If you checked any problems, how difficult have they made it for you to do your work, take care of things at home, or get along with other people? Not difficult at all                Objective    /60 (BP Location: Right arm, Patient Position: Sitting, Cuff Size: Adult Regular)   Pulse 56   Temp 97.9  F (36.6  C) (Oral)   Resp 18   Ht 1.613 m (5' 3.5\")   Wt 81.6 kg (180 lb)   SpO2 97%   BMI 31.39 kg/m    Body mass index is 31.39 kg/m .  Physical Exam   GENERAL: alert, no distress, over weight, fatigued and wearing a bilateral shoulder brace  RESP: lungs clear to auscultation - no rales, rhonchi or wheezes  CV: regular rates and rhythm and without murmur  ABDOMEN: " soft, nontender  MS: no edema and bilateral decreased range of motion in both upper extremities, currently wearing a brace that is treating both shoulders.  Pain with movement, getting out of chair etc.

## 2023-07-07 NOTE — PROGRESS NOTES
DISCHARGE  Reason for Discharge: Pt did not feel therapy was helpful and canceled her last scheduled visit    Equipment Issued:     Discharge Plan: Patient to continue home program.    Referring Provider:  Jacqueline Morales        06/08/23 1001   Appointment Info   Signing clinician's name / credentials Shruthi Floresjuni, PT   Visits Used 5   Medical Diagnosis Right rotator cuff tear arthropathy   PT Tx Diagnosis Left Rotator Cuff Tear   Progress Note/Certification   Start of Care Date 05/05/23   Onset of illness/injury or Date of Surgery 03/21/23   Therapy Frequency 1x/week   Predicted Duration 90 days   Certification date from 05/05/23   Certification date to 08/03/23   Progress Note Due Date 08/03/23   PT Goal 1   Goal Identifier SPADI   Goal Description Anita will demonstrate a decrease in pain and increase in function as measured by scoring </= 70% on the SPADI   Rationale to maximize safety and independence with performance of ADLs and functional tasks   Target Date 08/03/23   PT Goal 2   Goal Identifier HEP   Goal Description Anita will be independent in their HEP in order to facilitate return to prior level of function.   Rationale to maximize safety and independence with performance of ADLs and functional tasks   Target Date 08/03/23   PT Goal 3   Goal Identifier shoulder PROM flexino and abduction   Goal Description Anita will increase shoulder flexion and abduction PROM to >/= 100 without pain in order to improve the quality of their ADLs   Rationale to maximize safety and independence with performance of ADLs and functional tasks   Target Date 08/03/23   Subjective Report   Subjective Report She feels the same.  She doesn't think the pain levels have changed and doesn't think therapy is helping.  Since the shoulder squeezes last visit she continues to have pain which varies in intensity.  She does continue to try to do more activities to keep moving her shoulder but is a challenge and is mindful of her  pain levels.   Objective Measure 1   Objective Measure left shoulder flexion, AROM: supine, 95   Details was 20 on initial eval   Objective Measure 2   Objective Measure left shoulder abd, AROM, supine: 90   Details was 40 on initial eval   Objective Measure 3   Objective Measure pain level: 3/10   Treatment Interventions (PT)   Interventions Therapeutic Procedure/Exercise;Manual Therapy   Therapeutic Procedure/Exercise   Therapeutic Procedures: strength, endurance, ROM, flexibillity minutes (66790) 35   Ther Proc 1 UBE   Ther Proc 1 - Details 2.5 min forward and 2.5 min back   Ther Proc 2 bilateral shoulder ER with gentle scapular squeezes; elbows at sides with elbows bent 90 deg   Ther Proc 2 - Details X 5-10, painfree,   Ther Proc 3 pulleys, AAROM, left shoulder flexion and abd X 10   Ther Proc 3 - Details supine B shoulder abd X 10, AAROM   Skilled Intervention Shoulder ROM and postural strengthening.   Patient Response/Progress attempted yellow tband with shoulder ER but pt felt it was too much resistance   Manual Therapy   Manual Therapy: Mobilization, MFR, MLD, friction massage minutes (95638) 10   Manual Therapy 1 MFR   Manual Therapy 1 - Details supine, Soft tissue mobilization and myofascial release to left shoulder to decrease pain and improve mobility.   Patient Response/Progress pain relief with manual therapy   Education   Learner/Method Patient   Plan   Home program PTRx   Updates to plan of care did pt schedule a follow-up with MD?   Plan for next session potential discharge next visit if no change in symptoms   Comments   Comments Assessment:  Anita presents to therapy with a shoulder brace on both of her shoulders today after her recent falls. She continues to feel a lot of pain in both shoulders and isn't sure physical therapy is helping.  She has not tried TENS on her shoulders but may try it before her next visit for pain control. Due to continued pain levels she will make a follow-up visit  with her MD.  Able to try a couple new exercises today that she felt comfortable performing at home.  She will continue to do them in a painfree manner.  She felt slight relief after manual therapy again today but the relief is usually short lived. If pain levels continue and she doesn't feel she is making much progress may discharge next visit   Total Session Time   Timed Code Treatment Minutes 45   Total Treatment Time (sum of timed and untimed services) 45   Medicare Claim Information   Medical Diagnosis Right rotator cuff tear arthropathy   PT Diagnosis Left RC tear   Start of Care Date 05/05/23   Onset date of current episode/exacerbation 03/21/23   Certification date from 05/05/23   Ortho Goal 1   Goal Identifier SPADI   Goal Description Anita will demonstrate a decrease in pain and increase in function as measured by scoring </= 70% on the SPADI.   Goal Progress 82.3%   Target Date 08/03/23   Ortho Goal 2   Goal Identifier HEP   Goal Description Anita will be independent in their HEP in order to facilitate return to prior level of function.   Goal Progress In progress   Target Date 08/03/23   Ortho Goal 3   Goal Identifier Shoulder PROM flexion and abduction   Goal Description Anita will increase shoulder flexion and abduction PROM to >/= 100 without pain in order to improve the quality of their ADLs.   Goal Progress 45 flexion, 45 abduction   Target Date 08/03/23

## 2023-07-07 NOTE — TELEPHONE ENCOUNTER
Could she use diclofenac 1% gel (Voltaren) instead of the patch since that's not covered? This is also available over-the-counter at a reasonable cost.

## 2023-07-21 DIAGNOSIS — E11.9 TYPE 2 DIABETES MELLITUS (H): ICD-10-CM

## 2023-07-21 RX ORDER — METFORMIN HCL 500 MG
TABLET, EXTENDED RELEASE 24 HR ORAL
Qty: 360 TABLET | Refills: 1 | Status: SHIPPED | OUTPATIENT
Start: 2023-07-21 | End: 2024-02-22

## 2023-07-21 NOTE — TELEPHONE ENCOUNTER
"Last Written Prescription Date:  11/30/2022  Last Fill Quantity: 360,  # refills: 1   Last office visit provider:  07/06/2023     Requested Prescriptions   Pending Prescriptions Disp Refills     metFORMIN (GLUCOPHAGE XR) 500 MG 24 hr tablet [Pharmacy Med Name: metFORMIN HCl  MG Oral Tablet Extended Release 24 Hour] 360 tablet 0     Sig: TAKE 2 TABLETS BY MOUTH TWICE DAILY WITH MEALS . APPOINTMENT REQUIRED FOR FUTURE REFILLS       Biguanide Agents Passed - 7/21/2023  4:49 PM        Passed - Patient is age 10 or older        Passed - Patient has documented A1c within the specified period of time.     If HgbA1C is 8 or greater, it needs to be on file within the past 3 months.  If less than 8, must be on file within the past 6 months.     Recent Labs   Lab Test 07/06/23  1037   A1C 7.2*             Passed - Patient's CR is NOT>1.4 OR Patient's EGFR is NOT<45 within past 12 mos.     Recent Labs   Lab Test 07/06/23  1037 10/29/21  1402 06/15/21  1312   GFRESTIMATED 77   < > >60   GFRESTBLACK  --   --  >60    < > = values in this interval not displayed.       Recent Labs   Lab Test 07/06/23  1037   CR 0.82             Passed - Patient does NOT have a diagnosis of CHF.        Passed - Medication is active on med list        Passed - Patient is not pregnant        Passed - Patient has not had a positive pregnancy test within the past 12 mos.         Passed - Recent (6 mo) or future (30 days) visit within the authorizing provider's specialty     Patient had office visit in the last 6 months or has a visit in the next 30 days with authorizing provider or within the authorizing provider's specialty.  See \"Patient Info\" tab in inbasket, or \"Choose Columns\" in Meds & Orders section of the refill encounter.                 Abiola Westfall RN 07/21/23 4:49 PM  "

## 2023-08-18 ENCOUNTER — TRANSFERRED RECORDS (OUTPATIENT)
Dept: HEALTH INFORMATION MANAGEMENT | Facility: CLINIC | Age: 68
End: 2023-08-18
Payer: MEDICAID

## 2023-08-18 LAB — RETINOPATHY: POSITIVE

## 2023-09-08 DIAGNOSIS — K59.01 SLOW TRANSIT CONSTIPATION: ICD-10-CM

## 2023-09-08 DIAGNOSIS — I10 ESSENTIAL HYPERTENSION: ICD-10-CM

## 2023-09-08 DIAGNOSIS — E66.811 OBESITY (BMI 30.0-34.9): ICD-10-CM

## 2023-09-08 NOTE — TELEPHONE ENCOUNTER
"Routing refill request to provider for review/approval because:  Drug not on the [facility] refill protocol   A break in medication    Last Written Prescription Date:  12/2/2022  Last Fill Quantity: 180,  # refills: 0   Last office visit provider:  7/6/2023     Requested Prescriptions   Pending Prescriptions Disp Refills    sennosides (SENOKOT) 8.6 MG tablet [Pharmacy Med Name: Senna 8.6 MG Oral Tablet] 180 tablet 0     Sig: Take 1 tablet by mouth twice daily       There is no refill protocol information for this order   Last Written Prescription Date:  12/2/2022  Last Fill Quantity: 135,  # refills:10   Last office visit provider:  7/6/2023     phentermine (ADIPEX-P) 37.5 MG tablet [Pharmacy Med Name: Phentermine HCl 37.5 MG Oral Tablet] 135 tablet 0     Sig: TAKE 1 & 1/2 (ONE & ONE-HALF) TABLETS BY MOUTH ONCE DAILY IN THE MORNING       There is no refill protocol information for this order   Last Written Prescription Date:  4/4/2022  Last Fill Quantity: 90,  # refills: 2   Last office visit provider:  7/6/2023     lisinopril (ZESTRIL) 2.5 MG tablet [Pharmacy Med Name: Lisinopril 2.5 MG Oral Tablet] 90 tablet 0     Sig: Take 1 tablet by mouth once daily       ACE Inhibitors (Including Combos) Protocol Passed - 9/8/2023 11:21 AM        Passed - Blood pressure under 140/90 in past 12 months     BP Readings from Last 3 Encounters:   07/06/23 112/60   04/05/23 115/70   03/23/23 (!) 141/83                 Passed - Recent (12 mo) or future (30 days) visit within the authorizing provider's specialty     Patient has had an office visit with the authorizing provider or a provider within the authorizing providers department within the previous 12 mos or has a future within next 30 days. See \"Patient Info\" tab in inbasket, or \"Choose Columns\" in Meds & Orders section of the refill encounter.              Passed - Medication is active on med list        Passed - Patient is age 18 or older        Passed - No active pregnancy on record "        Passed - Normal serum creatinine on file in past 12 months     Recent Labs   Lab Test 07/06/23  1037   CR 0.82       Ok to refill medication if creatinine is low          Passed - Normal serum potassium on file in past 12 months     Recent Labs   Lab Test 07/06/23  1037   POTASSIUM 4.4             Passed - No positive pregnancy test within past 12 months             Coby High RN 09/08/23 11:22 AM

## 2023-09-11 RX ORDER — PHENTERMINE HYDROCHLORIDE 37.5 MG/1
TABLET ORAL
Qty: 135 TABLET | Refills: 0 | Status: SHIPPED | OUTPATIENT
Start: 2023-09-11 | End: 2024-02-27

## 2023-09-11 RX ORDER — LISINOPRIL 2.5 MG/1
TABLET ORAL
Qty: 90 TABLET | Refills: 0 | Status: SHIPPED | OUTPATIENT
Start: 2023-09-11 | End: 2023-11-16

## 2023-09-11 RX ORDER — SENNOSIDES 8.6 MG
TABLET ORAL
Qty: 180 TABLET | Refills: 0 | Status: SHIPPED | OUTPATIENT
Start: 2023-09-11 | End: 2023-12-08

## 2023-10-11 ENCOUNTER — TELEPHONE (OUTPATIENT)
Dept: FAMILY MEDICINE | Facility: CLINIC | Age: 68
End: 2023-10-11
Payer: MEDICAID

## 2023-10-11 DIAGNOSIS — M67.911 BILATERAL ROTATOR CUFF DYSFUNCTION: ICD-10-CM

## 2023-10-11 DIAGNOSIS — M67.912 BILATERAL ROTATOR CUFF DYSFUNCTION: ICD-10-CM

## 2023-10-11 NOTE — TELEPHONE ENCOUNTER
[facility]- Medication pended and [facility] for approval

## 2023-10-12 RX ORDER — OXYCODONE HYDROCHLORIDE 5 MG/1
5 TABLET ORAL EVERY 6 HOURS PRN
Qty: 90 TABLET | Refills: 0 | Status: SHIPPED | OUTPATIENT
Start: 2023-10-12 | End: 2024-02-22

## 2023-11-01 DIAGNOSIS — E11.9 TYPE 2 DIABETES MELLITUS WITHOUT COMPLICATION, WITHOUT LONG-TERM CURRENT USE OF INSULIN (H): Chronic | ICD-10-CM

## 2023-11-01 RX ORDER — GLIPIZIDE 5 MG/1
TABLET, FILM COATED, EXTENDED RELEASE ORAL
Qty: 90 TABLET | Refills: 0 | Status: SHIPPED | OUTPATIENT
Start: 2023-11-01 | End: 2024-02-05

## 2023-11-15 NOTE — TELEPHONE ENCOUNTER
Left message to call back for: pt   Information to relay to patient: As far as I know you should be safe to use CBD oil with your medications.  Please continue using your medications.  Let me know if you have any problems.     solids

## 2023-11-16 DIAGNOSIS — E78.2 MIXED HYPERLIPIDEMIA: ICD-10-CM

## 2023-11-16 DIAGNOSIS — I10 ESSENTIAL HYPERTENSION: ICD-10-CM

## 2023-11-16 RX ORDER — ROSUVASTATIN CALCIUM 10 MG/1
TABLET, COATED ORAL
Qty: 90 TABLET | Refills: 1 | Status: SHIPPED | OUTPATIENT
Start: 2023-11-16 | End: 2024-09-05

## 2023-11-16 RX ORDER — LISINOPRIL 2.5 MG/1
TABLET ORAL
Qty: 90 TABLET | Refills: 1 | Status: SHIPPED | OUTPATIENT
Start: 2023-11-16 | End: 2024-02-27

## 2023-12-08 DIAGNOSIS — K59.01 SLOW TRANSIT CONSTIPATION: ICD-10-CM

## 2023-12-08 RX ORDER — SENNOSIDES 8.6 MG
TABLET ORAL
Qty: 180 TABLET | Refills: 0 | Status: SHIPPED | OUTPATIENT
Start: 2023-12-08 | End: 2024-04-24 | Stop reason: DRUGHIGH

## 2023-12-09 PROBLEM — E11.3293 MILD NONPROLIFERATIVE DIABETIC RETINOPATHY OF BOTH EYES WITHOUT MACULAR EDEMA ASSOCIATED WITH TYPE 2 DIABETES MELLITUS (H): Status: ACTIVE | Noted: 2023-12-09

## 2024-02-03 DIAGNOSIS — E11.9 TYPE 2 DIABETES MELLITUS WITHOUT COMPLICATION, WITHOUT LONG-TERM CURRENT USE OF INSULIN (H): Chronic | ICD-10-CM

## 2024-02-05 ENCOUNTER — PATIENT OUTREACH (OUTPATIENT)
Dept: CARE COORDINATION | Facility: CLINIC | Age: 69
End: 2024-02-05
Payer: MEDICAID

## 2024-02-05 DIAGNOSIS — E11.9 TYPE 2 DIABETES MELLITUS WITHOUT COMPLICATION, WITHOUT LONG-TERM CURRENT USE OF INSULIN (H): Chronic | ICD-10-CM

## 2024-02-05 DIAGNOSIS — G89.4 CHRONIC PAIN SYNDROME: Chronic | ICD-10-CM

## 2024-02-05 RX ORDER — GLIPIZIDE 5 MG/1
TABLET, FILM COATED, EXTENDED RELEASE ORAL
Qty: 90 TABLET | Refills: 0 | OUTPATIENT
Start: 2024-02-05

## 2024-02-05 RX ORDER — GLIPIZIDE 5 MG/1
TABLET, FILM COATED, EXTENDED RELEASE ORAL
Qty: 30 TABLET | Refills: 0 | Status: SHIPPED | OUTPATIENT
Start: 2024-02-05 | End: 2024-02-27

## 2024-02-05 RX ORDER — IBUPROFEN 800 MG/1
800 TABLET, FILM COATED ORAL 3 TIMES DAILY PRN
Qty: 100 TABLET | Refills: 0 | Status: SHIPPED | OUTPATIENT
Start: 2024-02-05 | End: 2024-08-20

## 2024-02-19 ENCOUNTER — PATIENT OUTREACH (OUTPATIENT)
Dept: CARE COORDINATION | Facility: CLINIC | Age: 69
End: 2024-02-19
Payer: MEDICAID

## 2024-02-22 ENCOUNTER — MYC REFILL (OUTPATIENT)
Dept: FAMILY MEDICINE | Facility: CLINIC | Age: 69
End: 2024-02-22
Payer: MEDICAID

## 2024-02-22 DIAGNOSIS — E66.811 OBESITY (BMI 30.0-34.9): ICD-10-CM

## 2024-02-22 DIAGNOSIS — M67.912 BILATERAL ROTATOR CUFF DYSFUNCTION: ICD-10-CM

## 2024-02-22 DIAGNOSIS — G89.4 CHRONIC PAIN SYNDROME: Chronic | ICD-10-CM

## 2024-02-22 DIAGNOSIS — S46.911D: ICD-10-CM

## 2024-02-22 DIAGNOSIS — E55.9 VITAMIN D DEFICIENCY: ICD-10-CM

## 2024-02-22 DIAGNOSIS — G89.29 OTHER CHRONIC PAIN: ICD-10-CM

## 2024-02-22 DIAGNOSIS — G89.4 CHRONIC PAIN SYNDROME: ICD-10-CM

## 2024-02-22 DIAGNOSIS — E11.9 TYPE 2 DIABETES MELLITUS WITHOUT COMPLICATION, WITHOUT LONG-TERM CURRENT USE OF INSULIN (H): Primary | ICD-10-CM

## 2024-02-22 DIAGNOSIS — E11.9 TYPE 2 DIABETES MELLITUS (H): ICD-10-CM

## 2024-02-22 DIAGNOSIS — M12.9 ARTHRITIS/ARTHROPATHY OF MULTIPLE JOINTS: ICD-10-CM

## 2024-02-22 DIAGNOSIS — M67.911 BILATERAL ROTATOR CUFF DYSFUNCTION: ICD-10-CM

## 2024-02-22 DIAGNOSIS — J40 BRONCHITIS: Primary | ICD-10-CM

## 2024-02-22 DIAGNOSIS — R05.9 COUGH: ICD-10-CM

## 2024-02-22 RX ORDER — PHENTERMINE HYDROCHLORIDE 37.5 MG/1
TABLET ORAL
Qty: 135 TABLET | Refills: 0 | OUTPATIENT
Start: 2024-02-22

## 2024-02-22 RX ORDER — GABAPENTIN 300 MG/1
CAPSULE ORAL
Qty: 240 CAPSULE | Refills: 0 | Status: SHIPPED | OUTPATIENT
Start: 2024-02-22 | End: 2024-06-19

## 2024-02-22 RX ORDER — ERGOCALCIFEROL 1.25 MG/1
1 CAPSULE ORAL WEEKLY
Qty: 12 CAPSULE | Refills: 1 | Status: SHIPPED | OUTPATIENT
Start: 2024-02-22 | End: 2024-08-19

## 2024-02-22 RX ORDER — DULOXETIN HYDROCHLORIDE 60 MG/1
CAPSULE, DELAYED RELEASE ORAL
Qty: 90 CAPSULE | Refills: 0 | Status: SHIPPED | OUTPATIENT
Start: 2024-02-22 | End: 2024-08-20

## 2024-02-23 RX ORDER — DICLOFENAC EPOLAMINE 0.01 G/1
1 SYSTEM TOPICAL EVERY 12 HOURS PRN
Qty: 60 PATCH | Refills: 5 | Status: SHIPPED | OUTPATIENT
Start: 2024-02-23

## 2024-02-23 RX ORDER — OXYCODONE HYDROCHLORIDE 5 MG/1
5 TABLET ORAL EVERY 6 HOURS PRN
Qty: 90 TABLET | Refills: 0 | Status: SHIPPED | OUTPATIENT
Start: 2024-02-23 | End: 2024-07-01

## 2024-02-26 ENCOUNTER — TELEPHONE (OUTPATIENT)
Dept: FAMILY MEDICINE | Facility: CLINIC | Age: 69
End: 2024-02-26
Payer: MEDICAID

## 2024-02-26 NOTE — TELEPHONE ENCOUNTER
Prior Authorization Retail Medication Request    Medication/Dose: diclofenac (FLECTOR) 1.3 % patch  Diagnosis and ICD code (if different than what is on RX):  see chart  New/renewal/insurance change PA/secondary ins. PA:  Previously Tried and Failed:  see chart  Rationale:  see chart    Insurance   Primary: [facility]  Insurance ID:  [id number]     Secondary (if applicable):Medicaid  Insurance ID:  [id number]     Cover my meds key: [id number]

## 2024-02-27 ENCOUNTER — OFFICE VISIT (OUTPATIENT)
Dept: FAMILY MEDICINE | Facility: CLINIC | Age: 69
End: 2024-02-27
Payer: COMMERCIAL

## 2024-02-27 VITALS
HEIGHT: 64 IN | HEART RATE: 83 BPM | OXYGEN SATURATION: 99 % | SYSTOLIC BLOOD PRESSURE: 108 MMHG | BODY MASS INDEX: 33.63 KG/M2 | DIASTOLIC BLOOD PRESSURE: 73 MMHG | RESPIRATION RATE: 16 BRPM | TEMPERATURE: 97.8 F | WEIGHT: 197 LBS

## 2024-02-27 DIAGNOSIS — E11.9 TYPE 2 DIABETES MELLITUS WITHOUT COMPLICATION, WITHOUT LONG-TERM CURRENT USE OF INSULIN (H): Chronic | ICD-10-CM

## 2024-02-27 DIAGNOSIS — E66.01 MORBID OBESITY (H): ICD-10-CM

## 2024-02-27 DIAGNOSIS — F33.1 MODERATE EPISODE OF RECURRENT MAJOR DEPRESSIVE DISORDER (H): Chronic | ICD-10-CM

## 2024-02-27 DIAGNOSIS — G47.33 OSA (OBSTRUCTIVE SLEEP APNEA): Chronic | ICD-10-CM

## 2024-02-27 DIAGNOSIS — E78.2 MIXED HYPERLIPIDEMIA: Chronic | ICD-10-CM

## 2024-02-27 DIAGNOSIS — I10 ESSENTIAL HYPERTENSION: Chronic | ICD-10-CM

## 2024-02-27 DIAGNOSIS — J45.30 MILD PERSISTENT ASTHMA WITHOUT COMPLICATION: Chronic | ICD-10-CM

## 2024-02-27 DIAGNOSIS — Z79.899 CONTROLLED SUBSTANCE AGREEMENT SIGNED: ICD-10-CM

## 2024-02-27 DIAGNOSIS — Z23 IMMUNIZATION DUE: ICD-10-CM

## 2024-02-27 DIAGNOSIS — G89.4 CHRONIC PAIN SYNDROME: Primary | Chronic | ICD-10-CM

## 2024-02-27 LAB — HBA1C MFR BLD: 7.6 % (ref 0–5.6)

## 2024-02-27 PROCEDURE — 82570 ASSAY OF URINE CREATININE: CPT | Mod: 59 | Performed by: FAMILY MEDICINE

## 2024-02-27 PROCEDURE — G0481 DRUG TEST DEF 8-14 CLASSES: HCPCS | Performed by: FAMILY MEDICINE

## 2024-02-27 PROCEDURE — 91320 SARSCV2 VAC 30MCG TRS-SUC IM: CPT | Performed by: FAMILY MEDICINE

## 2024-02-27 PROCEDURE — 80061 LIPID PANEL: CPT | Performed by: FAMILY MEDICINE

## 2024-02-27 PROCEDURE — 83036 HEMOGLOBIN GLYCOSYLATED A1C: CPT | Performed by: FAMILY MEDICINE

## 2024-02-27 PROCEDURE — 90480 ADMN SARSCOV2 VAC 1/ONLY CMP: CPT | Performed by: FAMILY MEDICINE

## 2024-02-27 PROCEDURE — 82043 UR ALBUMIN QUANTITATIVE: CPT | Performed by: FAMILY MEDICINE

## 2024-02-27 PROCEDURE — G0008 ADMIN INFLUENZA VIRUS VAC: HCPCS | Performed by: FAMILY MEDICINE

## 2024-02-27 PROCEDURE — 99215 OFFICE O/P EST HI 40 MIN: CPT | Mod: 25 | Performed by: FAMILY MEDICINE

## 2024-02-27 PROCEDURE — 36415 COLL VENOUS BLD VENIPUNCTURE: CPT | Performed by: FAMILY MEDICINE

## 2024-02-27 PROCEDURE — 90662 IIV NO PRSV INCREASED AG IM: CPT | Performed by: FAMILY MEDICINE

## 2024-02-27 PROCEDURE — 80053 COMPREHEN METABOLIC PANEL: CPT | Performed by: FAMILY MEDICINE

## 2024-02-27 RX ORDER — GLIPIZIDE 5 MG/1
TABLET, FILM COATED, EXTENDED RELEASE ORAL
Qty: 30 TABLET | Refills: 0 | Status: SHIPPED | OUTPATIENT
Start: 2024-02-27 | End: 2024-04-12

## 2024-02-27 RX ORDER — CODEINE PHOSPHATE AND GUAIFENESIN 10; 100 MG/5ML; MG/5ML
SOLUTION ORAL
Qty: 120 ML | Refills: 0 | Status: SHIPPED | OUTPATIENT
Start: 2024-02-27

## 2024-02-27 RX ORDER — PHENTERMINE HYDROCHLORIDE 37.5 MG/1
TABLET ORAL
Qty: 135 TABLET | Refills: 0 | Status: SHIPPED | OUTPATIENT
Start: 2024-02-27 | End: 2024-03-25

## 2024-02-27 RX ORDER — PHENTERMINE HYDROCHLORIDE 37.5 MG/1
TABLET ORAL
Qty: 135 TABLET | Refills: 0 | OUTPATIENT
Start: 2024-02-27

## 2024-02-27 RX ORDER — RESPIRATORY SYNCYTIAL VIRUS VACCINE 120MCG/0.5
0.5 KIT INTRAMUSCULAR ONCE
Qty: 1 EACH | Refills: 0 | Status: CANCELLED | OUTPATIENT
Start: 2024-02-27 | End: 2024-02-27

## 2024-02-27 RX ORDER — GABAPENTIN 300 MG/1
CAPSULE ORAL
Qty: 240 CAPSULE | Refills: 1 | OUTPATIENT
Start: 2024-02-27

## 2024-02-27 RX ORDER — METFORMIN HCL 500 MG
TABLET, EXTENDED RELEASE 24 HR ORAL
Qty: 360 TABLET | Refills: 1 | Status: SHIPPED | OUTPATIENT
Start: 2024-02-27 | End: 2024-09-26

## 2024-02-27 RX ORDER — LISINOPRIL 2.5 MG/1
2.5 TABLET ORAL DAILY
Qty: 90 TABLET | Refills: 1 | Status: SHIPPED | OUTPATIENT
Start: 2024-02-27 | End: 2024-07-01

## 2024-02-27 ASSESSMENT — ANXIETY QUESTIONNAIRES
GAD7 TOTAL SCORE: 2
2. NOT BEING ABLE TO STOP OR CONTROL WORRYING: NOT AT ALL
4. TROUBLE RELAXING: NOT AT ALL
5. BEING SO RESTLESS THAT IT IS HARD TO SIT STILL: NOT AT ALL
3. WORRYING TOO MUCH ABOUT DIFFERENT THINGS: SEVERAL DAYS
1. FEELING NERVOUS, ANXIOUS, OR ON EDGE: NOT AT ALL
GAD7 TOTAL SCORE: 2
8. IF YOU CHECKED OFF ANY PROBLEMS, HOW DIFFICULT HAVE THESE MADE IT FOR YOU TO DO YOUR WORK, TAKE CARE OF THINGS AT HOME, OR GET ALONG WITH OTHER PEOPLE?: NOT DIFFICULT AT ALL
6. BECOMING EASILY ANNOYED OR IRRITABLE: NOT AT ALL
7. FEELING AFRAID AS IF SOMETHING AWFUL MIGHT HAPPEN: SEVERAL DAYS
7. FEELING AFRAID AS IF SOMETHING AWFUL MIGHT HAPPEN: SEVERAL DAYS
GAD7 TOTAL SCORE: 2
IF YOU CHECKED OFF ANY PROBLEMS ON THIS QUESTIONNAIRE, HOW DIFFICULT HAVE THESE PROBLEMS MADE IT FOR YOU TO DO YOUR WORK, TAKE CARE OF THINGS AT HOME, OR GET ALONG WITH OTHER PEOPLE: NOT DIFFICULT AT ALL

## 2024-02-27 ASSESSMENT — ASTHMA QUESTIONNAIRES
QUESTION_5 LAST FOUR WEEKS HOW WOULD YOU RATE YOUR ASTHMA CONTROL: WELL CONTROLLED
ACT_TOTALSCORE: 23
QUESTION_1 LAST FOUR WEEKS HOW MUCH OF THE TIME DID YOUR ASTHMA KEEP YOU FROM GETTING AS MUCH DONE AT WORK, SCHOOL OR AT HOME: NONE OF THE TIME
QUESTION_3 LAST FOUR WEEKS HOW OFTEN DID YOUR ASTHMA SYMPTOMS (WHEEZING, COUGHING, SHORTNESS OF BREATH, CHEST TIGHTNESS OR PAIN) WAKE YOU UP AT NIGHT OR EARLIER THAN USUAL IN THE MORNING: NOT AT ALL
QUESTION_4 LAST FOUR WEEKS HOW OFTEN HAVE YOU USED YOUR RESCUE INHALER OR NEBULIZER MEDICATION (SUCH AS ALBUTEROL): NOT AT ALL
QUESTION_2 LAST FOUR WEEKS HOW OFTEN HAVE YOU HAD SHORTNESS OF BREATH: ONCE OR TWICE A WEEK
ACT_TOTALSCORE: 23

## 2024-02-27 ASSESSMENT — PATIENT HEALTH QUESTIONNAIRE - PHQ9
SUM OF ALL RESPONSES TO PHQ QUESTIONS 1-9: 6
SUM OF ALL RESPONSES TO PHQ QUESTIONS 1-9: 6
10. IF YOU CHECKED OFF ANY PROBLEMS, HOW DIFFICULT HAVE THESE PROBLEMS MADE IT FOR YOU TO DO YOUR WORK, TAKE CARE OF THINGS AT HOME, OR GET ALONG WITH OTHER PEOPLE: NOT DIFFICULT AT ALL

## 2024-02-27 ASSESSMENT — PAIN SCALES - GENERAL: PAINLEVEL: NO PAIN (0)

## 2024-02-27 NOTE — PROGRESS NOTES
Assessment & Plan     Chronic pain syndrome  Patient with history of fibromyalgia, spine, shoulders and knee pain amongst other things.  She has a long time history of chronic pain.  We have a CSA for oxycodone 5 mg 90 tablets in 90 days.    Controlled substance agreement signed  New CSA signed today.  UDS today.  - [id number] - Urine Drug Confirmation Panel (Comprehensive)    Type 2 diabetes mellitus without complication, without long-term current use of insulin (H)  Typically has been pretty well-controlled in the last year or 2 with A1c's in the low 7% range.  Will check labs and refill her diabetes meds.  Discussed she might benefit from MTM referral to find out if the GLP-1 agonist would be affordable for her.  This would help her with her weight as well.  - Hemoglobin A1c  - Albumin Random Urine Quantitative with Creat Ratio  - glipiZIDE (GLUCOTROL XL) 5 MG 24 hr tablet  Dispense: 30 tablet; Refill: 0    Essential hypertension  Well-controlled on her current meds.  Will refill lisinopril and check labs.  - Comprehensive metabolic panel (BMP + Alb, Alk Phos, ALT, AST, Total. Bili, TP)  - lisinopril (ZESTRIL) 2.5 MG tablet  Dispense: 90 tablet; Refill: 1    Mixed hyperlipidemia  Currently on Crestor 10 mg.  Will check labs and refill accordingly.  - Lipid panel reflex to direct LDL Fasting    Moderate episode of recurrent major depressive disorder (H)  PHQ-9 score today is 6 with sleep and energy being the big problems.  These of course are a reflection of her chronic pain.  Will continue with her duloxetine.    Mild persistent asthma without complication  ACT score today is 23.  She has Flovent 110 mcg available if she goes into an exacerbation.  She has not had problems lately unless she was sick.    JANINE (obstructive sleep apnea)  Noncompliant with CPAP.    Obesity (BMI 30.0-34.9)  BMI today is 34.35.  This is increased from previous 31.  I will refill her phentermine which is also on her controlled substance  "agreement and refer her to [facility] for possible GLP-1 agonist use.  - phentermine (ADIPEX-P) 37.5 MG tablet  Dispense: 135 tablet; Refill: 0    Immunization due  Willing to have her COVID and flu shots today.  - INFLUENZA VACCINE 65+ (FLUZONE HD)  - COVID-19 12+ (2023-24) (PFIZER)    I will get back to her on her lab results by MyChart or mail and only call with grossly abnormal values.    I spent a total of 46 minutes on the day of the visit.   Time spent by me doing chart review, history and exam, documentation and further activities per the note      BMI  Estimated body mass index is 34.35 kg/m  as calculated from the following:    Height as of this encounter: 1.613 m (5' 3.5\").    Weight as of this encounter: 89.4 kg (197 lb).   Weight management plan: Discussed healthy diet and exercise guidelines      FUTURE APPOINTMENTS:       - Follow-up visit in approximately 3-4 months for her annual wellness visit and diabetes recheck.      Willem Howard is a 68 year old, presenting for the following health issues:  Recheck Medication (Med check )        2/27/2024     9:54 AM   Additional Questions   Roomed by vasile tran cma     History of Present Illness       Reason for visit:  Med check    She eats 0-1 servings of fruits and vegetables daily.She consumes 1 sweetened beverage(s) daily.She exercises with enough effort to increase her heart rate 9 or less minutes per day.  She exercises with enough effort to increase her heart rate 3 or less days per week. She is missing 1 dose(s) of medications per week.  She is not taking prescribed medications regularly due to remembering to take.  PHQ-9:      2/27/2024     9:50 AM   Last PHQ-9   1.  Little interest or pleasure in doing things 0   2.  Feeling down, depressed, or hopeless 0   3.  Trouble falling or staying asleep, or sleeping too much 3   4.  Feeling tired or having little energy 3   5.  Poor appetite or overeating 0   6.  Feeling bad about yourself 0   7.  Trouble " concentrating 0   8.  Moving slowly or restless 0   Q9: Thoughts of better off dead/self-harm past 2 weeks 0   PHQ-9 Total Score 6       GAD7:      2/27/2024     9:50 AM   KINDRA-7    1. Feeling nervous, anxious, or on edge 0   2. Not being able to stop or control worrying 0   3. Worrying too much about different things 1   4. Trouble relaxing 0   5. Being so restless that it is hard to sit still 0   6. Becoming easily annoyed or irritable 0   7. Feeling afraid, as if something awful might happen 1   KINDRA-7 Total Score 2   If you checked any problems, how difficult have they made it for you to do your work, take care of things at home, or get along with other people? Not difficult at all     I have not seen this patient since early July of last year.  She has diabetes and is supposed to see me at least every 6 months.  Patient tells me that she has gained weight since I last saw her.  When I last saw her she was quite stressed with moving to a new house and getting things fixed up etc.  During this active time she lost weight.  Now she is desperate to try to lose some more weight.  We already have a controlled substance agreement for phentermine and she takes 1-1/2 pills daily.  She asks about orlistat.  I told her this does not appear to be covered by her insurance.  I told her that she could check how well it is covered by good Rx and if she decides to move forward with using this medication, then she can let me know.  She also tells me about something she saw on TV.  Her significant other Minh tells her she needs to get more active.  But she is always having chronic pain.  He is still working.  She said he commented on her sitting in front of the TV when he leaves for work and then he comes home and she is sitting in front of the TV.  He does not seem to get that somebody is watching his close and making his food.  She does need to get moving but she has found this difficult as she gets older.  Right now she is  "really having her shoulders bothering her her.  Her left shoulder is the worst and the orthopedic surgeon told her she would need a new shoulder replacement.  Apparently she has a tear of the tendon in her right shoulder.        Objective    /73   Pulse 83   Temp 97.8  F (36.6  C)   Resp 16   Ht 1.613 m (5' 3.5\")   Wt 89.4 kg (197 lb)   SpO2 99%   BMI 34.35 kg/m    Body mass index is 34.35 kg/m .  Physical Exam   GENERAL: alert, no distress, and mildly obese  RESP: lungs clear to auscultation - no rales, rhonchi or wheezes and decreased breath sounds bilateral  CV: regular rates and rhythm  ABDOMEN: soft, nontender  MS: no gross musculoskeletal defects noted, no edema  Psych: Flat affect, anxious    Results for orders placed or performed in visit on 02/27/24 (from the past 24 hour(s))   Hemoglobin A1c   Result Value Ref Range    Hemoglobin A1C 7.6 (H) 0.0 - 5.6 %   [id number] - Urine Drug Confirmation Panel (Comprehensive)    Narrative    The following orders were created for panel order [id number] - Urine Drug Confirmation Panel (Comprehensive).  Procedure                               Abnormality         Status                     ---------                               -----------         ------                     Urine Drug Confirmation ...[[id number]]                      In process                 Urine Creatinine for Won...[[id number]]                      In process                   Please view results for these tests on the individual orders.           Signed Electronically by: Trish Eagle MD    "

## 2024-02-27 NOTE — LETTER
March 4, 2024      Anita SINGH Neto  [address] [place] [address]        Dear ,    We are writing to inform you of your test results.    Except for a mildly higher A1c, these results look quite good.  I prefer to see your A1c under 7.5%.  It would be great if we could get you on Ozempic, Mounjaro or Trulicity or another of the GLP-1 meds.  It would help you lose weight and bring down your A1c nicely.  Please make sure to call your insurance and ask which meds they cover and how much it will cost you.  See you in 4 months.    Resulted Orders   Hemoglobin A1c   Result Value Ref Range    Hemoglobin A1C 7.6 (H) 0.0 - 5.6 %      Comment:      Normal <5.7%   Prediabetes 5.7-6.4%    Diabetes 6.5% or higher     Note: Adopted from ADA consensus guidelines.   Lipid panel reflex to direct LDL Fasting   Result Value Ref Range    Cholesterol 172 <200 mg/dL    Triglycerides 167 (H) <150 mg/dL    Direct Measure HDL 70 >=50 mg/dL    LDL Cholesterol Calculated 69 <=100 mg/dL    Non HDL Cholesterol 102 <130 mg/dL    Patient Fasting > 8hrs? Yes     Narrative    Cholesterol  Desirable:  <200 mg/dL    Triglycerides  Normal:  Less than 150 mg/dL  Borderline High:  150-199 mg/dL  High:  200-499 mg/dL  Very High:  Greater than or equal to 500 mg/dL    Direct Measure HDL  Female:  Greater than or equal to 50 mg/dL   Male:  Greater than or equal to 40 mg/dL    LDL Cholesterol  Desirable:  <100mg/dL  Above Desirable:  100-129 mg/dL   Borderline High:  130-159 mg/dL   High:  160-189 mg/dL   Very High:  >= 190 mg/dL    Non HDL Cholesterol  Desirable:  130 mg/dL  Above Desirable:  130-159 mg/dL  Borderline High:  160-189 mg/dL  High:  190-219 mg/dL  Very High:  Greater than or equal to 220 mg/dL   Comprehensive metabolic panel (BMP + Alb, Alk Phos, ALT, AST, Total. Bili, TP)   Result Value Ref Range    Sodium 140 135 - 145 mmol/L      Comment:      Reference intervals for this test were updated on 09/26/2023 to more  accurately reflect our healthy population. There may be differences in the flagging of prior results with similar values performed with this method. Interpretation of those prior results can be made in the context of the updated reference intervals.     Potassium 4.4 3.4 - 5.3 mmol/L    Carbon Dioxide (CO2) 27 22 - 29 mmol/L    Anion Gap 12 7 - 15 mmol/L    Urea Nitrogen 21.1 8.0 - 23.0 mg/dL    Creatinine 0.84 0.51 - 0.95 mg/dL    GFR Estimate 74 >60 mL/min/1.73m2    Calcium 9.4 8.8 - 10.2 mg/dL    Chloride 101 98 - 107 mmol/L    Glucose 168 (H) 70 - 99 mg/dL    Alkaline Phosphatase 93 40 - 150 U/L      Comment:      Reference intervals for this test were updated on 11/14/2023 to more accurately reflect our healthy population. There may be differences in the flagging of prior results with similar values performed with this method. Interpretation of those prior results can be made in the context of the updated reference intervals.    AST 24 0 - 45 U/L      Comment:      Reference intervals for this test were updated on 6/12/2023 to more accurately reflect our healthy population. There may be differences in the flagging of prior results with similar values performed with this method. Interpretation of those prior results can be made in the context of the updated reference intervals.    ALT 21 0 - 50 U/L      Comment:      Reference intervals for this test were updated on 6/12/2023 to more accurately reflect our healthy population. There may be differences in the flagging of prior results with similar values performed with this method. Interpretation of those prior results can be made in the context of the updated reference intervals.      Protein Total 7.0 6.4 - 8.3 g/dL    Albumin 4.4 3.5 - 5.2 g/dL    Bilirubin Total 0.5 <=1.2 mg/dL   Albumin Random Urine Quantitative with Creat Ratio   Result Value Ref Range    Creatinine Urine mg/dL 71.3 mg/dL      Comment:      The reference ranges have not been established in  urine creatinine. The results should be integrated into the clinical context for interpretation.    Albumin Urine mg/L 14.1 mg/L      Comment:      The reference ranges have not been established in urine albumin. The results should be integrated into the clinical context for interpretation.    Albumin Urine mg/g Cr 19.78 0.00 - 25.00 mg/g Cr      Comment:      Microalbuminuria is defined as an albumin:creatinine ratio of 17 to 299 for males and 25 to 299 for females. A ratio of albumin:creatinine of 300 or higher is indicative of overt proteinuria.  Due to biologic variability, positive results should be confirmed by a second, first-morning random or 24-hour timed urine specimen. If there is discrepancy, a third specimen is recommended. When 2 out of 3 results are in the microalbuminuria range, this is evidence for incipient nephropathy and warrants increased efforts at glucose control, blood pressure control, and institution of therapy with an angiotensin-converting-enzyme (ACE) inhibitor (if the patient can tolerate it).     Urine Drug Confirmation Panel   Result Value Ref Range    Gabapentin (Neurontin) Present (A) Absent      Comment:      Sources of gabapentin are prescription medications.    Narrative    This test was developed and its performance characteristics determined by the [facility],  Special Chemistry Laboratory. It has not been cleared or approved by the FDA. The laboratory is regulated under CLIA as qualified to perform high-complexity testing. This test is used for clinical purposes. It should not be regarded as investigational or for research.    Drugs with concentrations less than the cutoff will not be reported.  The drugs with applicable detection cutoff limits that are included within the Drug Confirmation Panel are:    The following drugs are detected with a cutoff of 3 ng/ml: FENTANYL    The following drugs are detected with a cutoff of 5 ng/mL: 6-ACETYLMORPHINE,  BUPRENORPHINE, NALOXONE, NORBUPRENORPHINE, NORFENTANYL    The following drugs are detected with a cutoff of 10 ng/mL: SUFENTANIL    The following drugs are detected with a cutoff of 20 ng/mL: PCP (PHENCYCLIDINE)    The following drugs are detected with a cutoff of 50 ng/mL: 7-AMINOCLONAZEPAM, 7-AMINOFLUNITRAZEPAM, ALPRAZOLAM, AMPHETAMINE, A-OH-ALPRAZOLAM, A-OH-MIDAZOLAM, A-OH-TRIAZOLAM, BENZOYLECGONINE (Cocaine Metabolite), CLONAZEPAM, COCAETHYLENE (Cocaine Metabolite), CODEINE, DIAZEPAM, DIHYDROCODEINE, EDDP (Methadone Metabolite), HYDROCODONE, HYDROMORPHONE, LORAZEPAM, MDA (3,4-Methylenedioxyamphetamine), MDEA (3,0-Sgqphnkfftxdkz-G-ethylcathinone), MDMA (Methylenedioxyamphetamine,Ecstasy), MEPERIDINE, METHADONE, METHAMPHETAMINE, METHYLPHENIDATE (Ritalin), MORPHINE, NALTREXONE, N-DESMETH-TAPENTADOL, NORCODEINE, NORDIAZEPAM, NORMEPERIDINE, O-SHERI-TRAMADOL, OXAZEPAM, OXYCODONE, OXYMORPHONE, PROPOXYPHENE, RITALINIC ACID, TAPENTADOL, TEMAZEPAM, THEBAINE, TRAMADOL    The following drugs are detected with a cutoff of 100 ng/mL: GABAPENTIN, KETAMINE    The following drugs are detected with a cutoff of 200 ng/mL: PREGABALIN, XYLAZINE     Urine Creatinine for Drug Screen Panel   Result Value Ref Range    Creatinine Urine for Drug Screen 71 mg/dL      Comment:      The reference range has not been established for creatinine in random urines. The results should be integrated into the clinical context for interpretation.       If you have any questions or concerns, please call the clinic at the number listed above.       Sincerely,      Trish Eagle MD

## 2024-02-27 NOTE — LETTER

## 2024-02-28 ENCOUNTER — TELEPHONE (OUTPATIENT)
Dept: FAMILY MEDICINE | Facility: CLINIC | Age: 69
End: 2024-02-28
Payer: MEDICAID

## 2024-02-28 LAB
ALBUMIN SERPL BCG-MCNC: 4.4 G/DL (ref 3.5–5.2)
ALP SERPL-CCNC: 93 U/L (ref 40–150)
ALT SERPL W P-5'-P-CCNC: 21 U/L (ref 0–50)
ANION GAP SERPL CALCULATED.3IONS-SCNC: 12 MMOL/L (ref 7–15)
AST SERPL W P-5'-P-CCNC: 24 U/L (ref 0–45)
BILIRUB SERPL-MCNC: 0.5 MG/DL
BUN SERPL-MCNC: 21.1 MG/DL (ref 8–23)
CALCIUM SERPL-MCNC: 9.4 MG/DL (ref 8.8–10.2)
CHLORIDE SERPL-SCNC: 101 MMOL/L (ref 98–107)
CHOLEST SERPL-MCNC: 172 MG/DL
CREAT SERPL-MCNC: 0.84 MG/DL (ref 0.51–0.95)
CREAT UR-MCNC: 71 MG/DL
CREAT UR-MCNC: 71.3 MG/DL
DEPRECATED HCO3 PLAS-SCNC: 27 MMOL/L (ref 22–29)
EGFRCR SERPLBLD CKD-EPI 2021: 74 ML/MIN/1.73M2
FASTING STATUS PATIENT QL REPORTED: YES
GLUCOSE SERPL-MCNC: 168 MG/DL (ref 70–99)
HDLC SERPL-MCNC: 70 MG/DL
LDLC SERPL CALC-MCNC: 69 MG/DL
MICROALBUMIN UR-MCNC: 14.1 MG/L
MICROALBUMIN/CREAT UR: 19.78 MG/G CR (ref 0–25)
NONHDLC SERPL-MCNC: 102 MG/DL
POTASSIUM SERPL-SCNC: 4.4 MMOL/L (ref 3.4–5.3)
PROT SERPL-MCNC: 7 G/DL (ref 6.4–8.3)
SODIUM SERPL-SCNC: 140 MMOL/L (ref 135–145)
TRIGL SERPL-MCNC: 167 MG/DL

## 2024-02-28 NOTE — TELEPHONE ENCOUNTER
Prior Authorization Retail Medication Request    Medication/Dose: phentermine (ADIPEX-P) 37.5 MG tablet  Diagnosis and ICD code (if different than what is on RX):  see chart  New/renewal/insurance change PA/secondary ins. PA:  Previously Tried and Failed:  see chart  Rationale:  see chart    Insurance   Primary: [facility]  Insurance ID:  [id number]     Secondary (if applicable):Medicaid [place]  Insurance ID:  [id number]     Cover My Meds Key: [id number]

## 2024-02-29 LAB — GABAPENTIN UR QL CFM: PRESENT

## 2024-03-06 DIAGNOSIS — R05.9 COUGH, UNSPECIFIED TYPE: Primary | ICD-10-CM

## 2024-03-07 ENCOUNTER — TELEPHONE (OUTPATIENT)
Dept: FAMILY MEDICINE | Facility: CLINIC | Age: 69
End: 2024-03-07
Payer: MEDICAID

## 2024-03-07 RX ORDER — BENZONATATE 200 MG/1
CAPSULE ORAL
Qty: 30 CAPSULE | Refills: 0 | Status: SHIPPED | OUTPATIENT
Start: 2024-03-07

## 2024-03-07 NOTE — TELEPHONE ENCOUNTER
PRIOR AUTHORIZATION DENIED    Medication: BENZONATATE 200 MG PO CAPS  Insurance Company: [employer] ([facility]) - Phone [phone] Fax [phone]  Denial Date: 3/7/2024  Denial Reason(s):     Appeal Information: If provider would like to appeal we will need a detailed letter of medical necessity to start the process.     Patient Notified: No

## 2024-03-07 NOTE — TELEPHONE ENCOUNTER
Please call the patient and let her know that benzonatate is not covered by her insurance.  She can pay out-of-pocket or she can use GoodRx.  I think she should be able to get this rather affordably if really needed.

## 2024-03-08 NOTE — TELEPHONE ENCOUNTER
Retail Pharmacy Prior Authorization Team   Phone: [phone]    EPA APPROVED, RX RELEASED TO PHARMACY ONCE APPROVED -

## 2024-03-12 ENCOUNTER — MYC MEDICAL ADVICE (OUTPATIENT)
Dept: FAMILY MEDICINE | Facility: CLINIC | Age: 69
End: 2024-03-12
Payer: MEDICAID

## 2024-03-12 DIAGNOSIS — E66.01 CLASS 2 SEVERE OBESITY DUE TO EXCESS CALORIES WITH SERIOUS COMORBIDITY AND BODY MASS INDEX (BMI) OF 38.0 TO 38.9 IN ADULT (H): Primary | ICD-10-CM

## 2024-03-12 DIAGNOSIS — E66.812 CLASS 2 SEVERE OBESITY DUE TO EXCESS CALORIES WITH SERIOUS COMORBIDITY AND BODY MASS INDEX (BMI) OF 38.0 TO 38.9 IN ADULT (H): Primary | ICD-10-CM

## 2024-03-12 DIAGNOSIS — E66.811 CLASS 1 OBESITY DUE TO EXCESS CALORIES WITH SERIOUS COMORBIDITY AND BODY MASS INDEX (BMI) OF 34.0 TO 34.9 IN ADULT: ICD-10-CM

## 2024-03-12 DIAGNOSIS — E66.09 CLASS 1 OBESITY DUE TO EXCESS CALORIES WITH SERIOUS COMORBIDITY AND BODY MASS INDEX (BMI) OF 34.0 TO 34.9 IN ADULT: ICD-10-CM

## 2024-03-12 RX ORDER — TIRZEPATIDE 2.5 MG/.5ML
2.5 INJECTION, SOLUTION SUBCUTANEOUS
Qty: 2 ML | Refills: 0 | Status: SHIPPED | OUTPATIENT
Start: 2024-03-12 | End: 2024-03-25 | Stop reason: DRUGHIGH

## 2024-03-12 NOTE — TELEPHONE ENCOUNTER
Central Prior Authorization Team   Phone: [phone]    PA Initiation    Medication: phentermine (ADIPEX-P) 37.5 MG tablet  Insurance Company: [employer] - Phone [phone] Fax [phone]  Pharmacy Filling the Rx: [facility] [address] [id number] [facility]  Filling Pharmacy Phone: [phone]  Filling Pharmacy Fax:    Start Date: 3/12/2024

## 2024-03-13 ENCOUNTER — MYC REFILL (OUTPATIENT)
Dept: FAMILY MEDICINE | Facility: CLINIC | Age: 69
End: 2024-03-13
Payer: MEDICAID

## 2024-03-13 DIAGNOSIS — M12.9 ARTHRITIS/ARTHROPATHY OF MULTIPLE JOINTS: ICD-10-CM

## 2024-03-13 DIAGNOSIS — G89.4 CHRONIC PAIN SYNDROME: ICD-10-CM

## 2024-03-14 ENCOUNTER — TELEPHONE (OUTPATIENT)
Dept: FAMILY MEDICINE | Facility: CLINIC | Age: 69
End: 2024-03-14
Payer: MEDICAID

## 2024-03-14 DIAGNOSIS — G89.4 CHRONIC PAIN SYNDROME: ICD-10-CM

## 2024-03-14 DIAGNOSIS — M12.9 ARTHRITIS/ARTHROPATHY OF MULTIPLE JOINTS: ICD-10-CM

## 2024-03-14 RX ORDER — MELOXICAM 15 MG/1
15 TABLET ORAL DAILY
OUTPATIENT
Start: 2024-03-14

## 2024-03-14 NOTE — TELEPHONE ENCOUNTER
Message from pharmacy to prescriber:    Please specify amount in grams to use per application.    diclofenac (VOLTAREN) 1 % topical gel

## 2024-03-17 NOTE — TELEPHONE ENCOUNTER
PRIOR AUTHORIZATION DENIED    Medication: phentermine (ADIPEX-P) 37.5 MG tablet-PA DENIED     Denial Date: 3/12/2024    Denial Rational: Medication Exclusion From Patients Benefit Plan.          Appeal Information: N/A

## 2024-03-18 NOTE — TELEPHONE ENCOUNTER
Please call the patient and let her know that her phentermine was denied.  Since she is going to do the GLP-1 agonist Mounjaro, I think we can go without this medication.  Thank you

## 2024-03-25 ENCOUNTER — VIRTUAL VISIT (OUTPATIENT)
Dept: PHARMACY | Facility: CLINIC | Age: 69
End: 2024-03-25
Attending: FAMILY MEDICINE
Payer: COMMERCIAL

## 2024-03-25 DIAGNOSIS — G89.4 CHRONIC PAIN SYNDROME: Primary | Chronic | ICD-10-CM

## 2024-03-25 DIAGNOSIS — E11.9 TYPE 2 DIABETES MELLITUS WITHOUT COMPLICATION, WITHOUT LONG-TERM CURRENT USE OF INSULIN (H): Chronic | ICD-10-CM

## 2024-03-25 DIAGNOSIS — E66.811 CLASS 1 OBESITY WITHOUT SERIOUS COMORBIDITY WITH BODY MASS INDEX (BMI) OF 34.0 TO 34.9 IN ADULT, UNSPECIFIED OBESITY TYPE: ICD-10-CM

## 2024-03-25 DIAGNOSIS — J45.30 MILD PERSISTENT ASTHMA WITHOUT COMPLICATION: Chronic | ICD-10-CM

## 2024-03-25 DIAGNOSIS — R35.0 URINARY FREQUENCY: ICD-10-CM

## 2024-03-25 DIAGNOSIS — E66.9 OBESITY: ICD-10-CM

## 2024-03-25 DIAGNOSIS — G43.719 INTRACTABLE CHRONIC MIGRAINE WITHOUT AURA AND WITHOUT STATUS MIGRAINOSUS: ICD-10-CM

## 2024-03-25 DIAGNOSIS — M79.7 FIBROMYALGIA: ICD-10-CM

## 2024-03-25 PROCEDURE — 99207 PR NO CHARGE LOS: CPT | Mod: 93

## 2024-03-25 RX ORDER — LIDOCAINE 4 G/G
1 PATCH TOPICAL DAILY PRN
COMMUNITY

## 2024-03-25 RX ORDER — SENNOSIDES 8.6 MG
1300 CAPSULE ORAL EVERY 8 HOURS PRN
Qty: 100 TABLET | Refills: 2 | Status: SHIPPED | OUTPATIENT
Start: 2024-03-25

## 2024-03-25 RX ORDER — TIRZEPATIDE 5 MG/.5ML
5 INJECTION, SOLUTION SUBCUTANEOUS
Qty: 2 ML | Refills: 0 | Status: SHIPPED | OUTPATIENT
Start: 2024-03-25 | End: 2024-04-24 | Stop reason: DRUGHIGH

## 2024-03-25 NOTE — Clinical Note
Hi Dr. Eagle,   Patient has not been taking Vesicare but reports problems with increased urination, are you okay to re-start this?   Mallory Back, PharmD Medication Therapy Management Pharmacist  [facility]

## 2024-03-25 NOTE — PROGRESS NOTES
"Benefits Investigation Complete  Request ID: [id number]  MRN: [id number]  Last Name: Woolrich  Drug Class(es) to Investigate: [\"GLP-1\",\"SGLT-2\",\"Soliqua\",\"Xultophy\"]  Diagnosis Code: [\"T2D (E11)\"]  Results: Ozempic 28 days $11.20 Mounjaro is coming back refill too soon Trulicity 28 days $11.20 Victoza not covered Bydureon 28 days $11.20 Rybelsus 30 days $11.20  Jardiance 30 days $11.20 Farxiga 30 days $11.20 Invokana not covered  Soliqua 15 ml per 30 days $11.20 Xultophy not covered  Completed By: Naomi Solano  "

## 2024-03-25 NOTE — PROGRESS NOTES
Medication Therapy Management (MTM) Encounter    ASSESSMENT:                            Medication Adherence/Access: Patient notes that overall medications are affordable but has a hard time affording medications and food, will send in referral to care coordination to help with resources.     Diabetes: Plan in place to optimize Mounjaro dose and recommend a dose increase, if blood sugars do not improve to at goal of A1c <7.5% then could consider re-adding Farxiga after optimizing Mounjaro dose first since patient does not have elevated UACR or signifant cardiac history at this time. Also complains of increased urinary frequency which is a common side effect of Farxiga thus will optimize Mounjaro first before consideration of re-adding Farxiga. Educated to also consider using Miralax in addition to senna if constipation worsens with Mounjaro dose increase. Recommend patient monitor blood pressure and sugars as able when dizziness occurs as this can be a symptom of hypotension and/or hypoglycemia.     Obesity: Stable, recommend patient discontinue phentermine and will continue to optimize dose of Mounjaro as mentioned above.     Chronic Pain/Fibromyalgia/Headaches: Overall stable, educated for patient to watch use of diclofenac gel, dicolfenac patch, meloxicam, and ibuprofen as these are all NSAIDs and can increase risk of GI bleed and kidney function reduction. Recommend patient to limit use and if going to take meloxicam to avoid taking ibuprofen at same time. Updated prescription of diclofeanc patch sent to pharmacy by PCP last month thus recommend patient call pharmacy to see if covered. No indication on manufacture labeling that patient should not cut diclofenac patch thus appropriate to continue to use in this way if patient has found to be effective.     Asthma: Stable, educated on difference between Flovent and albuterol inhalers. Recommend that if patient has to start using albuterol more regularly than a  couple times weekly or if symptoms worsen to start using Flovent on a more regular basis as prescribed. Patient also should rinse mouth after use of Flovent.     Urinary frequency: Will consult with Dr. Eagle if vesicare should be re-started.     PLAN:                            I will send in a referral for care coordination to discuss options for financial and food resources.     Diclofenac gel, diclofenac patch, meloxicam, and ibuprofen are all NSAIDS which can cause GI bleeds and reduced kidney function, watch how you are using these medications and try not to take two at the same time.     Flovent is an inhaled steroid typically best to use every day when you have symptoms, you should rinse your mouth after using. Albuterol is a rescue inhaler to use as needed for shortness of breath or wheezing. If you find that you are using your albuterol daily or more than a couple times weekly then consider using Flovent 1 puff twice daily for a couple days to control symptoms.     I will discuss Dr. Eagle if you should re-start solifenacin for urinary symptoms.     Increase Mounjaro to 5mg weekly after finishing all doses of 2.5mg, if your constipation worsens, you could use Miralax as needed in addition to senna      Discontinue phentermine     Discontinue Farxiga for now, we will wait to see what your blood sugars are on Mounjaro before re-adding.     When you have dizzy symptoms you could consider checking your blood sugar and blood pressure to see if this is contributing to current symptoms     It looks like diclofenac patch was approved, ask pharmacy to re-fill this for you.     Follow-up: 4/24/2024 at 11:30 AM    SUBJECTIVE/OBJECTIVE:                          Anita Duque is a 68 year old female called for an initial visit. She was referred to me from Dr. Eagle.      Reason for visit: Diabetes management.    Allergies/ADRs: Reviewed in chart  Past Medical History: Reviewed in chart  Tobacco: She reports  that she has never smoked. She has never been exposed to tobacco smoke. She has never used smokeless tobacco.  Alcohol: not currently using    Medication Adherence/Access: Patient takes medications directly from bottles.  Patient takes medications 1 time(s) per day.   Per patient, notes that often once weekly she gets irritated with what medications she takes and may not take topiramate and spironolactone.   Medication barriers: Notes that medications are expensive at this time, has a card from medicare to help with food cost but it can only do so much for her.     Diabetes   Metformin XR 2,000mg daily   Glipizide XL 5mg daily   Farxiga 10mg daily - patient notes that they have not been taking for over 6-12 months.   Mounjaro 2.5mg weekly - patient notes that they have completed 2 doses thus far, she notes that she had constipation that occurred before starting Mounjaro and takes a senna twice daily for this, she notes that constipation has not worsened since starting Mounjaro. Continues to have a bowel movement 2 times weekly. Notes that Mounjaro was affordable at $11 for 1 month. Patient notes since starting she has lost 10 lbs.   Blood sugar monitorin time(s) daily; Ranges: (patient reported) Fasting-  after starting Mounjaro.    Current diabetes symptoms: none, declines any readings or symptoms of hypoglycemia since starting Mounjaro. Sometimes has dizziness which can sometimes make her fall but she is not able to check blood sugars at these times to see if related.      Urine Albumin:   Lab Results   Component Value Date    UMALCR 19.78 2024      Lab Results   Component Value Date    A1C 7.6 (H) 2024     Obesity:   Phentermine 56.25mg daily was discontinued once patient has started Mounjaro. Patient notes that she stopped taking 2 days before most recent visit with Dr. Eagle. Has now started taking Mounjaro 2.5mg weekly and has lost 10lbs since starting Mounajro.     Chronic  Pain/Fibromyalgia/Headaches:   Oxycodone 5mg daily daily as needed - patient notes that she usually tries to use other pain creams and medications before trying to use oxycodone, typically she takes a dose of oxycodone 4 days a week.   Diclofenac 1% gel applying twice daily as needed - patient notes that she applies to shoulders, hands, neck, lower back, upper back, and occasionally knees up to 4 times daily. She notes that this helps some initially for pain.   Diclofenac patch daily as needed for inflammation pain - patient notes that she would like to use daily but does not have enough supply at this time, as of now patient notes that she only has 3 patches left from a previous supply. She notes that she has been cutting the patches into about 1/4 patch to use up current supply.   Lidocaine patch daily as needed for pain- patient notes that she typically uses once daily and alternates with diclofenac patch. She notes that she has 12 hours between doses.   Has lidocaine ointment on medication list but notes that she has not been using.   Ibuprofen 800mg three times daily as needed - patient notes that she takes at night as needed for severe pain. Often she will use Acetaminophen 1,300mg as needed first (notes that she does not take every day) before considering using ibuprofen.    Meloxicam 15mg daily - patient notes that she was prescribed this for her hand pain because they are swollen and sore. Notes that meloxicam has been helpful.   Gabapentin 600mg 4 times daily   Cyclobenzaprine 10mg twice daily as needed - usually taking 4 days weekly, if she takes regularly it makes her too tired. Notes that this is effective if needed and declines side effects when taking as needed.   Topiramate 100mg twice daily (prescribed for headaches) - notes that she generally takes every day but may miss a dose occasionally. Patient notes that this is effective for headaches.   Overall patient notes that her current regimen is  effective, tires to alternate between different medications if they are not working, typically uses oxycodone or ibuprofen as last resort medications.     Asthma:   ICS - Flovent  mcg - 1 puffs daily as needed - patient notes that she uses as needed when having more troubles with breathing. Typically will use if she does too much around the house or the hot weather and this will be effective to control symptoms.   Albuterol (ProAir/Ventolin/Proventil) inhaler as needed - patient notes that typically she will use Flovent first when she has symptoms.   Generally she notes that she does not have to use either inhaler often.   Benzonatate 200mg 2-3 times daily as needed - patient notes that she uses when having coughing fit and this is effective.   Guaifenesin-codeine as needed for cough - patient notes that she uses when having coughing occasionally and they are effective if needed.   Patient reports no current medication side effects.      Patient reports the following symptoms: none.    Urinary frequency:  Vesicare 5mg daily - patient notes that they have not been taking. Last prescribed in 2021. Patient is not sure why she has not been taking. She notes that she always has urine leakage and would like to start something for helping with this.      Today's Vitals: There were no vitals taken for this visit.  ----------------  I spent 68 minutes with this patient today. All changes were made via collaborative practice agreement with Trish Eagle MD. A copy of the visit note was provided to the patient's provider(s).    A summary of these recommendations was sent via [employer].    Mallory Back, PharmD  Medication Therapy Management Pharmacist   [facility]     Telemedicine Visit Details  Type of service:  Telephone visit  Start Time:  11:04 AM  End Time: 12:12 PM     Medication Therapy Recommendations  Mild persistent asthma without complication    Current  Medication: fluticasone (FLOVENT HFA) 110 MCG/ACT inhaler   Rationale: Does not understand instructions - Adherence - Adherence   Recommendation: Provide Education   Status: Patient Agreed - Adherence/Education         Obesity    Current Medication: phentermine (ADIPEX-P) 37.5 MG tablet (Discontinued)   Rationale: More effective medication available - Ineffective medication - Effectiveness   Recommendation: Discontinue Medication   Status: Accepted per CPA         Type 2 diabetes mellitus (H)    Current Medication: FARXIGA 10 MG TABS tablet (Discontinued)   Rationale: More effective medication available - Ineffective medication - Effectiveness   Recommendation: Discontinue Medication   Status: Accepted per CPA          Current Medication: tirzepatide (MOUNJARO) 2.5 MG/0.5ML pen (Discontinued)   Rationale: Dose too low - Dosage too low - Effectiveness   Recommendation: Increase Dose   Status: Accepted per CPA         Urinary frequency    Current Medication: VESICARE 5 MG tablet (Discontinued)   Rationale: Untreated condition - Needs additional medication therapy - Indication   Recommendation: Start Medication   Status: Declined per Patient

## 2024-03-27 ENCOUNTER — PATIENT OUTREACH (OUTPATIENT)
Dept: CARE COORDINATION | Facility: CLINIC | Age: 69
End: 2024-03-27
Payer: MEDICAID

## 2024-03-27 NOTE — PROGRESS NOTES
Clinic Care Coordination Contact  Community Health Worker Initial Outreach    CHW Initial Information Gathering:  Referral Source: PCP  Preferred Hospital: [facility]  [phone]  Preferred Urgent Care: [facility] [place], [phone]  Current living arrangement:: I live in a private home with spouse  Informal Support system:: Spouse, Friends, Family  No PCP office visit in Past Year: No  Transportation means:: Regular car, Family  CHW Additional Questions  If ED/Hospital discharge, follow-up appointment scheduled as recommended?: N/A  Medication changes made following ED/Hospital discharge?: N/A  MyChart active?: Yes  Patient sent Social Determinants of Health questionnaire?: Yes    Patient accepts CC: Yes. Patient scheduled for assessment with CC RN on 3/28/2024 at 10:00 am. Patient noted desire to discuss recent CC referral.     Comments    Patient interested in learning more about financial resources, has a hard time affording food and medications sometimes.            Order Questions    Question Answer   Reason for Referral: Financial Support   Financial Support: Food    Medication Affordability   Clinical Staff have discussed the Care Coordination Referral with the patient and/or caregiver: Yes     Genie BLACK  Community Health Worker  [facility] Care Coordination  [facility]  [email or web address]  [email or web address]  Office: [phone]

## 2024-03-27 NOTE — PATIENT INSTRUCTIONS
"Recommendations from today's MTM visit:                                                      I will send in a referral for care coordination to discuss options for financial and food resources.     Diclofenac gel, diclofenac patch, meloxicam, and ibuprofen are all NSAIDS which can cause GI bleeds and reduced kidney function, watch how you are using these medications and try not to take two at the same time.     Flovent is an inhaled steroid typically best to use every day when you have symptoms, you should rinse your mouth after using. Albuterol is a rescue inhaler to use as needed for shortness of breath or wheezing. If you find that you are using your albuterol daily or more than a couple times weekly then consider using Flovent 1 puff twice daily for a couple days to control symptoms.     I will discuss Dr. Eagle if you should re-start solifenacin for urinary symptoms.     Increase Mounjaro to 5mg weekly after finishing all doses of 2.5mg, if your constipation worsens, you could use Miralax as needed in addition to senna      Discontinue phentermine     Discontinue Farxiga for now, we will wait to see what your blood sugars are on Mounjaro before re-adding.     When you have dizzy symptoms you could consider checking your blood sugar and blood pressure to see if this is contributing to current symptoms     It looks like diclofenac patch was approved, ask pharmacy to re-fill this for you.     Follow-up: 4/24/2024 at 11:30 AM    It was great speaking with you today.  I value your experience and would be very thankful for your time in providing feedback in our clinic survey. In the next few days, you may receive an email or text message from [employer] with a link to a survey related to your  clinical pharmacist.\"     To schedule another MTM appointment, please call the clinic directly or you may call the MTM scheduling line at [phone].    My Clinical Pharmacist's contact information:                         "                              Please feel free to contact me with any questions or concerns you have.      Mallory Back, PharmD  Medication Therapy Management Pharmacist   [facility]

## 2024-03-28 ENCOUNTER — PATIENT OUTREACH (OUTPATIENT)
Dept: NURSING | Facility: CLINIC | Age: 69
End: 2024-03-28
Payer: MEDICAID

## 2024-03-28 ASSESSMENT — ACTIVITIES OF DAILY LIVING (ADL): DEPENDENT_IADLS:: INDEPENDENT

## 2024-03-28 NOTE — PROGRESS NOTES
Clinic Care Coordination Contact    Situation: Patient chart reviewed by care coordinator.    Background: Patient spoke with patient today to discuss enrollment in Care Coordination.     Assessment: Writer provided patient with requested resources.     Plan/Recommendations: No ongoing needs. Patient declined enrollment in Care Coordination.     David Myhre, RN   CCC RN

## 2024-04-12 DIAGNOSIS — E11.9 TYPE 2 DIABETES MELLITUS WITHOUT COMPLICATION, WITHOUT LONG-TERM CURRENT USE OF INSULIN (H): Chronic | ICD-10-CM

## 2024-04-12 RX ORDER — GLIPIZIDE 5 MG/1
TABLET, FILM COATED, EXTENDED RELEASE ORAL
Qty: 90 TABLET | Refills: 0 | Status: SHIPPED | OUTPATIENT
Start: 2024-04-12 | End: 2024-07-01

## 2024-04-24 ENCOUNTER — VIRTUAL VISIT (OUTPATIENT)
Dept: PHARMACY | Facility: CLINIC | Age: 69
End: 2024-04-24
Payer: COMMERCIAL

## 2024-04-24 DIAGNOSIS — G89.4 CHRONIC PAIN SYNDROME: Primary | ICD-10-CM

## 2024-04-24 DIAGNOSIS — E11.9 TYPE 2 DIABETES MELLITUS WITHOUT COMPLICATION, WITHOUT LONG-TERM CURRENT USE OF INSULIN (H): ICD-10-CM

## 2024-04-24 DIAGNOSIS — K59.00 CONSTIPATION, UNSPECIFIED CONSTIPATION TYPE: ICD-10-CM

## 2024-04-24 DIAGNOSIS — M79.7 FIBROMYALGIA: ICD-10-CM

## 2024-04-24 DIAGNOSIS — G43.719 INTRACTABLE CHRONIC MIGRAINE WITHOUT AURA AND WITHOUT STATUS MIGRAINOSUS: ICD-10-CM

## 2024-04-24 DIAGNOSIS — R35.0 URINARY FREQUENCY: ICD-10-CM

## 2024-04-24 PROCEDURE — 99207 PR NO CHARGE LOS: CPT | Mod: 93

## 2024-04-24 RX ORDER — SENNA AND DOCUSATE SODIUM 50; 8.6 MG/1; MG/1
1 TABLET, FILM COATED ORAL 2 TIMES DAILY
Qty: 180 TABLET | Refills: 1 | Status: SHIPPED | OUTPATIENT
Start: 2024-04-24

## 2024-04-24 RX ORDER — TIRZEPATIDE 7.5 MG/.5ML
7.5 INJECTION, SOLUTION SUBCUTANEOUS
Qty: 2 ML | Refills: 0 | Status: SHIPPED | OUTPATIENT
Start: 2024-04-24 | End: 2024-05-02

## 2024-04-24 NOTE — PROGRESS NOTES
Called [facility] pharmacy regarding diclofenac patch- pharmacy staff notes that PA was approved and that they can fill the prescription but need to order the medication. They will call and update the patient.     Mallory Back, PharmD  Medication Therapy Management Pharmacist   [facility]

## 2024-04-24 NOTE — PROGRESS NOTES
Medication Therapy Management (MTM) Encounter    ASSESSMENT:                            Medication Adherence/Access: No issues identified    Diabetes: Patient reported fasting blood sugars are at goal of , recommend a dose increase of Mounjaro for benefits of additional blood sugar control and weight loss benefits. Recommend patient consider using senna & docusate to help with constipation in addition to trying a fiber supplement. Recommend patient start checking blood sugars when they have dizziness to rule out hypoglycemia.     Chronic Pain/Fibromyalgia/Headaches: Overall stable, continue to educate patient about the use of NSAIDs including diclofenac gel/patch, meloxicam, and ibuprofen and ongoing adverse events/risks. Recommend patient use topical NSAIDs more regularly since they have less systemic absorption thus less risk of impact on kidney function and GI complications. Kidney function remains stable at this time, recommend to continue to monitor. Will call pharmacy to get update on diclofenac patch.     Urinary frequency: Will continue off Vesicare therapy at this time due to risk of anticholinergic side effects.     PLAN:                            For constipation you can continue to use senna-docusate 8.6mg-50mg twice daily and try incorporating a fiber supplement like Metamucil and staying hydrated on this combination.     Increase Mounjaro to 7.5mg weekly after finishing all doses of 5mg, continue to monitor constipation with this dose increase.      When you have dizzy symptoms you could consider checking your blood sugar and blood pressure to see if this is contributing to current symptoms     I will call pharmacy regarding diclofenac patch    Follow-up: 5/22/2024 at 12:30 PM    SUBJECTIVE/OBJECTIVE:                          Anita Duque is a 68 year old female called for a follow-up visit.       Reason for visit: Diabetes follow up.    Allergies/ADRs: Reviewed in chart  Past Medical  History: Reviewed in chart  Tobacco: She reports that she has never smoked. She has never been exposed to tobacco smoke. She has never used smokeless tobacco.  Alcohol: not currently using    Medication Adherence/Access: no issues reported    Diabetes   Metformin XR 2,000mg daily   Glipizide XL 5mg daily   Mounjaro 5mg weekly (dose increase since last MTM visit) - patient notes that they have taken 3 doses thus far at this does. Notes that constipation may have worsened some (had to take more laxatives) and no weight loss seen yet which she has been frustrated about. Patient notes that they are using senna 8.6mg twice daily and Miralax as needed to have a bowel movement. She notes with the use of the laxatives she is able to have a bowel movement once weekly.   Blood sugar monitorin time(s) daily; Ranges: (patient reported) Fasting- , usually near 100s after starting Mounjaro.    Current diabetes symptoms: patient notes that they had an event of dizziness but was not able to check blood sugars at that time.      Eye exam is up to date  Foot exam: due  Urine Albumin:   Lab Results   Component Value Date    UMALCR 19.78 2024      Lab Results   Component Value Date    A1C 7.6 (H) 2024     Chronic Pain/Fibromyalgia/Headaches:   Oxycodone 5mg daily daily as needed - patient notes that she usually tries to use other pain creams and medications before trying to use oxycodone, typically she takes a dose of oxycodone 4 days a week.   Diclofenac 1% gel applying twice daily as needed - patient notes that she applies to shoulders, hands, neck, lower back, upper back, and occasionally knees up to 3-4 times daily. She notes that this helps some for pain.   Diclofenac patch daily as needed for inflammation pain - patient notes that she tried to get a refill from the pharmacy but the pharmacy noted that they needed clarification first from doctor thus she has not been able to get the patch yet.   Lidocaine  patch daily as needed for pain- patient notes that she typically uses once daily and alternates with diclofenac patch. She notes that she has 12 hours between doses.   Ibuprofen 800mg three times daily as needed - patient notes that she takes at night as needed for severe pain. Often she will use Acetaminophen 1,300mg as needed first (notes that she does not take every day) before considering using ibuprofen.    Meloxicam 15mg daily - patient notes that she was prescribed this for her hand pain because they are swollen and sore. Notes that meloxicam has been helpful.   Gabapentin 600mg 4 times daily   Cyclobenzaprine 10mg twice daily as needed - usually taking 4 days weekly, if she takes regularly it makes her too tired. Notes that this is effective if needed and declines side effects when taking as needed.   Topiramate 100mg twice daily (prescribed for headaches) - notes that she generally takes every day but may miss a dose occasionally. Patient notes that this is effective for headaches.   Overall patient notes that her current regimen is effective, tires to alternate between different medications if they are not working, typically uses oxycodone or ibuprofen as last resort medications.     Urinary frequency:  Vesicare 5mg daily - patient notes that they have not been taking. Last prescribed in 2021. After last MTM visit, discussed re-starting with PCP and PCP recommended that it may not be a good idea to re-start due to risk of cognitive side effcts, patient agreed and will not start at this time.     Today's Vitals: There were no vitals taken for this visit.  ----------------  I spent 22 minutes with this patient today. All changes were made via collaborative practice agreement with Trish Eagle MD. A copy of the visit note was provided to the patient's provider(s).    A summary of these recommendations was sent via [employer].    Mallory Back, PharmD  Medication Therapy Management Pharmacist   [facility]  [facility]     Telemedicine Visit Details  Type of service:  Telephone visit  Start Time:  11:32 AM  End Time: 11:54 AM     Medication Therapy Recommendations  Constipation    Current Medication: sennosides (SENOKOT) 8.6 MG tablet (Discontinued)   Rationale: More effective medication available - Ineffective medication - Effectiveness   Recommendation: Change Medication - Senna S 8.6-50 MG Tabs   Status: Accepted per CPA         Type 2 diabetes mellitus (H)    Current Medication: tirzepatide (MOUNJARO) 5 MG/0.5ML pen (Discontinued)   Rationale: Dose too low - Dosage too low - Effectiveness   Recommendation: Increase Dose   Status: Accepted per CPA

## 2024-04-24 NOTE — PROGRESS NOTES
Trish Eagle MD [place], [place] [facility] Mallory,  Sure, but are there cognitive side effects?  Up to patient to decide if she wants to restart.  Thanks for all your work.  Trish Eagle MD     Previous Messages       ----- Message -----  From: Montse Back JANET  Sent: 3/27/2024  12:05 PM CDT  To: Trish Eagle MD    Hi Dr. Eagle,    Patient has not been taking Vesicare but reports problems with increased urination, are you okay to re-start this?    Mallory Back, PharmD  Medication Therapy Management Pharmacist  [facility] and [facility]

## 2024-04-26 NOTE — PATIENT INSTRUCTIONS
"Recommendations from today's MTM visit:                                                      For constipation you can continue to use senna-docusate 8.6mg-50mg twice daily and try incorporating a fiber supplement like Metamucil and staying hydrated on this combination.     Increase Mounjaro to 7.5mg weekly after finishing all doses of 5mg, continue to monitor constipation with this dose increase.      When you have dizzy symptoms you could consider checking your blood sugar and blood pressure to see if this is contributing to current symptoms     I will call pharmacy regarding diclofenac patch    Follow-up: 5/22/2024 at 12:30 PM    It was great speaking with you today.  I value your experience and would be very thankful for your time in providing feedback in our clinic survey. In the next few days, you may receive an email or text message from [employer] with a link to a survey related to your  clinical pharmacist.\"     To schedule another MTM appointment, please call the clinic directly or you may call the MTM scheduling line at [phone].    My Clinical Pharmacist's contact information:                                                      Please feel free to contact me with any questions or concerns you have.      Mallory Back, PharmD  Medication Therapy Management Pharmacist   [facility] and [facility]    "

## 2024-05-02 ENCOUNTER — TELEPHONE (OUTPATIENT)
Dept: PHARMACY | Facility: CLINIC | Age: 69
End: 2024-05-02
Payer: MEDICAID

## 2024-05-02 DIAGNOSIS — E11.9 TYPE 2 DIABETES MELLITUS WITHOUT COMPLICATION, WITHOUT LONG-TERM CURRENT USE OF INSULIN (H): ICD-10-CM

## 2024-05-02 RX ORDER — TIRZEPATIDE 7.5 MG/.5ML
7.5 INJECTION, SOLUTION SUBCUTANEOUS
Qty: 2 ML | Refills: 0 | Status: SHIPPED | OUTPATIENT
Start: 2024-05-02 | End: 2024-05-22 | Stop reason: DRUGHIGH

## 2024-05-02 RX ORDER — TIRZEPATIDE 7.5 MG/.5ML
7.5 INJECTION, SOLUTION SUBCUTANEOUS
Qty: 2 ML | Refills: 0 | Status: SHIPPED | OUTPATIENT
Start: 2024-05-02 | End: 2024-05-02

## 2024-05-02 NOTE — TELEPHONE ENCOUNTER
Patient not able to get Mounjaro from their pharmacy, will try [facility].     Mallory Back, PharmD  Medication Therapy Management Pharmacist   [facility] and [facility]

## 2024-05-02 NOTE — TELEPHONE ENCOUNTER
Patient Returning Call    Reason for call:  Anita is returning a call from [place] and would like to get a call back    Information relayed to patient:  Someone will give you a call back once they receive this message    Patient has additional questions:  No      Could we send this information to you in [facility] or would you prefer to receive a phone call?:   Patient would prefer a phone call   Okay to leave a detailed message?: Yes at Cell number on file:    Telephone Information:   Mobile [phone]

## 2024-05-11 ENCOUNTER — HEALTH MAINTENANCE LETTER (OUTPATIENT)
Age: 69
End: 2024-05-11

## 2024-05-22 ENCOUNTER — VIRTUAL VISIT (OUTPATIENT)
Dept: PHARMACY | Facility: CLINIC | Age: 69
End: 2024-05-22
Payer: MEDICAID

## 2024-05-22 DIAGNOSIS — E11.9 TYPE 2 DIABETES MELLITUS WITHOUT COMPLICATION, WITHOUT LONG-TERM CURRENT USE OF INSULIN (H): Primary | ICD-10-CM

## 2024-05-22 DIAGNOSIS — G43.719 INTRACTABLE CHRONIC MIGRAINE WITHOUT AURA AND WITHOUT STATUS MIGRAINOSUS: ICD-10-CM

## 2024-05-22 DIAGNOSIS — M79.7 FIBROMYALGIA: ICD-10-CM

## 2024-05-22 DIAGNOSIS — G89.4 CHRONIC PAIN SYNDROME: ICD-10-CM

## 2024-05-22 PROCEDURE — 99207 PR NO CHARGE LOS: CPT | Mod: 93

## 2024-05-22 RX ORDER — TIRZEPATIDE 10 MG/.5ML
10 INJECTION, SOLUTION SUBCUTANEOUS
Qty: 2 ML | Refills: 0 | Status: SHIPPED | OUTPATIENT
Start: 2024-05-22 | End: 2024-06-19

## 2024-05-22 NOTE — PROGRESS NOTES
Medication Therapy Management (MTM) Encounter    ASSESSMENT:                            Medication Adherence/Access: No issues identified    Diabetes: Patient reported fasting blood sugars appear that they continue to be at/near goal of  for the two patient reported blood sugar readings since Mounjaro dose increase, recommend an additional dose increase of Mounjaro for benefits of additional blood sugar control and weight loss. Recommend patient continue to use senna/docusate and Miralax to help with constipation. Recommend obtaining updated A1c at upcoming visit with PCP to assess efficacy of Mounjaro. Did not discuss today but recommend patient schedule yearly eye exam since starting Mounjaro, will send patient my chart message and recommend discussing at follow up.     Chronic Pain/Fibromyalgia/Headaches: Overall stable, continue to educate patient about the use of NSAIDs including diclofenac gel/patch, meloxicam, and ibuprofen and ongoing adverse events/risks. Recommend patient use topical NSAIDs more regularly since they have less systemic absorption and thus less risk of impact on kidney function and GI complications. Kidney function remains stable at this time, recommend to continue to monitor.     PLAN:                            Increase Mounjaro to 10mg weekly after finishing all doses of 7.5mg.     Diclofenac gel, diclofenac patch, meloxicam, and ibuprofen are all NSAIDS which can cause GI bleeds and reduced kidney function, watch how you are using these medications and try not to take two at the same time.     Future considerations: scheduling yearly eye exam after starting Mounjaro if not done already     Follow-up: 6/19/2024 at 12:30 PM with Kamla     SUBJECTIVE/OBJECTIVE:                          Anita Duque is a 68 year old female called for a follow-up visit.       Reason for visit: Diabetes follow up.    Allergies/ADRs: Reviewed in chart  Past Medical History: Reviewed in chart  Tobacco:  She reports that she has never smoked. She has never been exposed to tobacco smoke. She has never used smokeless tobacco.  Alcohol: Reviewed in chart    Medication Adherence/Access: no issues reported    Diabetes   Metformin XR 2,000mg daily   Glipizide XL 5mg daily   Mounjaro 7.5mg weekly (dose increase since last MTM visit) - patient notes that they have taken 2 doses thus far at this does and 3rd injection scheduled for today. Notes that constipation remains about the same and has not gotten worse since dose increase. Patient notes that they are using senna-docusate 8.6mg-50mg twice daily and Miralax as needed to have a bowel movement. She notes with the use of the laxatives she is able to have a bowel movement a couple times weekly. Also may use prune juice to help.  Blood sugar monitorin time(s) daily; Ranges: (patient reported) Fasting- 130 this morning and one reading of 127 last week.   Patient notes that since increasing to the 7.5mg Mounjaro dose and continuing to use Mounjaro they have now lost about 15lbs.   Current diabetes symptoms: patient notes that they have not been having as many events of dizziness since last MTM visit since she has increased her water intake.   Lab Results   Component Value Date    A1C 7.6 2024    A1C 7.2 2023    A1C 7.1 2023    A1C 6.7 11/15/2022    A1C 7.6 2022          Chronic Pain/Fibromyalgia/Headaches:   Diclofenac 1% gel applying to shoulders, hands, neck, lower back, upper back, and occasionally knees up to 4 times daily. She notes that this is her first line of medication to use for pain. If pain is severe, then she will use oxycodone (if pain is ~10) or lidocaine patch (is pain is ~7-8). Notes that the diclofenac patch is now covered and she was able to get this from the pharmacy. Sometimes may use ibuprofen, diclofenac patch, and Acetaminophen as needed for pain depending on the situation.   Meloxicam 15mg daily - patient notes that she was  prescribed this for her hand pain because they are swollen and sore. Notes that meloxicam has been helpful.   Gabapentin 600mg 4 times daily   Cyclobenzaprine 10mg twice daily as needed - usually taking 4 days weekly, if she takes regularly it makes her too tired. Notes that this is effective if needed and declines side effects when taking as needed.   Topiramate 100mg twice daily (prescribed for headaches)   Overall patient notes that her current regimen is effective, tires to alternate between different medications if they are not working.     Today's Vitals: There were no vitals taken for this visit.  ----------------  I spent 28 minutes with this patient today. All changes were made via collaborative practice agreement with Trish Eagle MD. A copy of the visit note was provided to the patient's provider(s).    A summary of these recommendations was sent via [employer].    Mallory Back, PharmD  Medication Therapy Management Pharmacist   [facility] and [facility]     Telemedicine Visit Details  Type of service:  Telephone visit  Start Time:  12:30 PM  End Time: 12:58 PM     Medication Therapy Recommendations  Type 2 diabetes mellitus (H)    Current Medication: tirzepatide (MOUNJARO) 7.5 MG/0.5ML pen (Discontinued)   Rationale: Dose too low - Dosage too low - Effectiveness   Recommendation: Increase Dose   Status: Accepted per CPA

## 2024-05-22 NOTE — PATIENT INSTRUCTIONS
"Recommendations from today's MTM visit:                                                      Increase Mounjaro to 10mg weekly after finishing all doses of 7.5mg.     Diclofenac gel, diclofenac patch, meloxicam, and ibuprofen are all NSAIDS which can cause GI bleeds and reduced kidney function, watch how you are using these medications and try not to take two at the same time.     Follow-up: 6/19/2024 at 12:30 PM with Kamla     It was great speaking with you today.  I value your experience and would be very thankful for your time in providing feedback in our clinic survey. In the next few days, you may receive an email or text message from [facility] with a link to a survey related to your  clinical pharmacist.\"     To schedule another MTM appointment, please call the clinic directly or you may call the MTM scheduling line at [phone].    My Clinical Pharmacist's contact information:                                                      Please feel free to contact me with any questions or concerns you have.      Mallory Back, PharmD  Medication Therapy Management Pharmacist   [facility] and [facility]      "

## 2024-06-18 NOTE — PROGRESS NOTES
Medication Therapy Management (MTM) Encounter    ASSESSMENT:                            Medication Adherence/Access: No issues identified    Diabetes:   Self monitoring of blood glucose is at/close to goal of fasting  mg/dL though A1c was not at goal in February. No postprandial readings. Mounjaro can be increased for weight loss however if upcoming A1c is at goal, consider stopping glipizide. Patient should monitor for constipation side effects with dose increase. Recommended increased frequency of Miralax to achieve more regular bowel movement.     Asthma  Given infrequent symptoms, recommended switching to albuterol as needed and discontinuing Flovent. ACT at goal >20 recently.     GERD:   Appropriately on PPI as needed for symptoms.     Vitamin D Deficiency:  Due for vitamin D level and can switch to maintenance vitamin D dosing if at goal.     PLAN:                            Increase to 12.5mg Mounjaro. Get A1c and vitamin D level checked when you see Dr. Eagle. We will likely be able to stop glipizide if your A1c is improved.   Start taking Miralax every other day so you can have bowel movements at least twice a week.  Stop Flovent and use albuterol as needed instead.     Follow-up: Return in 2 months (on 8/19/2024) for Follow up, with me, using a phone visit.    SUBJECTIVE/OBJECTIVE:                          Anita Duque is a 69 year old female seen for a follow-up visit from 5/22/24.       Reason for visit: follow-up .    Allergies/ADRs: Reviewed in chart  Past Medical History: Reviewed in chart  Tobacco: She reports that she has never smoked. She has never been exposed to tobacco smoke. She has never used smokeless tobacco.  Alcohol: not assessed    Medication Adherence/Access: no issues reported    Diabetes   Metformin XR 2,000mg daily   Glipizide XL 5mg daily   Mounjaro 10mg weekly - she says she is primarily using this for weight loss now. Per previous note, has lost 15 lbs since  "starting  Constipation remains unchanged, continues with Miralax as needed and senna. She reports having bowel movement once a week.     Blood sugar monitorin time(s) daily; Ranges: (patient reported) Fasting- 129, 131, 134 was highest reading. No lows <70     Eye exam is up to date  Foot exam: due    Asthma   Albuterol HFA inhaler as needed   Flovent 110mcg - 1 puff daily  Benzonatate 200mg - 1 cap 2-3 times once daily - patient reports using about once a day as needed     Patient reports using Flovent and albuterol as needed when it is hot outside or walking far with grandkids. Also uses albuterol as needed, last used in May. She is not sure which one she uses or is more effective.      GERD:   Omeprazole 40mg once daily as needed - patient reports rarely uses.  Patient reports no current symptoms.   Patient feels that current regimen is effective.  The patient does not have a history of GI bleed.    Vitamin D Deficiency Screening Results:  No results found for: \"VITDT\"  Taking vitamin D2 14377 units weekly. No issues reported today.      Today's Vitals: There were no vitals taken for this visit.  ----------------    I spent 34 with this patient today. All changes were made via collaborative practice agreement with Trish Eagle MD. A copy of the visit note was provided to the patient's provider(s).    A summary of these recommendations was sent via [employer].    Kamla Zelaay PharmD  Medication Therapy Management (MTM) Pharmacist      Telemedicine Visit Details  Type of service:  Telephone visit  Start Time: 12:32 PM  End Time: 1:06 PM     Medication Therapy Recommendations  Mild intermittent asthma without complication    Current Medication: fluticasone (FLOVENT HFA) 110 MCG/ACT inhaler (Discontinued)   Rationale: No medical indication at this time - Unnecessary medication therapy - Indication   Recommendation: Discontinue Medication   Status: Accepted per CPA         Type 2 diabetes mellitus (H)    Current " Medication: tirzepatide (MOUNJARO) 10 MG/0.5ML pen (Discontinued)   Rationale: Dose too low - Dosage too low - Effectiveness   Recommendation: Increase Dose   Status: Accepted per CPA

## 2024-06-19 ENCOUNTER — VIRTUAL VISIT (OUTPATIENT)
Dept: PHARMACY | Facility: CLINIC | Age: 69
End: 2024-06-19
Payer: MEDICAID

## 2024-06-19 DIAGNOSIS — K21.9 GASTROESOPHAGEAL REFLUX DISEASE WITHOUT ESOPHAGITIS: ICD-10-CM

## 2024-06-19 DIAGNOSIS — E55.9 VITAMIN D DEFICIENCY: ICD-10-CM

## 2024-06-19 DIAGNOSIS — J45.20 MILD INTERMITTENT ASTHMA WITHOUT COMPLICATION: Primary | ICD-10-CM

## 2024-06-19 DIAGNOSIS — E11.9 TYPE 2 DIABETES MELLITUS WITHOUT COMPLICATION, WITHOUT LONG-TERM CURRENT USE OF INSULIN (H): ICD-10-CM

## 2024-06-19 PROCEDURE — 99207 PR NO CHARGE LOS: CPT | Mod: 93

## 2024-06-19 RX ORDER — ALBUTEROL SULFATE 90 UG/1
2 AEROSOL, METERED RESPIRATORY (INHALATION) EVERY 4 HOURS PRN
Qty: 18 G | Refills: 11 | Status: SHIPPED | OUTPATIENT
Start: 2024-06-19

## 2024-06-19 RX ORDER — GABAPENTIN 300 MG/1
600 CAPSULE ORAL 4 TIMES DAILY
Qty: 240 CAPSULE | Refills: 3 | Status: SHIPPED | OUTPATIENT
Start: 2024-06-19 | End: 2024-06-19

## 2024-06-19 RX ORDER — GABAPENTIN 300 MG/1
600 CAPSULE ORAL 4 TIMES DAILY
Qty: 240 CAPSULE | Refills: 3 | Status: SHIPPED | OUTPATIENT
Start: 2024-06-19

## 2024-06-20 NOTE — PATIENT INSTRUCTIONS
"Recommendations from today's MTM visit:                                                       Increase to 12.5mg Mounjaro. Get A1c and vitamin D level checked when you see Dr. Eagle. We will likely be able to stop glipizide if your A1c is improved.   Start taking Miralax every other day so you can have bowel movements at least twice a week.  Stop Flovent and use albuterol as needed instead.    Follow-up: Return in 2 months (on 8/19/2024) for Follow up, with me, using a phone visit.    It was great speaking with you today.  I value your experience and would be very thankful for your time in providing feedback in our clinic survey. In the next few days, you may receive an email or text message from [employer] with a link to a survey related to your  clinical pharmacist.\"     To schedule another MTM appointment, please call the clinic directly or you may call the MTM scheduling line at [phone].    My Clinical Pharmacist's contact information:                                                      Please feel free to contact me with any questions or concerns you have.      Kamla Zelaya, PharmD  Medication Therapy Management (MTM) Pharmacist   "

## 2024-06-28 SDOH — HEALTH STABILITY: PHYSICAL HEALTH: ON AVERAGE, HOW MANY DAYS PER WEEK DO YOU ENGAGE IN MODERATE TO STRENUOUS EXERCISE (LIKE A BRISK WALK)?: 0 DAYS

## 2024-06-28 SDOH — HEALTH STABILITY: PHYSICAL HEALTH: ON AVERAGE, HOW MANY MINUTES DO YOU ENGAGE IN EXERCISE AT THIS LEVEL?: 0 MIN

## 2024-06-28 ASSESSMENT — SOCIAL DETERMINANTS OF HEALTH (SDOH): HOW OFTEN DO YOU GET TOGETHER WITH FRIENDS OR RELATIVES?: ONCE A WEEK

## 2024-07-01 ENCOUNTER — OFFICE VISIT (OUTPATIENT)
Dept: FAMILY MEDICINE | Facility: CLINIC | Age: 69
End: 2024-07-01
Payer: COMMERCIAL

## 2024-07-01 VITALS
HEIGHT: 64 IN | DIASTOLIC BLOOD PRESSURE: 66 MMHG | TEMPERATURE: 98.3 F | SYSTOLIC BLOOD PRESSURE: 95 MMHG | OXYGEN SATURATION: 97 % | HEART RATE: 81 BPM | WEIGHT: 180 LBS | BODY MASS INDEX: 30.73 KG/M2 | RESPIRATION RATE: 16 BRPM

## 2024-07-01 DIAGNOSIS — M67.911 BILATERAL ROTATOR CUFF DYSFUNCTION: ICD-10-CM

## 2024-07-01 DIAGNOSIS — E78.2 MIXED HYPERLIPIDEMIA: Chronic | ICD-10-CM

## 2024-07-01 DIAGNOSIS — I10 ESSENTIAL HYPERTENSION: Chronic | ICD-10-CM

## 2024-07-01 DIAGNOSIS — Z79.899 CONTROLLED SUBSTANCE AGREEMENT SIGNED: Chronic | ICD-10-CM

## 2024-07-01 DIAGNOSIS — J45.30 MILD PERSISTENT ASTHMA WITHOUT COMPLICATION: Chronic | ICD-10-CM

## 2024-07-01 DIAGNOSIS — M67.912 BILATERAL ROTATOR CUFF DYSFUNCTION: ICD-10-CM

## 2024-07-01 DIAGNOSIS — F33.1 MODERATE EPISODE OF RECURRENT MAJOR DEPRESSIVE DISORDER (H): Chronic | ICD-10-CM

## 2024-07-01 DIAGNOSIS — Z00.00 ENCOUNTER FOR MEDICARE ANNUAL WELLNESS EXAM: Primary | ICD-10-CM

## 2024-07-01 DIAGNOSIS — E55.9 VITAMIN D DEFICIENCY: ICD-10-CM

## 2024-07-01 DIAGNOSIS — E11.9 TYPE 2 DIABETES MELLITUS WITHOUT COMPLICATION, WITHOUT LONG-TERM CURRENT USE OF INSULIN (H): Chronic | ICD-10-CM

## 2024-07-01 DIAGNOSIS — G89.4 CHRONIC PAIN SYNDROME: Chronic | ICD-10-CM

## 2024-07-01 DIAGNOSIS — Z12.11 SCREEN FOR COLON CANCER: ICD-10-CM

## 2024-07-01 LAB
ANION GAP SERPL CALCULATED.3IONS-SCNC: 14 MMOL/L (ref 7–15)
BUN SERPL-MCNC: 18.5 MG/DL (ref 8–23)
CALCIUM SERPL-MCNC: 9.7 MG/DL (ref 8.8–10.2)
CHLORIDE SERPL-SCNC: 105 MMOL/L (ref 98–107)
CREAT SERPL-MCNC: 0.76 MG/DL (ref 0.51–0.95)
DEPRECATED HCO3 PLAS-SCNC: 21 MMOL/L (ref 22–29)
EGFRCR SERPLBLD CKD-EPI 2021: 83 ML/MIN/1.73M2
GLUCOSE SERPL-MCNC: 101 MG/DL (ref 70–99)
HBA1C MFR BLD: 5.8 % (ref 0–5.6)
POTASSIUM SERPL-SCNC: 4.2 MMOL/L (ref 3.4–5.3)
SODIUM SERPL-SCNC: 140 MMOL/L (ref 135–145)
VIT D+METAB SERPL-MCNC: 30 NG/ML (ref 20–50)

## 2024-07-01 PROCEDURE — G0439 PPPS, SUBSEQ VISIT: HCPCS | Performed by: FAMILY MEDICINE

## 2024-07-01 PROCEDURE — 83036 HEMOGLOBIN GLYCOSYLATED A1C: CPT | Performed by: FAMILY MEDICINE

## 2024-07-01 PROCEDURE — 36415 COLL VENOUS BLD VENIPUNCTURE: CPT | Performed by: FAMILY MEDICINE

## 2024-07-01 PROCEDURE — 80048 BASIC METABOLIC PNL TOTAL CA: CPT | Performed by: FAMILY MEDICINE

## 2024-07-01 PROCEDURE — 82306 VITAMIN D 25 HYDROXY: CPT | Performed by: FAMILY MEDICINE

## 2024-07-01 PROCEDURE — 99214 OFFICE O/P EST MOD 30 MIN: CPT | Mod: 25 | Performed by: FAMILY MEDICINE

## 2024-07-01 RX ORDER — RESPIRATORY SYNCYTIAL VIRUS VACCINE 120MCG/0.5
0.5 KIT INTRAMUSCULAR ONCE
Qty: 1 EACH | Refills: 0 | Status: CANCELLED | OUTPATIENT
Start: 2024-07-01 | End: 2024-07-01

## 2024-07-01 RX ORDER — OXYCODONE HYDROCHLORIDE 5 MG/1
5 TABLET ORAL EVERY 6 HOURS PRN
Qty: 90 TABLET | Refills: 0 | Status: SHIPPED | OUTPATIENT
Start: 2024-07-01

## 2024-07-01 ASSESSMENT — ANXIETY QUESTIONNAIRES
GAD7 TOTAL SCORE: 0
7. FEELING AFRAID AS IF SOMETHING AWFUL MIGHT HAPPEN: NOT AT ALL
7. FEELING AFRAID AS IF SOMETHING AWFUL MIGHT HAPPEN: NOT AT ALL
6. BECOMING EASILY ANNOYED OR IRRITABLE: NOT AT ALL
1. FEELING NERVOUS, ANXIOUS, OR ON EDGE: NOT AT ALL
GAD7 TOTAL SCORE: 0
5. BEING SO RESTLESS THAT IT IS HARD TO SIT STILL: NOT AT ALL
4. TROUBLE RELAXING: NOT AT ALL
3. WORRYING TOO MUCH ABOUT DIFFERENT THINGS: NOT AT ALL
2. NOT BEING ABLE TO STOP OR CONTROL WORRYING: NOT AT ALL
8. IF YOU CHECKED OFF ANY PROBLEMS, HOW DIFFICULT HAVE THESE MADE IT FOR YOU TO DO YOUR WORK, TAKE CARE OF THINGS AT HOME, OR GET ALONG WITH OTHER PEOPLE?: NOT DIFFICULT AT ALL
GAD7 TOTAL SCORE: 0
IF YOU CHECKED OFF ANY PROBLEMS ON THIS QUESTIONNAIRE, HOW DIFFICULT HAVE THESE PROBLEMS MADE IT FOR YOU TO DO YOUR WORK, TAKE CARE OF THINGS AT HOME, OR GET ALONG WITH OTHER PEOPLE: NOT DIFFICULT AT ALL

## 2024-07-01 ASSESSMENT — PATIENT HEALTH QUESTIONNAIRE - PHQ9
SUM OF ALL RESPONSES TO PHQ QUESTIONS 1-9: 3
10. IF YOU CHECKED OFF ANY PROBLEMS, HOW DIFFICULT HAVE THESE PROBLEMS MADE IT FOR YOU TO DO YOUR WORK, TAKE CARE OF THINGS AT HOME, OR GET ALONG WITH OTHER PEOPLE: NOT DIFFICULT AT ALL
SUM OF ALL RESPONSES TO PHQ QUESTIONS 1-9: 3

## 2024-07-01 ASSESSMENT — PAIN SCALES - GENERAL: PAINLEVEL: MODERATE PAIN (5)

## 2024-07-01 NOTE — PATIENT INSTRUCTIONS
"Patient Education   Preventive Care Advice   This is general advice we often give to help people stay healthy. Your care team may have specific advice just for you. Please talk to your care team about your own preventive care needs.  Lifestyle  Exercise at least 150 minutes each week (30 minutes a day, 5 days a week).  Do muscle strengthening activities 2 days a week. These help control your weight and prevent disease.  No smoking.  Wear sunscreen to prevent skin cancer.  Have your home tested for radon every 2 to 5 years. Radon is a colorless, odorless gas that can harm your lungs. To learn more, go to www.health.[facility].[place].us and search for \"Radon in Homes.\"  Keep guns unloaded and locked up in a safe place like a safe or gun vault, or, use a gun lock and hide the keys. Always lock away bullets separately. To learn more, visit [employer].[place].gov and search for \"safe gun storage.\"  Nutrition  Eat 5 or more servings of fruits and vegetables each day.  Try wheat bread, brown rice and whole grain pasta (instead of white bread, rice, and pasta).  Get enough calcium and vitamin D. Check the label on foods and aim for 100% of the RDA (recommended daily allowance).  Regular exams  Have a dental exam and cleaning every 6 months.  See your health care team every year to talk about:  Any changes in your health.  Any medicines your care team has prescribed.  Preventive care, family planning, and ways to prevent chronic diseases.  Shots (vaccines)   HPV shots (up to age 26), if you've never had them before.  Hepatitis B shots (up to age 59), if you've never had them before.  COVID-19 shot: Get this shot when it's due.  Flu shot: Get a flu shot every year.  Tetanus shot: Get a tetanus shot every 10 years.  Pneumococcal, hepatitis A, and RSV shots: Ask your care team if you need these based on your risk.  Shingles shot (for age 50 and up).  General health tests  Diabetes screening:  Starting at age 35, Get screened for diabetes at least " every 3 years.  If you are younger than age 35, ask your care team if you should be screened for diabetes.  Cholesterol test: At age 39, start having a cholesterol test every 5 years, or more often if advised.  Bone density scan (DEXA): At age 50, ask your care team if you should have this scan for osteoporosis (brittle bones).  Hepatitis C: Get tested at least once in your life.  Abdominal aortic aneurysm screening: Talk to your doctor about having this screening if you:  Have ever smoked; and  Are biologically male; and  Are between the ages of 65 and 75.  STIs (sexually transmitted infections)  Before age 24: Ask your care team if you should be screened for STIs.  After age 24: Get screened for STIs if you're at risk. You are at risk for STIs (including HIV) if:  You are sexually active with more than one person.  You don't use condoms every time.  You or a partner was diagnosed with a sexually transmitted infection.  If you are at risk for HIV, ask about PrEP medicine to prevent HIV.  Get tested for HIV at least once in your life, whether you are at risk for HIV or not.  Cancer screening tests  Cervical cancer screening: If you have a cervix, begin getting regular cervical cancer screening tests at age 21. Most people who have regular screenings with normal results can stop after age 65. Talk about this with your provider.  Breast cancer scan (mammogram): If you've ever had breasts, begin having regular mammograms starting at age 40. This is a scan to check for breast cancer.  Colon cancer screening: It is important to start screening for colon cancer at age 45.  Have a colonoscopy test every 10 years (or more often if you're at risk) Or, ask your provider about stool tests like a FIT test every year or Cologuard test every 3 years.  To learn more about your testing options, visit: www.adQuota/[email or web address].pdf.  For help making a decision, visit: steve/lr79311.  Prostate cancer screening test: If you have a  prostate and are age 55 to 69, ask your provider if you would benefit from a yearly prostate cancer screening test.  Lung cancer screening: If you are a current or former smoker age 50 to 80, ask your care team if ongoing lung cancer screenings are right for you.  For informational purposes only. Not to replace the advice of your health care provider. Copyright   2023 [facility]. All rights reserved. Clinically reviewed by the  [facility]. [employer] 119846 - REV 04/24.  Preventing Falls: Care Instructions  Injuries and health problems such as trouble walking or poor eyesight can increase your risk of falling. So can some medicines. But there are things you can do to help prevent falls. You can exercise to get stronger. You can also arrange your home to make it safer.    Talk to your doctor about the medicines you take. Ask if any of them increase the risk of falls and whether they can be changed or stopped.   Try to exercise regularly. It can help improve your strength and balance. This can help lower your risk of falling.     Practice fall safety and prevention.    Wear low-heeled shoes that fit well and give your feet good support. Talk to your doctor if you have foot problems that make this hard.  Carry a cellphone or wear a medical alert device that you can use to call for help.  Use stepladders instead of chairs to reach high objects. Don't climb if you're at risk for falls. Ask for help, if needed.  Wear the correct eyeglasses, if you need them.    Make your home safer.    Remove rugs, cords, clutter, and furniture from walkways.  Keep your house well lit. Use night-lights in hallways and bathrooms.  Install and use sturdy handrails on stairways.  Wear nonskid footwear, even inside. Don't walk barefoot or in socks without shoes.    Be safe outside.    Use handrails, curb cuts, and ramps whenever possible.  Keep your hands free by using a shoulder bag or backpack.  Try  "to walk in well-lit areas. Watch out for uneven ground, changes in pavement, and debris.  Be careful in the winter. Walk on the grass or gravel when sidewalks are slippery. Use de-icer on steps and walkways. Add non-slip devices to shoes.    Put grab bars and nonskid mats in your shower or tub and near the toilet. Try to use a shower chair or bath bench when bathing.   Get into a tub or shower by putting in your weaker leg first. Get out with your strong side first. Have a phone or medical alert device in the bathroom with you.   Where can you learn more?  Go to https://www."[employer].net/patiented  Enter G117 in the search box to learn more about \"Preventing Falls: Care Instructions.\"  Current as of: July 17, 2023               Content Version: 14.0    3958-9981 [employer].   Care instructions adapted under license by your healthcare professional. If you have questions about a medical condition or this instruction, always ask your healthcare professional. [employer] disclaims any warranty or liability for your use of this information.      Hearing Loss: Care Instructions  Overview     Hearing loss is a sudden or slow decrease in how well you hear. It can range from slight to profound. Permanent hearing loss can occur with aging. It also can happen when you are exposed long-term to loud noise. Examples include listening to loud music, riding motorcycles, or being around other loud machines.  Hearing loss can affect your work and home life. It can make you feel lonely or depressed. You may feel that you have lost your independence. But hearing aids and other devices can help you hear better and feel connected to others.  Follow-up care is a key part of your treatment and safety. Be sure to make and go to all appointments, and call your doctor if you are having problems. It's also a good idea to know your test results and keep a list of the medicines you take.  How can you care for yourself " at home?  Avoid loud noises whenever possible. This helps keep your hearing from getting worse.  Always wear hearing protection around loud noises.  Wear a hearing aid as directed.  A professional can help you pick a hearing aid that will work best for you.  You can also get hearing aids over the counter for mild to moderate hearing loss.  Have hearing tests as your doctor suggests. They can show whether your hearing has changed. Your hearing aid may need to be adjusted.  Use other devices as needed. These may include:  Telephone amplifiers and hearing aids that can connect to a television, stereo, radio, or microphone.  Devices that use lights or vibrations. These alert you to the doorbell, a ringing telephone, or a baby monitor.  Television closed-captioning. This shows the words at the bottom of the screen. Most new TVs can do this.  TTY (text telephone). This lets you type messages back and forth on the telephone instead of talking or listening. These devices are also called TDD. When messages are typed on the keyboard, they are sent over the phone line to a receiving TTY. The message is shown on a monitor.  Use text messaging, social media, and email if it is hard for you to communicate by telephone.  Try to learn a listening technique called speechreading. It is not lipreading. You pay attention to people's gestures, expressions, posture, and tone of voice. These clues can help you understand what a person is saying. Face the person you are talking to, and have them face you. Make sure the lighting is good. You need to see the other person's face clearly.  Think about counseling if you need help to adjust to your hearing loss.  When should you call for help?  Watch closely for changes in your health, and be sure to contact your doctor if:    You think your hearing is getting worse.     You have new symptoms, such as dizziness or nausea.   Where can you learn more?  Go to  "https://www.[employer]..net/patiented  Enter R798 in the search box to learn more about \"Hearing Loss: Care Instructions.\"  Current as of: September 27, 2023               Content Version: 14.0    9149-7316 [employer].   Care instructions adapted under license by your healthcare professional. If you have questions about a medical condition or this instruction, always ask your healthcare professional. [employer] disclaims any warranty or liability for your use of this information.      Bladder Training: Care Instructions  Your Care Instructions     Bladder training is used to treat urge incontinence and stress incontinence. Urge incontinence means that the need to urinate comes on so fast that you can't get to a toilet in time. Stress incontinence means that you leak urine because of pressure on your bladder. For example, it may happen when you laugh, cough, or lift something heavy.  Bladder training can increase how long you can wait before you have to urinate. It can also help your bladder hold more urine. And it can give you better control over the urge to urinate.  It is important to remember that bladder training takes a few weeks to a few months to make a difference. You may not see results right away, but don't give up.  Follow-up care is a key part of your treatment and safety. Be sure to make and go to all appointments, and call your doctor if you are having problems. It's also a good idea to know your test results and keep a list of the medicines you take.  How can you care for yourself at home?  Work with your doctor to come up with a bladder training program that is right for you. You may use one or more of the following methods.  Delayed urination  In the beginning, try to keep from urinating for 5 minutes after you first feel the need to go.  While you wait, take deep, slow breaths to relax. Kegel exercises can also help you delay the need to go to the bathroom.  After some " "practice, when you can easily wait 5 minutes to urinate, try to wait 10 minutes before you urinate.  Slowly increase the waiting period until you are able to control when you have to urinate.  Scheduled urination  Empty your bladder when you first wake up in the morning.  Schedule times throughout the day when you will urinate.  Start by going to the bathroom every hour, even if you don't need to go.  Slowly increase the time between trips to the bathroom.  When you have found a schedule that works well for you, keep doing it.  If you wake up during the night and have to urinate, do it. Apply your schedule to waking hours only.  Kegel exercises  These tighten and strengthen pelvic muscles, which can help you control the flow of urine. (If doing these exercises causes pain, stop doing them and talk with your doctor.) To do Kegel exercises:  Squeeze your muscles as if you were trying not to pass gas. Or squeeze your muscles as if you were stopping the flow of urine. Your belly, legs, and buttocks shouldn't move.  Hold the squeeze for 3 seconds, then relax for 5 to 10 seconds.  Start with 3 seconds, then add 1 second each week until you are able to squeeze for 10 seconds.  Repeat the exercise 10 times a session. Do 3 to 8 sessions a day.  When should you call for help?  Watch closely for changes in your health, and be sure to contact your doctor if:    Your incontinence is getting worse.     You do not get better as expected.   Where can you learn more?  Go to https://www.[employer].net/patiented  Enter V684 in the search box to learn more about \"Bladder Training: Care Instructions.\"  Current as of: November 15, 2023               Content Version: 14.0    0698-1292 [employer].   Care instructions adapted under license by your healthcare professional. If you have questions about a medical condition or this instruction, always ask your healthcare professional. [employer] disclaims any warranty " or liability for your use of this information.      Substance Use Disorder: Care Instructions  Overview     You can improve your life and health by stopping your use of alcohol or drugs. When you don't drink or use drugs, you may feel and sleep better. You may get along better with your family, friends, and coworkers. There are medicines and programs that can help with substance use disorder.  How can you care for yourself at home?  Here are some ways to help you stay sober and prevent relapse.  If you have been given medicine to help keep you sober or reduce your cravings, be sure to take it exactly as prescribed.  Talk to your doctor about programs that can help you stop using drugs or drinking alcohol.  Do not keep alcohol or drugs in your home.  Plan ahead. Think about what you'll say if other people ask you to drink or use drugs. Try not to spend time with people who drink or use drugs.  Use the time and money spent on drinking or drugs to do something that's important to you.  Preventing a relapse  Have a plan to deal with relapse. Learn to recognize changes in your thinking that lead you to drink or use drugs. Get help before you start to drink or use drugs again.  Try to stay away from situations, friends, or places that may lead you to drink or use drugs.  If you feel the need to drink alcohol or use drugs again, seek help right away. Call a trusted friend or family member. Some people get support from organizations such as Narcotics Anonymous or [employer] or from treatment facilities.  If you relapse, get help as soon as you can. Some people make a plan with another person that outlines what they want that person to do for them if they relapse. The plan usually includes how to handle the relapse and who to notify in case of relapse.  Don't give up. Remember that a relapse doesn't mean that you have failed. Use the experience to learn the triggers that lead you to drink or use drugs. Then quit again.  "Recovery is a lifelong process. Many people have several relapses before they are able to quit for good.  Follow-up care is a key part of your treatment and safety. Be sure to make and go to all appointments, and call your doctor if you are having problems. It's also a good idea to know your test results and keep a list of the medicines you take.  When should you call for help?   Call 911  anytime you think you may need emergency care. For example, call if you or someone else:    Has overdosed or has withdrawal signs. Be sure to tell the emergency workers that you are or someone else is using or trying to quit using drugs. Overdose or withdrawal signs may include:  Losing consciousness.  Seizure.  Seeing or hearing things that aren't there (hallucinations).     Is thinking or talking about suicide or harming others.   Where to get help 24 hours a day, 7 days a week   If you or someone you know talks about suicide, self-harm, a mental health crisis, a substance use crisis, or any other kind of emotional distress, get help right away. You can:    Call the Suicide and Crisis Lifeline at 988.     Call 8-426-509-TALK (1-349.365.7379).     Text HOME to 877654 to access the Crisis Text Line.   Consider saving these numbers in your phone.  Go to [employer].org for more information or to chat online.  Call your doctor now or seek immediate medical care if:    You are having withdrawal symptoms. These may include nausea or vomiting, sweating, shakiness, and anxiety.   Watch closely for changes in your health, and be sure to contact your doctor if:    You have a relapse.     You need more help or support to stop.   Where can you learn more?  Go to https://www.[employer]x.net/patiented  Enter H573 in the search box to learn more about \"Substance Use Disorder: Care Instructions.\"  Current as of: November 15, 2023               Content Version: 14.0    0057-1113 [employer].   Care instructions adapted under license " by your healthcare professional. If you have questions about a medical condition or this instruction, always ask your healthcare professional. [employer] disclaims any warranty or liability for your use of this information.      Chronic Pain: Care Instructions  Your Care Instructions     Chronic pain is pain that lasts a long time (months or even years) and may or may not have a clear cause. It is different from acute pain, which usually does have a clear cause--like an injury or illness--and gets better over time. Chronic pain:  Lasts over time but may vary from day to day.  Does not go away despite efforts to end it.  May disrupt your sleep and lead to fatigue.  May cause depression or anxiety.  May make your muscles tense, causing more pain.  Can disrupt your work, hobbies, home life, and relationships with friends and family.  Chronic pain is a very real condition. It is not just in your head. Treatment can help and usually includes several methods used together, such as medicines, physical therapy, exercise, and other treatments. Learning how to relax and changing negative thought patterns can also help you cope.  Chronic pain is complex. Taking an active role in your treatment will help you better manage your pain. Tell your doctor if you have trouble dealing with your pain. You may have to try several things before you find what works best for you.  Follow-up care is a key part of your treatment and safety. Be sure to make and go to all appointments, and call your doctor if you are having problems. It's also a good idea to know your test results and keep a list of the medicines you take.  How can you care for yourself at home?  Pace yourself. Break up large jobs into smaller tasks. Save harder tasks for days when you have less pain, or go back and forth between hard tasks and easier ones. Take rest breaks.  Relax, and reduce stress. Relaxation techniques such as deep breathing or meditation can  help.  Keep moving. Gentle, daily exercise can help reduce pain over the long run. Try low- or no-impact exercises such as walking, swimming, and stationary biking. Do stretches to stay flexible.  Try heat, cold packs, and massage.  Get enough sleep. Chronic pain can make you tired and drain your energy. Talk with your doctor if you have trouble sleeping because of pain.  Think positive. Your thoughts can affect your pain level. Do things that you enjoy to distract yourself when you have pain instead of focusing on the pain. See a movie, read a book, listen to music, or spend time with a friend.  If you think you are depressed, talk to your doctor about treatment.  Keep a daily pain diary. Record how your moods, thoughts, sleep patterns, activities, and medicine affect your pain. You may find that your pain is worse during or after certain activities or when you are feeling a certain emotion. Having a record of your pain can help you and your doctor find the best ways to treat your pain.  Take pain medicines exactly as directed.  If the doctor gave you a prescription medicine for pain, take it as prescribed.  If you are not taking a prescription pain medicine, ask your doctor if you can take an over-the-counter medicine.  Reducing constipation caused by pain medicine  Talk to your doctor about a laxative. If a laxative doesn't work, your doctor may suggest a prescription medicine.  Include fruits, vegetables, beans, and whole grains in your diet each day. These foods are high in fiber.  If your doctor recommends it, get more exercise. Walking is a good choice. Bit by bit, increase the amount you walk every day. Try for at least 30 minutes on most days of the week.  Schedule time each day for a bowel movement. A daily routine may help. Take your time and do not strain when having a bowel movement.  When should you call for help?   Call your doctor now or seek immediate medical care if:    Your pain gets worse or is  "out of control.     You feel down or blue, or you do not enjoy things like you once did. You may be depressed, which is common in people with chronic pain. Depression can be treated.     You have vomiting or cramps for more than 2 hours.   Watch closely for changes in your health, and be sure to contact your doctor if:    You cannot sleep because of pain.     You are very worried or anxious about your pain.     You have trouble taking your pain medicine.     You have any concerns about your pain medicine.     You have trouble with bowel movements, such as:  No bowel movement in 3 days.  Blood in the anal area, in your stool, or on the toilet paper.  Diarrhea for more than 24 hours.   Where can you learn more?  Go to https://www.[employer].net/patiented  Enter N004 in the search box to learn more about \"Chronic Pain: Care Instructions.\"  Current as of: July 10, 2023               Content Version: 14.0    2177-4458 [employer].   Care instructions adapted under license by your healthcare professional. If you have questions about a medical condition or this instruction, always ask your healthcare professional. [employer] disclaims any warranty or liability for your use of this information.         "

## 2024-07-01 NOTE — PROGRESS NOTES
Preventive Care Visit  [facility]  Trish Eagle MD, Family Medicine  Jul 1, 2024      Assessment & Plan     Encounter for Medicare annual wellness exam  Patient had her colonoscopy on 7/22/2019 and is due this month.  She had her mammogram 3/17/2023.  Bone density was done 12/29/2020 and was normal.  She is eligible for RSV and Tdap vaccines.  - PRIMARY CARE FOLLOW-UP SCHEDULING    Type 2 diabetes mellitus without complication, without long-term current use of insulin (H)  Typically pretty well-controlled up until last check in February.  Was 7.6% then and so she was sent to [facility] to start a GLP-1.  She was started and titrated up on Mounjaro and is at 12.5 mg.  She has had 18 pound weight loss.  Will check lab.  - Hemoglobin A1c  - Hemoglobin A1c    Essential hypertension  Actually she was on the lisinopril for her diabetes.  I will take her off of that.  - Basic metabolic panel  - Basic metabolic panel    Mixed hyperlipidemia  Patient's last LDL was 69 on rosuvastatin 10 mg.    Mild persistent asthma without complication  ACT score today is 23.  Asthma action plan was done today.  Has Flovent which she uses during peak allergy season.    Chronic pain syndrome  Patient has had chronic pain for a long time.  Mostly it was spine early on but now she is dealing with bilateral shoulder pain.    Bilateral rotator cuff dysfunction  Patient is trying to avoid surgery.  She does need her oxycodone filled.  - oxyCODONE (ROXICODONE) 5 MG tablet  Dispense: 90 tablet; Refill: 0    Controlled substance agreement signed  Last CSA for the oxycodone was done 2/27/2024.    Moderate episode of recurrent major depressive disorder (H)  PHQ-9 score today was 3.  KINDRA-7 score 0.  Well-controlled on her Cymbalta which she also uses for pain.    Vitamin D deficiency  - Vitamin D deficiency screening  - Vitamin D Deficiency    Screen for colon cancer  Has history of polyps and needs colonoscopy.  -  Colonoscopy Screening  Referral      Patient has been advised of split billing requirements and indicates understanding: Yes    I will get back to her on lab results by MyChart or mail and only call with grossly abnormal values.  I will refill meds as needed.      Counseling  Appropriate preventive services were discussed with this patient, including applicable screening as appropriate for fall prevention, nutrition, physical activity, Tobacco-use cessation, weight loss and cognition.  Checklist reviewing preventive services available has been given to the patient.  Reviewed patient's diet, addressing concerns and/or questions.   The patient was provided with written information regarding signs of hearing loss.   Information on urinary incontinence and treatment options given to patient.   I have reviewed Opioid Use Disorder and Substance Use Disorder risk factors and made any needed referrals.       FUTURE APPOINTMENTS:       - Follow-up visit in 6 months for med check.      Willem Howard is a 69 year old, presenting for the following:  Annual Visit (AWV )        7/1/2024    12:25 PM   Additional Questions   Roomed by vasile tran cma         Health Care Directive  Patient does not have a Health Care Directive or Living Will: Discussed advance care planning with patient; information given to patient to review.    HPI  I last saw this patient in February and referred her to [facility] for GLP-1 use.  Mostly because she wanted to lose weight.  She is also diabetic.  When I checked her her A1c was quite high at 7.6%.  She had usually been much better controlled.  She has worked with them to increase her Mounjaro and is now at 12.5 mg.  Her blood sugars are all very much in control.  We discussed she can go off of the glipizide.  Also her blood pressure is on the low end.  She is not symptomatic but we decided to take her off of the 2.5 mg lisinopril she was on.        6/28/2024   General Health   How would you rate  your overall physical health? Good   Feel stress (tense, anxious, or unable to sleep) To some extent      (!) STRESS CONCERN      6/28/2024   Nutrition   Diet: Diabetic            6/28/2024   Exercise   Days per week of moderate/strenous exercise 0 days   Average minutes spent exercising at this level 0 min      (!) EXERCISE CONCERN      6/28/2024   Social Factors   Frequency of gathering with friends or relatives Once a week   Worry food won't last until get money to buy more Yes   Food not last or not have enough money for food? Yes   Do you have housing? (Housing is defined as stable permanent housing and does not include staying ouside in a car, in a tent, in an abandoned building, in an overnight shelter, or couch-surfing.) Yes   Are you worried about losing your housing? No   Lack of transportation? No   Unable to get utilities (heat,electricity)? No      (!) FOOD SECURITY CONCERN PRESENT      7/1/2024   Fall Risk   Reason Gait Speed Test Not Completed Unable to complete test, patient reported symptoms             6/28/2024   Activities of Daily Living- Home Safety   Needs help with the following daily activites None of the above   Safety concerns in the home None of the above            6/28/2024   Dental   Dentist two times every year? Yes            6/28/2024   Hearing Screening   Hearing concerns? (!) TROUBLE UNDERSTANDING SOFT OR WHISPERED SPEECH.    (!) TROUBLE UNDERSTANDING SPEECH ON THE TELEPHONE       Multiple values from one day are sorted in reverse-chronological order         6/28/2024   Driving Risk Screening   Patient/family members have concerns about driving No            6/28/2024   General Alertness/Fatigue Screening   Have you been more tired than usual lately? No            6/28/2024   Urinary Incontinence Screening   Bothered by leaking urine in past 6 months Yes            6/28/2024   TB Screening   Were you born outside of the [place]? No          Today's PHQ-9 Score:       7/1/2024    12:22  PM   PHQ-9 SCORE   PHQ-9 Total Score MyChart 3 (Minimal depression)   PHQ-9 Total Score 3         6/28/2024   Substance Use   Alcohol more than 3/day or more than 7/wk No   Do you have a current opioid prescription? (!) YES   How severe/bad is pain from 1 to 10? 7/10   Do you use any other substances recreationally? (!) PRESCRIPTION DRUGS            7/1/2024    12:44 PM   OPIOID RISK TOOL TOTAL SCORE   Total Score 3     Low Risk (0-3)  Moderate Risk (4-7)  High Risk (>8)  Social History     Tobacco Use    Smoking status: Never     Passive exposure: Never    Smokeless tobacco: Never   Vaping Use    Vaping status: Never Used   Substance Use Topics    Alcohol use: Not Currently    Drug use: Never           3/17/2023   LAST FHS-7 RESULTS   1st degree relative breast or ovarian cancer No   Any relative bilateral breast cancer No   Any male have breast cancer No   Any ONE woman have BOTH breast AND ovarian cancer No   Any woman with breast cancer before 50yrs No   2 or more relatives with breast AND/OR ovarian cancer No   2 or more relatives with breast AND/OR bowel cancer No           Mammogram Screening - Mammogram every 1-2 years updated in Health Maintenance based on mutual decision making    ASCVD Risk   The 10-year ASCVD risk score (Deven VIRK, et al., 2019) is: 13.7%    Values used to calculate the score:      Age: 69 years      Sex: Female      Is Non- : No      Diabetic: Yes      Tobacco smoker: No      Systolic Blood Pressure: 95 mmHg      Is BP treated: Yes      HDL Cholesterol: 70 mg/dL      Total Cholesterol: 172 mg/dL          Reviewed and updated as needed this visit by Provider                    Past Medical History:   Diagnosis Date    Arthritis     Asthma     Chronic pain syndrome     Depression     Diabetes mellitus, type II (H)     Fibromyalgia     Hyperlipidemia     Hypertension     Insomnia      Past Surgical History:   Procedure Laterality Date    HC REVISE MEDIAN  N/CARPAL TUNNEL SURG      Description: Neuroplasty Decompression Median Nerve At Carpal Tunnel;  Recorded: 07/16/2012;    HYSTERECTOMY  2011    HYSTERECTOMY TOTAL ABDOMINAL      IR LUMBAR EPIDURAL STEROID INJECTION  4/13/2011    IR LUMBAR NERVE ROOT INJECTION  3/9/2012    OOPHORECTOMY  2011    ZZC DELGADILLO W/O FACETEC FORAMOT/DSKC 1/2 VRT SEG, CERVICAL      Description: Laminectomy Lumbar;  Recorded: 10/05/2012;    ZZC SUPRACERV ABD HYSTERECTOMY      Description: Supracervical Hysterectomy;  Recorded: 04/05/2011;     Labs reviewed in EPIC  BP Readings from Last 3 Encounters:   07/01/24 95/66   02/27/24 108/73   07/06/23 112/60    Wt Readings from Last 3 Encounters:   07/01/24 81.6 kg (180 lb)   02/27/24 89.4 kg (197 lb)   07/06/23 81.6 kg (180 lb)                  Current providers sharing in care for this patient include:  Patient Care Team:  Trish Eagle MD as PCP - General (Family Practice)  Trish Eagle MD as Assigned PCP  Jacqueline Morales MD as Assigned Musculoskeletal Provider  Trish Eagle MD as Assigned Pain Medication Provider  Montse Back RPH as Pharmacist (Pharmacist)  Montse Back RPH as Assigned MTM Pharmacist  Kamla Zelaya RPH as Pharmacist (Pharmacist)    The following health maintenance items are reviewed in Epic and correct as of today:  Health Maintenance   Topic Date Due    DEPRESSION ACTION PLAN  Never done    RSV VACCINE (Pregnancy & 60+) (1 - 1-dose 60+ series) Never done    DTAP/TDAP/TD IMMUNIZATION (2 - Td or Tdap) 09/03/2018    DIABETIC FOOT EXAM  03/06/2024    COVID-19 Vaccine (5 - 2023-24 season) 06/27/2024    COLORECTAL CANCER SCREENING  07/22/2024    EYE EXAM  08/18/2024    ASTHMA CONTROL TEST  08/27/2024    INFLUENZA VACCINE (1) 09/01/2024    A1C  01/01/2025    PHQ-9  01/01/2025    BMP  02/27/2025    LIPID  02/27/2025    MICROALBUMIN  02/27/2025    URINE DRUG SCREEN  02/27/2025    ANNUAL REVIEW OF HM ORDERS  02/27/2025    MAMMO SCREENING  03/17/2025     "MEDICARE ANNUAL WELLNESS VISIT  07/01/2025    ASTHMA ACTION PLAN  07/01/2025    FALL RISK ASSESSMENT  07/01/2025    ADVANCE CARE PLANNING  07/01/2029    DEXA  12/29/2035    HEPATITIS C SCREENING  Completed    Pneumococcal Vaccine: 65+ Years  Completed    ZOSTER IMMUNIZATION  Completed    IPV IMMUNIZATION  Aged Out    HPV IMMUNIZATION  Aged Out    MENINGITIS IMMUNIZATION  Aged Out    RSV MONOCLONAL ANTIBODY  Aged Out        Objective    Exam  BP 95/66   Pulse 81   Temp 98.3  F (36.8  C)   Resp 16   Ht 1.613 m (5' 3.5\")   Wt 81.6 kg (180 lb)   SpO2 97%   BMI 31.39 kg/m     Estimated body mass index is 31.39 kg/m  as calculated from the following:    Height as of this encounter: 1.613 m (5' 3.5\").    Weight as of this encounter: 81.6 kg (180 lb).    Physical Exam  General appearance - alert, well appearing, and in no distress and overweight  Mental status - normal mood, behavior, speech, dress, motor activity, and thought processes  Eyes - pupils equal and reactive, extraocular eye movements intact  Ears - bilateral TM's and external ear canals normal  Nose - normal and patent, no erythema, discharge   Mouth - mucous membranes moist, pharynx normal without lesions  Neck - supple, no significant adenopathy, carotids upstroke normal bilaterally, no bruits, thyroid exam: thyroid is normal in size without nodules or tenderness  Chest - clear to auscultation, no wheezes, rales or rhonchi, symmetric air entry  Heart - normal rate and regular rhythm, no murmurs noted  Abdomen - soft, nontender, nondistended, no masses or organomegaly  Breasts - not examined  Pelvic - examination not indicated  Back exam - full range of motion, no tenderness, palpable spasm or pain on motion  Neurological - alert, oriented, normal speech, no focal findings or movement disorder noted, cranial nerves II through XII intact  Musculoskeletal - abnormal active range of motion of shoulders bilaterally  Extremities - peripheral pulses normal, " no pedal edema, no clubbing or cyanosis  Skin - normal coloration and turgor, no rashes, no suspicious skin lesions noted  Diabetic foot exam: No deformity of the foot.  Skin is soft and without lesions.  Toes with some mild to moderate onychomycosis, good pulses, no neuropathy on filament testing.        7/1/2024   Mini Cog   Clock Draw Score 2 Normal   3 Item Recall 3 objects recalled   Mini Cog Total Score 5                 Signed Electronically by: Trish Eagle MD    Answers submitted by the patient for this visit:  Patient Health Questionnaire (Submitted on 7/1/2024)  If you checked off any problems, how difficult have these problems made it for you to do your work, take care of things at home, or get along with other people?: Not difficult at all  PHQ9 TOTAL SCORE: 3  KINDRA-7 (Submitted on 7/1/2024)  KINDRA 7 TOTAL SCORE: 0

## 2024-07-01 NOTE — LETTER
My Asthma Action Plan    Name: Anita Duque   YOB: 1955  Date: 7/1/2024   My doctor: Trish Eagle MD   My clinic: [facility]        My Rescue Medicine:   Albuterol inhaler (Proair/Ventolin/Proventil HFA)  2-4 puffs EVERY 4 HOURS as needed. Use a spacer if recommended by your provider.   My Asthma Severity:   Intermittent / Exercise Induced  Know your asthma triggers:   upper respiratory infections  humidity          GREEN ZONE   Good Control  I feel good  No cough or wheeze  Can work, sleep and play without asthma symptoms       Take your asthma control medicine every day.     If exercise triggers your asthma, take your rescue medication  15 minutes before exercise or sports, and  During exercise if you have asthma symptoms  Spacer to use with inhaler: If you have a spacer, make sure to use it with your inhaler             YELLOW ZONE Getting Worse  I have ANY of these:  I do not feel good  Cough or wheeze  Chest feels tight  Wake up at night   Keep taking your Green Zone medications  Start taking your rescue medicine:  every 20 minutes for up to 1 hour. Then every 4 hours for 24-48 hours.  If you stay in the Yellow Zone for more than 12-24 hours, contact your doctor.  If you do not return to the Green Zone in 12-24 hours or you get worse, start taking your oral steroid medicine if prescribed by your provider.           RED ZONE Medical Alert - Get Help  I have ANY of these:  I feel awful  Medicine is not helping  Breathing getting harder  Trouble walking or talking  Nose opens wide to breathe       Take your rescue medicine NOW  If your provider has prescribed an oral steroid medicine, start taking it NOW  Call your doctor NOW  If you are still in the Red Zone after 20 minutes and you have not reached your doctor:  Take your rescue medicine again and  Call 911 or go to the emergency room right away    See your regular doctor within 2 weeks of an Emergency Room  or Urgent Care visit for follow-up treatment.          Annual Reminders:  Meet with Asthma Educator,  Flu Shot in the Fall, consider Pneumonia Vaccination for patients with asthma (aged 19 and older).    Pharmacy:    [facility] [address] [place] [address] - [address] [facility] [place], [place] - [phone]    Electronically signed by Trish Eagle MD   Date: 07/01/24                    Asthma Triggers  How To Control Things That Make Your Asthma Worse    Triggers are things that make your asthma worse.  Look at the list below to help you find your triggers and   what you can do about them. You can help prevent asthma flare-ups by staying away from your triggers.      Trigger                                                          What you can do   Cigarette Smoke  Tobacco smoke can make asthma worse. Do not allow smoking in your home, car or around you.  Be sure no one smokes at a child s day care or school.  If you smoke, ask your health care provider for ways to help you quit.  Ask family members to quit too.  Ask your health care provider for a referral to Quit Plan to help you quit smoking, or call 9-373-026-PLAN.     Colds, Flu, Bronchitis  These are common triggers of asthma. Wash your hands often.  Don t touch your eyes, nose or mouth.  Get a flu shot every year.     Dust Mites  These are tiny bugs that live in cloth or carpet. They are too small to see. Wash sheets and blankets in hot water every week.   Encase pillows and mattress in dust mite proof covers.  Avoid having carpet if you can. If you have carpet, vacuum weekly.   Use a dust mask and HEPA vacuum.   Pollen and Outdoor Mold  Some people are allergic to trees, grass, or weed pollen, or molds. Try to keep your windows closed.  Limit time out doors when pollen count is high.   Ask you health care provider about taking medicine during allergy season.     Animal Dander  Some people are allergic to  skin flakes, urine or saliva from pets with fur or feathers. Keep pets with fur or feathers out of your home.    If you can t keep the pet outdoors, then keep the pet out of your bedroom.  Keep the bedroom door closed.  Keep pets off cloth furniture and away from stuffed toys.     Mice, Rats, and Cockroaches  Some people are allergic to the waste from these pests.   Cover food and garbage.  Clean up spills and food crumbs.  Store grease in the refrigerator.   Keep food out of the bedroom.   Indoor Mold  This can be a trigger if your home has high moisture. Fix leaking faucets, pipes, or other sources of water.   Clean moldy surfaces.  Dehumidify basement if it is damp and smelly.   Smoke, Strong Odors, and Sprays  These can reduce air quality. Stay away from strong odors and sprays, such as perfume, powder, hair spray, paints, smoke incense, paint, cleaning products, candles and new carpet.   Exercise or Sports  Some people with asthma have this trigger. Be active!  Ask your doctor about taking medicine before sports or exercise to prevent symptoms.    Warm up for 5-10 minutes before and after sports or exercise.     Other Triggers of Asthma  Cold air:  Cover your nose and mouth with a scarf.  Sometimes laughing or crying can be a trigger.  Some medicines and food can trigger asthma.

## 2024-07-03 ENCOUNTER — TRANSFERRED RECORDS (OUTPATIENT)
Dept: HEALTH INFORMATION MANAGEMENT | Facility: CLINIC | Age: 69
End: 2024-07-03
Payer: MEDICAID

## 2024-08-19 ENCOUNTER — VIRTUAL VISIT (OUTPATIENT)
Dept: PHARMACY | Facility: CLINIC | Age: 69
End: 2024-08-19
Payer: MEDICAID

## 2024-08-19 DIAGNOSIS — G43.719 INTRACTABLE CHRONIC MIGRAINE WITHOUT AURA AND WITHOUT STATUS MIGRAINOSUS: ICD-10-CM

## 2024-08-19 DIAGNOSIS — J45.20 MILD INTERMITTENT ASTHMA WITHOUT COMPLICATION: ICD-10-CM

## 2024-08-19 DIAGNOSIS — E55.9 VITAMIN D DEFICIENCY: ICD-10-CM

## 2024-08-19 DIAGNOSIS — G89.4 CHRONIC PAIN SYNDROME: ICD-10-CM

## 2024-08-19 DIAGNOSIS — M79.7 FIBROMYALGIA: ICD-10-CM

## 2024-08-19 DIAGNOSIS — K21.9 GASTROESOPHAGEAL REFLUX DISEASE WITHOUT ESOPHAGITIS: ICD-10-CM

## 2024-08-19 DIAGNOSIS — E11.9 TYPE 2 DIABETES MELLITUS WITHOUT COMPLICATION, WITHOUT LONG-TERM CURRENT USE OF INSULIN (H): Primary | ICD-10-CM

## 2024-08-19 DIAGNOSIS — E66.811 OBESITY (BMI 30.0-34.9): ICD-10-CM

## 2024-08-19 PROCEDURE — 99207 PR NO CHARGE LOS: CPT | Mod: 93

## 2024-08-19 RX ORDER — FAMOTIDINE 20 MG
1 TABLET ORAL DAILY
Qty: 90 CAPSULE | Refills: 2 | Status: SHIPPED | OUTPATIENT
Start: 2024-08-19

## 2024-08-19 RX ORDER — DULOXETIN HYDROCHLORIDE 30 MG/1
30 CAPSULE, DELAYED RELEASE ORAL DAILY
Qty: 30 CAPSULE | Refills: 1 | Status: SHIPPED | OUTPATIENT
Start: 2024-08-19

## 2024-08-19 NOTE — PATIENT INSTRUCTIONS
"Recommendations from today's MTM visit:                                                         Increase to Mounjaro 15mg weekly.  Start vitamin D 1000 units once daily  Start taking an extra duloxetine 30mg once daily for pain - new prescription sent to pharmacy    Follow-up: Return in 8 weeks (on 10/14/2024) for Follow up, with me, using a phone visit.    It was great speaking with you today.  I value your experience and would be very thankful for your time in providing feedback in our clinic survey. In the next few days, you may receive an email or text message from [employer] with a link to a survey related to your  clinical pharmacist.\"     To schedule another MTM appointment, please call the clinic directly or you may call the MTM scheduling line at [phone].    My Clinical Pharmacist's contact information:                                                      Please feel free to contact me with any questions or concerns you have.      Kamla Zelaya, PharmD  Medication Therapy Management (MTM) Pharmacist   "

## 2024-08-19 NOTE — PROGRESS NOTES
Medication Therapy Management (MTM) Encounter    ASSESSMENT:                            Medication Adherence/Access: No issues identified    Diabetes /Weight Management   A1c at goal <7% now. Self monitoring of blood glucose is at/close to goal of fasting  mg/dL. Mounjaro can be increased for weight loss, recommend patient continue to monitor for hypoglycemia with dose increase. She has lost 12% of her body weight so far. We had a brief discussion on weight loss expectations, especially as she has almost optimized Mounjaro. There are limited options for additional treatment - potentially phentermine as she is already on topiramate. She would not qualify for Contrave since she is on oxycodone.      Asthma   Stable.      GERD    Stable with as needed PPI use.      Vitamin D Deficiency:   Recommend starting maintenance vitamin D supplement now that her vitamin D levels are at goal.     Chronic Pain/Fibromyalgia/Headaches:   Current pain regimen involves acetaminophen, topical and oral NSAIDs, gabapentin, muscle relaxer, gabapentin, and SNRI. PT was ineffective in the past. There is room to optimize duloxetine dosing so recommended trying this first.     PLAN:                            Increase to Mounjaro 15mg weekly.  Start vitamin D 1000 units once daily  Start taking an extra duloxetine 30mg once daily for pain - new prescription sent to pharmacy    Follow-up: Return in 8 weeks (on 10/14/2024) for Follow up, with me, using a phone visit.    SUBJECTIVE/OBJECTIVE:                          Anita Duque is a 69 year old female seen for a follow-up visit from 6/19/24.       Reason for visit: follow-up     Allergies/ADRs: Reviewed in chart  Past Medical History: Reviewed in chart  Tobacco: She reports that she has never smoked. She has never been exposed to tobacco smoke. She has never used smokeless tobacco.  Alcohol: not assessed    Medication Adherence/Access: no issues reported    Diabetes /Weight Management    Metformin XR 2,000mg daily   Mounjaro 12.5mg weekly - she says she is primarily using this for weight loss now.   Starting weight 198, lowest 174 (-24 lbs)  She came back from [facility] in [place] and gained 5 lbs over 3 weeks from eating corn dogs and baked goods  Goal weight is 140 lbs. She would like to increase Mounjaro dose.  Using Miralax once weekly and senna every day to manage constipation    Blood sugar monitorin time(s) daily; Ranges: (patient reported) Fasting- 128 yesterday. 138 highest, denies lows.      Asthma   Albuterol HFA inhaler as needed - just using as needed.   Benzonatate 200mg 2-3 times daily - patient reports using about once a day as needed for dry cough, not sure what triggers her symptoms      GERD    Omeprazole 40mg once daily as needed - patient reports rarely uses.  Patient reports no current symptoms.   Patient feels that current regimen is effective.  The patient does not have a history of GI bleed.     Vitamin D Deficiency:   Vitamin D 01581 units weekly - patient reports she is no longer taking    Chronic Pain/Fibromyalgia/Headaches:   Diclofenac 1% gel   Applying to shoulders, hands, neck, lower back, upper back, and occasionally knees up to 4 times daily. She notes that this is her first line of medication to use for pain.   Oxycodone 5mg every 6 hours as needed  Patient reports she has been using couple times/day lately  If pain is severe, then she will use oxycodone (if pain is ~10) or lidocaine patch (is pain is ~7-8).  Sometimes may use ibuprofen, diclofenac patch, and acetaminophen as needed for pain depending on the situation.   Meloxicam 15mg daily   Patient notes that she was prescribed this for her hand pain because they are swollen and sore. Notes that meloxicam has been helpful.   Gabapentin 600mg 4 times daily   Cyclobenzaprine 10mg twice daily as needed  Usually taking 4 days weekly, if she takes regularly it makes her too tired. Notes that this is  effective if needed and declines side effects when taking as needed.   Topiramate 100mg twice daily (prescribed for headaches)   Duloxetine 60mg once daily     She feels like pain is worsened. Rates pain as a 7/10. Used to go to pain clinic. She has degenerative disc disease and if she exerts herself (weeding, vacuums, ironing) it will trigger her pain so she tries to limit these tasks to once a week. Walking without walker is also painful. Has tried PT which aggravates her pain.      Today's Vitals: There were no vitals taken for this visit.  ----------------    I spent 23 minutes with this patient today. All changes were made via collaborative practice agreement with Trish Eagle MD. A copy of the visit note was provided to the patient's provider(s).    A summary of these recommendations was sent via [employer].    Danae TorresD  Medication Therapy Management (MTM) Pharmacist    Telemedicine Visit Details  Type of service:  Telephone visit  Start Time: 12:30 PM  End Time: 12:53 PM     Medication Therapy Recommendations  Fibromyalgia    Current Medication: DULoxetine (CYMBALTA) 60 MG capsule (Discontinued)   Rationale: Dose too low - Dosage too low - Effectiveness   Recommendation: Increase Dose   Status: Accepted per CPA         Obesity (BMI 30.0-34.9)    Current Medication: tirzepatide (MOUNJARO) 12.5 MG/0.5ML pen (Discontinued)   Rationale: Dose too low - Dosage too low - Effectiveness   Recommendation: Increase Dose   Status: Accepted per CPA         Vitamin D deficiency    Rationale: Preventive therapy - Needs additional medication therapy - Indication   Recommendation: Start Medication - vitamin C 1000 MG Tabs   Status: Accepted per CPA             0.05

## 2024-08-20 DIAGNOSIS — G89.4 CHRONIC PAIN SYNDROME: Chronic | ICD-10-CM

## 2024-08-20 RX ORDER — IBUPROFEN 800 MG/1
800 TABLET, FILM COATED ORAL 3 TIMES DAILY PRN
Qty: 100 TABLET | Refills: 2 | Status: SHIPPED | OUTPATIENT
Start: 2024-08-20

## 2024-08-20 RX ORDER — DULOXETIN HYDROCHLORIDE 60 MG/1
CAPSULE, DELAYED RELEASE ORAL
Qty: 90 CAPSULE | Refills: 1 | Status: SHIPPED | OUTPATIENT
Start: 2024-08-20

## 2024-08-26 ENCOUNTER — TRANSFERRED RECORDS (OUTPATIENT)
Dept: HEALTH INFORMATION MANAGEMENT | Facility: CLINIC | Age: 69
End: 2024-08-26
Payer: MEDICAID

## 2024-08-26 LAB — RETINOPATHY: POSITIVE

## 2024-09-01 DIAGNOSIS — E78.2 MIXED HYPERLIPIDEMIA: ICD-10-CM

## 2024-09-04 NOTE — TELEPHONE ENCOUNTER
Left message for patient to return call. If patient calls back, please route to [facility].     TC please route to RN.    Shy Ashraf RN  [facility]

## 2024-09-05 RX ORDER — ROSUVASTATIN CALCIUM 10 MG/1
TABLET, COATED ORAL
Qty: 90 TABLET | Refills: 1 | Status: SHIPPED | OUTPATIENT
Start: 2024-09-05

## 2024-09-05 NOTE — TELEPHONE ENCOUNTER
Spoke with patient. She states she has been taking the medication since it was prescribed, but when the gap was pointed out she seemed confused. She got her refill on file in May of 2024 and states she has been taking it as prescribed, but again she still has some medications left from this prescription. It seems as if the patient, while trying to adhere to her medications forgets to take them at times and doesn't realize this.    Hubert Claudio RN  [facility]

## 2024-09-26 DIAGNOSIS — E11.9 TYPE 2 DIABETES MELLITUS WITHOUT COMPLICATION, WITHOUT LONG-TERM CURRENT USE OF INSULIN (H): ICD-10-CM

## 2024-09-26 RX ORDER — METFORMIN HCL 500 MG
TABLET, EXTENDED RELEASE 24 HR ORAL
Qty: 360 TABLET | Refills: 0 | Status: SHIPPED | OUTPATIENT
Start: 2024-09-26

## 2024-10-08 ENCOUNTER — TRANSFERRED RECORDS (OUTPATIENT)
Dept: HEALTH INFORMATION MANAGEMENT | Facility: CLINIC | Age: 69
End: 2024-10-08
Payer: MEDICAID

## 2024-10-14 ENCOUNTER — VIRTUAL VISIT (OUTPATIENT)
Dept: PHARMACY | Facility: CLINIC | Age: 69
End: 2024-10-14
Payer: MEDICAID

## 2024-10-14 DIAGNOSIS — G89.4 CHRONIC PAIN SYNDROME: Primary | ICD-10-CM

## 2024-10-14 DIAGNOSIS — M79.7 FIBROMYALGIA: ICD-10-CM

## 2024-10-14 DIAGNOSIS — G43.719 INTRACTABLE CHRONIC MIGRAINE WITHOUT AURA AND WITHOUT STATUS MIGRAINOSUS: ICD-10-CM

## 2024-10-14 DIAGNOSIS — E66.811 OBESITY (BMI 30.0-34.9): ICD-10-CM

## 2024-10-14 DIAGNOSIS — E11.9 TYPE 2 DIABETES MELLITUS WITHOUT COMPLICATION, WITHOUT LONG-TERM CURRENT USE OF INSULIN (H): ICD-10-CM

## 2024-10-14 PROCEDURE — 99207 PR NO CHARGE LOS: CPT | Mod: 93

## 2024-10-14 RX ORDER — DULOXETIN HYDROCHLORIDE 30 MG/1
30 CAPSULE, DELAYED RELEASE ORAL DAILY
Qty: 30 CAPSULE | Refills: 1 | Status: SHIPPED | OUTPATIENT
Start: 2024-10-14 | End: 2024-11-11

## 2024-10-14 ASSESSMENT — ASTHMA QUESTIONNAIRES
QUESTION_3 LAST FOUR WEEKS HOW OFTEN DID YOUR ASTHMA SYMPTOMS (WHEEZING, COUGHING, SHORTNESS OF BREATH, CHEST TIGHTNESS OR PAIN) WAKE YOU UP AT NIGHT OR EARLIER THAN USUAL IN THE MORNING: NOT AT ALL
QUESTION_1 LAST FOUR WEEKS HOW MUCH OF THE TIME DID YOUR ASTHMA KEEP YOU FROM GETTING AS MUCH DONE AT WORK, SCHOOL OR AT HOME: NONE OF THE TIME
ACT_TOTALSCORE: 24
QUESTION_4 LAST FOUR WEEKS HOW OFTEN HAVE YOU USED YOUR RESCUE INHALER OR NEBULIZER MEDICATION (SUCH AS ALBUTEROL): NOT AT ALL
QUESTION_5 LAST FOUR WEEKS HOW WOULD YOU RATE YOUR ASTHMA CONTROL: WELL CONTROLLED
ACT_TOTALSCORE: 24
QUESTION_2 LAST FOUR WEEKS HOW OFTEN HAVE YOU HAD SHORTNESS OF BREATH: NOT AT ALL
ACUTE_EXACERBATION_TODAY: NO

## 2024-10-14 NOTE — PATIENT INSTRUCTIONS
"Recommendations from today's MTM visit:                                                         Start taking duloxetine 60mg + 30mg every day for pain  Work on decreasing refined carbs (such as white rice, flour tortillas, white breads, and pastas) and increasing vegetables, fruits, and protein (such as chicken, tofu).      Follow-up: Return in 4 weeks (on 11/11/2024) for Follow up, with me, using a phone visit.    It was great speaking with you today.  I value your experience and would be very thankful for your time in providing feedback in our clinic survey. In the next few days, you may receive an email or text message from [facility] with a link to a survey related to your  clinical pharmacist.\"     To schedule another MTM appointment, please call the clinic directly or you may call the MTM scheduling line at [phone].    My Clinical Pharmacist's contact information:                                                      Please feel free to contact me with any questions or concerns you have.      Kamla Zelaya, PharmD  Medication Therapy Management (MTM) Pharmacist   "

## 2024-10-14 NOTE — PROGRESS NOTES
Medication Therapy Management (MTM) Encounter    ASSESSMENT:                            Medication Adherence/Access: No issues identified.    Diabetes /Weight Management   A1c at goal <7%. Self monitoring of blood glucose is at goal of fasting  mg/dL. She has lost 16% of her body weight so far. We discussed that although she is not at her goal weight, the amount of weight she has lost is still significant for health benefits and the expected amount with Mounjaro. Recommended implementing dietary changes at this time such as decreased carbs and increase vegetable, fruit, and protein intake. She may benefit from [employer], [employer], or [employer].     Chronic Pain/Fibromyalgia/Headaches:   Current pain regimen involves acetaminophen, topical and oral NSAIDs, gabapentin, muscle relaxer, gabapentin, and SNRI. PT was ineffective in the past. Reiterated increasing dose of duloxetine for pain.     PLAN:                            Start taking duloxetine 60mg + 30mg every day for pain  Work on decreasing refined carbs (such as white rice, flour tortillas, white breads, and pastas) and increasing vegetables, fruits, and protein (such as chicken, tofu).      Follow-up: Return in 4 weeks (on 11/11/2024) for Follow up, with me, using a phone visit.    SUBJECTIVE/OBJECTIVE:                          Anita Duque is a 69 year old female seen for a follow-up visit from 8/19/24.       Reason for visit: diabetes follow-up     Allergies/ADRs: Reviewed in chart  Past Medical History: Reviewed in chart  Tobacco: She reports that she has never smoked. She has never been exposed to tobacco smoke. She has never used smokeless tobacco.  Alcohol: none    Medication Adherence/Access: no issues reported.    Diabetes   /Weight Management   Metformin XR 2,000mg daily   Mounjaro 15mg weekly  Starting weight 198, current weight 170 lbs. Reports she has not lost weight in 2 months, has not gained either  Goal weight is 140 lbs  Using Miralax  once weekly and senna every day to manage constipation    Diet: oatmeal for lunch. Had rice, refried beans, tortilla with chicken for dinner last night. Broccoli/cauliflower about 3 days/week but hard to afford vegetables. She has a $80/month voucher at [employer]  Activity limited due to pain.      Blood sugar monitorin time(s) daily; Ranges: (patient reported) Fasting- 120s, 125 this morning   Eye exam is up to date  Foot exam: due    Chronic Pain/Fibromyalgia/Headaches:   Diclofenac 1% gel   Applying to shoulders, hands, neck, lower back, upper back, and occasionally knees up to 4 times daily. She notes that this is her first line of medication to use for pain.   Oxycodone 5mg every 6 hours as needed  Patient reports she has been using couple times/day lately  If pain is severe, then she will use oxycodone (if pain is ~10) or lidocaine patch (is pain is ~7-8).  Meloxicam 15mg daily   Patient notes that she was prescribed this for her hand pain because they are swollen and sore. Notes that meloxicam has been helpful.   Gabapentin 600mg 4 times daily   Cyclobenzaprine 10mg twice daily as needed  Usually taking 4 days weekly, if she takes regularly it makes her too tired. Notes that this is effective if needed and declines side effects when taking as needed.   Topiramate 100mg twice daily (prescribed for headaches)   Duloxetine 90mg once daily - patient reports she has only been taking 30mg dose which she is out of now.   Sometimes may use ibuprofen, diclofenac patch, and acetaminophen as needed for pain depending on the situation.     Denies any changes in pain. Rates pain as a 7/10. Used to go to pain clinic. She has degenerative disc disease and if she exerts herself (weeding, vacuums, ironing) it will trigger her pain so she tries to limit these tasks to once a week. Walking without walker is also painful. Has tried PT which aggravates her pain.        Today's Vitals: There were no vitals  taken for this visit.  ----------------    I spent 20 minutes with this patient today. All changes were made via collaborative practice agreement with Trish Eagle MD. A copy of the visit note was provided to the patient's provider(s).    A summary of these recommendations was sent via [employer].    Kamla Zelaya PharmD  Medication Therapy Management (MTM) Pharmacist    Telemedicine Visit Details  The patient's medications can be safely assessed via a telemedicine encounter.  Type of service:  Telephone visit  Originating Location (pt. Location): Home  Distant Location (provider location):  On-site  Start Time: 12:31 PM  End Time: 12:51 PM     Medication Therapy Recommendations  Fibromyalgia    Current Medication: DULoxetine (CYMBALTA) 30 MG capsule   Rationale: Does not understand instructions - Adherence - Adherence   Recommendation: Provide Education   Status: Patient Agreed - Adherence/Education

## 2024-11-11 ENCOUNTER — VIRTUAL VISIT (OUTPATIENT)
Dept: PHARMACY | Facility: CLINIC | Age: 69
End: 2024-11-11
Payer: MEDICAID

## 2024-11-11 DIAGNOSIS — M79.7 FIBROMYALGIA: ICD-10-CM

## 2024-11-11 DIAGNOSIS — G89.4 CHRONIC PAIN SYNDROME: Chronic | ICD-10-CM

## 2024-11-11 DIAGNOSIS — E66.811 OBESITY (BMI 30.0-34.9): ICD-10-CM

## 2024-11-11 DIAGNOSIS — E11.9 TYPE 2 DIABETES MELLITUS WITHOUT COMPLICATION, WITHOUT LONG-TERM CURRENT USE OF INSULIN (H): Primary | ICD-10-CM

## 2024-11-11 PROCEDURE — 99207 PR NO CHARGE LOS: CPT | Mod: 93

## 2024-11-11 RX ORDER — DULOXETIN HYDROCHLORIDE 30 MG/1
30 CAPSULE, DELAYED RELEASE ORAL DAILY
Qty: 90 CAPSULE | Refills: 1 | Status: SHIPPED | OUTPATIENT
Start: 2024-11-11

## 2024-11-11 RX ORDER — DULOXETIN HYDROCHLORIDE 60 MG/1
CAPSULE, DELAYED RELEASE ORAL
Qty: 90 CAPSULE | Refills: 1 | Status: SHIPPED | OUTPATIENT
Start: 2024-11-11

## 2024-11-11 NOTE — PATIENT INSTRUCTIONS
"Recommendations from today's MTM visit:                                                         No medication changes today  We can help enroll you for fresh fruit/vegetable programs in March/April 2025.  You can also request shelf stable bags at your next in-person appointment    Follow-up: Return in 26 weeks (on 5/12/2025) for Follow up, with me, using a phone visit.    It was great speaking with you today.  I value your experience and would be very thankful for your time in providing feedback in our clinic survey. In the next few days, you may receive an email or text message from [employer] with a link to a survey related to your  clinical pharmacist.\"     To schedule another MTM appointment, please call the clinic directly or you may call the MTM scheduling line at [phone].    My Clinical Pharmacist's contact information:                                                      Please feel free to contact me with any questions or concerns you have.      Kamla Zelaya, PharmD  Medication Therapy Management (MTM) Pharmacist   "

## 2024-11-11 NOTE — PROGRESS NOTES
Medication Therapy Management (MTM) Encounter    ASSESSMENT:                            Medication Adherence/Access: No issues identified.    Diabetes /Weight Management   A1c at goal <7%. Self monitoring of blood glucose is at goal of fasting  mg/dL. She has lost 16% of her body weight so far. She may benefit from enrolling in [employer] or [employer] at follow-up. We did discuss that shelf stable bags are also available in clinic.       Chronic Pain/Fibromyalgia   Improved on increased duloxetine dose. Current pain regimen involves acetaminophen, topical and oral NSAIDs, gabapentin, muscle relaxer, gabapentin, and SNRI. PT was ineffective in the past. Consider switching cyclobenzaprine to methocarbamol in the future with less sedating side effects.    PLAN:                            No medication changes today  We can help enroll you for fresh fruit/vegetable programs in March/April 2025.  You can also request shelf stable bags at your next in-person appointment    Follow-up: Return in 26 weeks (on 5/12/2025) for Follow up, with me, using a phone visit.    SUBJECTIVE/OBJECTIVE:                          Anita Duque is a 69 year old female seen for a follow-up visit from 10/14/24.       Reason for visit: follow-up     Allergies/ADRs: Reviewed in chart  Past Medical History: Reviewed in chart  Tobacco: She reports that she has never smoked. She has never been exposed to tobacco smoke. She has never used smokeless tobacco.  Alcohol: none    Medication Adherence/Access: no issues reported.    Diabetes   /Weight Management   Metformin XR 2,000mg daily   Mounjaro 15mg weekly  Starting weight 198, current weight 170 lbs. Reports she has not lost weight in 2 months, has not gained either  Goal weight is 140 lbs  Using Miralax once weekly and senna every day to manage constipation    Diet: oatmeal for lunch. Had rice, refried beans, tortilla with chicken for dinner last night. Broccoli/cauliflower about 3 days/week but  "hard to afford vegetables. She has a $80/month voucher at [email or web address]  Activity limited due to pain.      Blood sugar monitorin time(s) daily; Ranges: (patient reported) Fasting- 120s, 129   Eye exam is up to date  Foot exam: due    Chronic Pain/Fibromyalgia   Diclofenac 1% gel   Applying to shoulders, hands, neck, lower back, upper back, and occasionally knees up to 4 times daily. She notes that this is her first line of medication to use for pain.   Oxycodone 5mg every 6 hours as needed  Patient reports she has been using couple times/day lately  She will use oxycodone (if pain is ~10) or lidocaine patch (if pain is ~7-8).  Meloxicam 15mg daily   Patient notes that she was prescribed this for her hand pain because they are swollen and sore. Notes that meloxicam has been helpful.   Gabapentin 600mg 4 times daily   Cyclobenzaprine 10mg twice daily as needed  Usually taking 4 days weekly, if she takes regularly it makes her too tired. Notes that this is effective if needed and declines side effects when taking as needed.  Duloxetine 90mg once daily - increased last visit  Sometimes may use ibuprofen, diclofenac patch, and acetaminophen as needed for pain depending on the situation.      Patient reports increase in duloxetine dose does seem to be helping. Reports pain is 5/10 on average (previously 7/10). She says \"maybe\" making her more tired. Denies other side effects on higher duloxetine dose.    Used to go to pain clinic. She has degenerative disc disease and if she exerts herself (weeding, vacuums, ironing) it will trigger her pain so she tries to limit these tasks to once a week. Walking without walker is also painful. Has tried PT which aggravates her pain.      Today's Vitals: There were no vitals taken for this visit.  ----------------    I spent 6 minutes with this patient today. All changes were made via collaborative practice agreement with Trish Eagle MD. A copy of the visit note " was provided to the patient's provider(s).    A summary of these recommendations was sent via [employer].    Kamla Zelaya PharmD  Medication Therapy Management (MTM) Pharmacist    Telemedicine Visit Details  The patient's medications can be safely assessed via a telemedicine encounter.  Type of service:  Telephone visit  Originating Location (pt. Location): Home    Distant Location (provider location):  On-site  Start Time: 12:36 PM  End Time: 12:42 PM     Medication Therapy Recommendations  No medication therapy recommendations to display

## 2024-12-08 DIAGNOSIS — K59.00 CONSTIPATION, UNSPECIFIED CONSTIPATION TYPE: ICD-10-CM

## 2024-12-08 DIAGNOSIS — I10 ESSENTIAL HYPERTENSION: ICD-10-CM

## 2024-12-09 RX ORDER — DOCUSATE SODIUM, SENNOSIDES 50; 8.6 MG/1; MG/1
1 TABLET ORAL 2 TIMES DAILY
Qty: 180 TABLET | Refills: 0 | Status: SHIPPED | OUTPATIENT
Start: 2024-12-09

## 2024-12-10 RX ORDER — SPIRONOLACTONE 100 MG/1
TABLET, FILM COATED ORAL
Qty: 90 TABLET | Refills: 1 | Status: SHIPPED | OUTPATIENT
Start: 2024-12-10

## 2025-01-13 ENCOUNTER — OFFICE VISIT (OUTPATIENT)
Dept: FAMILY MEDICINE | Facility: CLINIC | Age: 70
End: 2025-01-13
Payer: COMMERCIAL

## 2025-01-13 VITALS
OXYGEN SATURATION: 98 % | RESPIRATION RATE: 16 BRPM | TEMPERATURE: 97.9 F | HEART RATE: 84 BPM | HEIGHT: 63 IN | BODY MASS INDEX: 29.59 KG/M2 | DIASTOLIC BLOOD PRESSURE: 68 MMHG | SYSTOLIC BLOOD PRESSURE: 104 MMHG | WEIGHT: 167 LBS

## 2025-01-13 DIAGNOSIS — K21.9 GASTROESOPHAGEAL REFLUX DISEASE WITHOUT ESOPHAGITIS: ICD-10-CM

## 2025-01-13 DIAGNOSIS — J45.30 MILD PERSISTENT ASTHMA WITHOUT COMPLICATION: Chronic | ICD-10-CM

## 2025-01-13 DIAGNOSIS — I10 ESSENTIAL HYPERTENSION: Chronic | ICD-10-CM

## 2025-01-13 DIAGNOSIS — M12.811 ROTATOR CUFF TEAR ARTHROPATHY OF BOTH SHOULDERS: ICD-10-CM

## 2025-01-13 DIAGNOSIS — M12.812 ROTATOR CUFF TEAR ARTHROPATHY OF BOTH SHOULDERS: ICD-10-CM

## 2025-01-13 DIAGNOSIS — M53.9 MULTILEVEL DEGENERATIVE DISC DISEASE: ICD-10-CM

## 2025-01-13 DIAGNOSIS — N39.46 MIXED INCONTINENCE: ICD-10-CM

## 2025-01-13 DIAGNOSIS — E11.3293 MILD NONPROLIFERATIVE DIABETIC RETINOPATHY OF BOTH EYES WITHOUT MACULAR EDEMA ASSOCIATED WITH TYPE 2 DIABETES MELLITUS (H): ICD-10-CM

## 2025-01-13 DIAGNOSIS — F33.1 MODERATE EPISODE OF RECURRENT MAJOR DEPRESSIVE DISORDER (H): Chronic | ICD-10-CM

## 2025-01-13 DIAGNOSIS — G89.4 CHRONIC PAIN SYNDROME: Primary | Chronic | ICD-10-CM

## 2025-01-13 DIAGNOSIS — M75.101 ROTATOR CUFF TEAR ARTHROPATHY OF BOTH SHOULDERS: ICD-10-CM

## 2025-01-13 DIAGNOSIS — M75.102 ROTATOR CUFF TEAR ARTHROPATHY OF BOTH SHOULDERS: ICD-10-CM

## 2025-01-13 DIAGNOSIS — E78.2 MIXED HYPERLIPIDEMIA: Chronic | ICD-10-CM

## 2025-01-13 DIAGNOSIS — R26.89 POOR BALANCE: ICD-10-CM

## 2025-01-13 DIAGNOSIS — E66.3 OVERWEIGHT (BMI 25.0-29.9): ICD-10-CM

## 2025-01-13 DIAGNOSIS — R42 DIZZINESS: ICD-10-CM

## 2025-01-13 DIAGNOSIS — Z79.899 CONTROLLED SUBSTANCE AGREEMENT SIGNED: Chronic | ICD-10-CM

## 2025-01-13 DIAGNOSIS — E11.9 TYPE 2 DIABETES MELLITUS WITHOUT COMPLICATION, WITHOUT LONG-TERM CURRENT USE OF INSULIN (H): Chronic | ICD-10-CM

## 2025-01-13 PROBLEM — E66.01 MORBID OBESITY (H): Status: ACTIVE | Noted: 2025-01-13

## 2025-01-13 LAB
ALBUMIN SERPL BCG-MCNC: 4.6 G/DL (ref 3.5–5.2)
ALP SERPL-CCNC: 75 U/L (ref 40–150)
ALT SERPL W P-5'-P-CCNC: 17 U/L (ref 0–50)
ANION GAP SERPL CALCULATED.3IONS-SCNC: 11 MMOL/L (ref 7–15)
AST SERPL W P-5'-P-CCNC: 26 U/L (ref 0–45)
BILIRUB SERPL-MCNC: 0.4 MG/DL
BUN SERPL-MCNC: 20 MG/DL (ref 8–23)
CALCIUM SERPL-MCNC: 9.8 MG/DL (ref 8.8–10.4)
CHLORIDE SERPL-SCNC: 105 MMOL/L (ref 98–107)
CHOLEST SERPL-MCNC: 126 MG/DL
CREAT SERPL-MCNC: 0.94 MG/DL (ref 0.51–0.95)
CREAT UR-MCNC: 199 MG/DL
EGFRCR SERPLBLD CKD-EPI 2021: 65 ML/MIN/1.73M2
EST. AVERAGE GLUCOSE BLD GHB EST-MCNC: 146 MG/DL
FASTING STATUS PATIENT QL REPORTED: YES
FASTING STATUS PATIENT QL REPORTED: YES
GLUCOSE SERPL-MCNC: 111 MG/DL (ref 70–99)
HBA1C MFR BLD: 6.7 % (ref 0–5.6)
HCO3 SERPL-SCNC: 24 MMOL/L (ref 22–29)
HDLC SERPL-MCNC: 65 MG/DL
LDLC SERPL CALC-MCNC: 35 MG/DL
MAGNESIUM SERPL-MCNC: 1.7 MG/DL (ref 1.7–2.3)
MICROALBUMIN UR-MCNC: 15 MG/L
MICROALBUMIN/CREAT UR: 7.54 MG/G CR (ref 0–25)
NONHDLC SERPL-MCNC: 61 MG/DL
POTASSIUM SERPL-SCNC: 4.8 MMOL/L (ref 3.4–5.3)
PROT SERPL-MCNC: 7.2 G/DL (ref 6.4–8.3)
SODIUM SERPL-SCNC: 140 MMOL/L (ref 135–145)
TRIGL SERPL-MCNC: 130 MG/DL

## 2025-01-13 PROCEDURE — 80061 LIPID PANEL: CPT | Performed by: FAMILY MEDICINE

## 2025-01-13 PROCEDURE — 83036 HEMOGLOBIN GLYCOSYLATED A1C: CPT | Performed by: FAMILY MEDICINE

## 2025-01-13 PROCEDURE — 80053 COMPREHEN METABOLIC PANEL: CPT | Performed by: FAMILY MEDICINE

## 2025-01-13 PROCEDURE — 82043 UR ALBUMIN QUANTITATIVE: CPT | Performed by: FAMILY MEDICINE

## 2025-01-13 PROCEDURE — 99215 OFFICE O/P EST HI 40 MIN: CPT | Performed by: FAMILY MEDICINE

## 2025-01-13 PROCEDURE — 82570 ASSAY OF URINE CREATININE: CPT | Performed by: FAMILY MEDICINE

## 2025-01-13 PROCEDURE — 36415 COLL VENOUS BLD VENIPUNCTURE: CPT | Performed by: FAMILY MEDICINE

## 2025-01-13 PROCEDURE — 83735 ASSAY OF MAGNESIUM: CPT | Performed by: FAMILY MEDICINE

## 2025-01-13 PROCEDURE — G2211 COMPLEX E/M VISIT ADD ON: HCPCS | Performed by: FAMILY MEDICINE

## 2025-01-13 RX ORDER — OXYCODONE HYDROCHLORIDE 5 MG/1
5 TABLET ORAL EVERY 6 HOURS PRN
Qty: 90 TABLET | Refills: 0 | Status: SHIPPED | OUTPATIENT
Start: 2025-01-13

## 2025-01-13 RX ORDER — CHOLECALCIFEROL (VITAMIN D3) 25 MCG
TABLET ORAL
COMMUNITY
Start: 2024-08-22 | End: 2025-01-13

## 2025-01-13 ASSESSMENT — ANXIETY QUESTIONNAIRES
7. FEELING AFRAID AS IF SOMETHING AWFUL MIGHT HAPPEN: NOT AT ALL
GAD7 TOTAL SCORE: 0
6. BECOMING EASILY ANNOYED OR IRRITABLE: NOT AT ALL
5. BEING SO RESTLESS THAT IT IS HARD TO SIT STILL: NOT AT ALL
1. FEELING NERVOUS, ANXIOUS, OR ON EDGE: NOT AT ALL
8. IF YOU CHECKED OFF ANY PROBLEMS, HOW DIFFICULT HAVE THESE MADE IT FOR YOU TO DO YOUR WORK, TAKE CARE OF THINGS AT HOME, OR GET ALONG WITH OTHER PEOPLE?: NOT DIFFICULT AT ALL
GAD7 TOTAL SCORE: 0
4. TROUBLE RELAXING: NOT AT ALL
3. WORRYING TOO MUCH ABOUT DIFFERENT THINGS: NOT AT ALL
IF YOU CHECKED OFF ANY PROBLEMS ON THIS QUESTIONNAIRE, HOW DIFFICULT HAVE THESE PROBLEMS MADE IT FOR YOU TO DO YOUR WORK, TAKE CARE OF THINGS AT HOME, OR GET ALONG WITH OTHER PEOPLE: NOT DIFFICULT AT ALL
7. FEELING AFRAID AS IF SOMETHING AWFUL MIGHT HAPPEN: NOT AT ALL
2. NOT BEING ABLE TO STOP OR CONTROL WORRYING: NOT AT ALL
GAD7 TOTAL SCORE: 0

## 2025-01-13 ASSESSMENT — PAIN SCALES - GENERAL: PAINLEVEL_OUTOF10: NO PAIN (0)

## 2025-01-13 ASSESSMENT — ASTHMA QUESTIONNAIRES: ACT_TOTALSCORE: 25

## 2025-01-13 ASSESSMENT — PATIENT HEALTH QUESTIONNAIRE - PHQ9
SUM OF ALL RESPONSES TO PHQ QUESTIONS 1-9: 3
SUM OF ALL RESPONSES TO PHQ QUESTIONS 1-9: 3
10. IF YOU CHECKED OFF ANY PROBLEMS, HOW DIFFICULT HAVE THESE PROBLEMS MADE IT FOR YOU TO DO YOUR WORK, TAKE CARE OF THINGS AT HOME, OR GET ALONG WITH OTHER PEOPLE: NOT DIFFICULT AT ALL

## 2025-01-13 NOTE — LETTER

## 2025-01-13 NOTE — PROGRESS NOTES
Assessment & Plan     Chronic pain syndrome  This has been a longstanding diagnosis.  She is treated for pain with gabapentin, Mobic, lidocaine patches, oxycodone 5 mg and duloxetine 90 mg daily.  Working with MTM who has her on maximum dose of duloxetine.    Controlled substance agreement signed  New CSA was done today for 90 tablets of oxycodone 5 mg to last 90 days or longer.    Multilevel degenerative disc disease  Patient claims that her back is not any better since losing weight.    Rotator cuff tear arthropathy of both shoulders  Patient needs refill of medication that she has not filled since our last visit.  - oxyCODONE (ROXICODONE) 5 MG tablet  Dispense: 90 tablet; Refill: 0    Type 2 diabetes mellitus without complication, without long-term current use of insulin (H)  Patient's A1c dropped to 5.8 from 7.6% with the addition of Mounjaro.  Will check labs and order test strips.  She is also on metformin.  - Albumin Random Urine Quantitative with Creat Ratio  - blood glucose (NO BRAND SPECIFIED) test strip  Dispense: 100 strip; Refill: 3  - Hemoglobin A1c  - Albumin Random Urine Quantitative with Creat Ratio    Mild nonproliferative diabetic retinopathy of both eyes without macular edema associated with type 2 diabetes mellitus (H)  Last eye exam was 8/26/2024.    Essential hypertension  Had previously had hypertension but now her blood pressure is quite low.  I am going to take her off spironolactone to see if she has less dizzy spells.  Will check labs today.  - Comprehensive metabolic panel (BMP + Alb, Alk Phos, ALT, AST, Total. Bili, TP)  - Comprehensive metabolic panel (BMP + Alb, Alk Phos, ALT, AST, Total. Bili, TP)    Mixed hyperlipidemia  Currently on rosuvastatin 10 mg with last LDL of 69.  Will recheck.  - Lipid panel reflex to direct LDL Fasting  - Lipid panel reflex to direct LDL Fasting    Mild persistent asthma without complication  ACT score today is 25.  Asthma action plan was done  7/1/2024.    Moderate episode of recurrent major depressive disorder (H)  PHQ-9 score today is 3 which is stable on her duloxetine.  KINDRA-7 score is 0 which is stable from 6 months ago.    Gastroesophageal reflux disease without esophagitis  Using omeprazole as needed.    Dizziness  Has a component of vertigo as well.  She tells me about nystagmus symptoms.  But she also describes what seems like orthostatic hypotension.  I will check labs.  She is to stay hydrated and hopefully going off of spironolactone will help as well.  - Comprehensive metabolic panel (BMP + Alb, Alk Phos, ALT, AST, Total. Bili, TP)  - Magnesium  - Commode Chair Order for DME - ONLY FOR DME  - Comprehensive metabolic panel (BMP + Alb, Alk Phos, ALT, AST, Total. Bili, TP)  - Magnesium    Poor balance  Tells me that nearly every morning she makes a big mess when she stands up for urination.  She has to wait until she is not dizzy and it takes her a while to get to the bathroom and by that time she has wet herself.  We discussed that perhaps she should get a commode which would solve the problem.  - Commode Chair Order for DME - ONLY FOR DME    Mixed incontinence  She particularly has urge incontinence but cannot afford medications that work for this such as Gemtesa.  Perhaps MTM could help get some financial help.  - Commode Chair Order for DME - ONLY FOR DME    Overweight (BMI 25.0-29.9)  Current BMI is 29.58.  Previous weight was 198 and is currently 167.  I congratulated her.  She is not very active because of her physical state, but she is able to lose weight with the GLP-1 agonist.    I will get back to her on lab results by Farhad and only call with grossly abnormal values.  I will refill her meds as needed.    The longitudinal plan of care for the diagnosis(es)/condition(s) as documented were addressed during this visit. Due to the added complexity in care, I will continue to support Anita in the subsequent management and with ongoing  "continuity of care.      BMI  Estimated body mass index is 29.58 kg/m  as calculated from the following:    Height as of this encounter: 1.6 m (5' 3\").    Weight as of this encounter: 75.8 kg (167 lb).   Weight management plan: Specific weight management program called mounjaro discussed      I spent a total of 46 minutes on the day of the visit.   Time spent by me today doing chart review, history and exam, documentation and further activities per the note    FUTURE APPOINTMENTS:       - Follow-up for annual visit in 6 months or as needed    Subjective   Anita is a 69 year old, presenting for the following health issues:  Recheck Medication        1/13/2025    11:10 AM   Additional Questions   Roomed by vasile tran cma     History of Present Illness     Asthma:  She presents for follow up of asthma.  She has no cough, no wheezing, and no shortness of breath.  She is using a relief medication a few times a month. She does not miss any doses of her controller medication throughout the week. Patient is aware of the following triggers: unaware of any triggers. The patient has not had a visit to the Emergency Room, Urgent Care or Hospital due to asthma since the last clinic visit.     Back Pain:  She presents for follow up of back pain. Patient's back pain is a chronic problem.  Location of back pain:  Right lower back, left lower back, right middle of back, left middle of back, right upper back, left upper back, right side of neck, left side of neck, right shoulder, left shoulder, right hip, left hip, right side of waist and left side of waist  Description of back pain: burning, dull ache, sharp and stabbing  Back pain spreads: right shoulder, left shoulder, right side of neck and left side of neck    Since patient first noticed back pain, pain is: always present, but gets better and worse  Does back pain interfere with her job:  Not applicable       Mental Health Follow-up:  Patient presents to follow-up on " "Anxiety.    Patient's anxiety since last visit has been:  No change  The patient is having other symptoms associated with anxiety.  Any significant life events: health concerns  Patient is not feeling anxious or having panic attacks.  Patient has no concerns about alcohol or drug use.    Diabetes:   She presents for follow up of diabetes.  She is checking home blood glucose a few times a month.   She checks blood glucose before meals.  Blood glucose is never over 200 and never under 70. She is aware of hypoglycemia symptoms including shakiness, dizziness, weakness and blurred vision.   She is concerned about other.   She is having numbness in feet and excessive thirst.            Hyperlipidemia:  She presents for follow up of hyperlipidemia.   She is taking medication to lower cholesterol. She is not having myalgia or other side effects to statin medications.    Headaches:   Since the patient's last clinic visit, headaches are: no change  The patient is getting headaches:  Twice  She is not able to do normal daily activities when she has a migraine.  The patient is taking the following rescue/relief medications:  Tylenol   Patient states \"I get total relief\" from the rescue/relief medications.   The patient is taking the following medications to prevent migraines:  Topomax  In the past 4 weeks, the patient has gone to an Urgent Care or Emergency Room 0 times times due to headaches.    Reason for visit:  Diabetic check    She eats 0-1 servings of fruits and vegetables daily.She consumes 1 sweetened beverage(s) daily.She exercises with enough effort to increase her heart rate 9 or less minutes per day.  She exercises with enough effort to increase her heart rate 3 or less days per week. She is missing 1 dose(s) of medications per week.     This is a medically complicated patient who last saw me for an annual wellness back on 1 July.  She has chronic pain and we have a controlled substance agreement for oxycodone.  It " "needs to be renewed today.  She has trouble with her shoulders, back, headaches and migraine, and fibromyalgia.  She has been working with MTM and they have increased her duloxetine to 90 mg in a day.  They also have been adjusting her Mounjaro.  She is a type II diabetic who has struggled with her weight and has done on a number of different measures to lose weight.  Currently she is on Mounjaro and doing quite well.  Her current BMI is just under 30.  She has lost 31 pounds since she started.  She used to be on phentermine for which we had a controlled substance agreement.  She complains of being dizzy and having poor balance.  She had a big workup a couple years back at the dizzy and balance clinic in [place].  She never got a good answer she tells me.  Lately she has been having some orthostatic like dizziness which is likely because she has had weight loss and her blood pressure is rather low right now.  She is not on any blood pressure meds besides the spironolactone which I believe is being used for different reason.  We discussed getting off of that medication.  She also describes that whenever she gets cold or chills she gets a burning sensation in the tops of her legs and distal arms which happened to be exactly the place where she got a bad sunburn back in July.  I tell her I think it is probably related although other people have said to her they did not think it was.      Objective    /68 (BP Location: Left arm, Patient Position: Sitting, Cuff Size: Adult Regular)   Pulse 84   Temp 97.9  F (36.6  C)   Resp 16   Ht 1.6 m (5' 3\")   Wt 75.8 kg (167 lb)   SpO2 98%   BMI 29.58 kg/m    Body mass index is 29.58 kg/m .  Physical Exam   General appearance - alert, well appearing, and in no distress and overweight  Mental status - normal mood, behavior, speech, dress, motor activity, and thought processes  Eyes - pupils equal and reactive, extraocular eye movements intact  Ears - bilateral TM's and " external ear canals normal  Nose - normal and patent, no erythema, discharge or polyps  Mouth - mucous membranes moist, pharynx normal without lesions  Neck - supple, no significant adenopathy, carotids upstroke normal bilaterally, no bruits, thyroid exam: thyroid is normal in size without nodules or tenderness  Chest - clear to auscultation, no wheezes, rales or rhonchi, symmetric air entry  Heart - normal rate and regular rhythm, no murmurs noted  Abdomen - soft, nontender, nondistended, no masses or organomegaly  Back exam - full range of motion, no tenderness, palpable spasm or pain on motion  Neurological - alert, oriented, normal speech, no focal findings or movement disorder noted, cranial nerves II through XII intact, DTR's normal and symmetric  Musculoskeletal - no joint tenderness, deformity or swelling  Extremities - peripheral pulses normal, no pedal edema, no clubbing or cyanosis  Skin - normal coloration and turgor, no rashes, no suspicious skin lesions noted      Results for orders placed or performed in visit on 01/13/25 (from the past 24 hours)   Hemoglobin A1c   Result Value Ref Range    Estimated Average Glucose 146 (H) <117 mg/dL    Hemoglobin A1C 6.7 (H) 0.0 - 5.6 %           Signed Electronically by: Trish Eagle MD

## 2025-01-23 DIAGNOSIS — E11.9 TYPE 2 DIABETES MELLITUS WITHOUT COMPLICATION, WITHOUT LONG-TERM CURRENT USE OF INSULIN (H): ICD-10-CM

## 2025-01-23 RX ORDER — METFORMIN HYDROCHLORIDE 500 MG/1
TABLET, EXTENDED RELEASE ORAL
Qty: 360 TABLET | Refills: 0 | Status: SHIPPED | OUTPATIENT
Start: 2025-01-23

## 2025-02-17 ENCOUNTER — PATIENT OUTREACH (OUTPATIENT)
Dept: CARE COORDINATION | Facility: CLINIC | Age: 70
End: 2025-02-17
Payer: MEDICAID

## 2025-03-17 DIAGNOSIS — K59.00 CONSTIPATION, UNSPECIFIED CONSTIPATION TYPE: ICD-10-CM

## 2025-03-18 RX ORDER — DOCUSATE SODIUM, SENNOSIDES 50; 8.6 MG/1; MG/1
1 TABLET ORAL 2 TIMES DAILY
Qty: 180 TABLET | Refills: 0 | Status: SHIPPED | OUTPATIENT
Start: 2025-03-18

## 2025-05-12 ENCOUNTER — VIRTUAL VISIT (OUTPATIENT)
Dept: PHARMACY | Facility: CLINIC | Age: 70
End: 2025-05-12
Payer: COMMERCIAL

## 2025-05-12 DIAGNOSIS — M79.7 FIBROMYALGIA: Primary | ICD-10-CM

## 2025-05-12 DIAGNOSIS — Z78.9 TAKES DIETARY SUPPLEMENTS: ICD-10-CM

## 2025-05-12 DIAGNOSIS — E11.9 TYPE 2 DIABETES MELLITUS WITHOUT COMPLICATION, WITHOUT LONG-TERM CURRENT USE OF INSULIN (H): ICD-10-CM

## 2025-05-12 DIAGNOSIS — E55.9 VITAMIN D DEFICIENCY: ICD-10-CM

## 2025-05-12 DIAGNOSIS — E66.811 OBESITY (BMI 30.0-34.9): ICD-10-CM

## 2025-05-12 DIAGNOSIS — K21.9 GASTROESOPHAGEAL REFLUX DISEASE WITHOUT ESOPHAGITIS: ICD-10-CM

## 2025-05-12 DIAGNOSIS — G43.719 INTRACTABLE CHRONIC MIGRAINE WITHOUT AURA AND WITHOUT STATUS MIGRAINOSUS: ICD-10-CM

## 2025-05-12 DIAGNOSIS — N39.3 FEMALE STRESS INCONTINENCE: ICD-10-CM

## 2025-05-12 DIAGNOSIS — J45.20 MILD INTERMITTENT ASTHMA WITHOUT COMPLICATION: ICD-10-CM

## 2025-05-12 DIAGNOSIS — E78.2 MIXED HYPERLIPIDEMIA: Chronic | ICD-10-CM

## 2025-05-12 DIAGNOSIS — K59.00 CONSTIPATION, UNSPECIFIED CONSTIPATION TYPE: ICD-10-CM

## 2025-05-12 DIAGNOSIS — G89.4 CHRONIC PAIN SYNDROME: ICD-10-CM

## 2025-05-12 PROCEDURE — 99207 PR NO CHARGE LOS: CPT | Mod: 93

## 2025-05-12 RX ORDER — METHOCARBAMOL 500 MG/1
500 TABLET, FILM COATED ORAL 3 TIMES DAILY PRN
Qty: 90 TABLET | Refills: 1 | Status: SHIPPED | OUTPATIENT
Start: 2025-05-12

## 2025-05-12 RX ORDER — LIDOCAINE 4 G/G
1 PATCH TOPICAL DAILY PRN
Qty: 10 PATCH | Refills: 2 | Status: SHIPPED | OUTPATIENT
Start: 2025-05-12

## 2025-05-12 NOTE — Clinical Note
Hi Dr Eagle,  Does Anita need to continue on B12? I'm not seeing info about deficiency or anemia in her chart.   Thanks, Kamla

## 2025-05-12 NOTE — Clinical Note
Hello,   Is there anyone at the [facility] able to help this patient reapply for food assistance (e.g. Veggie Rx or FoodRx. I think she was receiving [employer] before).  Thank you! Kamla Zelaya, PharmD Medication Therapy Management (MTM) Pharmacist

## 2025-05-12 NOTE — PROGRESS NOTES
Medication Therapy Management (MTM) Encounter    ASSESSMENT:                            Medication Adherence/Access: See below for considerations.    Diabetes   /Weight Management   A1c at goal <7%. Limited self monitoring of blood glucose is at goal of fasting  mg/dL. She may benefit from enrolling in Veggie[employer] or FoodRx today.     Hyperlipidemia   LDL at goal <100 on moderate intensity statin.     Asthma   Stable.      Constipation   Recommend she increase frequency of senna and Miralax to help with hard stools and possibly help with her urinary incontinence as well.      GERD    Stable on as needed PPI.      Supplements   Will discuss with PCP if patient still indicated for B12 supplementation.      Chronic Pain/Fibromyalgia/Headaches  I recommended patient still be seen for her recent fall especially as she is still having 10/10 pain though she does not think she will go in. Recommended decreasing duloxetine dose as higher dose was not effective. Recommend switching cyclobenzaprine to methocarbamol with slightly better side effects profile given her dizziness and risk of falls. We did discuss supplements that may help with her arthritis pain.      Vitamin D deficiency  Last vitamin D level normal.     Urge incontinence  I would recommend Myrbetriq or Gemtesa for this patient to avoid additional anticholinergic medications.     PLAN:                            Pain   Stop/finish cyclobenzaprine (muscle relaxer) and then switch to methocarbamol 500mg three times daily as needed for back pain.   Decrease duloxetine back to 60mg once daily.   Turmeric supplements can decrease inflammation and help with joint pain. Glucosamine 500mg-chondroitin 400mg three times daily can help with arthritis pain as well.     Bladder  I will see if any bladder medications are covered by your insurance     Diabetes   Someone will reach out to you about food/produce programs for this year.     Constipation   Increase the frequency  of Miralax and/or senna-docusate to soften your stools. You can try every other day and increase/decrease based on your bowel movements.    B12  I will ask if you need to take B12 still.     Follow-up: Return in 6 weeks (on 2025) for Follow up, with me, using a phone visit.    SUBJECTIVE/OBJECTIVE:                          Anita Duque is a 69 year old female seen for a follow-up visit from 24.       Reason for visit: comprehensive medication review     Allergies/ADRs: Reviewed in chart  Past Medical History: Reviewed in chart  Tobacco: She reports that she has never smoked. She has never been exposed to tobacco smoke. She has never used smokeless tobacco.  Alcohol: none    Medication Adherence/Access: reports no missed doses, but sometimes does not feel like taking all of her pills.     Diabetes   /Weight Management   Metformin XR 2,000mg daily   Mounjaro 15mg weekly  Goal weight is 140 lbs  Constipation improved with increased water intake. Continues using Miralax/senna as needed about once a week  Interested in re-enrolling for food/produce programs.   She has a $80/month voucher at [employer]  Activity limited due to pain.      Blood sugar monitorin time(s) daily; Ranges: (patient reported) Fasting- 129   Eye exam is up to date  Foot exam: due    Hyperlipidemia   Rosuvastatin 10mg once daily   Denies side effects  The ASCVD Risk score (Deven DK, et al., 2019) failed to calculate for the following reasons:    The valid total cholesterol range is 130 to 320 mg/dL     Asthma   Albuterol HFA inhaler as needed  Benzonatate 200mg 2-3 times daily - patient reports as needed for cough  Guaifenesin-codeine suspension as needed for cough  No issues/symptoms reported today.     Constipation   Senna-docusate 8.6-50mg twice daily   Miralax 17g once daily - once every other week.   Patient is having bowel movements every 1 days.   Bowel movements are described as hard.     GERD    Omeprazole  40mg once daily - as needed with spicy food  Patient reports no current symptoms.   Patient feels that current regimen is effective.  The patient does not have a history of GI bleed.     Supplements   B12 once daily as needed - thinks she was started on this for anemia     Chronic Pain/Fibromyalgia/Headaches:   Diclofenac 1% gel   Applying to shoulders, hands, neck, lower back, upper back, and occasionally knees up to 4 times daily.  Diclofenac 1.3% patch every 12 hours as needed   Oxycodone 5mg every 6 hours as needed  Ibuprofen 800mg three times daily as needed   Acetaminophen 1300mg every 8 hours as needed    Gabapentin 600mg 4 times daily   Cyclobenzaprine 10mg twice daily   Topiramate 100mg twice daily (prescribed for headaches)   Headaches on the driveway.   Fell tailbone, flat on back and head.   Duloxetine 90mg once daily - did not feel like dose increase made a difference  Lidocaine 4% patch once daily as needed     She had a fall on her driveway a couple weeks ago from pulling stubborn weed. She fell backwards on tailbone, fell flat on back and hit her head. She did not want to go to ED however reports pain is still 10/10, especially back pain. She reports dizziness was not cause of her fall though she does have cane and walker.     She denies side effects of drowsiness but does continue to feel dizzy at times even after stopping blood pressure medications.     Has tried PT which aggravates her pain.    Vitamin D deficiency  Vitamin D3 1000 units once daily   No issues reported today    Urge incontinence  Patient does not remember what medication she was previously on. Used to follow with gynecology for vaginal wall prolapse. She had a failed surgery for this and pessary? which was not effective. She reports using 5-7 pads per day.       Today's Vitals: There were no vitals taken for this visit.  ----------------    I spent 30 minutes with this patient today. All changes were made via collaborative  practice agreement with Trish Eagle MD.     A summary of these recommendations was mailed to the patient.    Kamla Zelaya, DanaeD  Medication Therapy Management (MTM) Pharmacist    Telemedicine Visit Details  The patient's medications can be safely assessed via a telemedicine encounter.  Type of service:  Telephone visit  Originating Location (pt. Location): Home    Distant Location (provider location):  On-site  Start Time: 12:33 PM  End Time: 1:03 PM     Medication Therapy Recommendations  Chronic pain syndrome   1 Current Medication: cyclobenzaprine (FLEXERIL) 10 MG tablet (Discontinued)   Current Medication Sig: Take 1 tablet by mouth twice daily   Rationale: Unsafe medication for the patient - Adverse medication event - Safety   Recommendation: Change Medication - methocarbamol 500 MG tablet   Status: Accepted per CPA   Identified Date: 5/12/2025 Completed Date: 5/12/2025         Constipation   1 Current Medication: EQ SENNA-S 8.6-50 MG tablet   Current Medication Sig: Take 1 tablet by mouth twice daily   Rationale: Dose too low - Dosage too low - Effectiveness   Recommendation: Increase Frequency   Status: Accepted - no CPA Needed   Identified Date: 5/12/2025 Completed Date: 5/12/2025         Fibromyalgia   1 Current Medication: DULoxetine (CYMBALTA) 30 MG capsule (Discontinued)   Current Medication Sig: Take 1 capsule (30 mg) by mouth daily. Take with 60mg capsule for a total daily dose of 90mg.   Rationale: Dose too high - Dosage too high - Safety   Recommendation: Decrease Dose   Status: Accepted per CPA   Identified Date: 5/12/2025 Completed Date: 5/12/2025         Takes dietary supplements   1 Current Medication: cyanocobalamin, vitamin B-12, (VITAMIN B-12 ORAL)   Current Medication Sig: Take 1 tablet by mouth as needed.   Rationale: No medical indication at this time - Unnecessary medication therapy - Indication   Recommendation: Discontinue Medication   Status: Contact Provider - Awaiting Response    Identified Date: 5/12/2025

## 2025-05-12 NOTE — PATIENT INSTRUCTIONS
"Recommendations from today's MTM visit:                                                       Pain   Stop/finish cyclobenzaprine (muscle relaxer) and then switch to methocarbamol 500mg three times daily as needed for back pain.   Decrease duloxetine back to 60mg once daily.   Turmeric supplements can decrease inflammation and help with joint pain. Glucosamine 500mg-chondroitin 400mg three times daily can help with arthritis pain as well.     Bladder  I will see if any bladder medications are covered by your insurance     Diabetes   Someone will reach out to you about food/produce programs for this year.     Constipation   Increase the frequency of Miralax and/or senna-docusate to soften your stools. You can try every other day and increase/decrease based on your bowel movements.    B12  I will ask if you need to take B12 still.     Follow-up: Return in 6 weeks (on 6/26/2025) for Follow up, with me, using a phone visit.    It was great speaking with you today.  I value your experience and would be very thankful for your time in providing feedback in our clinic survey. In the next few days, you may receive an email or text message from [employer] with a link to a survey related to your  clinical pharmacist.\"     To schedule another MTM appointment, please call the clinic directly or you may call the MTM scheduling line at [phone].    My Clinical Pharmacist's contact information:                                                      Please feel free to contact me with any questions or concerns you have.      Kamla Zelaya, PharmD  Medication Therapy Management (MTM) Pharmacist   "

## 2025-05-13 ENCOUNTER — TRANSFERRED RECORDS (OUTPATIENT)
Dept: HEALTH INFORMATION MANAGEMENT | Facility: CLINIC | Age: 70
End: 2025-05-13
Payer: MEDICAID

## 2025-05-17 ENCOUNTER — HEALTH MAINTENANCE LETTER (OUTPATIENT)
Age: 70
End: 2025-05-17

## 2025-05-29 DIAGNOSIS — M79.7 FIBROMYALGIA: ICD-10-CM

## 2025-05-29 DIAGNOSIS — E11.9 TYPE 2 DIABETES MELLITUS WITHOUT COMPLICATION, WITHOUT LONG-TERM CURRENT USE OF INSULIN (H): ICD-10-CM

## 2025-05-29 DIAGNOSIS — G89.4 CHRONIC PAIN SYNDROME: Chronic | ICD-10-CM

## 2025-05-29 RX ORDER — DULOXETIN HYDROCHLORIDE 60 MG/1
CAPSULE, DELAYED RELEASE ORAL
Qty: 90 CAPSULE | Refills: 1 | Status: SHIPPED | OUTPATIENT
Start: 2025-05-29

## 2025-05-29 RX ORDER — METFORMIN HYDROCHLORIDE 500 MG/1
TABLET, EXTENDED RELEASE ORAL
Qty: 360 TABLET | Refills: 1 | Status: SHIPPED | OUTPATIENT
Start: 2025-05-29

## 2025-05-29 RX ORDER — DULOXETIN HYDROCHLORIDE 30 MG/1
CAPSULE, DELAYED RELEASE ORAL
Qty: 90 CAPSULE | Refills: 0 | OUTPATIENT
Start: 2025-05-29

## 2025-06-02 ENCOUNTER — PATIENT OUTREACH (OUTPATIENT)
Dept: CARE COORDINATION | Facility: CLINIC | Age: 70
End: 2025-06-02
Payer: MEDICAID

## 2025-06-16 DIAGNOSIS — M79.7 FIBROMYALGIA: ICD-10-CM

## 2025-06-16 RX ORDER — DULOXETIN HYDROCHLORIDE 30 MG/1
CAPSULE, DELAYED RELEASE ORAL
Qty: 90 CAPSULE | Refills: 0 | OUTPATIENT
Start: 2025-06-16

## 2025-06-20 ENCOUNTER — TRANSFERRED RECORDS (OUTPATIENT)
Dept: HEALTH INFORMATION MANAGEMENT | Facility: CLINIC | Age: 70
End: 2025-06-20
Payer: MEDICAID

## 2025-06-26 ENCOUNTER — VIRTUAL VISIT (OUTPATIENT)
Dept: PHARMACY | Facility: CLINIC | Age: 70
End: 2025-06-26
Payer: COMMERCIAL

## 2025-06-26 DIAGNOSIS — M79.7 FIBROMYALGIA: ICD-10-CM

## 2025-06-26 DIAGNOSIS — G89.4 CHRONIC PAIN SYNDROME: ICD-10-CM

## 2025-06-26 DIAGNOSIS — N39.3 FEMALE STRESS INCONTINENCE: Primary | ICD-10-CM

## 2025-06-26 DIAGNOSIS — K59.00 CONSTIPATION, UNSPECIFIED CONSTIPATION TYPE: ICD-10-CM

## 2025-06-26 DIAGNOSIS — G43.719 INTRACTABLE CHRONIC MIGRAINE WITHOUT AURA AND WITHOUT STATUS MIGRAINOSUS: ICD-10-CM

## 2025-06-26 DIAGNOSIS — Z78.9 TAKES DIETARY SUPPLEMENTS: ICD-10-CM

## 2025-06-26 RX ORDER — MIRABEGRON 25 MG/1
25 TABLET, FILM COATED, EXTENDED RELEASE ORAL DAILY
Qty: 30 TABLET | Refills: 1 | Status: SHIPPED | OUTPATIENT
Start: 2025-06-26

## 2025-06-26 NOTE — PATIENT INSTRUCTIONS
"Recommendations from today's MTM visit:                                                         Joint pain  Turmeric supplements can decrease inflammation and help with joint pain. Glucosamine 500mg-chondroitin 400mg three times daily can help with arthritis pain as well.      Bladder  Start Myrbetriq (mirabegron) 25mg once daily for incontinence. Stop the medication if you have any symptoms of severe allergic reaction (swelling of face/tongue/throat) or heart palpitations.    Schedule a follow-up appointment with Dr. Eagle this summer.     Follow-up: Return in 8 weeks (on 8/21/2025) for Follow up, using a phone visit.    It was great speaking with you today.  I value your experience and would be very thankful for your time in providing feedback in our clinic survey. In the next few days, you may receive an email or text message from [employer] with a link to a survey related to your  clinical pharmacist.\"     To schedule another MTM appointment, please call the clinic directly or you may call the MTM scheduling line at [phone].    My Clinical Pharmacist's contact information:                                                      Please feel free to contact me with any questions or concerns you have.      Kamla Zelaya, PharmD  Medication Therapy Management (MTM) Pharmacist   "

## 2025-06-26 NOTE — PROGRESS NOTES
Medication Therapy Management (MTM) Encounter    ASSESSMENT:                            Medication Adherence/Access: No issues identified.    Constipation   Improved.      Chronic Pain/Fibromyalgia/Headaches  Side effects improved with methocarbamol. Consider dose increase if further pain control is needed. She can also try turmeric as we have discussed before.      Supplements   Due for recheck this summer. If levels are normal, can discontinue.      Urge incontinence  Recommend starting a beta-3 agonist for incontinence. Counseled on medication side effects.     PLAN:                            Joint pain  Turmeric supplements can decrease inflammation and help with joint pain. Glucosamine 500mg-chondroitin 400mg three times daily can help with arthritis pain as well.      Bladder  Start Myrbetriq (mirabegron) 25mg once daily for incontinence. Stop the medication if you have any symptoms of severe allergic reaction (swelling of face/tongue/throat) or heart palpitations.    Schedule a follow-up appointment with Dr. Eagle this summer.     Follow-up: Return in 8 weeks (on 8/21/2025) for Follow up, using a phone visit.    SUBJECTIVE/OBJECTIVE:                          Anita Duque is a 70 year old female seen for a follow-up visit from 5/12/25.       Reason for visit: follow-up     Allergies/ADRs: Reviewed in chart  Past Medical History: Reviewed in chart  Tobacco: She reports that she has never smoked. She has never been exposed to tobacco smoke. She has never used smokeless tobacco.  Alcohol: none    Medication Adherence/Access: Does use cane and walker    Constipation   Senna-docusate 8.6-50mg twice daily   Miralax 17g once daily   Patient is having bowel movements every 1 days.   Bowel movement are improved with scheduled Miralax and senna as needed      Pain   Chronic Pain/Fibromyalgia/Headaches:   Diclofenac 1% gel   Applying to shoulders, hands, neck, lower back, upper back, and occasionally knees up to 4  times daily.  Diclofenac 1.3% patch every 12 hours as needed   Oxycodone 5mg every 6 hours as needed  Ibuprofen 800mg three times daily as needed   Acetaminophen 1300mg every 8 hours as needed    Gabapentin 600mg 4 times daily   Methocarbamol 500mg three times daily as needed   Topiramate 100mg twice daily (prescribed for headaches)   Headaches on the driveway.   Fell tailbone, flat on back and head.   Duloxetine 60mg once daily   Lidocaine 4% patch once daily as needed      Pain mostly unchanged since switching from cyclobenzaprine to methocarbamol and decreasing duloxetine though she reports falls/dizziness are much better.     Has tried PT which aggravates her pain.     Supplements   B12 once daily as needed - discontinued this and planning to recheck next month  She reports no history of bariatric surgery     Urge incontinence  Patient does not remember what medication she was previously on. Used to follow with gynecology for vaginal wall prolapse. She had a failed surgery for this and pessary? which was not effective. She reports using 5-7 pads per day.     Today's Vitals: There were no vitals taken for this visit.  ----------------    I spent 8 minutes with this patient today. All changes were made via collaborative practice agreement with Trish Eagle MD.     A summary of these recommendations was sent via [employer].    Danae TorresD  Medication Therapy Management (MTM) Pharmacist    Telemedicine Visit Details  The patient's medications can be safely assessed via a telemedicine encounter.  Type of service:  Telephone visit  Originating Location (pt. Location): Home    Distant Location (provider location):  On-site  Start Time: 12:32 PM  End Time: 12:40 PM     Medication Therapy Recommendations  Female stress incontinence   1 Rationale: Untreated condition - Needs additional medication therapy - Indication   Recommendation: Start Medication - Myrbetriq 25 MG Tb24   Status: Accepted per CPA   Identified  Date: 6/26/2025 Completed Date: 6/26/2025         Takes dietary supplements   1 Current Medication: cyanocobalamin, vitamin B-12, (VITAMIN B-12 ORAL)   Current Medication Sig: Take 1 tablet by mouth as needed.   Rationale: No medical indication at this time - Unnecessary medication therapy - Indication   Recommendation: Discontinue Medication   Status: Accepted per Provider   Identified Date: 5/12/2025 Completed Date: 6/26/2025

## 2025-07-05 DIAGNOSIS — K59.00 CONSTIPATION, UNSPECIFIED CONSTIPATION TYPE: ICD-10-CM

## 2025-07-07 RX ORDER — SODIUM PHOSPHATE, DIBASIC, UNSPECIFIED FORM AND SODIUM PHOSPHATE, MONOBASIC, UNSPECIFIED FORM 7; 19 G/118ML; G/118ML
1 ENEMA RECTAL 2 TIMES DAILY
Qty: 180 TABLET | Refills: 1 | Status: SHIPPED | OUTPATIENT
Start: 2025-07-07

## 2025-07-19 ENCOUNTER — HEALTH MAINTENANCE LETTER (OUTPATIENT)
Age: 70
End: 2025-07-19

## 2025-07-22 ENCOUNTER — OFFICE VISIT (OUTPATIENT)
Dept: FAMILY MEDICINE | Facility: CLINIC | Age: 70
End: 2025-07-22
Payer: COMMERCIAL

## 2025-07-22 VITALS
OXYGEN SATURATION: 98 % | HEART RATE: 79 BPM | HEIGHT: 63 IN | BODY MASS INDEX: 28.35 KG/M2 | WEIGHT: 160 LBS | DIASTOLIC BLOOD PRESSURE: 63 MMHG | RESPIRATION RATE: 16 BRPM | TEMPERATURE: 98.1 F | SYSTOLIC BLOOD PRESSURE: 91 MMHG

## 2025-07-22 DIAGNOSIS — Z79.899 CONTROLLED SUBSTANCE AGREEMENT SIGNED: ICD-10-CM

## 2025-07-22 DIAGNOSIS — J45.30 MILD PERSISTENT ASTHMA WITHOUT COMPLICATION: Chronic | ICD-10-CM

## 2025-07-22 DIAGNOSIS — Z12.31 VISIT FOR SCREENING MAMMOGRAM: ICD-10-CM

## 2025-07-22 DIAGNOSIS — E78.2 MIXED HYPERLIPIDEMIA: Chronic | ICD-10-CM

## 2025-07-22 DIAGNOSIS — M12.811 ROTATOR CUFF TEAR ARTHROPATHY OF BOTH SHOULDERS: ICD-10-CM

## 2025-07-22 DIAGNOSIS — M75.102 ROTATOR CUFF TEAR ARTHROPATHY OF BOTH SHOULDERS: ICD-10-CM

## 2025-07-22 DIAGNOSIS — I10 ESSENTIAL HYPERTENSION: Chronic | ICD-10-CM

## 2025-07-22 DIAGNOSIS — E11.319 TYPE 2 DIABETES MELLITUS WITH RETINOPATHY, WITHOUT LONG-TERM CURRENT USE OF INSULIN, MACULAR EDEMA PRESENCE UNSPECIFIED, UNSPECIFIED LATERALITY, UNSPECIFIED RETINOPATHY SEVERITY (H): Primary | Chronic | ICD-10-CM

## 2025-07-22 DIAGNOSIS — Z78.9 TAKES DIETARY SUPPLEMENTS: ICD-10-CM

## 2025-07-22 DIAGNOSIS — M75.101 ROTATOR CUFF TEAR ARTHROPATHY OF BOTH SHOULDERS: ICD-10-CM

## 2025-07-22 DIAGNOSIS — M12.812 ROTATOR CUFF TEAR ARTHROPATHY OF BOTH SHOULDERS: ICD-10-CM

## 2025-07-22 DIAGNOSIS — G89.4 CHRONIC PAIN SYNDROME: Chronic | ICD-10-CM

## 2025-07-22 LAB
AMPHETAMINES UR QL SCN: NORMAL
ANION GAP SERPL CALCULATED.3IONS-SCNC: 13 MMOL/L (ref 7–15)
BARBITURATES UR QL SCN: NORMAL
BENZODIAZ UR QL SCN: NORMAL
BUN SERPL-MCNC: 21.4 MG/DL (ref 8–23)
BZE UR QL SCN: NORMAL
CALCIUM SERPL-MCNC: 10.2 MG/DL (ref 8.8–10.4)
CANNABINOIDS UR QL SCN: NORMAL
CHLORIDE SERPL-SCNC: 102 MMOL/L (ref 98–107)
CHOLEST SERPL-MCNC: 131 MG/DL
CREAT SERPL-MCNC: 0.93 MG/DL (ref 0.51–0.95)
EGFRCR SERPLBLD CKD-EPI 2021: 65 ML/MIN/1.73M2
EST. AVERAGE GLUCOSE BLD GHB EST-MCNC: 128 MG/DL
FASTING STATUS PATIENT QL REPORTED: NO
FASTING STATUS PATIENT QL REPORTED: NO
FENTANYL UR QL: NORMAL
GLUCOSE SERPL-MCNC: 125 MG/DL (ref 70–99)
HBA1C MFR BLD: 6.1 % (ref 0–5.6)
HCO3 SERPL-SCNC: 24 MMOL/L (ref 22–29)
HDLC SERPL-MCNC: 76 MG/DL
LDLC SERPL CALC-MCNC: 43 MG/DL
NONHDLC SERPL-MCNC: 55 MG/DL
OPIATES UR QL SCN: NORMAL
PCP QUAL URINE (ROCHE): NORMAL
POTASSIUM SERPL-SCNC: 4.6 MMOL/L (ref 3.4–5.3)
SODIUM SERPL-SCNC: 139 MMOL/L (ref 135–145)
TRIGL SERPL-MCNC: 60 MG/DL
VIT B12 SERPL-MCNC: 792 PG/ML (ref 232–1245)

## 2025-07-22 PROCEDURE — 82607 VITAMIN B-12: CPT | Performed by: FAMILY MEDICINE

## 2025-07-22 PROCEDURE — 80061 LIPID PANEL: CPT | Performed by: FAMILY MEDICINE

## 2025-07-22 PROCEDURE — 83036 HEMOGLOBIN GLYCOSYLATED A1C: CPT | Performed by: FAMILY MEDICINE

## 2025-07-22 PROCEDURE — 36415 COLL VENOUS BLD VENIPUNCTURE: CPT | Performed by: FAMILY MEDICINE

## 2025-07-22 PROCEDURE — 80048 BASIC METABOLIC PNL TOTAL CA: CPT | Performed by: FAMILY MEDICINE

## 2025-07-22 PROCEDURE — 80307 DRUG TEST PRSMV CHEM ANLYZR: CPT | Performed by: FAMILY MEDICINE

## 2025-07-22 RX ORDER — MELOXICAM 15 MG/1
TABLET ORAL
COMMUNITY
Start: 2025-05-13

## 2025-07-22 RX ORDER — OXYCODONE HYDROCHLORIDE 5 MG/1
5 TABLET ORAL EVERY 6 HOURS PRN
Qty: 90 TABLET | Refills: 0 | Status: SHIPPED | OUTPATIENT
Start: 2025-07-22

## 2025-07-22 ASSESSMENT — ANXIETY QUESTIONNAIRES
4. TROUBLE RELAXING: NOT AT ALL
6. BECOMING EASILY ANNOYED OR IRRITABLE: NOT AT ALL
7. FEELING AFRAID AS IF SOMETHING AWFUL MIGHT HAPPEN: NOT AT ALL
GAD7 TOTAL SCORE: 1
GAD7 TOTAL SCORE: 1
8. IF YOU CHECKED OFF ANY PROBLEMS, HOW DIFFICULT HAVE THESE MADE IT FOR YOU TO DO YOUR WORK, TAKE CARE OF THINGS AT HOME, OR GET ALONG WITH OTHER PEOPLE?: NOT DIFFICULT AT ALL
GAD7 TOTAL SCORE: 1
3. WORRYING TOO MUCH ABOUT DIFFERENT THINGS: SEVERAL DAYS
5. BEING SO RESTLESS THAT IT IS HARD TO SIT STILL: NOT AT ALL
7. FEELING AFRAID AS IF SOMETHING AWFUL MIGHT HAPPEN: NOT AT ALL
2. NOT BEING ABLE TO STOP OR CONTROL WORRYING: NOT AT ALL
IF YOU CHECKED OFF ANY PROBLEMS ON THIS QUESTIONNAIRE, HOW DIFFICULT HAVE THESE PROBLEMS MADE IT FOR YOU TO DO YOUR WORK, TAKE CARE OF THINGS AT HOME, OR GET ALONG WITH OTHER PEOPLE: NOT DIFFICULT AT ALL
1. FEELING NERVOUS, ANXIOUS, OR ON EDGE: NOT AT ALL

## 2025-07-22 ASSESSMENT — ASTHMA QUESTIONNAIRES
ACT_TOTALSCORE: 24
QUESTION_2 LAST FOUR WEEKS HOW OFTEN HAVE YOU HAD SHORTNESS OF BREATH: NOT AT ALL
QUESTION_4 LAST FOUR WEEKS HOW OFTEN HAVE YOU USED YOUR RESCUE INHALER OR NEBULIZER MEDICATION (SUCH AS ALBUTEROL): NOT AT ALL
QUESTION_3 LAST FOUR WEEKS HOW OFTEN DID YOUR ASTHMA SYMPTOMS (WHEEZING, COUGHING, SHORTNESS OF BREATH, CHEST TIGHTNESS OR PAIN) WAKE YOU UP AT NIGHT OR EARLIER THAN USUAL IN THE MORNING: NOT AT ALL
QUESTION_5 LAST FOUR WEEKS HOW WOULD YOU RATE YOUR ASTHMA CONTROL: WELL CONTROLLED
QUESTION_1 LAST FOUR WEEKS HOW MUCH OF THE TIME DID YOUR ASTHMA KEEP YOU FROM GETTING AS MUCH DONE AT WORK, SCHOOL OR AT HOME: NONE OF THE TIME

## 2025-07-22 ASSESSMENT — PAIN SCALES - GENERAL: PAINLEVEL_OUTOF10: MODERATE PAIN (5)

## 2025-07-22 NOTE — PROGRESS NOTES
"  {PROVIDER CHARTING PREFERENCE:056390}    Willem Howard is a 70 year old, presenting for the following health issues:  Diabetes      7/22/2025    11:18 AM   Additional Questions   Roomed by vasile tran cma     History of Present Illness       Back Pain:  She presents for follow up of back pain. Patient's back pain is a chronic problem.  Location of back pain:  Right lower back, left lower back, right middle of back, right side of neck, left side of neck, right shoulder, left shoulder, right buttock and right hip  Description of back pain: dull ache  Back pain spreads: nowhere    Since patient first noticed back pain, pain is: always present, but gets better and worse  Does back pain interfere with her job:  Not applicable       Diabetes:   She presents for follow up of diabetes.  She is checking home blood glucose a few times a month.   She checks blood glucose before meals.  Blood glucose is never over 200 and never under 70. She is aware of hypoglycemia symptoms including shakiness, dizziness and weakness.    She has no concerns regarding her diabetes at this time.   She is not experiencing numbness or burning in feet, excessive thirst, blurry vision, weight changes or redness, sores or blisters on feet.           Headaches:   Since the patient's last clinic visit, headaches are: no change  The patient is getting headaches:  2 weekly  She is not able to do normal daily activities when she has a migraine.  The patient is taking the following rescue/relief medications:  Tylenol   Patient states \"I get some relief\" from the rescue/relief medications.   The patient is taking the following medications to prevent migraines:  No medications to prevent migraines  In the past 4 weeks, the patient has gone to an Urgent Care or Emergency Room 0 times times due to headaches.    She eats 0-1 servings of fruits and vegetables daily.She consumes 0 sweetened beverage(s) daily.She exercises with enough effort to increase her " "heart rate 9 or less minutes per day.  She exercises with enough effort to increase her heart rate 3 or less days per week. She is missing 1 dose(s) of medications per week.        {MA/LPN/RN Pre-Provider Visit Orders- hCG/UA/Strep (Optional):188618}  {SUPERLIST (Optional):772283}  {additonal problems for provider to add (Optional):921663}    {ROS Picklists (Optional):133034}      Objective    BP 91/63 (BP Location: Right arm, Patient Position: Sitting, Cuff Size: Adult Regular)   Pulse 79   Temp 98.1  F (36.7  C)   Resp 16   Ht 1.6 m (5' 3\")   Wt 72.6 kg (160 lb)   SpO2 98%   BMI 28.34 kg/m    Body mass index is 28.34 kg/m .  Physical Exam   {Exam List (Optional):557220}    {Diagnostic Test Results (Optional):390250}        Signed Electronically by: Trish Eagle MD  {Email feedback regarding this note to [email or web address]   :[id number]}  "

## 2025-07-22 NOTE — LETTER
My Asthma Action Plan    Name: Anita Duque   YOB: 1955  Date: 7/22/2025   My doctor: Trish Eagle MD   My clinic: [facility]        My Control Medicine: Fluticasone propionate (Flovent HFA) - 44 mcg 1 puff daily PRN  My Rescue Medicine: Albuterol (Proair/Ventolin/Proventil HFA) 2-4 puffs EVERY 4 HOURS as needed. Use a spacer if recommended by your provider.  My Oral Steroid Medicine: prednisone My Asthma Severity:   Mild Persistent  Know your asthma triggers: smoke, pollens, mold, and exercise or sports  upper respiratory infections  humidity            GREEN ZONE   Good Control  I feel good  No cough or wheeze  Can work, sleep and play without asthma symptoms       Take your asthma control medicine every day.     If exercise triggers your asthma, take your rescue medication  15 minutes before exercise or sports, and  During exercise if you have asthma symptoms  Spacer to use with inhaler: If you have a spacer, make sure to use it with your inhaler             YELLOW ZONE Getting Worse  I have ANY of these:  I do not feel good  Cough or wheeze  Chest feels tight  Wake up at night   Keep taking your Green Zone medications  Start taking your rescue medicine:  every 20 minutes for up to 1 hour. Then every 4 hours for 24-48 hours.  If you stay in the Yellow Zone for more than 12-24 hours, contact your doctor.  If you do not return to the Green Zone in 12-24 hours or you get worse, start taking your oral steroid medicine if prescribed by your provider.           RED ZONE Medical Alert - Get Help  I have ANY of these:  I feel awful  Medicine is not helping  Breathing getting harder  Trouble walking or talking  Nose opens wide to breathe       Take your rescue medicine NOW  If your provider has prescribed an oral steroid medicine, start taking it NOW  Call your doctor NOW  If you are still in the Red Zone after 20 minutes and you have not reached your doctor:  Take  your rescue medicine again and  Call 911 or go to the emergency room right away    See your regular doctor within 2 weeks of an Emergency Room or Urgent Care visit for follow-up treatment.          Annual Reminders:  Meet with Asthma Educator,  Flu Shot in the Fall, consider Pneumonia Vaccination for patients with asthma (aged 19 and older).    Pharmacy:    [facility] [address] [place] [address] [address] [facility] [place], [place] - [address] [facility]    Electronically signed by Trish Eagle MD   Date: 07/22/25                      Asthma Triggers  How To Control Things That Make Your Asthma Worse    Triggers are things that make your asthma worse.  Look at the list below to help you find your triggers and what you can do about them.  You can help prevent asthma flare-ups by staying away from your triggers.      Trigger                                                          What you can do   Cigarette Smoke  Tobacco smoke can make asthma worse. Do not allow smoking in your home, car or around you.  Be sure no one smokes at a child s day care or school.  If you smoke, ask your health care provider for ways to help you quit.  Ask family members to quit too.  Ask your health care provider for a referral to Quit Plan to help you quit smoking, or call 7-199-116-PLAN.     Colds, Flu, Bronchitis  These are common triggers of asthma. Wash your hands often.  Don t touch your eyes, nose or mouth.  Get a flu shot every year.     Dust Mites  These are tiny bugs that live in cloth or carpet. They are too small to see. Wash sheets and blankets in hot water every week.   Encase pillows and mattress in dust mite proof covers.  Avoid having carpet if you can. If you have carpet, vacuum weekly.   Use a dust mask and HEPA vacuum.   Pollen and Outdoor Mold  Some people are allergic to trees, grass, or weed pollen, or molds. Try to keep your windows closed.  Limit time out doors when  pollen count is high.   Ask you health care provider about taking medicine during allergy season.     Animal Dander  Some people are allergic to skin flakes, urine or saliva from pets with fur or feathers. Keep pets with fur or feathers out of your home.    If you can t keep the pet outdoors, then keep the pet out of your bedroom.  Keep the bedroom door closed.  Keep pets off cloth furniture and away from stuffed toys.     Mice, Rats, and Cockroaches   Some people are allergic to the waste from these pests.   Cover food and garbage.  Clean up spills and food crumbs.  Store grease in the refrigerator.   Keep food out of the bedroom.   Indoor Mold  This can be a trigger if your home has high moisture. Fix leaking faucets, pipes, or other sources of water.   Clean moldy surfaces.  Dehumidify basement if it is damp and smelly.   Smoke, Strong Odors, and Sprays  These can reduce air quality. Stay away from strong odors and sprays, such as perfume, powder, hair spray, paints, smoke incense, paint, cleaning products, candles and new carpet.   Exercise or Sports  Some people with asthma have this trigger. Be active!  Ask your doctor about taking medicine before sports or exercise to prevent symptoms.    Warm up for 5-10 minutes before and after sports or exercise.     Other Triggers of Asthma  Cold air:  Cover your nose and mouth with a scarf.  Sometimes laughing or crying can be a trigger.  Some medicines and food can trigger asthma.

## 2025-07-23 ENCOUNTER — RESULTS FOLLOW-UP (OUTPATIENT)
Dept: PHARMACY | Facility: CLINIC | Age: 70
End: 2025-07-23
Payer: COMMERCIAL

## 2025-07-23 ENCOUNTER — PATIENT OUTREACH (OUTPATIENT)
Dept: CARE COORDINATION | Facility: CLINIC | Age: 70
End: 2025-07-23
Payer: COMMERCIAL

## 2025-07-23 NOTE — LETTER
July 24, 2025      Anita Duque  [address] [place] [address]        Dear ,    We are writing to inform you of your test results.    Your lab results look good.  See you in no longer than 6 months.    Resulted Orders   Vitamin B12   Result Value Ref Range    Vitamin B12 792 232 - 1,245 pg/mL   Hemoglobin A1c   Result Value Ref Range    Estimated Average Glucose 128 (H) <117 mg/dL    Hemoglobin A1C 6.1 (H) 0.0 - 5.6 %      Comment:      Normal <5.7%   Prediabetes 5.7-6.4%    Diabetes 6.5% or higher     Note: Adopted from ADA consensus guidelines.   Basic metabolic panel  (Ca, Cl, CO2, Creat, Gluc, K, Na, BUN)   Result Value Ref Range    Sodium 139 135 - 145 mmol/L    Potassium 4.6 3.4 - 5.3 mmol/L    Chloride 102 98 - 107 mmol/L    Carbon Dioxide (CO2) 24 22 - 29 mmol/L    Anion Gap 13 7 - 15 mmol/L    Urea Nitrogen 21.4 8.0 - 23.0 mg/dL    Creatinine 0.93 0.51 - 0.95 mg/dL    GFR Estimate 65 >60 mL/min/1.73m2      Comment:      eGFR calculated using 2021 CKD-EPI equation.    Calcium 10.2 8.8 - 10.4 mg/dL    Glucose 125 (H) 70 - 99 mg/dL    Patient Fasting > 8hrs? No    Lipid panel reflex to direct LDL Non-fasting   Result Value Ref Range    Cholesterol 131 <200 mg/dL    Triglycerides 60 <150 mg/dL    Direct Measure HDL 76 >=50 mg/dL    LDL Cholesterol Calculated 43 <100 mg/dL      Comment:      LDL calculated using the Friedewald equation.    Non HDL Cholesterol 55 <130 mg/dL    Patient Fasting > 8hrs? No     Narrative    Cholesterol  Desirable: < 200 mg/dL  Borderline High: 200 - 239 mg/dL  High: >= 240 mg/dL    Triglycerides  Normal: < 150 mg/dL  Borderline High: 150 - 199 mg/dL  High: 200-499 mg/dL  Very High: >= 500 mg/dL    Direct Measure HDL  Female: >= 50 mg/dL   Male: >= 40 mg/dL    LDL Cholesterol  Desirable: < 100 mg/dL  Above Desirable: 100 - 129 mg/dL   Borderline High: 130 - 159 mg/dL   High:  160 - 189 mg/dL   Very High: >= 190 mg/dL    Non HDL Cholesterol  Desirable: < 130  mg/dL  Above Desirable: 130 - 159 mg/dL  Borderline High: 160 - 189 mg/dL  High: 190 - 219 mg/dL  Very High: >= 220 mg/dL   Urine Drug Screen Panel   Result Value Ref Range    Amphetamines Urine Screen Negative Screen Negative      Comment:      Cutoff for a negative amphetamine is less than 500 ng/mL.    Barbituates Urine Screen Negative Screen Negative      Comment:      Cutoff for a negative barbiturate is less than 200 ng/mL.    Benzodiazepine Urine Screen Negative Screen Negative      Comment:      Cutoff for a negative benzodiazepine is less than 100 ng/mL.    Cannabinoids Urine Screen Negative Screen Negative      Comment:      Cutoff for a negative cannabinoid is less than 50 ng/mL.    Cocaine Urine Screen Negative Screen Negative      Comment:      Cutoff for a negative cocaine is less than 300 ng/mL.    Fentanyl Qual Urine Screen Negative Screen Negative      Comment:      Cutoff for negative fentanyl is less than 5 ng/mL.    Opiates Urine Screen Negative Screen Negative      Comment:      Cutoff for a negative opiate is less than 300 ng/mL.    PCP Urine Screen Negative Screen Negative      Comment:      Cutoff for a negative PCP is less than 25 ng/mL.       If you have any questions or concerns, please call the clinic at the number listed above.       Sincerely,      Trish Eagle MD    Electronically signed

## 2025-07-25 ENCOUNTER — HOSPITAL ENCOUNTER (OUTPATIENT)
Dept: MAMMOGRAPHY | Facility: CLINIC | Age: 70
Discharge: HOME | End: 2025-07-25
Attending: FAMILY MEDICINE | Admitting: FAMILY MEDICINE
Payer: COMMERCIAL

## 2025-07-25 DIAGNOSIS — Z12.31 VISIT FOR SCREENING MAMMOGRAM: ICD-10-CM

## 2025-07-25 PROCEDURE — 77067 SCR MAMMO BI INCL CAD: CPT

## 2025-08-04 DIAGNOSIS — G89.29 CHRONIC MIDLINE LOW BACK PAIN, UNSPECIFIED WHETHER SCIATICA PRESENT: ICD-10-CM

## 2025-08-04 DIAGNOSIS — M54.50 CHRONIC MIDLINE LOW BACK PAIN, UNSPECIFIED WHETHER SCIATICA PRESENT: ICD-10-CM

## 2025-08-04 DIAGNOSIS — G89.29 CHRONIC MIDLINE LOW BACK PAIN, UNSPECIFIED WHETHER SCIATICA PRESENT: Primary | ICD-10-CM

## 2025-08-04 DIAGNOSIS — M54.50 CHRONIC MIDLINE LOW BACK PAIN, UNSPECIFIED WHETHER SCIATICA PRESENT: Primary | ICD-10-CM

## 2025-08-09 ENCOUNTER — HEALTH MAINTENANCE LETTER (OUTPATIENT)
Age: 70
End: 2025-08-09

## 2025-08-14 DIAGNOSIS — G89.4 CHRONIC PAIN SYNDROME: Primary | Chronic | ICD-10-CM

## 2025-08-14 RX ORDER — GABAPENTIN 300 MG/1
CAPSULE ORAL
Qty: 240 CAPSULE | Refills: 0 | Status: SHIPPED | OUTPATIENT
Start: 2025-08-14

## 2025-08-16 DIAGNOSIS — E11.9 TYPE 2 DIABETES MELLITUS WITHOUT COMPLICATION, WITHOUT LONG-TERM CURRENT USE OF INSULIN (H): ICD-10-CM

## 2025-08-18 RX ORDER — TIRZEPATIDE 15 MG/.5ML
INJECTION, SOLUTION SUBCUTANEOUS
Qty: 4 ML | Refills: 1 | Status: SHIPPED | OUTPATIENT
Start: 2025-08-18

## 2025-08-21 ENCOUNTER — VIRTUAL VISIT (OUTPATIENT)
Dept: PHARMACY | Facility: CLINIC | Age: 70
End: 2025-08-21
Payer: COMMERCIAL

## 2025-08-21 DIAGNOSIS — E78.2 MIXED HYPERLIPIDEMIA: ICD-10-CM

## 2025-08-21 DIAGNOSIS — E66.811 OBESITY (BMI 30.0-34.9): ICD-10-CM

## 2025-08-21 DIAGNOSIS — N39.3 FEMALE STRESS INCONTINENCE: ICD-10-CM

## 2025-08-21 DIAGNOSIS — K59.00 CONSTIPATION, UNSPECIFIED CONSTIPATION TYPE: ICD-10-CM

## 2025-08-21 DIAGNOSIS — E11.9 TYPE 2 DIABETES MELLITUS WITHOUT COMPLICATION, WITHOUT LONG-TERM CURRENT USE OF INSULIN (H): Primary | ICD-10-CM

## 2025-08-21 RX ORDER — MIRABEGRON 50 MG/1
50 TABLET, FILM COATED, EXTENDED RELEASE ORAL DAILY
Qty: 90 TABLET | Refills: 3 | Status: SHIPPED | OUTPATIENT
Start: 2025-08-21